# Patient Record
Sex: FEMALE | Race: BLACK OR AFRICAN AMERICAN | NOT HISPANIC OR LATINO | Employment: OTHER | URBAN - METROPOLITAN AREA
[De-identification: names, ages, dates, MRNs, and addresses within clinical notes are randomized per-mention and may not be internally consistent; named-entity substitution may affect disease eponyms.]

---

## 2017-02-01 ENCOUNTER — HISTORICAL (OUTPATIENT)
Dept: ENDOSCOPY | Facility: HOSPITAL | Age: 77
End: 2017-02-01

## 2017-03-06 ENCOUNTER — HISTORICAL (OUTPATIENT)
Dept: ADMINISTRATIVE | Facility: HOSPITAL | Age: 77
End: 2017-03-06

## 2017-09-07 ENCOUNTER — HISTORICAL (OUTPATIENT)
Dept: ADMINISTRATIVE | Facility: HOSPITAL | Age: 77
End: 2017-09-07

## 2017-09-07 LAB
APPEARANCE, UA: CLEAR
BACTERIA SPEC CULT: ABNORMAL /HPF
BILIRUB UR QL STRIP: NEGATIVE
COLOR UR: YELLOW
GLUCOSE (UA): ABNORMAL
HGB UR QL STRIP: ABNORMAL
KETONES UR QL STRIP: NEGATIVE
LEUKOCYTE ESTERASE UR QL STRIP: NEGATIVE
NITRITE UR QL STRIP: NEGATIVE
PH UR STRIP: 5 [PH] (ref 5–9)
PROT UR QL STRIP: ABNORMAL
RBC #/AREA URNS HPF: ABNORMAL /[HPF]
SP GR UR STRIP: 1.02 (ref 1–1.03)
SQUAMOUS EPITHELIAL, UA: ABNORMAL
UROBILINOGEN UR STRIP-ACNC: 0.2
WBC #/AREA URNS HPF: ABNORMAL /[HPF]

## 2018-04-10 ENCOUNTER — HISTORICAL (OUTPATIENT)
Dept: ADMINISTRATIVE | Facility: HOSPITAL | Age: 78
End: 2018-04-10

## 2018-07-10 ENCOUNTER — HISTORICAL (OUTPATIENT)
Dept: RADIOLOGY | Facility: HOSPITAL | Age: 78
End: 2018-07-10

## 2018-09-27 ENCOUNTER — HISTORICAL (OUTPATIENT)
Dept: ADMINISTRATIVE | Facility: HOSPITAL | Age: 78
End: 2018-09-27

## 2018-09-27 LAB
ABS NEUT (OLG): 5.56 X10(3)/MCL (ref 2.1–9.2)
APTT PPP: 29.1 SECOND(S) (ref 24.8–36.9)
BASOPHILS # BLD AUTO: 0 X10(3)/MCL (ref 0–0.2)
BASOPHILS NFR BLD AUTO: 0 %
BUN SERPL-MCNC: 21 MG/DL (ref 7–18)
CALCIUM SERPL-MCNC: 9.1 MG/DL (ref 8.5–10.1)
CHLORIDE SERPL-SCNC: 102 MMOL/L (ref 98–107)
CO2 SERPL-SCNC: 26 MMOL/L (ref 21–32)
CREAT SERPL-MCNC: 1.29 MG/DL (ref 0.55–1.02)
CREAT/UREA NIT SERPL: 16.3
EOSINOPHIL # BLD AUTO: 0.3 X10(3)/MCL (ref 0–0.9)
EOSINOPHIL NFR BLD AUTO: 3 %
ERYTHROCYTE [DISTWIDTH] IN BLOOD BY AUTOMATED COUNT: 12.8 % (ref 11.5–17)
GLUCOSE SERPL-MCNC: 189 MG/DL (ref 74–106)
HCT VFR BLD AUTO: 39.5 % (ref 37–47)
HGB BLD-MCNC: 13 GM/DL (ref 12–16)
INR PPP: 0.95 (ref 0–1.27)
LYMPHOCYTES # BLD AUTO: 2.9 X10(3)/MCL (ref 0.6–4.6)
LYMPHOCYTES NFR BLD AUTO: 30 %
MCH RBC QN AUTO: 29.6 PG (ref 27–31)
MCHC RBC AUTO-ENTMCNC: 32.9 GM/DL (ref 33–36)
MCV RBC AUTO: 90 FL (ref 80–94)
MONOCYTES # BLD AUTO: 0.7 X10(3)/MCL (ref 0.1–1.3)
MONOCYTES NFR BLD AUTO: 8 %
NEUTROPHILS # BLD AUTO: 5.56 X10(3)/MCL (ref 2.1–9.2)
NEUTROPHILS NFR BLD AUTO: 58 %
PLATELET # BLD AUTO: 217 X10(3)/MCL (ref 130–400)
PMV BLD AUTO: 9.8 FL (ref 9.4–12.4)
POTASSIUM SERPL-SCNC: 4.7 MMOL/L (ref 3.5–5.1)
PROTHROMBIN TIME: 13 SECOND(S) (ref 12.2–14.7)
RBC # BLD AUTO: 4.39 X10(6)/MCL (ref 4.2–5.4)
SODIUM SERPL-SCNC: 137 MMOL/L (ref 136–145)
WBC # SPEC AUTO: 9.6 X10(3)/MCL (ref 4.5–11.5)

## 2018-10-23 ENCOUNTER — HISTORICAL (OUTPATIENT)
Dept: ADMINISTRATIVE | Facility: HOSPITAL | Age: 78
End: 2018-10-23

## 2018-10-23 LAB
ABS NEUT (OLG): 3.19 X10(3)/MCL (ref 2.1–9.2)
ALBUMIN SERPL-MCNC: 2.9 GM/DL (ref 3.4–5)
ALBUMIN/GLOB SERPL: 1 {RATIO}
ALP SERPL-CCNC: 87 UNIT/L (ref 45–117)
ALT SERPL-CCNC: 40 UNIT/L (ref 13–56)
AST SERPL-CCNC: 55 UNIT/L (ref 15–37)
BASOPHILS # BLD AUTO: 0.02 X10(3)/MCL (ref 0–0.2)
BASOPHILS NFR BLD AUTO: 0.3 % (ref 0–1)
BILIRUB SERPL-MCNC: 0.2 MG/DL (ref 0.2–1)
BILIRUBIN DIRECT+TOT PNL SERPL-MCNC: <0.1 MG/DL (ref 0–0.2)
BILIRUBIN DIRECT+TOT PNL SERPL-MCNC: >0.1 MG/DL (ref 0–1)
BUN SERPL-MCNC: 26 MG/DL (ref 7–18)
CALCIUM SERPL-MCNC: 8.9 MG/DL (ref 8.5–10.1)
CHLORIDE SERPL-SCNC: 108 MMOL/L (ref 98–107)
CHOLEST SERPL-MCNC: 305 MG/DL (ref 0–199)
CHOLEST/HDLC SERPL: 8 MG/DL (ref 0–8)
CO2 SERPL-SCNC: 24 MMOL/L (ref 21–32)
CREAT SERPL-MCNC: 1.27 MG/DL (ref 0.55–1.02)
DEPRECATED CALCIDIOL+CALCIFEROL SERPL-MC: 9.95 NG/ML (ref 30–80)
EOSINOPHIL # BLD AUTO: 0.16 X10(3)/MCL (ref 0–0.9)
EOSINOPHIL NFR BLD AUTO: 2.4 % (ref 0–6.4)
ERYTHROCYTE [DISTWIDTH] IN BLOOD BY AUTOMATED COUNT: 13.4 % (ref 11.5–17)
GLOBULIN SER-MCNC: 4 GM/DL (ref 2–4)
GLUCOSE SERPL-MCNC: 128 MG/DL (ref 74–106)
HCT VFR BLD AUTO: 39.8 % (ref 37–47)
HDLC SERPL-MCNC: 37 MG/DL
HGB BLD-MCNC: 12.8 GM/DL (ref 12–16)
IMM GRANULOCYTES # BLD AUTO: 0.05 10*3/UL (ref 0–0.02)
IMM GRANULOCYTES NFR BLD AUTO: 0.7 % (ref 0–0.43)
LDLC SERPL CALC-MCNC: 185 MG/DL (ref 0–129)
LYMPHOCYTES # BLD AUTO: 2.68 X10(3)/MCL (ref 0.6–4.6)
LYMPHOCYTES NFR BLD AUTO: 39.9 % (ref 16–44)
MCH RBC QN AUTO: 29.7 PG (ref 27–31)
MCHC RBC AUTO-ENTMCNC: 32.2 GM/DL (ref 33–36)
MCV RBC AUTO: 92.3 FL (ref 80–94)
MONOCYTES # BLD AUTO: 0.62 X10(3)/MCL (ref 0.1–1.3)
MONOCYTES NFR BLD AUTO: 9.2 % (ref 4–12.1)
NEUTROPHILS # BLD AUTO: 3.19 X10(3)/MCL (ref 2.1–9.2)
NEUTROPHILS NFR BLD AUTO: 47.5 % (ref 43–73)
NRBC BLD AUTO-RTO: 0 % (ref 0–0.2)
PLATELET # BLD AUTO: 173 X10(3)/MCL (ref 130–400)
PMV BLD AUTO: 10.4 FL (ref 7.4–10.4)
POTASSIUM SERPL-SCNC: 4.5 MMOL/L (ref 3.5–5.1)
PREALB SERPL-MCNC: 28.8 MG/DL (ref 20–40)
PROT SERPL-MCNC: 7 GM/DL (ref 6.4–8.2)
RBC # BLD AUTO: 4.31 X10(6)/MCL (ref 4.2–5.4)
SODIUM SERPL-SCNC: 140 MMOL/L (ref 136–145)
TRIGL SERPL-MCNC: 414 MG/DL (ref 0–149)
TSH SERPL-ACNC: 2.13 MIU/ML (ref 0.36–3.74)
URATE SERPL-MCNC: 7.3 MG/DL (ref 2.6–6)
VLDLC SERPL CALC-MCNC: 83 MG/DL
WBC # SPEC AUTO: 6.7 X10(3)/MCL (ref 4.5–11.5)

## 2018-10-24 LAB
BUN SERPL-MCNC: 27 MG/DL (ref 7–18)
CALCIUM SERPL-MCNC: 8.4 MG/DL (ref 8.5–10.1)
CHLORIDE SERPL-SCNC: 104 MMOL/L (ref 98–107)
CO2 SERPL-SCNC: 25 MMOL/L (ref 21–32)
CREAT SERPL-MCNC: 1.78 MG/DL (ref 0.55–1.02)
CREAT/UREA NIT SERPL: 15
GLUCOSE SERPL-MCNC: 289 MG/DL (ref 74–106)
POTASSIUM SERPL-SCNC: 4.5 MMOL/L (ref 3.5–5.1)
SODIUM SERPL-SCNC: 137 MMOL/L (ref 136–145)

## 2018-10-25 LAB
BUN SERPL-MCNC: 25 MG/DL (ref 7–18)
CALCIUM SERPL-MCNC: 8.6 MG/DL (ref 8.5–10.1)
CHLORIDE SERPL-SCNC: 108 MMOL/L (ref 98–107)
CO2 SERPL-SCNC: 26 MMOL/L (ref 21–32)
CREAT SERPL-MCNC: 1.33 MG/DL (ref 0.55–1.02)
CREAT/UREA NIT SERPL: 19
GLUCOSE SERPL-MCNC: 127 MG/DL (ref 74–106)
POTASSIUM SERPL-SCNC: 4.6 MMOL/L (ref 3.5–5.1)
SODIUM SERPL-SCNC: 140 MMOL/L (ref 136–145)

## 2018-10-26 LAB
BUN SERPL-MCNC: 22 MG/DL (ref 7–18)
CALCIUM SERPL-MCNC: 8.5 MG/DL (ref 8.5–10.1)
CHLORIDE SERPL-SCNC: 106 MMOL/L (ref 98–107)
CO2 SERPL-SCNC: 25 MMOL/L (ref 21–32)
CREAT SERPL-MCNC: 1.21 MG/DL (ref 0.55–1.02)
CREAT/UREA NIT SERPL: 18
GLUCOSE SERPL-MCNC: 229 MG/DL (ref 74–106)
POTASSIUM SERPL-SCNC: 4.4 MMOL/L (ref 3.5–5.1)
SODIUM SERPL-SCNC: 138 MMOL/L (ref 136–145)

## 2018-10-29 LAB
ABS NEUT (OLG): 2.94 X10(3)/MCL (ref 2.1–9.2)
ALBUMIN SERPL-MCNC: 2.5 GM/DL (ref 3.4–5)
ALBUMIN/GLOB SERPL: 1 {RATIO}
ALP SERPL-CCNC: 67 UNIT/L (ref 45–117)
ALT SERPL-CCNC: 19 UNIT/L (ref 13–56)
AST SERPL-CCNC: 30 UNIT/L (ref 15–37)
BASOPHILS # BLD AUTO: 0.02 X10(3)/MCL (ref 0–0.2)
BASOPHILS NFR BLD AUTO: 0.4 % (ref 0–1)
BILIRUB SERPL-MCNC: 0.2 MG/DL (ref 0.2–1)
BILIRUBIN DIRECT+TOT PNL SERPL-MCNC: <0.1 MG/DL (ref 0–0.2)
BILIRUBIN DIRECT+TOT PNL SERPL-MCNC: >0.1 MG/DL (ref 0–1)
BUN SERPL-MCNC: 13 MG/DL (ref 7–18)
CALCIUM SERPL-MCNC: 8.8 MG/DL (ref 8.5–10.1)
CHLORIDE SERPL-SCNC: 107 MMOL/L (ref 98–107)
CO2 SERPL-SCNC: 27 MMOL/L (ref 21–32)
CREAT SERPL-MCNC: 1.27 MG/DL (ref 0.55–1.02)
EOSINOPHIL # BLD AUTO: 0.12 X10(3)/MCL (ref 0–0.9)
EOSINOPHIL NFR BLD AUTO: 2.1 % (ref 0–6.4)
ERYTHROCYTE [DISTWIDTH] IN BLOOD BY AUTOMATED COUNT: 13.2 % (ref 11.5–17)
GLOBULIN SER-MCNC: 4 GM/DL (ref 2–4)
GLUCOSE SERPL-MCNC: 101 MG/DL (ref 74–106)
HCT VFR BLD AUTO: 32.2 % (ref 37–47)
HGB BLD-MCNC: 10.8 GM/DL (ref 12–16)
IMM GRANULOCYTES # BLD AUTO: 0.04 10*3/UL (ref 0–0.02)
IMM GRANULOCYTES NFR BLD AUTO: 0.7 % (ref 0–0.43)
INR PPP: 1.03 (ref 0–1.27)
LYMPHOCYTES # BLD AUTO: 2.02 X10(3)/MCL (ref 0.6–4.6)
LYMPHOCYTES NFR BLD AUTO: 35.4 % (ref 16–44)
MCH RBC QN AUTO: 30.3 PG (ref 27–31)
MCHC RBC AUTO-ENTMCNC: 33.5 GM/DL (ref 33–36)
MCV RBC AUTO: 90.4 FL (ref 80–94)
MONOCYTES # BLD AUTO: 0.56 X10(3)/MCL (ref 0.1–1.3)
MONOCYTES NFR BLD AUTO: 9.8 % (ref 4–12.1)
NEUTROPHILS # BLD AUTO: 2.94 X10(3)/MCL (ref 2.1–9.2)
NEUTROPHILS NFR BLD AUTO: 51.6 % (ref 43–73)
NRBC BLD AUTO-RTO: 0 % (ref 0–0.2)
PLATELET # BLD AUTO: 196 X10(3)/MCL (ref 130–400)
PMV BLD AUTO: 9.5 FL (ref 7.4–10.4)
POTASSIUM SERPL-SCNC: 4.1 MMOL/L (ref 3.5–5.1)
PREALB SERPL-MCNC: 19.4 MG/DL (ref 20–40)
PROT SERPL-MCNC: 6.4 GM/DL (ref 6.4–8.2)
PROTHROMBIN TIME: 13.8 SECOND(S) (ref 12.2–14.7)
RBC # BLD AUTO: 3.56 X10(6)/MCL (ref 4.2–5.4)
SODIUM SERPL-SCNC: 142 MMOL/L (ref 136–145)
WBC # SPEC AUTO: 5.7 X10(3)/MCL (ref 4.5–11.5)

## 2018-10-31 LAB
HCT VFR BLD AUTO: 32.8 % (ref 37–47)
HGB BLD-MCNC: 10.8 GM/DL (ref 12–16)

## 2018-11-01 LAB
BUN SERPL-MCNC: 24 MG/DL (ref 7–18)
CALCIUM SERPL-MCNC: 8.8 MG/DL (ref 8.5–10.1)
CHLORIDE SERPL-SCNC: 102 MMOL/L (ref 98–107)
CO2 SERPL-SCNC: 28 MMOL/L (ref 21–32)
CREAT SERPL-MCNC: 1.89 MG/DL (ref 0.55–1.02)
CREAT/UREA NIT SERPL: 13
GLUCOSE SERPL-MCNC: 142 MG/DL (ref 74–106)
MAGNESIUM SERPL-MCNC: 1.8 MG/DL (ref 1.8–2.4)
POTASSIUM SERPL-SCNC: 4.4 MMOL/L (ref 3.5–5.1)
SODIUM SERPL-SCNC: 138 MMOL/L (ref 136–145)

## 2018-11-02 LAB
BUN SERPL-MCNC: 17 MG/DL (ref 7–18)
CALCIUM SERPL-MCNC: 8.2 MG/DL (ref 8.5–10.1)
CHLORIDE SERPL-SCNC: 105 MMOL/L (ref 98–107)
CO2 SERPL-SCNC: 28 MMOL/L (ref 21–32)
CREAT SERPL-MCNC: 1.54 MG/DL (ref 0.55–1.02)
CREAT/UREA NIT SERPL: 11
GLUCOSE SERPL-MCNC: 108 MG/DL (ref 74–106)
POTASSIUM SERPL-SCNC: 4.3 MMOL/L (ref 3.5–5.1)
SODIUM SERPL-SCNC: 139 MMOL/L (ref 136–145)

## 2018-12-14 ENCOUNTER — HISTORICAL (OUTPATIENT)
Dept: RADIOLOGY | Facility: HOSPITAL | Age: 78
End: 2018-12-14

## 2018-12-21 ENCOUNTER — HISTORICAL (OUTPATIENT)
Dept: LAB | Facility: HOSPITAL | Age: 78
End: 2018-12-21

## 2018-12-21 LAB
ABS NEUT (OLG): 5.87 X10(3)/MCL (ref 2.1–9.2)
ALBUMIN SERPL-MCNC: 3.5 GM/DL (ref 3.4–5)
ALBUMIN/GLOB SERPL: 0.8 {RATIO}
ALP SERPL-CCNC: 68 UNIT/L (ref 38–126)
ALT SERPL-CCNC: 11 UNIT/L (ref 12–78)
ANISOCYTOSIS BLD QL SMEAR: 1
AST SERPL-CCNC: 18 UNIT/L (ref 15–37)
BILIRUB SERPL-MCNC: 0.3 MG/DL (ref 0.2–1)
BILIRUBIN DIRECT+TOT PNL SERPL-MCNC: 0.1 MG/DL (ref 0–0.2)
BILIRUBIN DIRECT+TOT PNL SERPL-MCNC: 0.2 MG/DL (ref 0–0.8)
BUN SERPL-MCNC: 20 MG/DL (ref 7–18)
CALCIUM SERPL-MCNC: 8.9 MG/DL (ref 8.5–10.1)
CHLORIDE SERPL-SCNC: 109 MMOL/L (ref 98–107)
CHOLEST SERPL-MCNC: 182 MG/DL (ref 0–200)
CHOLEST/HDLC SERPL: 5.5 {RATIO} (ref 0–4)
CO2 SERPL-SCNC: 20 MMOL/L (ref 21–32)
CREAT SERPL-MCNC: 1.31 MG/DL (ref 0.55–1.02)
EOSINOPHIL NFR BLD MANUAL: 3 % (ref 0–8)
ERYTHROCYTE [DISTWIDTH] IN BLOOD BY AUTOMATED COUNT: 13.3 % (ref 11.5–17)
GLOBULIN SER-MCNC: 4.2 GM/DL (ref 2.4–3.5)
GLUCOSE SERPL-MCNC: 143 MG/DL (ref 74–106)
HAV IGM SERPL QL IA: NEGATIVE
HBV CORE IGM SERPL QL IA: NEGATIVE
HBV SURFACE AG SERPL QL IA: NEGATIVE
HCT VFR BLD AUTO: 40.1 % (ref 37–47)
HCV AB SERPL QL IA: NEGATIVE
HDLC SERPL-MCNC: 33 MG/DL (ref 35–60)
HEPATITIS PANEL INTERP: NORMAL
HGB BLD-MCNC: 12.6 GM/DL (ref 12–16)
HIV 1+2 AB+HIV1 P24 AG SERPL QL IA: NEGATIVE
HYPOCHROMIA BLD QL SMEAR: 0
LDLC SERPL CALC-MCNC: 103 MG/DL (ref 0–129)
LYMPHOCYTES NFR BLD MANUAL: 26 % (ref 13–40)
MACROCYTES BLD QL SMEAR: 0
MCH RBC QN AUTO: 28.3 PG (ref 27–31)
MCHC RBC AUTO-ENTMCNC: 31.4 GM/DL (ref 33–36)
MCV RBC AUTO: 90.1 FL (ref 80–94)
MICROCYTES BLD QL SMEAR: 1
MONOCYTES NFR BLD MANUAL: 2 % (ref 2–11)
NEUTROPHILS NFR BLD MANUAL: 67 % (ref 47–80)
PLATELET # BLD AUTO: 256 X10(3)/MCL (ref 130–400)
PLATELET # BLD EST: NORMAL 10*3/UL
PMV BLD AUTO: 10.6 FL (ref 7.4–10.4)
POIKILOCYTOSIS BLD QL SMEAR: 0
POLYCHROMASIA BLD QL SMEAR: 0
POTASSIUM SERPL-SCNC: 4.1 MMOL/L (ref 3.5–5.1)
PROT SERPL-MCNC: 7.7 GM/DL (ref 6.4–8.2)
RBC # BLD AUTO: 4.45 X10(6)/MCL (ref 4.2–5.4)
SODIUM SERPL-SCNC: 139 MMOL/L (ref 136–145)
TRIGL SERPL-MCNC: 228 MG/DL (ref 30–150)
TSH SERPL-ACNC: 2.74 MIU/L (ref 0.36–3.74)
VLDLC SERPL CALC-MCNC: 46 MG/DL
WBC # SPEC AUTO: 9.6 X10(3)/MCL (ref 4.5–11.5)

## 2019-02-25 ENCOUNTER — HOSPITAL ENCOUNTER (OUTPATIENT)
Dept: ADMINISTRATIVE | Facility: HOSPITAL | Age: 79
End: 2019-02-26
Attending: INTERNAL MEDICINE | Admitting: INTERNAL MEDICINE

## 2019-02-25 LAB
ABS NEUT (OLG): 5.46 X10(3)/MCL (ref 2.1–9.2)
ALBUMIN SERPL-MCNC: 3.6 GM/DL (ref 3.4–5)
ALBUMIN/GLOB SERPL: 0.9 RATIO (ref 1.1–2)
ALP SERPL-CCNC: 82 UNIT/L (ref 38–126)
ALT SERPL-CCNC: 13 UNIT/L (ref 12–78)
AST SERPL-CCNC: 18 UNIT/L (ref 15–37)
BASOPHILS # BLD AUTO: 0 X10(3)/MCL (ref 0–0.2)
BASOPHILS NFR BLD AUTO: 1 %
BILIRUB SERPL-MCNC: 0.2 MG/DL (ref 0.2–1)
BILIRUBIN DIRECT+TOT PNL SERPL-MCNC: 0.1 MG/DL (ref 0–0.5)
BILIRUBIN DIRECT+TOT PNL SERPL-MCNC: 0.1 MG/DL (ref 0–0.8)
BUN SERPL-MCNC: 22 MG/DL (ref 7–18)
CALCIUM SERPL-MCNC: 9 MG/DL (ref 8.5–10.1)
CHLORIDE SERPL-SCNC: 104 MMOL/L (ref 98–107)
CK MB SERPL-MCNC: 0.8 NG/ML (ref 0.5–3.6)
CK MB SERPL-MCNC: 1 NG/ML (ref 0.5–3.6)
CK SERPL-CCNC: 86 UNIT/L (ref 26–192)
CK SERPL-CCNC: 90 UNIT/L (ref 26–192)
CO2 SERPL-SCNC: 27 MMOL/L (ref 21–32)
CREAT SERPL-MCNC: 1.35 MG/DL (ref 0.55–1.02)
EOSINOPHIL # BLD AUTO: 0.2 X10(3)/MCL (ref 0–0.9)
EOSINOPHIL NFR BLD AUTO: 2 %
ERYTHROCYTE [DISTWIDTH] IN BLOOD BY AUTOMATED COUNT: 12.8 % (ref 11.5–17)
GLOBULIN SER-MCNC: 3.9 GM/DL (ref 2.4–3.5)
GLUCOSE SERPL-MCNC: 194 MG/DL (ref 74–106)
HCT VFR BLD AUTO: 44.6 % (ref 37–47)
HGB BLD-MCNC: 14 GM/DL (ref 12–16)
LYMPHOCYTES # BLD AUTO: 2.2 X10(3)/MCL (ref 0.6–4.6)
LYMPHOCYTES NFR BLD AUTO: 26 %
MCH RBC QN AUTO: 27.1 PG (ref 27–31)
MCHC RBC AUTO-ENTMCNC: 31.4 GM/DL (ref 33–36)
MCV RBC AUTO: 86.3 FL (ref 80–94)
MONOCYTES # BLD AUTO: 0.5 X10(3)/MCL (ref 0.1–1.3)
MONOCYTES NFR BLD AUTO: 6 %
NEUTROPHILS # BLD AUTO: 5.46 X10(3)/MCL (ref 2.1–9.2)
NEUTROPHILS NFR BLD AUTO: 64 %
PLATELET # BLD AUTO: 246 X10(3)/MCL (ref 130–400)
PMV BLD AUTO: 10 FL (ref 9.4–12.4)
POC TROPONIN: 0 NG/ML (ref 0–0.08)
POTASSIUM SERPL-SCNC: 4.3 MMOL/L (ref 3.5–5.1)
PROT SERPL-MCNC: 7.5 GM/DL (ref 6.4–8.2)
RBC # BLD AUTO: 5.17 X10(6)/MCL (ref 4.2–5.4)
SODIUM SERPL-SCNC: 139 MMOL/L (ref 136–145)
TROPONIN I SERPL-MCNC: <0.02 NG/ML (ref 0.02–0.49)
TROPONIN I SERPL-MCNC: <0.02 NG/ML (ref 0.02–0.49)
WBC # SPEC AUTO: 8.5 X10(3)/MCL (ref 4.5–11.5)

## 2019-02-26 LAB
CK MB SERPL-MCNC: <1 NG/ML (ref 0.5–3.6)
CK SERPL-CCNC: 98 UNIT/L (ref 26–192)
TROPONIN I SERPL-MCNC: <0.02 NG/ML (ref 0.02–0.49)

## 2019-04-09 ENCOUNTER — HISTORICAL (OUTPATIENT)
Dept: RADIOLOGY | Facility: HOSPITAL | Age: 79
End: 2019-04-09

## 2019-10-11 ENCOUNTER — HISTORICAL (OUTPATIENT)
Dept: ADMINISTRATIVE | Facility: HOSPITAL | Age: 79
End: 2019-10-11

## 2019-10-11 LAB — DEPRECATED CALCIDIOL+CALCIFEROL SERPL-MC: 73.1 NG/ML (ref 30–100)

## 2019-11-12 ENCOUNTER — HISTORICAL (OUTPATIENT)
Dept: LAB | Facility: HOSPITAL | Age: 79
End: 2019-11-12

## 2019-11-12 ENCOUNTER — HISTORICAL (OUTPATIENT)
Dept: SURGERY | Facility: HOSPITAL | Age: 79
End: 2019-11-12

## 2019-11-12 LAB
BUN SERPL-MCNC: 33 MG/DL (ref 7–18)
CALCIUM SERPL-MCNC: 9.7 MG/DL (ref 8.5–10.1)
CHLORIDE SERPL-SCNC: 107 MMOL/L (ref 98–107)
CO2 SERPL-SCNC: 27 MMOL/L (ref 21–32)
CREAT SERPL-MCNC: 1.61 MG/DL (ref 0.55–1.02)
CREAT/UREA NIT SERPL: 20.5
GLUCOSE SERPL-MCNC: 104 MG/DL (ref 74–106)
HCT VFR BLD AUTO: 41.2 % (ref 37–47)
HGB BLD-MCNC: 13.2 GM/DL (ref 12–16)
INR PPP: 1 (ref 0–1.3)
PLATELET # BLD AUTO: 221 X10(3)/MCL (ref 130–400)
POTASSIUM SERPL-SCNC: 4.5 MMOL/L (ref 3.5–5.1)
PROTHROMBIN TIME: 12.9 SECOND(S) (ref 12–14)
SODIUM SERPL-SCNC: 141 MMOL/L (ref 136–145)

## 2019-11-26 ENCOUNTER — HISTORICAL (OUTPATIENT)
Dept: SURGERY | Facility: HOSPITAL | Age: 79
End: 2019-11-26

## 2020-01-03 ENCOUNTER — HISTORICAL (OUTPATIENT)
Dept: PREADMISSION TESTING | Facility: HOSPITAL | Age: 80
End: 2020-01-03

## 2020-01-03 LAB
ABS NEUT (OLG): 5.97 X10(3)/MCL (ref 2.1–9.2)
BASOPHILS # BLD AUTO: 0.1 X10(3)/MCL (ref 0–0.2)
BASOPHILS NFR BLD AUTO: 1 %
BUN SERPL-MCNC: 23 MG/DL (ref 7–18)
CALCIUM SERPL-MCNC: 9.3 MG/DL (ref 8.5–10.1)
CHLORIDE SERPL-SCNC: 105 MMOL/L (ref 98–107)
CO2 SERPL-SCNC: 26 MMOL/L (ref 21–32)
CREAT SERPL-MCNC: 1.49 MG/DL (ref 0.55–1.02)
CREAT/UREA NIT SERPL: 15.4
EOSINOPHIL # BLD AUTO: 0.2 X10(3)/MCL (ref 0–0.9)
EOSINOPHIL NFR BLD AUTO: 2 %
ERYTHROCYTE [DISTWIDTH] IN BLOOD BY AUTOMATED COUNT: 13.2 % (ref 11.5–17)
GLUCOSE SERPL-MCNC: 179 MG/DL (ref 74–106)
HCT VFR BLD AUTO: 41.2 % (ref 37–47)
HGB BLD-MCNC: 12.9 GM/DL (ref 12–16)
LYMPHOCYTES # BLD AUTO: 2.9 X10(3)/MCL (ref 0.6–4.6)
LYMPHOCYTES NFR BLD AUTO: 30 %
MCH RBC QN AUTO: 28.2 PG (ref 27–31)
MCHC RBC AUTO-ENTMCNC: 31.3 GM/DL (ref 33–36)
MCV RBC AUTO: 90 FL (ref 80–94)
MONOCYTES # BLD AUTO: 0.7 X10(3)/MCL (ref 0.1–1.3)
MONOCYTES NFR BLD AUTO: 7 %
NEUTROPHILS # BLD AUTO: 5.97 X10(3)/MCL (ref 2.1–9.2)
NEUTROPHILS NFR BLD AUTO: 60 %
PLATELET # BLD AUTO: 207 X10(3)/MCL (ref 130–400)
PMV BLD AUTO: 10.3 FL (ref 9.4–12.4)
POTASSIUM SERPL-SCNC: 4.2 MMOL/L (ref 3.5–5.1)
RBC # BLD AUTO: 4.58 X10(6)/MCL (ref 4.2–5.4)
SODIUM SERPL-SCNC: 139 MMOL/L (ref 136–145)
WBC # SPEC AUTO: 9.9 X10(3)/MCL (ref 4.5–11.5)

## 2020-01-14 ENCOUNTER — HISTORICAL (OUTPATIENT)
Dept: ADMINISTRATIVE | Facility: HOSPITAL | Age: 80
End: 2020-01-14

## 2020-01-14 LAB
CHOLEST SERPL-MCNC: 122 MG/DL (ref 100–199)
CHOLEST/HDLC SERPL: 3.7 RATIO (ref 0–4.4)
HDLC SERPL-MCNC: 33 MG/DL
LDLC SERPL CALC-MCNC: 40 MG/DL (ref 0–99)
TRIGL SERPL-MCNC: 245 MG/DL (ref 0–149)
TSH SERPL-ACNC: 4.01 MIU/ML (ref 0.45–4.5)
VLDLC SERPL CALC-MCNC: 49 MG/DL (ref 5–40)

## 2020-01-16 ENCOUNTER — HISTORICAL (OUTPATIENT)
Dept: SURGERY | Facility: HOSPITAL | Age: 80
End: 2020-01-16

## 2020-01-25 ENCOUNTER — HISTORICAL (OUTPATIENT)
Dept: ADMINISTRATIVE | Facility: HOSPITAL | Age: 80
End: 2020-01-25

## 2020-01-25 LAB
ABS NEUT (OLG): 4.01 X10(3)/MCL (ref 2.1–9.2)
ALBUMIN SERPL-MCNC: 3.4 GM/DL (ref 3.4–5)
ALBUMIN/GLOB SERPL: 0.8 {RATIO}
ALP SERPL-CCNC: 63 UNIT/L (ref 45–117)
ALT SERPL-CCNC: 10 UNIT/L (ref 13–56)
AST SERPL-CCNC: 18 UNIT/L (ref 15–37)
BASOPHILS # BLD AUTO: 0.02 X10(3)/MCL (ref 0–0.2)
BASOPHILS NFR BLD AUTO: 0.3 % (ref 0–1)
BILIRUB SERPL-MCNC: 0.4 MG/DL (ref 0.2–1)
BILIRUBIN DIRECT+TOT PNL SERPL-MCNC: <0.1 MG/DL (ref 0–0.2)
BILIRUBIN DIRECT+TOT PNL SERPL-MCNC: >0.3 MG/DL (ref 0–1)
BUN SERPL-MCNC: 15 MG/DL (ref 7–18)
CALCIUM SERPL-MCNC: 9.8 MG/DL (ref 8.5–10.1)
CHLORIDE SERPL-SCNC: 108 MMOL/L (ref 98–107)
CO2 SERPL-SCNC: 28 MMOL/L (ref 21–32)
CREAT SERPL-MCNC: 1.38 MG/DL (ref 0.55–1.02)
EOSINOPHIL # BLD AUTO: 0.31 X10(3)/MCL (ref 0–0.9)
EOSINOPHIL NFR BLD AUTO: 4.3 % (ref 0–6.4)
ERYTHROCYTE [DISTWIDTH] IN BLOOD BY AUTOMATED COUNT: 13.6 % (ref 11.5–17)
GLOBULIN SER-MCNC: 4 GM/DL (ref 2–4)
GLUCOSE SERPL-MCNC: 156 MG/DL (ref 74–106)
HCT VFR BLD AUTO: 33.4 % (ref 37–47)
HGB BLD-MCNC: 10.9 GM/DL (ref 12–16)
IMM GRANULOCYTES # BLD AUTO: 0.03 10*3/UL (ref 0–0.02)
IMM GRANULOCYTES NFR BLD AUTO: 0.4 % (ref 0–0.43)
LYMPHOCYTES # BLD AUTO: 2.36 X10(3)/MCL (ref 0.6–4.6)
LYMPHOCYTES NFR BLD AUTO: 32.7 % (ref 16–44)
MCH RBC QN AUTO: 29.2 PG (ref 27–31)
MCHC RBC AUTO-ENTMCNC: 32.6 GM/DL (ref 33–36)
MCV RBC AUTO: 89.5 FL (ref 80–94)
MONOCYTES # BLD AUTO: 0.49 X10(3)/MCL (ref 0.1–1.3)
MONOCYTES NFR BLD AUTO: 6.8 % (ref 4–12.1)
NEUTROPHILS # BLD AUTO: 4.01 X10(3)/MCL (ref 2.1–9.2)
NEUTROPHILS NFR BLD AUTO: 55.5 % (ref 43–73)
NRBC BLD AUTO-RTO: 0 % (ref 0–0.2)
PLATELET # BLD AUTO: 208 X10(3)/MCL (ref 130–400)
PMV BLD AUTO: 9.2 FL (ref 7.4–10.4)
POTASSIUM SERPL-SCNC: 3.7 MMOL/L (ref 3.5–5.1)
PREALB SERPL-MCNC: 22.8 MG/DL (ref 20–40)
PROT SERPL-MCNC: 7.2 GM/DL (ref 6.4–8.2)
RBC # BLD AUTO: 3.73 X10(6)/MCL (ref 4.2–5.4)
SODIUM SERPL-SCNC: 142 MMOL/L (ref 136–145)
URATE SERPL-MCNC: 5.3 MG/DL (ref 2.6–6)
WBC # SPEC AUTO: 7.2 X10(3)/MCL (ref 4.5–11.5)

## 2020-01-27 LAB
ABS NEUT (OLG): 11.6 X10(3)/MCL (ref 2.1–9.2)
ALBUMIN SERPL-MCNC: 3.5 GM/DL (ref 3.4–5)
ALBUMIN/GLOB SERPL: 0.9 {RATIO}
ALP SERPL-CCNC: 64 UNIT/L (ref 45–117)
ALT SERPL-CCNC: 12 UNIT/L (ref 13–56)
AST SERPL-CCNC: 18 UNIT/L (ref 15–37)
BASOPHILS # BLD AUTO: 0.03 X10(3)/MCL (ref 0–0.2)
BASOPHILS NFR BLD AUTO: 0.2 % (ref 0–1)
BILIRUB SERPL-MCNC: 0.3 MG/DL (ref 0.2–1)
BILIRUBIN DIRECT+TOT PNL SERPL-MCNC: <0.1 MG/DL (ref 0–0.2)
BILIRUBIN DIRECT+TOT PNL SERPL-MCNC: >0.2 MG/DL (ref 0–1)
BUN SERPL-MCNC: 34 MG/DL (ref 7–18)
CALCIUM SERPL-MCNC: 9.6 MG/DL (ref 8.5–10.1)
CHLORIDE SERPL-SCNC: 109 MMOL/L (ref 98–107)
CO2 SERPL-SCNC: 28 MMOL/L (ref 21–32)
CREAT SERPL-MCNC: 1.56 MG/DL (ref 0.55–1.02)
EOSINOPHIL # BLD AUTO: 0.01 X10(3)/MCL (ref 0–0.9)
EOSINOPHIL NFR BLD AUTO: 0.1 % (ref 0–6.4)
ERYTHROCYTE [DISTWIDTH] IN BLOOD BY AUTOMATED COUNT: 14.1 % (ref 11.5–17)
GLOBULIN SER-MCNC: 4 GM/DL (ref 2–4)
GLUCOSE SERPL-MCNC: 142 MG/DL (ref 74–106)
HCT VFR BLD AUTO: 33.9 % (ref 37–47)
HGB BLD-MCNC: 10.9 GM/DL (ref 12–16)
IMM GRANULOCYTES # BLD AUTO: 0.11 10*3/UL (ref 0–0.02)
IMM GRANULOCYTES NFR BLD AUTO: 0.7 % (ref 0–0.43)
LYMPHOCYTES # BLD AUTO: 3.31 X10(3)/MCL (ref 0.6–4.6)
LYMPHOCYTES NFR BLD AUTO: 20.6 % (ref 16–44)
MCH RBC QN AUTO: 29.1 PG (ref 27–31)
MCHC RBC AUTO-ENTMCNC: 32.2 GM/DL (ref 33–36)
MCV RBC AUTO: 90.6 FL (ref 80–94)
MONOCYTES # BLD AUTO: 0.98 X10(3)/MCL (ref 0.1–1.3)
MONOCYTES NFR BLD AUTO: 6.1 % (ref 4–12.1)
NEUTROPHILS # BLD AUTO: 11.6 X10(3)/MCL (ref 2.1–9.2)
NEUTROPHILS NFR BLD AUTO: 72.3 % (ref 43–73)
NRBC BLD AUTO-RTO: 0 % (ref 0–0.2)
PLATELET # BLD AUTO: 278 X10(3)/MCL (ref 130–400)
PMV BLD AUTO: 9.5 FL (ref 7.4–10.4)
POTASSIUM SERPL-SCNC: 4.1 MMOL/L (ref 3.5–5.1)
PREALB SERPL-MCNC: 30.8 MG/DL (ref 20–40)
PROT SERPL-MCNC: 7.6 GM/DL (ref 6.4–8.2)
RBC # BLD AUTO: 3.74 X10(6)/MCL (ref 4.2–5.4)
SODIUM SERPL-SCNC: 143 MMOL/L (ref 136–145)
WBC # SPEC AUTO: 16 X10(3)/MCL (ref 4.5–11.5)

## 2020-01-30 LAB
ABS NEUT (OLG): 3.4 X10(3)/MCL (ref 2.1–9.2)
BASOPHILS # BLD AUTO: 0.04 X10(3)/MCL (ref 0–0.2)
BASOPHILS NFR BLD AUTO: 0.5 % (ref 0–1)
BUN SERPL-MCNC: 32 MG/DL (ref 7–18)
CALCIUM SERPL-MCNC: 9.1 MG/DL (ref 8.5–10.1)
CHLORIDE SERPL-SCNC: 107 MMOL/L (ref 98–107)
CO2 SERPL-SCNC: 28 MMOL/L (ref 21–32)
CREAT SERPL-MCNC: 1.54 MG/DL (ref 0.55–1.02)
CREAT/UREA NIT SERPL: 21
EOSINOPHIL # BLD AUTO: 0.38 X10(3)/MCL (ref 0–0.9)
EOSINOPHIL NFR BLD AUTO: 5 % (ref 0–6.4)
ERYTHROCYTE [DISTWIDTH] IN BLOOD BY AUTOMATED COUNT: 14.3 % (ref 11.5–17)
GLUCOSE SERPL-MCNC: 146 MG/DL (ref 74–106)
HCT VFR BLD AUTO: 34.6 % (ref 37–47)
HGB BLD-MCNC: 11.2 GM/DL (ref 12–16)
IMM GRANULOCYTES # BLD AUTO: 0.07 10*3/UL (ref 0–0.02)
IMM GRANULOCYTES NFR BLD AUTO: 0.9 % (ref 0–0.43)
LYMPHOCYTES # BLD AUTO: 3.02 X10(3)/MCL (ref 0.6–4.6)
LYMPHOCYTES NFR BLD AUTO: 40 % (ref 16–44)
MCH RBC QN AUTO: 29.5 PG (ref 27–31)
MCHC RBC AUTO-ENTMCNC: 32.4 GM/DL (ref 33–36)
MCV RBC AUTO: 91.1 FL (ref 80–94)
MONOCYTES # BLD AUTO: 0.64 X10(3)/MCL (ref 0.1–1.3)
MONOCYTES NFR BLD AUTO: 8.5 % (ref 4–12.1)
NEUTROPHILS # BLD AUTO: 3.4 X10(3)/MCL (ref 2.1–9.2)
NEUTROPHILS NFR BLD AUTO: 45.1 % (ref 43–73)
NRBC BLD AUTO-RTO: 0.3 % (ref 0–0.2)
PLATELET # BLD AUTO: 264 X10(3)/MCL (ref 130–400)
PMV BLD AUTO: 9.3 FL (ref 7.4–10.4)
POTASSIUM SERPL-SCNC: 4.1 MMOL/L (ref 3.5–5.1)
RBC # BLD AUTO: 3.8 X10(6)/MCL (ref 4.2–5.4)
SODIUM SERPL-SCNC: 142 MMOL/L (ref 136–145)
WBC # SPEC AUTO: 7.6 X10(3)/MCL (ref 4.5–11.5)

## 2020-02-04 ENCOUNTER — HISTORICAL (OUTPATIENT)
Dept: ADMINISTRATIVE | Facility: HOSPITAL | Age: 80
End: 2020-02-04

## 2020-02-04 LAB — MAGNESIUM SERPL-MCNC: 1.9 MG/DL (ref 1.6–2.3)

## 2020-02-12 ENCOUNTER — HISTORICAL (OUTPATIENT)
Dept: ADMINISTRATIVE | Facility: HOSPITAL | Age: 80
End: 2020-02-12

## 2020-03-18 ENCOUNTER — HISTORICAL (OUTPATIENT)
Dept: ADMINISTRATIVE | Facility: HOSPITAL | Age: 80
End: 2020-03-18

## 2020-03-18 LAB
ALBUMIN SERPL-MCNC: 4.2 G/DL (ref 3.7–4.7)
ALBUMIN/GLOB SERPL: 1.6 {RATIO} (ref 1.2–2.2)
ALP SERPL-CCNC: 71 IU/L (ref 39–117)
ALT SERPL-CCNC: 7 IU/L (ref 0–32)
AST SERPL-CCNC: 19 IU/L (ref 0–40)
BASOPHILS # BLD AUTO: 0.1 X10E3/UL (ref 0–0.2)
BASOPHILS NFR BLD AUTO: 1 %
BILIRUB SERPL-MCNC: 0.2 MG/DL (ref 0–1.2)
BUN SERPL-MCNC: 30 MG/DL (ref 8–27)
CALCIUM SERPL-MCNC: 10.1 MG/DL (ref 8.7–10.3)
CHLORIDE SERPL-SCNC: 104 MMOL/L (ref 96–106)
CHOLEST SERPL-MCNC: 122 MG/DL (ref 100–199)
CHOLEST/HDLC SERPL: 4.1 RATIO (ref 0–4.4)
CO2 SERPL-SCNC: 20 MMOL/L (ref 20–29)
CREAT SERPL-MCNC: 1.66 MG/DL (ref 0.57–1)
CREAT/UREA NIT SERPL: 18 (ref 12–28)
DEPRECATED CALCIDIOL+CALCIFEROL SERPL-MC: 47 NG/ML (ref 30–100)
EOSINOPHIL # BLD AUTO: 0.3 X10E3/UL (ref 0–0.4)
EOSINOPHIL NFR BLD AUTO: 4 %
ERYTHROCYTE [DISTWIDTH] IN BLOOD BY AUTOMATED COUNT: 13.7 % (ref 11.7–15.4)
GLOBULIN SER-MCNC: 2.7 G/DL (ref 1.5–4.5)
GLUCOSE SERPL-MCNC: 297 MG/DL (ref 65–99)
HCT VFR BLD AUTO: 39.5 % (ref 34–46.6)
HDLC SERPL-MCNC: 30 MG/DL
HGB BLD-MCNC: 12.3 G/DL (ref 11.1–15.9)
LDLC SERPL CALC-MCNC: 30 MG/DL (ref 0–99)
LYMPHOCYTES # BLD AUTO: 2.5 X10E3/UL (ref 0.7–3.1)
LYMPHOCYTES NFR BLD AUTO: 34 %
MAGNESIUM SERPL-MCNC: 1.8 MG/DL (ref 1.6–2.3)
MCH RBC QN AUTO: 28.7 PG (ref 26.6–33)
MCHC RBC AUTO-ENTMCNC: 31.1 G/DL (ref 31.5–35.7)
MCV RBC AUTO: 92 FL (ref 79–97)
MONOCYTES # BLD AUTO: 0.6 X10E3/UL (ref 0.1–0.9)
MONOCYTES NFR BLD AUTO: 8 %
NEUTROPHILS # BLD AUTO: 4 X10E3/UL (ref 1.4–7)
NEUTROPHILS NFR BLD AUTO: 53 %
PLATELET # BLD AUTO: 226 X10E3/UL (ref 150–450)
POTASSIUM SERPL-SCNC: 4.2 MMOL/L (ref 3.5–5.2)
PROT SERPL-MCNC: 6.9 G/DL (ref 6–8.5)
RBC # BLD AUTO: 4.29 X10(6)/MCL (ref 3.77–5.28)
SODIUM SERPL-SCNC: 139 MMOL/L (ref 134–144)
TRIGL SERPL-MCNC: 312 MG/DL (ref 0–149)
TSH SERPL-ACNC: 3.48 MIU/ML (ref 0.45–4.5)
VLDLC SERPL CALC-MCNC: 62 MG/DL (ref 5–40)
WBC # SPEC AUTO: 7.5 X10E3/UL (ref 3.4–10.8)

## 2020-04-22 ENCOUNTER — HISTORICAL (OUTPATIENT)
Dept: ADMINISTRATIVE | Facility: HOSPITAL | Age: 80
End: 2020-04-22

## 2020-04-22 LAB
ALBUMIN SERPL-MCNC: 4.2 G/DL (ref 3.7–4.7)
ALBUMIN/GLOB SERPL: 1.4 {RATIO} (ref 1.2–2.2)
ALP SERPL-CCNC: 69 IU/L (ref 39–117)
ALT SERPL-CCNC: 8 IU/L (ref 0–32)
AST SERPL-CCNC: 24 IU/L (ref 0–40)
BASOPHILS # BLD AUTO: 0.1 X10E3/UL (ref 0–0.2)
BASOPHILS NFR BLD AUTO: 1 %
BILIRUB SERPL-MCNC: 0.2 MG/DL (ref 0–1.2)
BILIRUB SERPL-MCNC: NEGATIVE MG/DL
BLOOD URINE, POC: NORMAL
BUN SERPL-MCNC: 33 MG/DL (ref 8–27)
CALCIUM SERPL-MCNC: 9.3 MG/DL (ref 8.7–10.3)
CHLORIDE SERPL-SCNC: 100 MMOL/L (ref 96–106)
CLARITY, POC UA: CLEAR
CO2 SERPL-SCNC: 22 MMOL/L (ref 20–29)
COLOR, POC UA: YELLOW
CREAT SERPL-MCNC: 1.74 MG/DL (ref 0.57–1)
CREAT/UREA NIT SERPL: 19 (ref 12–28)
EOSINOPHIL # BLD AUTO: 0.2 X10E3/UL (ref 0–0.4)
EOSINOPHIL NFR BLD AUTO: 3 %
ERYTHROCYTE [DISTWIDTH] IN BLOOD BY AUTOMATED COUNT: 13 % (ref 11.7–15.4)
GLOBULIN SER-MCNC: 3.1 G/DL (ref 1.5–4.5)
GLUCOSE SERPL-MCNC: 269 MG/DL (ref 65–99)
GLUCOSE UR QL STRIP: NORMAL
HCT VFR BLD AUTO: 38.8 % (ref 34–46.6)
HGB BLD-MCNC: 12.4 G/DL (ref 11.1–15.9)
KETONES UR QL STRIP: NEGATIVE
LEUKOCYTE EST, POC UA: NORMAL
LYMPHOCYTES # BLD AUTO: 2.3 X10E3/UL (ref 0.7–3.1)
LYMPHOCYTES NFR BLD AUTO: 32 %
MCH RBC QN AUTO: 28.5 PG (ref 26.6–33)
MCHC RBC AUTO-ENTMCNC: 32 G/DL (ref 31.5–35.7)
MCV RBC AUTO: 89 FL (ref 79–97)
MONOCYTES # BLD AUTO: 0.5 X10E3/UL (ref 0.1–0.9)
MONOCYTES NFR BLD AUTO: 7 %
NEUTROPHILS # BLD AUTO: 4.2 X10E3/UL (ref 1.4–7)
NEUTROPHILS NFR BLD AUTO: 57 %
NITRITE, POC UA: NEGATIVE
PH, POC UA: 6
PLATELET # BLD AUTO: 192 X10E3/UL (ref 150–450)
POTASSIUM SERPL-SCNC: 4.5 MMOL/L (ref 3.5–5.2)
PROT SERPL-MCNC: 7.3 G/DL (ref 6–8.5)
PROTEIN, POC: NORMAL
RBC # BLD AUTO: 4.35 X10(6)/MCL (ref 3.77–5.28)
SODIUM SERPL-SCNC: 139 MMOL/L (ref 134–144)
SPECIFIC GRAVITY, POC UA: 1.02
TSH SERPL-ACNC: 3.6 MIU/ML (ref 0.45–4.5)
UROBILINOGEN, POC UA: NORMAL
WBC # SPEC AUTO: 7.3 X10E3/UL (ref 3.4–10.8)

## 2020-07-30 ENCOUNTER — HISTORICAL (OUTPATIENT)
Dept: RADIOLOGY | Facility: HOSPITAL | Age: 80
End: 2020-07-30

## 2021-04-07 ENCOUNTER — HISTORICAL (OUTPATIENT)
Dept: ADMINISTRATIVE | Facility: HOSPITAL | Age: 81
End: 2021-04-07

## 2021-04-07 LAB
BASOPHILS # BLD AUTO: 0 X10E3/UL (ref 0–0.2)
BASOPHILS NFR BLD AUTO: 1 %
EOSINOPHIL # BLD AUTO: 0.4 X10E3/UL (ref 0–0.4)
EOSINOPHIL NFR BLD AUTO: 6 %
ERYTHROCYTE [DISTWIDTH] IN BLOOD BY AUTOMATED COUNT: 13.9 % (ref 11.7–15.4)
HCT VFR BLD AUTO: 41.1 % (ref 34–46.6)
HGB BLD-MCNC: 13.3 G/DL (ref 11.1–15.9)
LYMPHOCYTES # BLD AUTO: 2.3 X10E3/UL (ref 0.7–3.1)
LYMPHOCYTES NFR BLD AUTO: 32 %
MCH RBC QN AUTO: 29.3 PG (ref 26.6–33)
MCHC RBC AUTO-ENTMCNC: 32.4 G/DL (ref 31.5–35.7)
MCV RBC AUTO: 91 FL (ref 79–97)
MONOCYTES # BLD AUTO: 0.5 X10E3/UL (ref 0.1–0.9)
MONOCYTES NFR BLD AUTO: 7 %
NEUTROPHILS # BLD AUTO: 4 X10E3/UL (ref 1.4–7)
NEUTROPHILS NFR BLD AUTO: 54 %
PLATELET # BLD AUTO: 212 X10E3/UL (ref 150–450)
RBC # BLD AUTO: 4.54 X10(6)/MCL (ref 3.77–5.28)
WBC # SPEC AUTO: 7.4 X10E3/UL (ref 3.4–10.8)

## 2021-09-10 ENCOUNTER — HISTORICAL (OUTPATIENT)
Dept: ADMINISTRATIVE | Facility: HOSPITAL | Age: 81
End: 2021-09-10

## 2021-09-10 LAB
ALBUMIN SERPL-MCNC: 4.5 G/DL (ref 3.7–4.7)
ALBUMIN/GLOB SERPL: 1.6 {RATIO} (ref 1.2–2.2)
ALP SERPL-CCNC: 74 IU/L (ref 48–121)
ALT SERPL-CCNC: 6 IU/L (ref 0–32)
AST SERPL-CCNC: 19 IU/L (ref 0–40)
BASOPHILS # BLD AUTO: 0.1 X10E3/UL (ref 0–0.2)
BASOPHILS NFR BLD AUTO: 1 %
BILIRUB SERPL-MCNC: 0.2 MG/DL (ref 0–1.2)
BUN SERPL-MCNC: 25 MG/DL (ref 8–27)
CALCIUM SERPL-MCNC: 9.9 MG/DL (ref 8.7–10.3)
CHLORIDE SERPL-SCNC: 108 MMOL/L (ref 96–106)
CHOLEST SERPL-MCNC: 143 MG/DL (ref 100–199)
CHOLEST/HDLC SERPL: 4.2 RATIO (ref 0–4.4)
CO2 SERPL-SCNC: 20 MMOL/L (ref 20–29)
CREAT SERPL-MCNC: 1.45 MG/DL (ref 0.57–1)
CREAT/UREA NIT SERPL: 17 (ref 12–28)
DEPRECATED CALCIDIOL+CALCIFEROL SERPL-MC: 49.4 NG/ML (ref 30–100)
EOSINOPHIL # BLD AUTO: 0.3 X10E3/UL (ref 0–0.4)
EOSINOPHIL NFR BLD AUTO: 4 %
ERYTHROCYTE [DISTWIDTH] IN BLOOD BY AUTOMATED COUNT: 13.7 % (ref 11.7–15.4)
GLOBULIN SER-MCNC: 2.8 G/DL (ref 1.5–4.5)
GLUCOSE SERPL-MCNC: 171 MG/DL (ref 65–99)
HCT VFR BLD AUTO: 42.2 % (ref 34–46.6)
HDLC SERPL-MCNC: 34 MG/DL
HGB BLD-MCNC: 13.4 G/DL (ref 11.1–15.9)
LDLC SERPL CALC-MCNC: 73 MG/DL (ref 0–99)
LYMPHOCYTES # BLD AUTO: 2.4 X10E3/UL (ref 0.7–3.1)
LYMPHOCYTES NFR BLD AUTO: 32 %
MCH RBC QN AUTO: 29.3 PG (ref 26.6–33)
MCHC RBC AUTO-ENTMCNC: 31.8 G/DL (ref 31.5–35.7)
MCV RBC AUTO: 92 FL (ref 79–97)
MONOCYTES # BLD AUTO: 0.6 X10E3/UL (ref 0.1–0.9)
MONOCYTES NFR BLD AUTO: 8 %
NEUTROPHILS # BLD AUTO: 4.1 X10E3/UL (ref 1.4–7)
NEUTROPHILS NFR BLD AUTO: 55 %
PLATELET # BLD AUTO: 217 X10E3/UL (ref 150–450)
POTASSIUM SERPL-SCNC: 4.4 MMOL/L (ref 3.5–5.2)
PROT SERPL-MCNC: 7.3 G/DL (ref 6–8.5)
RBC # BLD AUTO: 4.58 X10(6)/MCL (ref 3.77–5.28)
SODIUM SERPL-SCNC: 144 MMOL/L (ref 134–144)
TRIGL SERPL-MCNC: 216 MG/DL (ref 0–149)
TSH SERPL-ACNC: 3.38 MIU/ML (ref 0.45–4.5)
VLDLC SERPL CALC-MCNC: 36 MG/DL (ref 5–40)
WBC # SPEC AUTO: 7.4 X10E3/UL (ref 3.4–10.8)

## 2021-10-04 ENCOUNTER — HISTORICAL (OUTPATIENT)
Dept: RADIOLOGY | Facility: HOSPITAL | Age: 81
End: 2021-10-04

## 2021-12-29 ENCOUNTER — HISTORICAL (OUTPATIENT)
Dept: ADMINISTRATIVE | Facility: HOSPITAL | Age: 81
End: 2021-12-29

## 2021-12-29 LAB
BASOPHILS # BLD AUTO: 0.1 X10E3/UL (ref 0–0.2)
BASOPHILS NFR BLD AUTO: 1 %
EOSINOPHIL # BLD AUTO: 0.3 X10E3/UL (ref 0–0.4)
EOSINOPHIL NFR BLD AUTO: 5 %
ERYTHROCYTE [DISTWIDTH] IN BLOOD BY AUTOMATED COUNT: 13.7 % (ref 11.7–15.4)
HBA1C MFR BLD: 9.4 % (ref 4.8–5.6)
HCT VFR BLD AUTO: 39.8 % (ref 34–46.6)
HGB BLD-MCNC: 12.8 G/DL (ref 11.1–15.9)
LYMPHOCYTES # BLD AUTO: 2.4 X10E3/UL (ref 0.7–3.1)
LYMPHOCYTES NFR BLD AUTO: 33 %
MCH RBC QN AUTO: 28.8 PG (ref 26.6–33)
MCHC RBC AUTO-ENTMCNC: 32.2 G/DL (ref 31.5–35.7)
MCV RBC AUTO: 90 FL (ref 79–97)
MONOCYTES # BLD AUTO: 0.6 X10E3/UL (ref 0.1–0.9)
MONOCYTES NFR BLD AUTO: 8 %
NEUTROPHILS # BLD AUTO: 3.8 X10E3/UL (ref 1.4–7)
NEUTROPHILS NFR BLD AUTO: 53 %
PLATELET # BLD AUTO: 202 X10E3/UL (ref 150–450)
RBC # BLD AUTO: 4.44 X10(6)/MCL (ref 3.77–5.28)
WBC # SPEC AUTO: 7.1 X10E3/UL (ref 3.4–10.8)

## 2022-02-22 ENCOUNTER — HISTORICAL (OUTPATIENT)
Dept: ADMINISTRATIVE | Facility: HOSPITAL | Age: 82
End: 2022-02-22

## 2022-03-15 ENCOUNTER — HISTORICAL (OUTPATIENT)
Dept: ADMINISTRATIVE | Facility: HOSPITAL | Age: 82
End: 2022-03-15

## 2022-04-11 ENCOUNTER — HISTORICAL (OUTPATIENT)
Dept: ADMINISTRATIVE | Facility: HOSPITAL | Age: 82
End: 2022-04-11
Payer: MEDICARE

## 2022-04-24 VITALS
SYSTOLIC BLOOD PRESSURE: 123 MMHG | WEIGHT: 161 LBS | OXYGEN SATURATION: 96 % | BODY MASS INDEX: 26.82 KG/M2 | DIASTOLIC BLOOD PRESSURE: 78 MMHG | HEIGHT: 65 IN

## 2022-04-30 NOTE — OP NOTE
Patient:   Rosalba Whitlock            MRN: [id number]            FIN: [id number]               Age:   79 years     Sex:  Female     :  1940   Associated Diagnoses:   None   Author:   Giovanny Trotter MD      DATE OF SURGERY:    2020    SURGEON:  Giovanny Trotter MD    PREOPERATIVE DIAGNOSIS:  Lumbar spondylosis.    POSTOPERATIVE DIAGNOSIS:  Lumbar spondylosis.    PROCEDURE:  Fluoroscopically guided radiofrequency ablation at right L3, L4, L5, S1.    PROCEDURE IN DETAIL:  Following informed consent, and with the patient under general anesthesia, she was prepped and draped in usual sterile fashion.  The skin and subcutaneous tissue were anesthetized.  Following this I used an 18 gauge angulated 100 mm exposed tip needle at each level, correctly placing them under fluoroscopic guidance at the junction of the SAP and transverse process.  After stimulating the medial branch nerve at each level without evidence of motor signs, I performed radiofrequency ablation of each level, heating the affected nerves to 80 degrees centigrade for 90 seconds.  A total of four sites were heated and treated.  I removed the needles and applied sterile dressings.  The patient tolerated the procedure well without apparent complications.    IMPRESSION:  Successful fluoroscopically guided radiofrequency ablation at right L3, L4, L5, S1.      ______________________________  Giovanny Trotter MD

## 2022-04-30 NOTE — ED PROVIDER NOTES
"   Patient:   Rosalba Whitlock            MRN: [id number]            FIN: [id number]               Age:   78 years     Sex:  Female     :  1940   Associated Diagnoses:   Chest pain   Author:   Geoffrey HUERTA, Hermelindo Fonseca      Basic Information   Time seen: Date & time 2019 11:27:00.   History source: Patient.   Arrival mode: Private vehicle, walking.   History limitation: None.   Additional information: Patient's physician(s): Richard HUERTA, Eleazar Kline, Chief Complaint from Nursing Triage Note : Chief Complaint   2019 11:27 CST      Chief Complaint           patient reports having L sided CP that began while at rest at approximately 0900. states L arm went numb. denies SOB. denies cardiac hx.  .      History of Present Illness   The patient presents with chest pain, 78-year-old black female complains left anterior chest pain radiating to left arm  on at 9 AM lasting approximately one hour.  No history of CAD.  States took baby aspirin this am.   Chastity James nurse practitioner, SORT NOTE and     I, Dr. Hale assumed care of the patient at 1449.  77 y/o AAF with a history of HTN, HLD, and DM presents to the ED c/o left substernal chest pain with LUE weakness onset 0900. Patient describes the pain as a "pressure." She states the LUE weakness lasted for about 45 mins to 1 hour. She is unsure if she had SOB or sweating. .  The onset was just prior to arrival.  The course/duration of symptoms is fluctuating in intensity.  Location: Left substernal. Radiating pain: left arm. The character of symptoms is pressure.  The degree at onset was moderate.  The degree at maximum was moderate.  The degree at present is minimal.  The exacerbating factor is none.  The relieving factor is none.  Risk factors consist of hypertension, diabetes mellitus and hyperlipidemia.  Prior episodes: none.  Therapy today None.        Review of Systems   Constitutional symptoms:  No fever, no chills, no sweats, no weakness, no " fatigue.    Skin symptoms:  No rash, no lesion.    Respiratory symptoms:  No shortness of breath, no cough, no hemoptysis.    Cardiovascular symptoms:  Chest pain, left chest, central, pressure, no palpitations, no syncope, no peripheral edema.    Gastrointestinal symptoms:  No abdominal pain, no nausea, no vomiting, no diarrhea.    Genitourinary symptoms:  No dysuria, no hematuria, no vaginal bleeding, no vaginal discharge.    Musculoskeletal symptoms:  L shoulder weakness, No back pain,    Neurologic symptoms:  No headache, no dizziness, no altered level of consciousness, no numbness, no tingling, no weakness.              Additional review of systems information: All other systems reviewed and otherwise negative.      Health Status   Allergies:    Allergic Reactions (Selected)  No Known Allergies.   Medications:  (Selected)   Prescriptions  Prescribed  Metoprolol Succinate ER 50 mg oral tablet, extended release: See Instructions, TAKE 1 AND 1/2 TABLETS BY MOUTH DAILY FOR BLOOD PRESSURE, # 135 tab(s), 4 Refill(s), Pharmacy: [id number]  Tradjenta 5 mg oral tablet: 5 mg = 1 tab(s), Oral, Daily, for diabetes, # 30 tab(s), 0 Refill(s), Pharmacy: [facility] [id number]  gabapentin 100 mg oral capsule: See Instructions, TAKE 1 CAPSULE EVERY MORNING, # 30 cap(s), 3 Refill(s), Pharmacy: [facility] [id number]  gabapentin 300 mg oral capsule: See Instructions, TAKE ONE CAPSULE BY MOUTH AT BEDTIME, # 30 cap(s), 1 Refill(s), Pharmacy: [facility] [id number]  glipiZIDE 5 mg oral tablet: See Instructions, TAKE 2 TABLETS BY MOUTH WITH BREAKFAST, # 60 tab(s), 3 Refill(s), eRx: [facility] [id number]  nortriptyline 25 mg oral capsule: 25 mg = 1 cap(s), Oral, Once a day (at bedtime), for sleep, # 30 cap(s), 5 Refill(s), Pharmacy: [facility] [id number]  omeprazole 40 mg oral DR capsule: See Instructions, TAKE 1 CAPSULE BY MOUTH DAILY FOR HEART BURN, # 30 cap(s), eRx: [facility]  [id number]  simvastatin 20 mg oral tablet: 20 mg = 1 tab(s), Oral, Once a day (at bedtime), # 30 tab(s), 11 Refill(s), other reason (Rx).      Past Medical/ Family/ Social History   Medical history:    Resolved  Mixed hyperlipidemia ([id number]):  Resolved.  Pain, foot ([id number]):  Resolved.,   HTN   DM.   Surgical history:    Injection Lumbar Epidural Steroid (., None) on 9/27/2018 at 78 Years.  Comments:  9/27/2018 17:09 - Agustina Steward RN  auto-populated from documented surgical case  feet ([id number]) on 1/10/2018 at 77 Years.  Comments:  5/2/2018 10:00 - Enid Irby  surgery on both feet  Polypectomy on 2/1/2017 at 76 Years.  Comments:  2/1/2017 16:Fabiola Farrar RN  auto-populated from documented surgical case  Colonoscopy on 2/1/2017 at 76 Years.  Comments:  2/1/2017 16:Fabiola Farrar RN  auto-populated from documented surgical case  Bougie Dilatation on 11/1/2013 at 73 Years.  Comments:  11/1/2013 13:Compa Vickers RN  auto-populated from documented surgical case  Esophagogastroduodenoscopy on 11/1/2013 at 73 Years.  Comments:  11/1/2013 13:Compa Vickers RN  auto-populated from documented surgical case  Biopsy Gastrointestional on 11/1/2013 at 73 Years.  Comments:  11/1/2013 13:Compa Vickers RN  auto-populated from documented surgical case  Colonoscopy ([id number]).  Hysterectomy ([id number]).  Comments:  1/26/2016 14:16 Denilson Ramos MD, Eleazar Kline  Subtotal.   Family history:    Primary malignant neoplasm of lung  Mother  .   Social history:    Social & Psychosocial Habits    Alcohol  11/19/2016  Use: Current    Type: Beer, Liquor, Wine    Frequency: 1-2 times per year    Employment/School  02/24/2016  Status: Retired    Exercise  04/01/2015 Risk Assessment: Does not exercise    02/24/2016  Self assessment: Fair condition    Home/Environment  04/01/2015 Risk Assessment: No Risk    03/07/2017  Lives with:  Alone    Nutrition/Health  04/01/2015 Risk Assessment: No Risk    02/24/2016  Type of diet: Regular    Sexual  02/24/2016  Sexually active: No    Substance Abuse  08/11/2013 Risk Assessment: Denies Substance Abuse      Comment: NEVER - 04/01/2015 10:38 - Comb Valeria QUINTERO    Tobacco  12/18/2018  Use: Former smoker    Patient Wants Consult For Cessation Counseling N/A    02/25/2019  Use: Never (less than 100 in l    Patient Wants Consult For Cessation Counseling N/A, Alcohol use: no history of alcohol abuse, Drug use: Denies.      Physical Examination               Vital Signs   Vital Signs   2/25/2019 15:15 CST      Peripheral Pulse Rate     72 bpm                             Heart Rate Monitored      71 bpm                             Respiratory Rate          18 br/min                             SpO2                      96 %                             Oxygen Therapy            Room air                             Systolic Blood Pressure   125 mmHg                             Diastolic Blood Pressure  95 mmHg  HI                             Mean Arterial Pressure, Cuff              105 mmHg    2/25/2019 13:54 CST      Peripheral Pulse Rate     69 bpm                             Heart Rate Monitored      115 bpm  HI                             Respiratory Rate          18 br/min                             SpO2                      100 %                             Oxygen Therapy            Room air                             Systolic Blood Pressure   127 mmHg                             Diastolic Blood Pressure  56 mmHg  LOW                             Mean Arterial Pressure, Cuff              80 mmHg    2/25/2019 11:27 CST      Temperature Oral          36.5 DegC                             Temperature Oral (calculated)             97.70 DegF                             Peripheral Pulse Rate     77 bpm                             Respiratory Rate          22 br/min                             SpO2                       98 %                             Oxygen Therapy            Room air                             Systolic Blood Pressure   147 mmHg  HI                             Diastolic Blood Pressure  87 mmHg  .      Vital Signs (last 24 hrs)_____  Last Charted___________  Temp Oral     36.5 DegC  (FEB 25 11:27)  Heart Rate Peripheral   72 bpm  (FEB 25 15:15)  Resp Rate         18 br/min  (FEB 25 15:15)  SBP      125 mmHg  (FEB 25 15:15)  DBP      H 95mmHg  (FEB 25 15:15)  SpO2      96 %  (FEB 25 15:15).               Per nurse's notes.   Basic Oxygen Information   2/25/2019 15:15 CST      SpO2                      96 %                             Oxygen Therapy            Room air    2/25/2019 13:54 CST      SpO2                      100 %                             Oxygen Therapy            Room air    2/25/2019 11:27 CST      SpO2                      98 %                             Oxygen Therapy            Room air  .   General:  No acute distress.   Neurological:  Alert and oriented to person, place, time, and situation, No focal neurological deficit observed, CN II-XII intact, normal sensory observed, normal motor observed, normal speech observed, normal coordination observed.    Skin:  Warm, dry, no rash.    Neck:  Supple.   , dry mucus membrane . Cardiovascular:  Regular rate and rhythm, No murmur, Normal peripheral perfusion, No edema.    Respiratory:  Breath sounds are equal, Symmetrical chest wall expansion, Respirations: Regular, Breath sounds: Clear.    Chest wall:  No tenderness, No deformity.    Back:  Nontender, Normal range of motion.    Musculoskeletal:  No swelling, no deformity.    Gastrointestinal:  Soft, Nontender, Non distended, Normal bowel sounds.    Psychiatric:  Cooperative, appropriate mood & affect.       Medical Decision Making   Differential Diagnosis:  Unstable angina, angina, anxiety, pulmonary embolism, atypical chest pain, pneumonia, gastroesophageal reflux disease,  costochondritis, pleurisy, chest wall pain, bronchitis.    Documents reviewed:  Emergency department nurses' notes.   Orders  Laboratory    POC iSTAT ER Troponin request, Chastity James NP, 02/25/19, 11:29, Completed    CBC w/ Auto Diff, Chastity James NP, 02/25/19, 11:29, Completed    CMP, Chastity James NP, 02/25/19, 11:29, Completed    Automated Diff, Chastity James NP, 02/25/19, 12:47, Completed    POC Istat ER Troponin, ER, Physician, 02/25/19, 12:51, Completed    Estimated Glomerular Filtration Rate, Chastity Jmaes NP, 02/25/19, 13:40, Completed  Xray    XR Chest 1 View, Chastity James NP, 02/25/19, 11:29, Completed  EKG / Respiratory / Ancillary    EKG Adult 12 Lead, Chastity James NP, 02/25/19, 11:29, Completed.   Electrocardiogram:  Time 2/25/2019 11:28:00, rate 71, normal sinus rhythm, no ectopy, normal SC & QRS intervals, EP Interp, T wave Inversion, V1, , V2.    Results review:  Lab results : Lab View   2/25/2019 12:46 CST      Sodium Lvl                139 mmol/L                             Potassium Lvl             4.3 mmol/L                             Chloride                  104 mmol/L                             CO2                       27.0 mmol/L                             Calcium Lvl               9.0 mg/dL                             Glucose Lvl               194 mg/dL  HI                             BUN                       22.0 mg/dL  HI                             Creatinine                1.35 mg/dL  HI                             eGFR-AA                   49 mL/min/1.73 m2  NA                             eGFR-BAM                  41 mL/min/1.73 m2  NA                             Bili Total                0.2 mg/dL                             Bili Direct               0.10 mg/dL                             Bili Indirect             0.10 mg/dL                             AST                       18 unit/L                             ALT                       13  unit/L                             Alk Phos                  82 unit/L                             Total Protein             7.5 gm/dL                             Albumin Lvl               3.60 gm/dL                             Globulin                  3.90 gm/dL  HI                             A/G Ratio                 0.9 ratio  LOW                             WBC                       8.5 x10(3)/mcL                             RBC                       5.17 x10(6)/mcL                             Hgb                       14.0 gm/dL                             Hct                       44.6 %                             Platelet                  246 x10(3)/mcL                             MCV                       86.3 fL                             MCH                       27.1 pg                             MCHC                      31.4 gm/dL  LOW                             RDW                       12.8 %                             MPV                       10.0 fL                             Abs Neut                  5.46 x10(3)/mcL                             Neutro Auto               64 %  NA                             Lymph Auto                26 %  NA                             Mono Auto                 6 %  NA                             Eos Auto                  2 %  NA                             Abs Eos                   0.2 x10(3)/mcL                             Basophil Auto             1 %  NA                             Abs Neutro                5.46 x10(3)/mcL                             Abs Lymph                 2.2 x10(3)/mcL                             Abs Mono                  0.5 x10(3)/mcL                             Abs Baso                  0.0 x10(3)/mcL    2/25/2019 12:40 CST      POC Troponin              0.00 ng/mL  .   Notes:  * Final Report *    Reason For Exam  Chest Pain    Radiology Report  Indication: Chest pain     Findings: Compared to 10/24/2018. Heart size is normal and  lungs are  clear bilaterally. Pulmonary vasculature is normal.     IMPRESSION: No evidence of acute disease.       Signature Line  Electronically Signed By: Carlos HUERTA, Vinicius DAVENPORT.  Date/Time Signed: 02/25/2019 12:26      This document has an image    .      Reexamination/ Reevaluation   Patient seen and examined. Treated with Aspirin PO and Zofran IV.  Labs and imaging reviewed as above. CXR no acute pathology  Patient remained stable during ED evaluation.    Discussed with [facility] for admission      Impression and Plan   Diagnosis   Chest pain (ONL37-NC R07.9)   Plan   Disposition: Place in Observation Unit, Magdiel HUERTA, Macie.    Counseled: Patient, Regarding diagnostic results, Regarding treatment plan.    Notes: I, Orquidea Fine, acted solely as a scribe for and in the presence of Dr. Hale who performed the service., I acknowledge that the documentation was provided by a scribe on the date of the service noted above and that the documentation in the chart reflects work and decisions made by me alone. .

## 2022-04-30 NOTE — OP NOTE
Patient:   Rosalba Whitlock            MRN: [id number]            FIN: [id number]               Age:   79 years     Sex:  Female     :  1940   Associated Diagnoses:   None   Author:   Giovanny Trotter MD      DATE OF SURGERY:    2019    SURGEON:  Giovanny Trotter MD    PREOPERATIVE DIAGNOSIS:  Lumbar spondylosis.    POSTOPERATIVE DIAGNOSIS:  Lumbar spondylosis.    PROCEDURE PERFORMED:  Fluoroscopically-guided medial branch blocks at right L4 and right L5.    EQUIPMENT USED:  Epidural tray.    DESCRIPTION OF PROCEDURE:  Following informed consent, the patient was prepped and draped in the usual sterile fashion.  I infiltrated the tissue overlying L4 and L5 with local anesthetic.  Under fluoroscopic guidance, I advanced two 22-gauge 3.5 inch BD spinal needles, performing medial branch blocks using 60mg 2% lidocaine  The patient tolerated the procedure well without apparent complication.    IMPRESSION:  Successful fluoroscopically-guided medial branch blocks at right L4 and right L5

## 2022-04-30 NOTE — OP NOTE
DATE OF SURGERY:    09/27/2018    SURGEON:  Giovanny Trotter MD    PREOPERATIVE DIAGNOSIS:  Lumbar radiculopathy.    POSTOPERATIVE DIAGNOSIS:  Lumbar radiculopathy.    PROCEDURE:  Fluoroscopically-guided transforaminal lumbar epidural injection at left L3-4.    EQUIPMENT USED:  Epidural tray.    DETAILED DESCRIPTION:  Following informed consent, the patient was prepped and draped in the usual sterile fashion.  I infiltrated the tissue overlying L3-4 with local anesthetic.  Under fluoroscopic guidance, I advanced a 22-gauge 3.5 inch BD spinal needle in the epidural space via left transforaminal approach.  Positioning was confirmed with administration of contrast.  I then instilled a combination of 160 mg Depo-Medrol and 1 mL of 5% dextrose in water.  I removed the needle and applied a sterile dressing.  The patient tolerated the procedure well, without apparent complication.    IMPRESSION:  Successful fluoroscopically-guided transforaminal lumbar epidural injection at left L3-4.        ______________________________  Giovanny Trotter MD    DP/GERI  DD:  09/27/2018  Time:  04:52PM  DT:  09/27/2018  Time:  05:10PM  Job #:  [id number]

## 2022-04-30 NOTE — DISCHARGE SUMMARY
Patient:   Rosalba Whitlock            MRN: [id number]            FIN: [id number]               Age:   78 years     Sex:  Female     :  1940   Associated Diagnoses:   None   Author:   Veronika Jo      Please see same day progress note as discharge summary

## 2022-04-30 NOTE — OP NOTE
Patient:   Rosalba Whitlock            MRN: [id number]            FIN: [id number]               Age:   79 years     Sex:  Female     :  1940   Associated Diagnoses:   None   Author:   Giovanny Trotter MD      DATE OF SURGERY:    2019    SURGEON:  Giovanny Trotter MD    PREOPERATIVE DIAGNOSIS:  Lumbar spondylosis.    POSTOPERATIVE DIAGNOSIS:  Lumbar spondylosis.    PROCEDURE PERFORMED:  Fluoroscopically-guided medial branch blocks at right L4 and right L5.    EQUIPMENT USED:  Epidural tray.    DESCRIPTION OF PROCEDURE:  Following informed consent, the patient was prepped and draped in the usual sterile fashion.  I infiltrated the tissue overlying L4 and L5 with local anesthetic.  Under fluoroscopic guidance, I advanced two 22-gauge 3.5 inch BD spinal needles, performing medial branch blocks using 80mg 2% lidocaine  The patient tolerated the procedure well without apparent complication.    IMPRESSION:  Successful fluoroscopically-guided medial branch blocks at right L4 and right L5

## 2022-06-08 ENCOUNTER — OFFICE VISIT (OUTPATIENT)
Dept: URGENT CARE | Facility: CLINIC | Age: 82
End: 2022-06-08
Payer: MEDICARE

## 2022-06-08 VITALS
SYSTOLIC BLOOD PRESSURE: 111 MMHG | BODY MASS INDEX: 24.99 KG/M2 | HEIGHT: 65 IN | RESPIRATION RATE: 20 BRPM | HEART RATE: 96 BPM | OXYGEN SATURATION: 96 % | TEMPERATURE: 98 F | WEIGHT: 150 LBS | DIASTOLIC BLOOD PRESSURE: 68 MMHG

## 2022-06-08 DIAGNOSIS — R05.9 COUGH: Primary | ICD-10-CM

## 2022-06-08 DIAGNOSIS — J40 BRONCHITIS: ICD-10-CM

## 2022-06-08 LAB
CTP QC/QA: YES
CTP QC/QA: YES
FLUAV AG NPH QL: NEGATIVE
FLUBV AG NPH QL: NEGATIVE
SARS-COV-2 RDRP RESP QL NAA+PROBE: NEGATIVE

## 2022-06-08 PROCEDURE — 99213 PR OFFICE/OUTPT VISIT, EST, LEVL III, 20-29 MIN: ICD-10-PCS | Mod: ,,, | Performed by: NURSE PRACTITIONER

## 2022-06-08 PROCEDURE — 99213 OFFICE O/P EST LOW 20 MIN: CPT | Mod: ,,, | Performed by: NURSE PRACTITIONER

## 2022-06-08 PROCEDURE — U0002 COVID-19 LAB TEST NON-CDC: HCPCS | Mod: QW,CR,, | Performed by: NURSE PRACTITIONER

## 2022-06-08 PROCEDURE — 87804 INFLUENZA ASSAY W/OPTIC: CPT | Mod: QW,,, | Performed by: NURSE PRACTITIONER

## 2022-06-08 PROCEDURE — U0002: ICD-10-PCS | Mod: QW,CR,, | Performed by: NURSE PRACTITIONER

## 2022-06-08 PROCEDURE — 87804 POCT INFLUENZA A/B: ICD-10-PCS | Mod: QW,,, | Performed by: NURSE PRACTITIONER

## 2022-06-08 RX ORDER — ASPIRIN 81 MG/1
81 TABLET ORAL DAILY
COMMUNITY
Start: 2022-03-11

## 2022-06-08 RX ORDER — ISOSORBIDE MONONITRATE 30 MG/1
30 TABLET, EXTENDED RELEASE ORAL DAILY
COMMUNITY
Start: 2022-05-08

## 2022-06-08 RX ORDER — DULOXETIN HYDROCHLORIDE 60 MG/1
CAPSULE, DELAYED RELEASE ORAL
COMMUNITY
Start: 2022-04-28 | End: 2022-06-16

## 2022-06-08 RX ORDER — GABAPENTIN 300 MG/1
300 CAPSULE ORAL NIGHTLY
COMMUNITY
Start: 2022-05-17 | End: 2022-06-16

## 2022-06-08 RX ORDER — ROSUVASTATIN CALCIUM 40 MG/1
40 TABLET, COATED ORAL NIGHTLY
COMMUNITY
Start: 2022-03-11

## 2022-06-08 RX ORDER — INSULIN GLARGINE 100 [IU]/ML
INJECTION, SOLUTION SUBCUTANEOUS
COMMUNITY
Start: 2022-01-12 | End: 2022-06-16

## 2022-06-08 RX ORDER — PEN NEEDLE, DIABETIC 32GX 5/32"
NEEDLE, DISPOSABLE MISCELLANEOUS
COMMUNITY
Start: 2022-03-17 | End: 2022-06-16

## 2022-06-08 RX ORDER — HYDROCODONE BITARTRATE AND ACETAMINOPHEN 5; 325 MG/1; MG/1
1 TABLET ORAL
COMMUNITY
Start: 2022-05-24 | End: 2022-06-16

## 2022-06-08 RX ORDER — MECLIZINE HCL 12.5 MG 12.5 MG/1
12.5 TABLET ORAL 3 TIMES DAILY PRN
COMMUNITY
Start: 2022-03-11 | End: 2022-06-16

## 2022-06-08 RX ORDER — OMEPRAZOLE 40 MG/1
CAPSULE, DELAYED RELEASE ORAL
COMMUNITY
Start: 2022-03-15 | End: 2023-03-15

## 2022-06-08 RX ORDER — LORATADINE 10 MG/1
10 TABLET ORAL DAILY
COMMUNITY
Start: 2022-03-11 | End: 2022-06-16

## 2022-06-08 RX ORDER — LINAGLIPTIN 5 MG/1
5 TABLET, FILM COATED ORAL DAILY
COMMUNITY
Start: 2022-05-14 | End: 2023-03-21 | Stop reason: SDUPTHER

## 2022-06-08 RX ORDER — BENZONATATE 100 MG/1
100 CAPSULE ORAL 3 TIMES DAILY PRN
Qty: 30 CAPSULE | Refills: 0 | Status: SHIPPED | OUTPATIENT
Start: 2022-06-08 | End: 2022-06-16

## 2022-06-08 RX ORDER — CLOPIDOGREL BISULFATE 75 MG/1
75 TABLET ORAL
COMMUNITY
Start: 2022-03-11 | End: 2022-06-16

## 2022-06-08 RX ORDER — METOPROLOL SUCCINATE 50 MG/1
50 TABLET, EXTENDED RELEASE ORAL DAILY
COMMUNITY
Start: 2022-05-05 | End: 2022-06-16

## 2022-06-08 RX ORDER — COLCHICINE 0.6 MG/1
TABLET ORAL
COMMUNITY
Start: 2022-04-26 | End: 2022-06-16

## 2022-06-08 NOTE — PROGRESS NOTES
"Subjective:       Patient ID: Rosalba Whitlock is a 81 y.o. female.    Vitals:  height is 5' 5" (1.651 m) and weight is 68 kg (150 lb). Her oral temperature is 98.3 °F (36.8 °C). Her blood pressure is 111/68 and her pulse is 96. Her respiration is 20 and oxygen saturation is 96%.     Chief Complaint: Cough (Started yesterday)    81-year-old female presents with intermittent periods of cough without shortness of breath or chest pain.  Onset 3 days ago.  No sore throat, or fever.    Cough  The current episode started yesterday. Pertinent negatives include no shortness of breath. Associated symptoms comments: Body aches, fatigue.       Respiratory: Positive for cough. Negative for shortness of breath.        Objective:      Physical Exam   Constitutional: She is oriented to person, place, and time. She appears well-developed. She is cooperative.  Non-toxic appearance. She does not appear ill. No distress.   HENT:   Head: Normocephalic and atraumatic.   Ears:   Right Ear: Hearing, tympanic membrane, external ear and ear canal normal.   Left Ear: Hearing, tympanic membrane, external ear and ear canal normal.   Nose: Nose normal. No mucosal edema, rhinorrhea or nasal deformity. No epistaxis. Right sinus exhibits no maxillary sinus tenderness and no frontal sinus tenderness. Left sinus exhibits no maxillary sinus tenderness and no frontal sinus tenderness.   Mouth/Throat: Uvula is midline, oropharynx is clear and moist and mucous membranes are normal. No trismus in the jaw. Normal dentition. No uvula swelling. No oropharyngeal exudate, posterior oropharyngeal edema or posterior oropharyngeal erythema.   Eyes: Conjunctivae and lids are normal. No scleral icterus.   Neck: Trachea normal and phonation normal. Neck supple. No edema present. No erythema present. No neck rigidity present.   Cardiovascular: Normal rate, regular rhythm, normal heart sounds and normal pulses.   Pulmonary/Chest: Effort normal and breath sounds " normal. No respiratory distress. She has no decreased breath sounds. She has no rhonchi.   Abdominal: Normal appearance.   Musculoskeletal: Normal range of motion.         General: No deformity. Normal range of motion.   Neurological: She is alert and oriented to person, place, and time. She exhibits normal muscle tone. Coordination normal.   Skin: Skin is warm, dry, intact, not diaphoretic and not pale.   Psychiatric: Her speech is normal and behavior is normal. Judgment and thought content normal.   Nursing note and vitals reviewed.        Assessment:       1. Cough    2. Bronchitis          Plan:     Increase oral fluids.  Tylenol or ibuprofen OTC for pain as directed.  Mucinex OTC for cough as directed or tessalon perles as directed  We will notify of the final radiology x-ray report result      Cough  -     POCT Influenza A/B  -     POCT COVID-19 Rapid Screening  -     XR CHEST PA AND LATERAL; Future; Expected date: 06/08/2022    Bronchitis  -     benzonatate (TESSALON) 100 MG capsule; Take 1 capsule (100 mg total) by mouth 3 (three) times daily as needed for Cough.  Dispense: 30 capsule; Refill: 0

## 2022-06-08 NOTE — PATIENT INSTRUCTIONS
Increase oral fluids.  Tylenol or ibuprofen OTC for pain as directed.  Mucinex OTC for cough as directed or tessalon perles as directed  We will notify of the final radiology x-ray report result

## 2022-06-13 PROBLEM — M81.0 OSTEOPOROSIS: Chronic | Status: ACTIVE | Noted: 2022-06-13

## 2022-06-13 PROBLEM — R53.81 PHYSICAL DECONDITIONING: Chronic | Status: ACTIVE | Noted: 2022-06-13

## 2022-06-13 PROBLEM — M54.50 LOW BACK PAIN: Chronic | Status: ACTIVE | Noted: 2022-06-13

## 2022-06-13 PROBLEM — E11.9 TYPE 2 DIABETES MELLITUS: Status: ACTIVE | Noted: 2022-06-13

## 2022-06-13 PROBLEM — M81.0 OSTEOPOROSIS: Status: ACTIVE | Noted: 2022-06-13

## 2022-06-13 PROBLEM — M1A.00X0 CHRONIC GOUTY ARTHRITIS: Status: ACTIVE | Noted: 2022-06-13

## 2022-06-13 PROBLEM — H81.13 BENIGN PAROXYSMAL POSITIONAL VERTIGO, BILATERAL: Chronic | Status: ACTIVE | Noted: 2022-06-13

## 2022-06-13 PROBLEM — M54.50 LOW BACK PAIN: Status: ACTIVE | Noted: 2022-06-13

## 2022-06-13 PROBLEM — N18.30 STAGE 3 CHRONIC KIDNEY DISEASE: Status: ACTIVE | Noted: 2022-06-13

## 2022-06-13 PROBLEM — H81.13 BENIGN PAROXYSMAL POSITIONAL VERTIGO, BILATERAL: Status: ACTIVE | Noted: 2022-06-13

## 2022-06-13 PROBLEM — E78.00 PURE HYPERCHOLESTEROLEMIA: Chronic | Status: ACTIVE | Noted: 2022-06-13

## 2022-06-13 PROBLEM — J40 BRONCHITIS: Chronic | Status: ACTIVE | Noted: 2022-06-08

## 2022-06-13 PROBLEM — R41.3 AMNESIA: Status: ACTIVE | Noted: 2022-06-13

## 2022-06-13 PROBLEM — M47.816 SPONDYLOSIS OF LUMBAR SPINE: Status: ACTIVE | Noted: 2022-06-13

## 2022-06-13 PROBLEM — R41.3 AMNESIA: Chronic | Status: ACTIVE | Noted: 2022-06-13

## 2022-06-13 PROBLEM — R60.0 PERIPHERAL EDEMA: Chronic | Status: ACTIVE | Noted: 2022-06-13

## 2022-06-13 PROBLEM — R79.89 LOW VITAMIN D LEVEL: Chronic | Status: ACTIVE | Noted: 2022-06-13

## 2022-06-13 PROBLEM — E78.00 PURE HYPERCHOLESTEROLEMIA: Status: ACTIVE | Noted: 2022-06-13

## 2022-06-13 PROBLEM — M47.816 SPONDYLOSIS OF LUMBAR SPINE: Chronic | Status: ACTIVE | Noted: 2022-06-13

## 2022-06-13 PROBLEM — R79.89 LOW VITAMIN D LEVEL: Status: ACTIVE | Noted: 2022-06-13

## 2022-06-13 PROBLEM — N18.30 STAGE 3 CHRONIC KIDNEY DISEASE: Chronic | Status: ACTIVE | Noted: 2022-06-13

## 2022-06-13 PROBLEM — R53.81 PHYSICAL DECONDITIONING: Status: ACTIVE | Noted: 2022-06-13

## 2022-06-13 PROBLEM — E11.9 TYPE 2 DIABETES MELLITUS: Chronic | Status: ACTIVE | Noted: 2022-06-13

## 2022-06-13 PROBLEM — M1A.00X0 CHRONIC GOUTY ARTHRITIS: Chronic | Status: ACTIVE | Noted: 2022-06-13

## 2022-06-13 PROBLEM — R60.0 PERIPHERAL EDEMA: Status: ACTIVE | Noted: 2022-06-13

## 2022-07-05 NOTE — PROGRESS NOTES
Subjective:      Patient ID: Rosalba Whitlock is a 81 y.o. female.    Chief Complaint: Back Pain (3 month f/u for lumbar spondylosis, pt reports pain level today is a 7 and describes pain as constant undescribable pain that radiates down both legs from hips down to mid calf. )    Referred by: No ref. provider found     HPI    Interventional Pain History  Last HPI on 04/06/2022 noted:   Pertinent PMH includes DMII (A1c uncontrolled w/8.8 and 9.4 in 2022+2021 respectively), CKD w/elevated crea, GERD, HA, memory loss, osteopenia, spondylosis of lumbar spine, chronic back pain and dyspnea.       Her current pain is located to bilateral low back with radiation to both legs and bilateral feet. She also reports instability with her left knee. She is not followed by Ortho. Ambulates with a rollator. She left at home today and presents with single prong cane. Patient denies falls, states near [facility]. States this is due to her balance being off. Her DMII is still uncontrolled and she has bilateral peripheral neuropathy.Patient states her Cardiologist told her  clear her for MBB LEFT L3,L4, L5, S1; however, she is going to an MD about her leg pain. Denies stents in legs and no dx of PAD. Former smoker.         is also now wearing compression hose due to orthostatic hypotension managed per Cardiology.  Currently, patient takes Gabapentin 400 mg TID. She also reports dehydration. She recently saw her Cardiologist yesterday and was concerned about her kidneys w/f/u appt on 4/19/2022.  Patient states she asked her Cardiologist a while back if she could receive a RFA on LEFT to L3, L4, L5 and S1. Cardiologist would not clear her in 2020. She will inquire again with Cardiologist if he would clear her for MBB LEFT L3,L4, L5, S1. She completed PT for the low back and leg pain 6 months and this did not help. Reports aggravated pain to her legs. She continues PT exercises at home with no relief.     Interventional Pain  Procedures:  01/2020: radiofrequency ablation at right L3, L4, L5, S1.  11/2019:medial branch blocks at right L4 and right L5.  11/2019: medial branch blocks at right L4 and right L5.  09/2018:transforaminal lumbar epidural injection at left L3-4.    Imaging Review:  MRI L Spine 2018:  IMPRESSION:  1. A left foraminal disc protrusion is present at the L3-4 level causing  moderate left-sided neural foraminal stenosis and possible impingement  of the exiting left L3 nerve root.  2. Posterior annular fissures are present at L4-5 and L5-S1  3. Simple appearing cysts are present in both kidneys and may be further  evaluated with renal ultrasound is clinically indicated.      Pertinent Labs 2021:  Creatinine >1.44, GFR for AA normal        ROS  As noted in HPI      Objective:          Physical Exam   Gen NA  CV : compression hose wearing. No peripheral edema. + claudication w/ambulation  Resp no distress. Even chest expansion  GI soft  Ext no C/C/E  Neuro  AAOx4  Speech fluent, appropriate  EOMI, PERRLA  Tongue and palate midline  Motor 4/5 bilateral upper and lower ext  SLR + LEFT leg  SLR - right leg  Sensation: numbness to bilateral  lower feet.  Coord intact: finger to nose  Gait slow, antalgic, with single prong cane.         Assessment:       Encounter Diagnosis   Name Primary?    Lumbar spondylosis Yes         Plan:       Rosalba was seen today for back pain.    Diagnoses and all orders for this visit:    Lumbar spondylosis       Pain Meds:   Gabapentin 800 mg at bedtime  Gabapentin 100 mg BID.     After Visit Summary Instructions to patient:   Ask pharmacist if you can cut Gabapentin 800 mg tablet in half and take this at bedtime.   If pharmacist recommends you reducing Gabapentin from 800 mg to 600 mg at bedtime, I will order this to replace your current dose of 800 mg at bedtime. Let me know recommendation of pharmacist since you have chronic kidney disease.     Also please ask your Cardiologist to send  confirmation to our clinic if will approve you proceeding with a medial branch block to LEFT L3, L4, L5, S1. If he approves, I will send request to Sarah Pires to request with insurance.     Also follow up with Ortho to evaluate your left knee instability.      Follow up for chronic pain first available with Dr Trotter.

## 2022-07-06 ENCOUNTER — OFFICE VISIT (OUTPATIENT)
Dept: NEUROLOGY | Facility: CLINIC | Age: 82
End: 2022-07-06
Payer: MEDICARE

## 2022-07-06 VITALS
HEIGHT: 65 IN | BODY MASS INDEX: 26.33 KG/M2 | DIASTOLIC BLOOD PRESSURE: 72 MMHG | WEIGHT: 158 LBS | SYSTOLIC BLOOD PRESSURE: 118 MMHG

## 2022-07-06 DIAGNOSIS — M47.816 LUMBAR SPONDYLOSIS: Primary | ICD-10-CM

## 2022-07-06 PROCEDURE — 99999 PR PBB SHADOW E&M-EST. PATIENT-LVL III: ICD-10-PCS | Mod: PBBFAC,,, | Performed by: NURSE PRACTITIONER

## 2022-07-06 PROCEDURE — 99215 OFFICE O/P EST HI 40 MIN: CPT | Mod: S$PBB,,, | Performed by: NURSE PRACTITIONER

## 2022-07-06 PROCEDURE — 99999 PR PBB SHADOW E&M-EST. PATIENT-LVL III: CPT | Mod: PBBFAC,,, | Performed by: NURSE PRACTITIONER

## 2022-07-06 PROCEDURE — 99213 OFFICE O/P EST LOW 20 MIN: CPT | Mod: PBBFAC | Performed by: NURSE PRACTITIONER

## 2022-07-06 PROCEDURE — 99215 PR OFFICE/OUTPT VISIT, EST, LEVL V, 40-54 MIN: ICD-10-PCS | Mod: S$PBB,,, | Performed by: NURSE PRACTITIONER

## 2022-07-06 RX ORDER — DULOXETIN HYDROCHLORIDE 60 MG/1
60 CAPSULE, DELAYED RELEASE ORAL DAILY
COMMUNITY
End: 2022-10-05

## 2022-07-06 RX ORDER — LORATADINE 10 MG/1
10 TABLET ORAL DAILY
COMMUNITY
Start: 2022-06-18 | End: 2023-03-21 | Stop reason: SDUPTHER

## 2022-07-06 NOTE — PATIENT INSTRUCTIONS
Ask pharmacist if you can cut Gabapentin 800 mg tablet in half and take this at bedtime.   If pharmacist recommends you reducing Gabapentin from 800 mg to 600 mg at bedtime, I will order this to replace your current dose of 800 mg at bedtime.     Also please ask your Cardiologist to send confirmation to our clinic if will approve you proceeding with a medial branch block to left

## 2022-08-16 ENCOUNTER — HOSPITAL ENCOUNTER (OUTPATIENT)
Dept: RADIOLOGY | Facility: HOSPITAL | Age: 82
Discharge: HOME | End: 2022-08-16
Attending: INTERNAL MEDICINE
Payer: MEDICARE

## 2022-08-16 DIAGNOSIS — R06.09 DOE (DYSPNEA ON EXERTION): ICD-10-CM

## 2022-08-16 PROCEDURE — 71046 X-RAY EXAM CHEST 2 VIEWS: CPT | Mod: TC

## 2022-11-02 PROBLEM — J40 BRONCHITIS: Chronic | Status: RESOLVED | Noted: 2022-06-08 | Resolved: 2022-11-02

## 2022-12-06 ENCOUNTER — HOSPITAL ENCOUNTER (OUTPATIENT)
Dept: RADIOLOGY | Facility: HOSPITAL | Age: 82
Discharge: HOME | End: 2022-12-06
Attending: INTERNAL MEDICINE
Payer: MEDICARE

## 2022-12-06 DIAGNOSIS — Z12.31 ENCOUNTER FOR SCREENING MAMMOGRAM FOR MALIGNANT NEOPLASM OF BREAST: ICD-10-CM

## 2022-12-06 DIAGNOSIS — Z00.00 ROUTINE HEALTH MAINTENANCE: ICD-10-CM

## 2022-12-06 PROCEDURE — 77067 MAMMO DIGITAL SCREENING BILAT WITH TOMO: ICD-10-PCS | Mod: 26,,, | Performed by: RADIOLOGY

## 2022-12-06 PROCEDURE — 77063 MAMMO DIGITAL SCREENING BILAT WITH TOMO: ICD-10-PCS | Mod: 26,,, | Performed by: RADIOLOGY

## 2022-12-06 PROCEDURE — 77063 BREAST TOMOSYNTHESIS BI: CPT | Mod: 26,,, | Performed by: RADIOLOGY

## 2022-12-06 PROCEDURE — 77063 BREAST TOMOSYNTHESIS BI: CPT | Mod: TC

## 2022-12-06 PROCEDURE — 77067 SCR MAMMO BI INCL CAD: CPT | Mod: 26,,, | Performed by: RADIOLOGY

## 2023-01-03 PROBLEM — R41.3 AMNESIA: Chronic | Status: RESOLVED | Noted: 2022-06-13 | Resolved: 2023-01-03

## 2023-01-12 ENCOUNTER — LAB REQUISITION (OUTPATIENT)
Dept: LAB | Facility: HOSPITAL | Age: 83
End: 2023-01-12
Payer: MEDICARE

## 2023-01-12 DIAGNOSIS — E11.22 TYPE 2 DIABETES MELLITUS WITH DIABETIC CHRONIC KIDNEY DISEASE: ICD-10-CM

## 2023-01-12 DIAGNOSIS — E11.65 TYPE 2 DIABETES MELLITUS WITH HYPERGLYCEMIA: ICD-10-CM

## 2023-01-12 PROBLEM — R80.9 MICROALBUMINURIA: Status: ACTIVE | Noted: 2023-01-12

## 2023-01-12 PROBLEM — E87.20 METABOLIC ACIDOSIS: Status: ACTIVE | Noted: 2023-01-12

## 2023-01-12 PROBLEM — E87.20 METABOLIC ACIDOSIS: Chronic | Status: ACTIVE | Noted: 2023-01-12

## 2023-01-12 PROBLEM — R80.9 MICROALBUMINURIA: Chronic | Status: ACTIVE | Noted: 2023-01-12

## 2023-01-12 LAB
ALBUMIN SERPL-MCNC: 3.8 G/DL (ref 3.4–4.8)
ALBUMIN/GLOB SERPL: 1.1 RATIO (ref 1.1–2)
ALP SERPL-CCNC: 64 UNIT/L (ref 40–150)
ALT SERPL-CCNC: 5 UNIT/L (ref 0–55)
AST SERPL-CCNC: 21 UNIT/L (ref 5–34)
BILIRUBIN DIRECT+TOT PNL SERPL-MCNC: 0.4 MG/DL
BUN SERPL-MCNC: 33.4 MG/DL (ref 9.8–20.1)
CALCIUM SERPL-MCNC: 10 MG/DL (ref 8.4–10.2)
CHLORIDE SERPL-SCNC: 110 MMOL/L (ref 98–107)
CO2 SERPL-SCNC: 16 MMOL/L (ref 23–31)
CREAT SERPL-MCNC: 1.95 MG/DL (ref 0.55–1.02)
CREAT UR-MCNC: 91.5 MG/DL (ref 47–110)
EST. AVERAGE GLUCOSE BLD GHB EST-MCNC: 205.9 MG/DL
GFR SERPLBLD CREATININE-BSD FMLA CKD-EPI: 26 MLS/MIN/1.73/M2
GLOBULIN SER-MCNC: 3.4 GM/DL (ref 2.4–3.5)
GLUCOSE SERPL-MCNC: 137 MG/DL (ref 82–115)
HBA1C MFR BLD: 8.8 %
MICROALBUMIN UR-MCNC: 49.2 UG/ML
MICROALBUMIN/CREAT RATIO PNL UR: 53.8 MG/GM CR (ref 0–30)
POTASSIUM SERPL-SCNC: 4.8 MMOL/L (ref 3.5–5.1)
PROT SERPL-MCNC: 7.2 GM/DL (ref 5.8–7.6)
SODIUM SERPL-SCNC: 140 MMOL/L (ref 136–145)

## 2023-01-12 PROCEDURE — 82043 UR ALBUMIN QUANTITATIVE: CPT | Performed by: INTERNAL MEDICINE

## 2023-01-12 PROCEDURE — 83036 HEMOGLOBIN GLYCOSYLATED A1C: CPT | Performed by: INTERNAL MEDICINE

## 2023-01-12 PROCEDURE — 80053 COMPREHEN METABOLIC PANEL: CPT | Performed by: INTERNAL MEDICINE

## 2023-02-08 ENCOUNTER — PATIENT MESSAGE (OUTPATIENT)
Dept: RESEARCH | Facility: HOSPITAL | Age: 83
End: 2023-02-08
Payer: MEDICARE

## 2023-02-14 ENCOUNTER — PATIENT MESSAGE (OUTPATIENT)
Dept: RESEARCH | Facility: HOSPITAL | Age: 83
End: 2023-02-14
Payer: MEDICARE

## 2023-03-09 ENCOUNTER — LAB REQUISITION (OUTPATIENT)
Dept: LAB | Facility: HOSPITAL | Age: 83
End: 2023-03-09
Payer: MEDICARE

## 2023-03-09 DIAGNOSIS — N18.32 CHRONIC KIDNEY DISEASE, STAGE 3B: ICD-10-CM

## 2023-03-09 DIAGNOSIS — E11.42 TYPE 2 DIABETES MELLITUS WITH DIABETIC POLYNEUROPATHY: ICD-10-CM

## 2023-03-09 DIAGNOSIS — D63.1 ANEMIA IN CHRONIC KIDNEY DISEASE (CODE): ICD-10-CM

## 2023-03-09 LAB
ALBUMIN SERPL-MCNC: 3.6 G/DL (ref 3.4–4.8)
ALBUMIN/GLOB SERPL: 1.2 RATIO (ref 1.1–2)
ALP SERPL-CCNC: 75 UNIT/L (ref 40–150)
ALT SERPL-CCNC: 8 UNIT/L (ref 0–55)
AST SERPL-CCNC: 19 UNIT/L (ref 5–34)
BASOPHILS # BLD AUTO: 0.05 X10(3)/MCL (ref 0–0.2)
BASOPHILS NFR BLD AUTO: 0.5 %
BILIRUBIN DIRECT+TOT PNL SERPL-MCNC: 0.4 MG/DL
BUN SERPL-MCNC: 28 MG/DL (ref 9.8–20.1)
CALCIUM SERPL-MCNC: 9.2 MG/DL (ref 8.4–10.2)
CHLORIDE SERPL-SCNC: 105 MMOL/L (ref 98–107)
CO2 SERPL-SCNC: 24 MMOL/L (ref 23–31)
CREAT SERPL-MCNC: 2.16 MG/DL (ref 0.55–1.02)
EOSINOPHIL # BLD AUTO: 0.43 X10(3)/MCL (ref 0–0.9)
EOSINOPHIL NFR BLD AUTO: 4.6 %
ERYTHROCYTE [DISTWIDTH] IN BLOOD BY AUTOMATED COUNT: 13.3 % (ref 11.5–17)
GFR SERPLBLD CREATININE-BSD FMLA CKD-EPI: 23 MLS/MIN/1.73/M2
GLOBULIN SER-MCNC: 2.9 GM/DL (ref 2.4–3.5)
GLUCOSE SERPL-MCNC: 329 MG/DL (ref 82–115)
HCT VFR BLD AUTO: 37 % (ref 37–47)
HGB BLD-MCNC: 11.9 G/DL (ref 12–16)
IMM GRANULOCYTES # BLD AUTO: 0.04 X10(3)/MCL (ref 0–0.04)
IMM GRANULOCYTES NFR BLD AUTO: 0.4 %
LYMPHOCYTES # BLD AUTO: 2.36 X10(3)/MCL (ref 0.6–4.6)
LYMPHOCYTES NFR BLD AUTO: 25.4 %
MCH RBC QN AUTO: 28.1 PG
MCHC RBC AUTO-ENTMCNC: 32.2 G/DL (ref 33–36)
MCV RBC AUTO: 87.5 FL (ref 80–94)
MONOCYTES # BLD AUTO: 0.54 X10(3)/MCL (ref 0.1–1.3)
MONOCYTES NFR BLD AUTO: 5.8 %
NEUTROPHILS # BLD AUTO: 5.88 X10(3)/MCL (ref 2.1–9.2)
NEUTROPHILS NFR BLD AUTO: 63.3 %
NRBC BLD AUTO-RTO: 0 %
PLATELET # BLD AUTO: 211 X10(3)/MCL (ref 130–400)
PMV BLD AUTO: 9.9 FL (ref 7.4–10.4)
POTASSIUM SERPL-SCNC: 4.2 MMOL/L (ref 3.5–5.1)
PROT SERPL-MCNC: 6.5 GM/DL (ref 5.8–7.6)
RBC # BLD AUTO: 4.23 X10(6)/MCL (ref 4.2–5.4)
SODIUM SERPL-SCNC: 140 MMOL/L (ref 136–145)
TSH SERPL-ACNC: 1.97 UIU/ML (ref 0.35–4.94)
WBC # SPEC AUTO: 9.3 X10(3)/MCL (ref 4.5–11.5)

## 2023-03-09 PROCEDURE — 85025 COMPLETE CBC W/AUTO DIFF WBC: CPT | Performed by: INTERNAL MEDICINE

## 2023-03-09 PROCEDURE — 80053 COMPREHEN METABOLIC PANEL: CPT | Performed by: INTERNAL MEDICINE

## 2023-03-09 PROCEDURE — 84443 ASSAY THYROID STIM HORMONE: CPT | Performed by: INTERNAL MEDICINE

## 2023-03-10 ENCOUNTER — LAB REQUISITION (OUTPATIENT)
Dept: LAB | Facility: HOSPITAL | Age: 83
End: 2023-03-10
Payer: MEDICARE

## 2023-03-10 DIAGNOSIS — N18.32 CHRONIC KIDNEY DISEASE, STAGE 3B: ICD-10-CM

## 2023-03-10 DIAGNOSIS — E11.22 TYPE 2 DIABETES MELLITUS WITH DIABETIC CHRONIC KIDNEY DISEASE: ICD-10-CM

## 2023-03-10 LAB
APPEARANCE UR: CLEAR
BACTERIA #/AREA URNS AUTO: NORMAL /HPF
BILIRUB UR QL STRIP.AUTO: NEGATIVE MG/DL
COLOR STL: NORMAL
COLOR UR AUTO: YELLOW
CONSISTENCY STL: NORMAL
GLUCOSE UR QL STRIP.AUTO: ABNORMAL MG/DL
HEMOCCULT SP1 STL QL: NEGATIVE
KETONES UR QL STRIP.AUTO: NEGATIVE MG/DL
LEUKOCYTE ESTERASE UR QL STRIP.AUTO: NEGATIVE UNIT/L
NITRITE UR QL STRIP.AUTO: NEGATIVE
PH UR STRIP.AUTO: 6 [PH]
PROT UR QL STRIP.AUTO: ABNORMAL MG/DL
RBC #/AREA URNS AUTO: <5 /HPF
RBC UR QL AUTO: NEGATIVE UNIT/L
SP GR UR STRIP.AUTO: 1.01 (ref 1–1.03)
SQUAMOUS #/AREA URNS AUTO: <5 /HPF
UROBILINOGEN UR STRIP-ACNC: 0.2 MG/DL
WBC #/AREA URNS AUTO: <5 /HPF

## 2023-03-10 PROCEDURE — 81001 URINALYSIS AUTO W/SCOPE: CPT | Performed by: FAMILY MEDICINE

## 2023-03-10 PROCEDURE — 82270 OCCULT BLOOD FECES: CPT | Performed by: FAMILY MEDICINE

## 2023-03-17 ENCOUNTER — LAB REQUISITION (OUTPATIENT)
Dept: LAB | Facility: HOSPITAL | Age: 83
End: 2023-03-17
Payer: MEDICARE

## 2023-03-17 DIAGNOSIS — N18.32 CHRONIC KIDNEY DISEASE, STAGE 3B: ICD-10-CM

## 2023-03-17 DIAGNOSIS — E11.65 TYPE 2 DIABETES MELLITUS WITH HYPERGLYCEMIA: ICD-10-CM

## 2023-03-17 LAB
ALBUMIN SERPL-MCNC: 3.9 G/DL (ref 3.4–4.8)
ALBUMIN/GLOB SERPL: 1.3 RATIO (ref 1.1–2)
ALP SERPL-CCNC: 60 UNIT/L (ref 40–150)
ALT SERPL-CCNC: 6 UNIT/L (ref 0–55)
AST SERPL-CCNC: 19 UNIT/L (ref 5–34)
BASOPHILS # BLD AUTO: 0.05 X10(3)/MCL (ref 0–0.2)
BASOPHILS NFR BLD AUTO: 0.5 %
BILIRUBIN DIRECT+TOT PNL SERPL-MCNC: 0.3 MG/DL
BUN SERPL-MCNC: 28.8 MG/DL (ref 9.8–20.1)
CALCIUM SERPL-MCNC: 9.8 MG/DL (ref 8.4–10.2)
CHLORIDE SERPL-SCNC: 105 MMOL/L (ref 98–107)
CO2 SERPL-SCNC: 23 MMOL/L (ref 23–31)
CREAT SERPL-MCNC: 1.59 MG/DL (ref 0.55–1.02)
EOSINOPHIL # BLD AUTO: 0.49 X10(3)/MCL (ref 0–0.9)
EOSINOPHIL NFR BLD AUTO: 5.2 %
ERYTHROCYTE [DISTWIDTH] IN BLOOD BY AUTOMATED COUNT: 13.6 % (ref 11.5–17)
FERRITIN SERPL-MCNC: 215.75 NG/ML (ref 4.63–204)
FOLATE SERPL-MCNC: 10.9 NG/ML (ref 7–31.4)
GFR SERPLBLD CREATININE-BSD FMLA CKD-EPI: 33 MLS/MIN/1.73/M2
GLOBULIN SER-MCNC: 3.1 GM/DL (ref 2.4–3.5)
GLUCOSE SERPL-MCNC: 106 MG/DL (ref 82–115)
HCT VFR BLD AUTO: 38.9 % (ref 37–47)
HGB BLD-MCNC: 12.8 G/DL (ref 12–16)
IMM GRANULOCYTES # BLD AUTO: 0.04 X10(3)/MCL (ref 0–0.04)
IMM GRANULOCYTES NFR BLD AUTO: 0.4 %
IRON SATN MFR SERPL: 24 % (ref 20–50)
IRON SERPL-MCNC: 54 UG/DL (ref 50–170)
LYMPHOCYTES # BLD AUTO: 3.38 X10(3)/MCL (ref 0.6–4.6)
LYMPHOCYTES NFR BLD AUTO: 36 %
MCH RBC QN AUTO: 28.3 PG
MCHC RBC AUTO-ENTMCNC: 32.9 G/DL (ref 33–36)
MCV RBC AUTO: 85.9 FL (ref 80–94)
MONOCYTES # BLD AUTO: 0.65 X10(3)/MCL (ref 0.1–1.3)
MONOCYTES NFR BLD AUTO: 6.9 %
NEUTROPHILS # BLD AUTO: 4.77 X10(3)/MCL (ref 2.1–9.2)
NEUTROPHILS NFR BLD AUTO: 51 %
NRBC BLD AUTO-RTO: 0 %
PLATELET # BLD AUTO: 209 X10(3)/MCL (ref 130–400)
PMV BLD AUTO: 10.5 FL (ref 7.4–10.4)
POTASSIUM SERPL-SCNC: 4.3 MMOL/L (ref 3.5–5.1)
PROT SERPL-MCNC: 7 GM/DL (ref 5.8–7.6)
RBC # BLD AUTO: 4.53 X10(6)/MCL (ref 4.2–5.4)
SODIUM SERPL-SCNC: 141 MMOL/L (ref 136–145)
TIBC SERPL-MCNC: 175 UG/DL (ref 70–310)
TIBC SERPL-MCNC: 229 UG/DL (ref 250–450)
TRANSFERRIN SERPL-MCNC: 210 MG/DL
VIT B12 SERPL-MCNC: 346 PG/ML (ref 213–816)
WBC # SPEC AUTO: 9.4 X10(3)/MCL (ref 4.5–11.5)

## 2023-03-17 PROCEDURE — 82728 ASSAY OF FERRITIN: CPT | Performed by: INTERNAL MEDICINE

## 2023-03-17 PROCEDURE — 82746 ASSAY OF FOLIC ACID SERUM: CPT | Performed by: INTERNAL MEDICINE

## 2023-03-17 PROCEDURE — 85025 COMPLETE CBC W/AUTO DIFF WBC: CPT | Performed by: INTERNAL MEDICINE

## 2023-03-17 PROCEDURE — 83550 IRON BINDING TEST: CPT | Performed by: INTERNAL MEDICINE

## 2023-03-17 PROCEDURE — 80053 COMPREHEN METABOLIC PANEL: CPT | Performed by: INTERNAL MEDICINE

## 2023-03-17 PROCEDURE — 82607 VITAMIN B-12: CPT | Performed by: INTERNAL MEDICINE

## 2023-03-21 ENCOUNTER — LAB REQUISITION (OUTPATIENT)
Dept: LAB | Facility: HOSPITAL | Age: 83
End: 2023-03-21
Payer: MEDICARE

## 2023-03-21 DIAGNOSIS — N39.0 URINARY TRACT INFECTION, SITE NOT SPECIFIED: ICD-10-CM

## 2023-03-21 LAB
APPEARANCE UR: CLEAR
BACTERIA #/AREA URNS AUTO: ABNORMAL /HPF
BILIRUB UR QL STRIP.AUTO: NEGATIVE MG/DL
COLOR UR AUTO: YELLOW
GLUCOSE UR QL STRIP.AUTO: ABNORMAL MG/DL
KETONES UR QL STRIP.AUTO: NEGATIVE MG/DL
LEUKOCYTE ESTERASE UR QL STRIP.AUTO: ABNORMAL UNIT/L
NITRITE UR QL STRIP.AUTO: NEGATIVE
PH UR STRIP.AUTO: 5.5 [PH]
PROT UR QL STRIP.AUTO: ABNORMAL MG/DL
RBC #/AREA URNS AUTO: ABNORMAL /HPF
RBC UR QL AUTO: NEGATIVE UNIT/L
SP GR UR STRIP.AUTO: 1.01 (ref 1–1.03)
SQUAMOUS #/AREA URNS AUTO: ABNORMAL /HPF
UROBILINOGEN UR STRIP-ACNC: 0.2 MG/DL
WBC #/AREA URNS AUTO: ABNORMAL /HPF

## 2023-03-21 PROCEDURE — 87088 URINE BACTERIA CULTURE: CPT | Performed by: INTERNAL MEDICINE

## 2023-03-21 PROCEDURE — 81001 URINALYSIS AUTO W/SCOPE: CPT | Performed by: INTERNAL MEDICINE

## 2023-03-22 ENCOUNTER — LAB REQUISITION (OUTPATIENT)
Dept: LAB | Facility: HOSPITAL | Age: 83
End: 2023-03-22
Payer: MEDICARE

## 2023-03-22 DIAGNOSIS — M47.816 SPONDYLOSIS WITHOUT MYELOPATHY OR RADICULOPATHY, LUMBAR REGION: ICD-10-CM

## 2023-03-22 DIAGNOSIS — E11.42 TYPE 2 DIABETES MELLITUS WITH DIABETIC POLYNEUROPATHY: ICD-10-CM

## 2023-03-22 DIAGNOSIS — N18.32 CHRONIC KIDNEY DISEASE, STAGE 3B: ICD-10-CM

## 2023-03-22 DIAGNOSIS — E11.65 TYPE 2 DIABETES MELLITUS WITH HYPERGLYCEMIA: ICD-10-CM

## 2023-03-22 LAB — HEMOCCULT SP1 STL QL: NEGATIVE

## 2023-03-22 PROCEDURE — 82270 OCCULT BLOOD FECES: CPT | Performed by: INTERNAL MEDICINE

## 2023-03-23 LAB — BACTERIA UR CULT: NO GROWTH

## 2023-04-14 ENCOUNTER — LAB REQUISITION (OUTPATIENT)
Dept: LAB | Facility: HOSPITAL | Age: 83
End: 2023-04-14
Payer: MEDICARE

## 2023-04-14 DIAGNOSIS — E11.65 TYPE 2 DIABETES MELLITUS WITH HYPERGLYCEMIA: ICD-10-CM

## 2023-04-14 LAB
ALBUMIN SERPL-MCNC: 3.3 G/DL (ref 3.4–4.8)
ALBUMIN/GLOB SERPL: 1 RATIO (ref 1.1–2)
ALP SERPL-CCNC: 75 UNIT/L (ref 40–150)
ALT SERPL-CCNC: 6 UNIT/L (ref 0–55)
AST SERPL-CCNC: 20 UNIT/L (ref 5–34)
BILIRUBIN DIRECT+TOT PNL SERPL-MCNC: 0.2 MG/DL
BUN SERPL-MCNC: 27 MG/DL (ref 9.8–20.1)
CALCIUM SERPL-MCNC: 9.4 MG/DL (ref 8.4–10.2)
CHLORIDE SERPL-SCNC: 107 MMOL/L (ref 98–107)
CO2 SERPL-SCNC: 21 MMOL/L (ref 23–31)
CREAT SERPL-MCNC: 1.9 MG/DL (ref 0.55–1.02)
EST. AVERAGE GLUCOSE BLD GHB EST-MCNC: 211.6 MG/DL
GFR SERPLBLD CREATININE-BSD FMLA CKD-EPI: 26 MLS/MIN/1.73/M2
GLOBULIN SER-MCNC: 3.4 GM/DL (ref 2.4–3.5)
GLUCOSE SERPL-MCNC: 241 MG/DL (ref 82–115)
HBA1C MFR BLD: 9 %
POTASSIUM SERPL-SCNC: 4.5 MMOL/L (ref 3.5–5.1)
PROT SERPL-MCNC: 6.7 GM/DL (ref 5.8–7.6)
SODIUM SERPL-SCNC: 139 MMOL/L (ref 136–145)

## 2023-04-14 PROCEDURE — 80053 COMPREHEN METABOLIC PANEL: CPT | Performed by: INTERNAL MEDICINE

## 2023-04-14 PROCEDURE — 83036 HEMOGLOBIN GLYCOSYLATED A1C: CPT | Performed by: INTERNAL MEDICINE

## 2023-04-15 ENCOUNTER — LAB REQUISITION (OUTPATIENT)
Dept: LAB | Facility: HOSPITAL | Age: 83
End: 2023-04-15
Payer: MEDICARE

## 2023-04-15 DIAGNOSIS — E11.65 TYPE 2 DIABETES MELLITUS WITH HYPERGLYCEMIA: ICD-10-CM

## 2023-04-15 LAB
APPEARANCE UR: CLEAR
BACTERIA #/AREA URNS AUTO: NORMAL /HPF
BILIRUB UR QL STRIP.AUTO: NEGATIVE MG/DL
COLOR UR AUTO: YELLOW
GLUCOSE UR QL STRIP.AUTO: ABNORMAL MG/DL
KETONES UR QL STRIP.AUTO: NEGATIVE MG/DL
LEUKOCYTE ESTERASE UR QL STRIP.AUTO: NEGATIVE UNIT/L
NITRITE UR QL STRIP.AUTO: NEGATIVE
PH UR STRIP.AUTO: 5.5 [PH]
PROT UR QL STRIP.AUTO: NEGATIVE MG/DL
RBC #/AREA URNS AUTO: <5 /HPF
RBC UR QL AUTO: NEGATIVE UNIT/L
SP GR UR STRIP.AUTO: 1.01 (ref 1–1.03)
SQUAMOUS #/AREA URNS AUTO: <5 /HPF
UROBILINOGEN UR STRIP-ACNC: 0.2 MG/DL
WBC #/AREA URNS AUTO: <5 /HPF

## 2023-04-15 PROCEDURE — 81001 URINALYSIS AUTO W/SCOPE: CPT | Performed by: INTERNAL MEDICINE

## 2023-04-18 ENCOUNTER — HOSPITAL ENCOUNTER (EMERGENCY)
Facility: HOSPITAL | Age: 83
Discharge: HOME | End: 2023-04-18
Attending: EMERGENCY MEDICINE
Payer: MEDICARE

## 2023-04-18 VITALS
TEMPERATURE: 99 F | OXYGEN SATURATION: 98 % | SYSTOLIC BLOOD PRESSURE: 133 MMHG | HEART RATE: 90 BPM | WEIGHT: 155 LBS | DIASTOLIC BLOOD PRESSURE: 87 MMHG | BODY MASS INDEX: 25.83 KG/M2 | HEIGHT: 65 IN | RESPIRATION RATE: 20 BRPM

## 2023-04-18 DIAGNOSIS — J98.01 BRONCHOSPASM: ICD-10-CM

## 2023-04-18 DIAGNOSIS — R06.02 SOB (SHORTNESS OF BREATH): ICD-10-CM

## 2023-04-18 DIAGNOSIS — J18.9 COMMUNITY ACQUIRED PNEUMONIA, UNSPECIFIED LATERALITY: Primary | ICD-10-CM

## 2023-04-18 LAB
ALBUMIN SERPL-MCNC: 3.4 G/DL (ref 3.4–4.8)
ALBUMIN/GLOB SERPL: 0.8 RATIO (ref 1.1–2)
ALP SERPL-CCNC: 61 UNIT/L (ref 40–150)
ALT SERPL-CCNC: 7 UNIT/L (ref 0–55)
AST SERPL-CCNC: 20 UNIT/L (ref 5–34)
BASOPHILS # BLD AUTO: 0.06 X10(3)/MCL (ref 0–0.2)
BASOPHILS NFR BLD AUTO: 0.5 %
BILIRUBIN DIRECT+TOT PNL SERPL-MCNC: 0.3 MG/DL
BNP BLD-MCNC: 144.4 PG/ML
BUN SERPL-MCNC: 25.2 MG/DL (ref 9.8–20.1)
CALCIUM SERPL-MCNC: 9.9 MG/DL (ref 8.4–10.2)
CHLORIDE SERPL-SCNC: 106 MMOL/L (ref 98–107)
CO2 SERPL-SCNC: 22 MMOL/L (ref 23–31)
CREAT SERPL-MCNC: 1.65 MG/DL (ref 0.55–1.02)
EOSINOPHIL # BLD AUTO: 0.45 X10(3)/MCL (ref 0–0.9)
EOSINOPHIL NFR BLD AUTO: 3.8 %
ERYTHROCYTE [DISTWIDTH] IN BLOOD BY AUTOMATED COUNT: 13.3 % (ref 11.5–17)
FLUAV AG UPPER RESP QL IA.RAPID: NOT DETECTED
FLUBV AG UPPER RESP QL IA.RAPID: NOT DETECTED
GFR SERPLBLD CREATININE-BSD FMLA CKD-EPI: 31 MLS/MIN/1.73/M2
GLOBULIN SER-MCNC: 4.1 GM/DL (ref 2.4–3.5)
GLUCOSE SERPL-MCNC: 176 MG/DL (ref 82–115)
HCT VFR BLD AUTO: 36.2 % (ref 37–47)
HGB BLD-MCNC: 11.7 G/DL (ref 12–16)
IMM GRANULOCYTES # BLD AUTO: 0.1 X10(3)/MCL (ref 0–0.04)
IMM GRANULOCYTES NFR BLD AUTO: 0.9 %
LYMPHOCYTES # BLD AUTO: 2.56 X10(3)/MCL (ref 0.6–4.6)
LYMPHOCYTES NFR BLD AUTO: 21.9 %
MCH RBC QN AUTO: 28.1 PG (ref 27–31)
MCHC RBC AUTO-ENTMCNC: 32.3 G/DL (ref 33–36)
MCV RBC AUTO: 87 FL (ref 80–94)
MONOCYTES # BLD AUTO: 0.72 X10(3)/MCL (ref 0.1–1.3)
MONOCYTES NFR BLD AUTO: 6.1 %
NEUTROPHILS # BLD AUTO: 7.82 X10(3)/MCL (ref 2.1–9.2)
NEUTROPHILS NFR BLD AUTO: 66.8 %
NRBC BLD AUTO-RTO: 0 %
PLATELET # BLD AUTO: 293 X10(3)/MCL (ref 130–400)
PMV BLD AUTO: 9.4 FL (ref 7.4–10.4)
POTASSIUM SERPL-SCNC: 4 MMOL/L (ref 3.5–5.1)
PROT SERPL-MCNC: 7.5 GM/DL (ref 5.8–7.6)
RBC # BLD AUTO: 4.16 X10(6)/MCL (ref 4.2–5.4)
SARS-COV-2 RNA RESP QL NAA+PROBE: NOT DETECTED
SODIUM SERPL-SCNC: 138 MMOL/L (ref 136–145)
TROPONIN I SERPL-MCNC: 0.01 NG/ML (ref 0–0.04)
WBC # SPEC AUTO: 11.7 X10(3)/MCL (ref 4.5–11.5)

## 2023-04-18 PROCEDURE — 93010 ELECTROCARDIOGRAM REPORT: CPT | Mod: ,,, | Performed by: INTERNAL MEDICINE

## 2023-04-18 PROCEDURE — 63600175 PHARM REV CODE 636 W HCPCS: Performed by: EMERGENCY MEDICINE

## 2023-04-18 PROCEDURE — 99285 EMERGENCY DEPT VISIT HI MDM: CPT | Mod: 25

## 2023-04-18 PROCEDURE — 84484 ASSAY OF TROPONIN QUANT: CPT | Performed by: EMERGENCY MEDICINE

## 2023-04-18 PROCEDURE — 94640 AIRWAY INHALATION TREATMENT: CPT

## 2023-04-18 PROCEDURE — 83880 ASSAY OF NATRIURETIC PEPTIDE: CPT | Performed by: EMERGENCY MEDICINE

## 2023-04-18 PROCEDURE — 94761 N-INVAS EAR/PLS OXIMETRY MLT: CPT

## 2023-04-18 PROCEDURE — 96374 THER/PROPH/DIAG INJ IV PUSH: CPT

## 2023-04-18 PROCEDURE — 0240U COVID/FLU A&B PCR: CPT | Performed by: EMERGENCY MEDICINE

## 2023-04-18 PROCEDURE — 93005 ELECTROCARDIOGRAM TRACING: CPT

## 2023-04-18 PROCEDURE — 99900031 HC PATIENT EDUCATION (STAT)

## 2023-04-18 PROCEDURE — 80053 COMPREHEN METABOLIC PANEL: CPT | Performed by: EMERGENCY MEDICINE

## 2023-04-18 PROCEDURE — 93010 EKG 12-LEAD: ICD-10-PCS | Mod: ,,, | Performed by: INTERNAL MEDICINE

## 2023-04-18 PROCEDURE — 85025 COMPLETE CBC W/AUTO DIFF WBC: CPT | Performed by: EMERGENCY MEDICINE

## 2023-04-18 PROCEDURE — 25000242 PHARM REV CODE 250 ALT 637 W/ HCPCS: Performed by: EMERGENCY MEDICINE

## 2023-04-18 RX ORDER — DEXAMETHASONE SODIUM PHOSPHATE 4 MG/ML
8 INJECTION, SOLUTION INTRA-ARTICULAR; INTRALESIONAL; INTRAMUSCULAR; INTRAVENOUS; SOFT TISSUE
Status: COMPLETED | OUTPATIENT
Start: 2023-04-18 | End: 2023-04-18

## 2023-04-18 RX ORDER — IPRATROPIUM BROMIDE AND ALBUTEROL SULFATE 2.5; .5 MG/3ML; MG/3ML
3 SOLUTION RESPIRATORY (INHALATION)
Status: COMPLETED | OUTPATIENT
Start: 2023-04-18 | End: 2023-04-18

## 2023-04-18 RX ORDER — AZITHROMYCIN 250 MG/1
TABLET, FILM COATED ORAL
Qty: 6 TABLET | Refills: 0 | Status: SHIPPED | OUTPATIENT
Start: 2023-04-18 | End: 2023-04-23

## 2023-04-18 RX ORDER — ALBUTEROL SULFATE 90 UG/1
1-2 AEROSOL, METERED RESPIRATORY (INHALATION) EVERY 4 HOURS PRN
Qty: 6.7 G | Refills: 0 | Status: SHIPPED | OUTPATIENT
Start: 2023-04-18 | End: 2024-02-01

## 2023-04-18 RX ORDER — PREDNISONE 20 MG/1
60 TABLET ORAL DAILY
Qty: 15 TABLET | Refills: 0 | Status: SHIPPED | OUTPATIENT
Start: 2023-04-18 | End: 2023-04-23

## 2023-04-18 RX ADMIN — DEXAMETHASONE SODIUM PHOSPHATE 8 MG: 4 INJECTION, SOLUTION INTRA-ARTICULAR; INTRALESIONAL; INTRAMUSCULAR; INTRAVENOUS; SOFT TISSUE at 05:04

## 2023-04-18 RX ADMIN — IPRATROPIUM BROMIDE AND ALBUTEROL SULFATE 3 ML: 2.5; .5 SOLUTION RESPIRATORY (INHALATION) at 05:04

## 2023-04-18 NOTE — ED TRIAGE NOTES
Non prod cough w/ sob worsening today , states dx w/ pneumonia x 2 weeks ago and completed, +fever at night.

## 2023-04-18 NOTE — ED PROVIDER NOTES
Encounter Date: 4/18/2023       History     Chief Complaint   Patient presents with    Cough     Non prod cough w/ sob worsening today , states dx w/ pneumonia x 2 weeks ago and completed, +fever at night.      The history is provided by the patient. No  was used.   Cough  This is a new problem. The current episode started several weeks ago. The problem has been unchanged. The cough is Non-productive. The maximum temperature recorded prior to her arrival was 100 - 100.9 F. The fever has been present for 5 days or more. Associated symptoms include sore throat, shortness of breath and wheezing. Pertinent negatives include no chest pain. Associated symptoms comments: hoarseness. She has tried nothing for the symptoms. She is not a smoker.   Completed course of antibiotics yesterday but not improving (cefdinir).  Sent from Dr. Ramos's office for evaluation.    Review of patient's allergies indicates:   Allergen Reactions    Amlodipine      Skin crawling      Past Medical History:   Diagnosis Date    Amnesia 6/13/2022    Benign paroxysmal positional vertigo, bilateral 6/13/2022    Bronchitis 6/8/2022    Chronic gouty arthritis 6/13/2022    Diabetes mellitus, type 2     Essential hypertension 4/21/2016    Gastroesophageal reflux disease without esophagitis 4/21/2016    GERD (gastroesophageal reflux disease)     Gout, unspecified     Heart attack     Low back pain 6/13/2022    lower back pain bilateral with movement; radiates down right leg to thigh    Low vitamin D level 6/13/2022    Metabolic acidosis 1/12/2023    Microalbuminuria 1/12/2023    Migraine headache 4/21/2016    Osteoporosis 6/13/2022    Peripheral edema 6/13/2022    Physical deconditioning 6/13/2022    Pure hypercholesterolemia 6/13/2022    Spondylosis of lumbar spine 6/13/2022    Stage 3 chronic kidney disease 6/13/2022    Type 2 diabetes mellitus 6/13/2022     Past Surgical History:   Procedure Laterality Date    FOOT SURGERY    "    History reviewed. No pertinent family history.  Social History     Tobacco Use    Smoking status: Former    Smokeless tobacco: Never   Substance Use Topics    Alcohol use: Never    Drug use: Never     Review of Systems   Constitutional:  Negative for fever.   HENT:  Positive for sore throat.    Respiratory:  Positive for cough, shortness of breath and wheezing.    Cardiovascular:  Negative for chest pain.   Gastrointestinal:  Negative for nausea.   Genitourinary:  Negative for dysuria.   Musculoskeletal:  Negative for back pain.   Skin:  Negative for rash.   Neurological:  Negative for weakness.   Hematological:  Does not bruise/bleed easily.     Physical Exam     Initial Vitals [04/18/23 1715]   BP Pulse Resp Temp SpO2   133/87 93 (!) 22 98.9 °F (37.2 °C) 98 %      MAP       --         Physical Exam    Nursing note and vitals reviewed.  Constitutional: She appears well-developed and well-nourished.   HENT:   Head: Normocephalic and atraumatic.   Right Ear: External ear normal.   Left Ear: External ear normal.   "Hot potato" voice   Eyes: Conjunctivae and EOM are normal. Pupils are equal, round, and reactive to light.   Neck: Neck supple.   Normal range of motion.  Cardiovascular:  Normal rate, regular rhythm, normal heart sounds and intact distal pulses.           Pulmonary/Chest: She has wheezes in the right lower field and the left lower field. She has rhonchi in the right middle field, the right lower field, the left middle field and the left lower field.   Abdominal: Abdomen is soft. Bowel sounds are normal.   Musculoskeletal:         General: Normal range of motion.      Cervical back: Normal range of motion and neck supple.     Neurological: She is alert and oriented to person, place, and time. GCS score is 15. GCS eye subscore is 4. GCS verbal subscore is 5. GCS motor subscore is 6.   Skin: Skin is warm and dry. Capillary refill takes less than 2 seconds.   Psychiatric: She has a normal mood and affect. " Her behavior is normal. Judgment and thought content normal.       ED Course   Procedures  Labs Reviewed   B-TYPE NATRIURETIC PEPTIDE - Abnormal; Notable for the following components:       Result Value    Natriuretic Peptide 144.4 (*)     All other components within normal limits   COMPREHENSIVE METABOLIC PANEL - Abnormal; Notable for the following components:    Carbon Dioxide 22 (*)     Glucose Level 176 (*)     Blood Urea Nitrogen 25.2 (*)     Creatinine 1.65 (*)     Globulin 4.1 (*)     Albumin/Globulin Ratio 0.8 (*)     All other components within normal limits   CBC WITH DIFFERENTIAL - Abnormal; Notable for the following components:    WBC 11.7 (*)     RBC 4.16 (*)     Hgb 11.7 (*)     Hct 36.2 (*)     MCHC 32.3 (*)     IG# 0.10 (*)     All other components within normal limits   COVID/FLU A&B PCR - Normal    Narrative:     The Xpert Xpress SARS-CoV-2/FLU/RSV plus is a rapid, multiplexed real-time PCR test intended for the simultaneous qualitative detection and differentiation of SARS-CoV-2, Influenza A, Influenza B, and respiratory syncytial virus (RSV) viral RNA in either nasopharyngeal swab or nasal swab specimens.         TROPONIN I - Normal   CBC W/ AUTO DIFFERENTIAL    Narrative:     The following orders were created for panel order CBC auto differential.  Procedure                               Abnormality         Status                     ---------                               -----------         ------                     CBC with Differential[[id number]]        Abnormal            Final result                 Please view results for these tests on the individual orders.     EKG Readings: (Independently Interpreted)   Initial Reading: No STEMI. Rhythm: Normal Sinus Rhythm. Heart Rate: 83. Ectopy: No Ectopy. Conduction: Normal. ST Segments: Normal ST Segments. T Waves: Normal. Axis: Normal. Clinical Impression: Normal Sinus Rhythm     Imaging Results              CT Chest Without Contrast (Final  result)  Result time 04/18/23 18:35:13      Final result by Seymour Bruce MD (04/18/23 18:35:13)                   Impression:      Mild bibasilar atelectasis versus developing infiltrate.  Follow-up is recommended    Punctate 1-2 mm areas of nodule seen in the lungs bilaterally.  Follow-up is recommended according to Fleischner criteria.  For multiple solid nodules all <6 mm, Fleischner Society 2017 guidelines recommend no routine follow up for a low risk patient, or follow up with non-contrast chest CT at 12 months after discovery in a high risk patient.      Electronically signed by: Seymour Bruce  Date:    04/18/2023  Time:    18:35               Narrative:    EXAMINATION:  CT CHEST WITHOUT CONTRAST    CLINICAL HISTORY:  Cough, persistent;    TECHNIQUE:  Low dose axial images, sagittal and coronal reformations were obtained from the thoracic inlet to the lung bases. Contrast was not administered.  Automatic exposure control is utilized to reduce patient radiation exposure.    COMPARISON:  None.    FINDINGS:  There is mild bibasilar atelectasis versus developing infiltrate.  Follow-up is recommended.  No consolidation is seen..  There are some punctate scattered 1-2 mm nodules seen in the lungs bilaterally..  No pleural effusion is seen.  No pleural thickening is seen.  The mediastinum appears grossly unremarkable.  No abnormal lymphadenopathy is seen.  Thoracic aorta appears grossly unremarkable.  Heart appears normal.    Visualized portions of the upper abdomen show no acute abnormality.                                       Medications   dexAMETHasone injection 8 mg (8 mg Intravenous Given 4/18/23 1746)   albuterol-ipratropium 2.5 mg-0.5 mg/3 mL nebulizer solution 3 mL (3 mLs Nebulization Given 4/18/23 1758)         Differential includes: viral URI, COVID, flu, allergies, COPD, CHF, laryngitis, vocal cord paralysis.  Will obtain BNP, CBC, CMP, troponin, CT chest (pt had CXR in Dr. Ramos's office  today), and give DuoNeb and steroids for wheezing.            1850:  pt feeling better; discussed further outpatient therapy to which she is agreeable; I think steroids may be of benefit, given her bronchospasm.  Cautioned that her CBG will run higher - daughter states they can manage with insulin sliding scale.  Will cover for atypicals with azithromycin and prescribe steroids, MDI.         Clinical Impression:   Final diagnoses:  [R06.02] SOB (shortness of breath)  [J18.9] Community acquired pneumonia, unspecified laterality (Primary)  [J98.01] Bronchospasm        ED Disposition Condition    Discharge Stable          ED Prescriptions       Medication Sig Dispense Start Date End Date Auth. Provider    azithromycin (Z-RON) 250 MG tablet Take 2 tablets by mouth on day 1; Take 1 tablet by mouth on days 2-5 6 tablet 4/18/2023 4/23/2023 Antoni Phillips MD    predniSONE (DELTASONE) 20 MG tablet Take 3 tablets (60 mg total) by mouth once daily. for 5 days 15 tablet 4/18/2023 4/23/2023 Antoni Phillips MD    albuterol (PROVENTIL/VENTOLIN HFA) 90 mcg/actuation inhaler Inhale 1-2 puffs into the lungs every 4 (four) hours as needed for Wheezing. Rescue 6.7 g 4/18/2023 -- Antoni Phillips MD          Follow-up Information       Follow up With Specialties Details Why Contact Info    Eleazar Ramos MD Internal Medicine In 1 week  [address] [place] [address]  [phone]               Antoni Phillips MD  04/18/23 9040

## 2023-04-24 ENCOUNTER — HOSPITAL ENCOUNTER (EMERGENCY)
Facility: HOSPITAL | Age: 83
Discharge: HOME | End: 2023-04-24
Attending: FAMILY MEDICINE
Payer: MEDICARE

## 2023-04-24 VITALS
RESPIRATION RATE: 16 BRPM | SYSTOLIC BLOOD PRESSURE: 140 MMHG | DIASTOLIC BLOOD PRESSURE: 79 MMHG | HEART RATE: 68 BPM | TEMPERATURE: 98 F | OXYGEN SATURATION: 100 %

## 2023-04-24 DIAGNOSIS — K85.90 ACUTE PANCREATITIS, UNSPECIFIED COMPLICATION STATUS, UNSPECIFIED PANCREATITIS TYPE: ICD-10-CM

## 2023-04-24 DIAGNOSIS — R07.9 CHEST PAIN: ICD-10-CM

## 2023-04-24 DIAGNOSIS — R07.89 ATYPICAL CHEST PAIN: Primary | ICD-10-CM

## 2023-04-24 LAB
ABS NEUT CALC (OHS): 11.4 X10(3)/MCL (ref 2.1–9.2)
ALBUMIN SERPL-MCNC: 3.6 G/DL (ref 3.4–4.8)
ALBUMIN/GLOB SERPL: 1.1 RATIO (ref 1.1–2)
ALP SERPL-CCNC: 62 UNIT/L (ref 40–150)
ALT SERPL-CCNC: 9 UNIT/L (ref 0–55)
AST SERPL-CCNC: 16 UNIT/L (ref 5–34)
BILIRUBIN DIRECT+TOT PNL SERPL-MCNC: 0.3 MG/DL
BNP BLD-MCNC: 110.7 PG/ML
BUN SERPL-MCNC: 45.9 MG/DL (ref 9.8–20.1)
CALCIUM SERPL-MCNC: 10 MG/DL (ref 8.4–10.2)
CHLORIDE SERPL-SCNC: 105 MMOL/L (ref 98–107)
CK MB SERPL-MCNC: 1.4 NG/ML
CK SERPL-CCNC: 45 U/L (ref 29–168)
CO2 SERPL-SCNC: 22 MMOL/L (ref 23–31)
CREAT SERPL-MCNC: 1.85 MG/DL (ref 0.55–1.02)
EOSINOPHIL NFR BLD MANUAL: 0.18 X10(3)/MCL (ref 0–0.9)
EOSINOPHIL NFR BLD MANUAL: 1 % (ref 0–8)
ERYTHROCYTE [DISTWIDTH] IN BLOOD BY AUTOMATED COUNT: 13.7 % (ref 11.5–17)
GFR SERPLBLD CREATININE-BSD FMLA CKD-EPI: 27 MLS/MIN/1.73/M2
GLOBULIN SER-MCNC: 3.4 GM/DL (ref 2.4–3.5)
GLUCOSE SERPL-MCNC: 145 MG/DL (ref 82–115)
HCT VFR BLD AUTO: 38.8 % (ref 37–47)
HGB BLD-MCNC: 12.6 G/DL (ref 12–16)
LIPASE SERPL-CCNC: 171 U/L
LYMPHOCYTES NFR BLD MANUAL: 34 % (ref 13–40)
LYMPHOCYTES NFR BLD MANUAL: 6.15 X10(3)/MCL
MCH RBC QN AUTO: 28.6 PG (ref 27–31)
MCHC RBC AUTO-ENTMCNC: 32.5 G/DL (ref 33–36)
MCV RBC AUTO: 88.2 FL (ref 80–94)
MONOCYTES NFR BLD MANUAL: 0.36 X10(3)/MCL (ref 0.1–1.3)
MONOCYTES NFR BLD MANUAL: 2 % (ref 2–11)
NEUTROPHILS NFR BLD MANUAL: 63 % (ref 47–80)
PLATELET # BLD AUTO: 310 X10(3)/MCL (ref 130–400)
PMV BLD AUTO: 9.4 FL (ref 7.4–10.4)
POTASSIUM SERPL-SCNC: 4.7 MMOL/L (ref 3.5–5.1)
PROT SERPL-MCNC: 7 GM/DL (ref 5.8–7.6)
RBC # BLD AUTO: 4.4 X10(6)/MCL (ref 4.2–5.4)
SODIUM SERPL-SCNC: 139 MMOL/L (ref 136–145)
TROPONIN I SERPL-MCNC: 0.02 NG/ML (ref 0–0.04)
WBC # SPEC AUTO: 18.1 X10(3)/MCL (ref 4.5–11.5)

## 2023-04-24 PROCEDURE — 82550 ASSAY OF CK (CPK): CPT | Performed by: FAMILY MEDICINE

## 2023-04-24 PROCEDURE — 93010 EKG 12-LEAD: ICD-10-PCS | Mod: ,,, | Performed by: INTERNAL MEDICINE

## 2023-04-24 PROCEDURE — 99285 EMERGENCY DEPT VISIT HI MDM: CPT | Mod: 25

## 2023-04-24 PROCEDURE — 25000003 PHARM REV CODE 250: Performed by: FAMILY MEDICINE

## 2023-04-24 PROCEDURE — 85025 COMPLETE CBC W/AUTO DIFF WBC: CPT | Performed by: FAMILY MEDICINE

## 2023-04-24 PROCEDURE — 93010 ELECTROCARDIOGRAM REPORT: CPT | Mod: ,,, | Performed by: INTERNAL MEDICINE

## 2023-04-24 PROCEDURE — 82553 CREATINE MB FRACTION: CPT | Performed by: FAMILY MEDICINE

## 2023-04-24 PROCEDURE — 85027 COMPLETE CBC AUTOMATED: CPT | Performed by: FAMILY MEDICINE

## 2023-04-24 PROCEDURE — 84484 ASSAY OF TROPONIN QUANT: CPT | Performed by: FAMILY MEDICINE

## 2023-04-24 PROCEDURE — 80053 COMPREHEN METABOLIC PANEL: CPT | Performed by: FAMILY MEDICINE

## 2023-04-24 PROCEDURE — 83880 ASSAY OF NATRIURETIC PEPTIDE: CPT | Performed by: FAMILY MEDICINE

## 2023-04-24 PROCEDURE — 83690 ASSAY OF LIPASE: CPT | Performed by: FAMILY MEDICINE

## 2023-04-24 PROCEDURE — 93005 ELECTROCARDIOGRAM TRACING: CPT

## 2023-04-24 RX ORDER — FAMOTIDINE 20 MG/1
20 TABLET, FILM COATED ORAL 2 TIMES DAILY
Qty: 20 TABLET | Refills: 0 | Status: SHIPPED | OUTPATIENT
Start: 2023-04-24 | End: 2023-05-02 | Stop reason: SDUPTHER

## 2023-04-24 RX ORDER — MAG HYDROX/ALUMINUM HYD/SIMETH 200-200-20
30 SUSPENSION, ORAL (FINAL DOSE FORM) ORAL
Status: COMPLETED | OUTPATIENT
Start: 2023-04-24 | End: 2023-04-24

## 2023-04-24 RX ORDER — LIDOCAINE HYDROCHLORIDE 20 MG/ML
10 SOLUTION OROPHARYNGEAL
Status: COMPLETED | OUTPATIENT
Start: 2023-04-24 | End: 2023-04-24

## 2023-04-24 RX ORDER — ONDANSETRON 4 MG/1
4 TABLET, ORALLY DISINTEGRATING ORAL EVERY 6 HOURS PRN
Qty: 10 TABLET | Refills: 0 | Status: SHIPPED | OUTPATIENT
Start: 2023-04-24 | End: 2023-04-29

## 2023-04-24 RX ADMIN — LIDOCAINE HYDROCHLORIDE 10 ML: 20 SOLUTION ORAL; TOPICAL at 09:04

## 2023-04-24 RX ADMIN — ALUMINUM HYDROXIDE, MAGNESIUM HYDROXIDE, AND DIMETHICONE 30 ML: 200; 20; 200 SUSPENSION ORAL at 09:04

## 2023-04-25 NOTE — ED PROVIDER NOTES
Encounter Date: 4/24/2023       History     Chief Complaint   Patient presents with    Chest Pain     Patient 82-year-old female presents emergency room complaints of chest pain.  Symptoms began around 7:30 p.m. tonight.  Patient reports calling her daughter due to the chest pain.  Patient took some nitroglycerin at home, and reports slight improvement of the pain.  Currently reports the pain as being minimal.  Patient recently treated for upper respiratory infection and pneumonia, finished taking a steroid pack as well as azithromycin yesterday.  Patient denies nausea or vomiting.  During symptom episode, patient did break out in a sweat, and had nausea.  Denies any associated shortness of breath.    The history is provided by the patient and a relative.   Review of patient's allergies indicates:   Allergen Reactions    Amlodipine      Skin crawling      Past Medical History:   Diagnosis Date    Amnesia 6/13/2022    Benign paroxysmal positional vertigo, bilateral 6/13/2022    Bronchitis 6/8/2022    Chronic gouty arthritis 6/13/2022    Diabetes mellitus, type 2     Essential hypertension 4/21/2016    Gastroesophageal reflux disease without esophagitis 4/21/2016    GERD (gastroesophageal reflux disease)     Gout, unspecified     Heart attack     Low back pain 6/13/2022    lower back pain bilateral with movement; radiates down right leg to thigh    Low vitamin D level 6/13/2022    Metabolic acidosis 1/12/2023    Microalbuminuria 1/12/2023    Migraine headache 4/21/2016    Osteoporosis 6/13/2022    Peripheral edema 6/13/2022    Physical deconditioning 6/13/2022    Pure hypercholesterolemia 6/13/2022    Spondylosis of lumbar spine 6/13/2022    Stage 3 chronic kidney disease 6/13/2022    Type 2 diabetes mellitus 6/13/2022     Past Surgical History:   Procedure Laterality Date    FOOT SURGERY       History reviewed. No pertinent family history.  Social History     Tobacco Use    Smoking status: Former    Smokeless  tobacco: Never   Substance Use Topics    Alcohol use: Never    Drug use: Never     Review of Systems   Constitutional:  Negative for chills, fatigue and fever.   HENT:  Negative for ear pain, rhinorrhea and sore throat.    Eyes:  Negative for photophobia and pain.   Respiratory:  Negative for cough, shortness of breath and wheezing.    Cardiovascular:  Positive for chest pain.   Gastrointestinal:  Positive for nausea. Negative for abdominal pain, diarrhea and vomiting.   Genitourinary:  Negative for dysuria.   Neurological:  Negative for dizziness, weakness and headaches.   All other systems reviewed and are negative.    Physical Exam     Initial Vitals [04/24/23 2053]   BP Pulse Resp Temp SpO2   (!) 140/79 68 16 98 °F (36.7 °C) 100 %      MAP       --         Physical Exam    Nursing note and vitals reviewed.  Constitutional: She appears well-developed and well-nourished. No distress.   HENT:   Head: Normocephalic and atraumatic.   Eyes: Conjunctivae and EOM are normal. Pupils are equal, round, and reactive to light.   Neck: Neck supple.   Normal range of motion.  Cardiovascular:  Normal rate, regular rhythm and normal heart sounds.           Pulmonary/Chest: Breath sounds normal. No respiratory distress. She has no wheezes. She has no rhonchi. She has no rales.   Abdominal: Abdomen is soft. Bowel sounds are normal. She exhibits no distension. There is abdominal tenderness.   Mild epigastric abdominal tenderness without rebound or guarding. There is no rebound and no guarding.   Musculoskeletal:         General: Normal range of motion.      Cervical back: Normal range of motion and neck supple.     Neurological: She is alert and oriented to person, place, and time.   Skin: Skin is warm and dry. Capillary refill takes less than 2 seconds. No erythema.   Psychiatric: She has a normal mood and affect. Her behavior is normal. Judgment and thought content normal.     ED Course   Procedures  Labs Reviewed   COMPREHENSIVE  METABOLIC PANEL - Abnormal; Notable for the following components:       Result Value    Carbon Dioxide 22 (*)     Glucose Level 145 (*)     Blood Urea Nitrogen 45.9 (*)     Creatinine 1.85 (*)     All other components within normal limits   B-TYPE NATRIURETIC PEPTIDE - Abnormal; Notable for the following components:    Natriuretic Peptide 110.7 (*)     All other components within normal limits   CBC WITH DIFFERENTIAL - Abnormal; Notable for the following components:    WBC 18.1 (*)     MCHC 32.5 (*)     All other components within normal limits   MANUAL DIFFERENTIAL - Abnormal; Notable for the following components:    Abs Neut calc 11.403 (*)     Abs Lymp 6.154 (*)     All other components within normal limits   LIPASE - Abnormal; Notable for the following components:    Lipase Level 171 (*)     All other components within normal limits   CK - Normal   CK-MB - Normal   TROPONIN I - Normal   CBC W/ AUTO DIFFERENTIAL    Narrative:     The following orders were created for panel order CBC Auto Differential.  Procedure                               Abnormality         Status                     ---------                               -----------         ------                     CBC with Differential[[id number]]        Abnormal            Final result               Manual Differential[[id number]]          Abnormal            Final result                 Please view results for these tests on the individual orders.     EKG Readings: (Independently Interpreted)   My Independent EKG Interpretation  04/24/2023 9:53 PM  Rate: 70 bpm  Rhythm: Sinus  Axis: Normal  Intervals: Normal  ST Changes: Nonspecific  Impression: Normal sinus rhythm with nonspecific ST changes.         Imaging Results              X-Ray Chest 1 View (Final result)  Result time 04/24/23 21:55:05      Final result by Luiz Herring MD (04/24/23 21:55:05)                   Impression:      No acute findings.      Electronically signed by: Luiz  Herring  Date:    04/24/2023  Time:    21:55               Narrative:    EXAMINATION:  XR CHEST 1 VIEW    CLINICAL HISTORY:  Chest Pain;    COMPARISON:  18 April 2023    FINDINGS:  Portable frontal view of the chest was obtained. The heart is not significantly enlarged.  There is aortic atherosclerosis.  Lungs are grossly clear.  There is no pneumothorax.                        Wet Read by Ashish Harding MD (04/24/23 21:49:34, [facility] - Emergency Dept, Emergency Medicine)    No acute abnormality                                     Medications   aluminum-magnesium hydroxide-simethicone 200-200-20 mg/5 mL suspension 30 mL (30 mLs Oral Given 4/24/23 2114)   LIDOcaine HCl 2% oral solution 10 mL (10 mLs Oral Given 4/24/23 2114)     Medical Decision Making:   Initial Assessment:   Patient 82-year-old female presents emergency room with complaints of chest pain.  Patient has mild epigastric abdominal tenderness on physical examination.  Currently patient reports minimal discomfort.  She denies any associated shortness of breath at present.  Will obtain laboratory evaluation including cardiac klaus.  On review of patient's medical record, she recently completed a course of azithromycin and prednisone, taking last dose yesterday.  Differential diagnosis does include a gastritis with gastroesophageal reflux-will give a GI cocktail while awaiting on results to see for symptomatic relief.  Will continue to monitor.  Differential Diagnosis:   Atypical chest pain, NSTEMI, gastritis, gastroesophageal reflux, nonspecific abdominal pain, pancreatitis           ED Course as of 04/24/23 2308 Mon Apr 24, 2023 2146 Patient reports feeling significantly improved since receiving GI cocktail.  Still awaiting on laboratory evaluation.  Will continue to monitor. [MW]   2306 Patient feeling much improved.  Slightly elevated lipase.  Discussed with patient family about repeating cardiac enzymes, the patient  "says she is "ready to go".  Patient appears much better.  Patient was recently on prednisone, which is likely the reason for the slightly elevated white count of 18.1.  Patient will follow-up with primary care physician.  ER precautions given for any acute worsening.  Patient family report understanding. [MW]      ED Course User Index  [MW] Ashish Harding MD                 Clinical Impression:   Final diagnoses:  [R07.9] Chest pain  [R07.89] Atypical chest pain (Primary)  [K85.90] Acute pancreatitis, unspecified complication status, unspecified pancreatitis type        ED Disposition Condition    Discharge Stable          ED Prescriptions       Medication Sig Dispense Start Date End Date Auth. Provider    ondansetron (ZOFRAN-ODT) 4 MG TbDL Take 1 tablet (4 mg total) by mouth every 6 (six) hours as needed (nausea vomiting). 10 tablet 4/24/2023 4/29/2023 Ashish Harding MD    famotidine (PEPCID) 20 MG tablet Take 1 tablet (20 mg total) by mouth 2 (two) times daily. for 10 days 20 tablet 4/24/2023 5/4/2023 Ashish Harding MD          Follow-up Information       Follow up With Specialties Details Why Contact Info    Eleazar Ramos MD Internal Medicine   [address]  [facility] [address]  [phone]      [facility] - Emergency Dept Emergency Medicine  As needed, If symptoms worsen [id number]  [facility] [id number] [phone]             Ashish Harding MD  04/24/23 2308    "

## 2023-06-02 ENCOUNTER — LAB REQUISITION (OUTPATIENT)
Dept: LAB | Facility: HOSPITAL | Age: 83
End: 2023-06-02
Payer: MEDICARE

## 2023-06-02 DIAGNOSIS — E11.65 TYPE 2 DIABETES MELLITUS WITH HYPERGLYCEMIA: ICD-10-CM

## 2023-06-02 DIAGNOSIS — N18.32 CHRONIC KIDNEY DISEASE, STAGE 3B: ICD-10-CM

## 2023-06-02 LAB
ALBUMIN SERPL-MCNC: 3.6 G/DL (ref 3.4–4.8)
ALBUMIN/GLOB SERPL: 1.2 RATIO (ref 1.1–2)
ALP SERPL-CCNC: 76 UNIT/L (ref 40–150)
ALT SERPL-CCNC: 7 UNIT/L (ref 0–55)
AST SERPL-CCNC: 20 UNIT/L (ref 5–34)
BASOPHILS # BLD AUTO: 0.04 X10(3)/MCL
BASOPHILS NFR BLD AUTO: 0.5 %
BILIRUBIN DIRECT+TOT PNL SERPL-MCNC: 0.3 MG/DL
BUN SERPL-MCNC: 39.3 MG/DL (ref 9.8–20.1)
CALCIUM SERPL-MCNC: 9.2 MG/DL (ref 8.4–10.2)
CHLORIDE SERPL-SCNC: 109 MMOL/L (ref 98–107)
CO2 SERPL-SCNC: 20 MMOL/L (ref 23–31)
CREAT SERPL-MCNC: 2.06 MG/DL (ref 0.55–1.02)
EOSINOPHIL # BLD AUTO: 0.31 X10(3)/MCL (ref 0–0.9)
EOSINOPHIL NFR BLD AUTO: 4.2 %
ERYTHROCYTE [DISTWIDTH] IN BLOOD BY AUTOMATED COUNT: 14.1 % (ref 11.5–17)
GFR SERPLBLD CREATININE-BSD FMLA CKD-EPI: 24 MLS/MIN/1.73/M2
GLOBULIN SER-MCNC: 3.1 GM/DL (ref 2.4–3.5)
GLUCOSE SERPL-MCNC: 308 MG/DL (ref 82–115)
HCT VFR BLD AUTO: 35.8 % (ref 37–47)
HGB BLD-MCNC: 12.2 G/DL (ref 12–16)
IMM GRANULOCYTES # BLD AUTO: 0.01 X10(3)/MCL (ref 0–0.04)
IMM GRANULOCYTES NFR BLD AUTO: 0.1 %
LYMPHOCYTES # BLD AUTO: 2.68 X10(3)/MCL (ref 0.6–4.6)
LYMPHOCYTES NFR BLD AUTO: 36.6 %
MCH RBC QN AUTO: 29.3 PG (ref 27–31)
MCHC RBC AUTO-ENTMCNC: 34.1 G/DL (ref 33–36)
MCV RBC AUTO: 86.1 FL (ref 80–94)
MONOCYTES # BLD AUTO: 0.48 X10(3)/MCL (ref 0.1–1.3)
MONOCYTES NFR BLD AUTO: 6.5 %
NEUTROPHILS # BLD AUTO: 3.81 X10(3)/MCL (ref 2.1–9.2)
NEUTROPHILS NFR BLD AUTO: 52.1 %
NRBC BLD AUTO-RTO: 0 %
PLATELET # BLD AUTO: 191 X10(3)/MCL (ref 130–400)
PMV BLD AUTO: 10.2 FL (ref 7.4–10.4)
POTASSIUM SERPL-SCNC: 4.3 MMOL/L (ref 3.5–5.1)
PROT SERPL-MCNC: 6.7 GM/DL (ref 5.8–7.6)
RBC # BLD AUTO: 4.16 X10(6)/MCL (ref 4.2–5.4)
SODIUM SERPL-SCNC: 139 MMOL/L (ref 136–145)
WBC # SPEC AUTO: 7.33 X10(3)/MCL (ref 4.5–11.5)

## 2023-06-02 PROCEDURE — 80053 COMPREHEN METABOLIC PANEL: CPT | Performed by: INTERNAL MEDICINE

## 2023-06-02 PROCEDURE — 85025 COMPLETE CBC W/AUTO DIFF WBC: CPT | Performed by: INTERNAL MEDICINE

## 2023-06-09 ENCOUNTER — HOSPITAL ENCOUNTER (OUTPATIENT)
Dept: RADIOLOGY | Facility: HOSPITAL | Age: 83
Discharge: HOME | End: 2023-06-09
Attending: INTERNAL MEDICINE
Payer: MEDICARE

## 2023-06-09 DIAGNOSIS — N18.32 STAGE 3B CHRONIC KIDNEY DISEASE: ICD-10-CM

## 2023-06-09 PROCEDURE — 84166 PROTEIN E-PHORESIS/URINE/CSF: CPT

## 2023-06-09 PROCEDURE — 76770 US EXAM ABDO BACK WALL COMP: CPT | Mod: TC

## 2023-06-14 DIAGNOSIS — D47.2 MONOCLONAL GAMMOPATHY: Primary | ICD-10-CM

## 2023-06-14 PROCEDURE — 84156 ASSAY OF PROTEIN URINE: CPT | Performed by: INTERNAL MEDICINE

## 2023-06-14 PROCEDURE — 82570 ASSAY OF URINE CREATININE: CPT | Performed by: INTERNAL MEDICINE

## 2023-07-18 ENCOUNTER — OFFICE VISIT (OUTPATIENT)
Dept: HEMATOLOGY/ONCOLOGY | Facility: CLINIC | Age: 83
End: 2023-07-18
Payer: MEDICARE

## 2023-07-18 VITALS
HEART RATE: 87 BPM | WEIGHT: 156.5 LBS | SYSTOLIC BLOOD PRESSURE: 123 MMHG | BODY MASS INDEX: 26.08 KG/M2 | HEIGHT: 65 IN | RESPIRATION RATE: 16 BRPM | TEMPERATURE: 98 F | DIASTOLIC BLOOD PRESSURE: 74 MMHG | OXYGEN SATURATION: 96 %

## 2023-07-18 DIAGNOSIS — D47.2 MONOCLONAL GAMMOPATHY: ICD-10-CM

## 2023-07-18 LAB
ALBUMIN SERPL-MCNC: 4.1 G/DL (ref 3.4–4.8)
ALBUMIN/GLOB SERPL: 1.4 RATIO (ref 1.1–2)
ALP SERPL-CCNC: 64 UNIT/L (ref 40–150)
ALT SERPL-CCNC: 8 UNIT/L (ref 0–55)
AST SERPL-CCNC: 24 UNIT/L (ref 5–34)
B2 MICROGLOB SERPL-MCNC: 4.33 MG/L (ref 0.97–2.64)
BASOPHILS # BLD AUTO: 0.05 X10(3)/MCL
BASOPHILS NFR BLD AUTO: 0.5 %
BILIRUBIN DIRECT+TOT PNL SERPL-MCNC: 0.3 MG/DL
BUN SERPL-MCNC: 25.3 MG/DL (ref 9.8–20.1)
CALCIUM SERPL-MCNC: 10.1 MG/DL (ref 8.4–10.2)
CHLORIDE SERPL-SCNC: 107 MMOL/L (ref 98–107)
CO2 SERPL-SCNC: 24 MMOL/L (ref 23–31)
CREAT SERPL-MCNC: 1.62 MG/DL (ref 0.55–1.02)
EOSINOPHIL # BLD AUTO: 0.34 X10(3)/MCL (ref 0–0.9)
EOSINOPHIL NFR BLD AUTO: 3.7 %
ERYTHROCYTE [DISTWIDTH] IN BLOOD BY AUTOMATED COUNT: 13.3 % (ref 11.5–17)
GFR SERPLBLD CREATININE-BSD FMLA CKD-EPI: 32 MLS/MIN/1.73/M2
GLOBULIN SER-MCNC: 3 GM/DL (ref 2.4–3.5)
GLUCOSE SERPL-MCNC: 123 MG/DL (ref 82–115)
HCT VFR BLD AUTO: 38.9 % (ref 37–47)
HGB BLD-MCNC: 12.4 G/DL (ref 12–16)
IGA SERPL-MCNC: 360 MG/DL (ref 69–517)
IGG SERPL-MCNC: 966 MG/DL (ref 522–1631)
IGM SERPL-MCNC: 272 MG/DL (ref 33–293)
IMM GRANULOCYTES # BLD AUTO: 0.02 X10(3)/MCL (ref 0–0.04)
IMM GRANULOCYTES NFR BLD AUTO: 0.2 %
LYMPHOCYTES # BLD AUTO: 2.89 X10(3)/MCL (ref 0.6–4.6)
LYMPHOCYTES NFR BLD AUTO: 31.5 %
MCH RBC QN AUTO: 28.4 PG (ref 27–31)
MCHC RBC AUTO-ENTMCNC: 31.9 G/DL (ref 33–36)
MCV RBC AUTO: 89.2 FL (ref 80–94)
MONOCYTES # BLD AUTO: 0.65 X10(3)/MCL (ref 0.1–1.3)
MONOCYTES NFR BLD AUTO: 7.1 %
NEUTROPHILS # BLD AUTO: 5.22 X10(3)/MCL (ref 2.1–9.2)
NEUTROPHILS NFR BLD AUTO: 57 %
PLATELET # BLD AUTO: 222 X10(3)/MCL (ref 130–400)
PMV BLD AUTO: 9.3 FL (ref 7.4–10.4)
POTASSIUM SERPL-SCNC: 4.3 MMOL/L (ref 3.5–5.1)
PROT SERPL-MCNC: 7.1 GM/DL (ref 5.8–7.6)
RBC # BLD AUTO: 4.36 X10(6)/MCL (ref 4.2–5.4)
SODIUM SERPL-SCNC: 142 MMOL/L (ref 136–145)
WBC # SPEC AUTO: 9.17 X10(3)/MCL (ref 4.5–11.5)

## 2023-07-18 PROCEDURE — 82784 ASSAY IGA/IGD/IGG/IGM EACH: CPT | Performed by: INTERNAL MEDICINE

## 2023-07-18 PROCEDURE — 3074F PR MOST RECENT SYSTOLIC BLOOD PRESSURE < 130 MM HG: ICD-10-PCS | Mod: CPTII,S$GLB,, | Performed by: INTERNAL MEDICINE

## 2023-07-18 PROCEDURE — 85025 COMPLETE CBC W/AUTO DIFF WBC: CPT | Performed by: INTERNAL MEDICINE

## 2023-07-18 PROCEDURE — 84165 PROTEIN E-PHORESIS SERUM: CPT | Performed by: INTERNAL MEDICINE

## 2023-07-18 PROCEDURE — 1160F PR REVIEW ALL MEDS BY PRESCRIBER/CLIN PHARMACIST DOCUMENTED: ICD-10-PCS | Mod: CPTII,S$GLB,, | Performed by: INTERNAL MEDICINE

## 2023-07-18 PROCEDURE — 83521 IG LIGHT CHAINS FREE EACH: CPT | Performed by: INTERNAL MEDICINE

## 2023-07-18 PROCEDURE — 86334 IMMUNOFIX E-PHORESIS SERUM: CPT | Performed by: INTERNAL MEDICINE

## 2023-07-18 PROCEDURE — 99999 PR PBB SHADOW E&M-EST. PATIENT-LVL V: CPT | Mod: PBBFAC,,, | Performed by: INTERNAL MEDICINE

## 2023-07-18 PROCEDURE — 82232 ASSAY OF BETA-2 PROTEIN: CPT | Performed by: INTERNAL MEDICINE

## 2023-07-18 PROCEDURE — 36415 COLL VENOUS BLD VENIPUNCTURE: CPT | Performed by: INTERNAL MEDICINE

## 2023-07-18 PROCEDURE — 3078F DIAST BP <80 MM HG: CPT | Mod: CPTII,S$GLB,, | Performed by: INTERNAL MEDICINE

## 2023-07-18 PROCEDURE — 1160F RVW MEDS BY RX/DR IN RCRD: CPT | Mod: CPTII,S$GLB,, | Performed by: INTERNAL MEDICINE

## 2023-07-18 PROCEDURE — 80053 COMPREHEN METABOLIC PANEL: CPT | Performed by: INTERNAL MEDICINE

## 2023-07-18 PROCEDURE — 3074F SYST BP LT 130 MM HG: CPT | Mod: CPTII,S$GLB,, | Performed by: INTERNAL MEDICINE

## 2023-07-18 PROCEDURE — 99999 PR PBB SHADOW E&M-EST. PATIENT-LVL V: ICD-10-PCS | Mod: PBBFAC,,, | Performed by: INTERNAL MEDICINE

## 2023-07-18 PROCEDURE — 1101F PT FALLS ASSESS-DOCD LE1/YR: CPT | Mod: CPTII,S$GLB,, | Performed by: INTERNAL MEDICINE

## 2023-07-18 PROCEDURE — 3078F PR MOST RECENT DIASTOLIC BLOOD PRESSURE < 80 MM HG: ICD-10-PCS | Mod: CPTII,S$GLB,, | Performed by: INTERNAL MEDICINE

## 2023-07-18 PROCEDURE — 99204 OFFICE O/P NEW MOD 45 MIN: CPT | Mod: S$GLB,,, | Performed by: INTERNAL MEDICINE

## 2023-07-18 PROCEDURE — 3288F PR FALLS RISK ASSESSMENT DOCUMENTED: ICD-10-PCS | Mod: CPTII,S$GLB,, | Performed by: INTERNAL MEDICINE

## 2023-07-18 PROCEDURE — 1101F PR PT FALLS ASSESS DOC 0-1 FALLS W/OUT INJ PAST YR: ICD-10-PCS | Mod: CPTII,S$GLB,, | Performed by: INTERNAL MEDICINE

## 2023-07-18 PROCEDURE — 86146 BETA-2 GLYCOPROTEIN ANTIBODY: CPT | Performed by: INTERNAL MEDICINE

## 2023-07-18 PROCEDURE — 99204 PR OFFICE/OUTPT VISIT, NEW, LEVL IV, 45-59 MIN: ICD-10-PCS | Mod: S$GLB,,, | Performed by: INTERNAL MEDICINE

## 2023-07-18 PROCEDURE — 3288F FALL RISK ASSESSMENT DOCD: CPT | Mod: CPTII,S$GLB,, | Performed by: INTERNAL MEDICINE

## 2023-07-18 PROCEDURE — 1159F PR MEDICATION LIST DOCUMENTED IN MEDICAL RECORD: ICD-10-PCS | Mod: CPTII,S$GLB,, | Performed by: INTERNAL MEDICINE

## 2023-07-18 PROCEDURE — 1159F MED LIST DOCD IN RCRD: CPT | Mod: CPTII,S$GLB,, | Performed by: INTERNAL MEDICINE

## 2023-07-18 NOTE — PROGRESS NOTES
Subjective:       Patient ID: Rosalba Whitlock is a 82 y.o. female.    Chief Complaint: new patient (Patient reports back pain)      Diagnosis:  Monoclonal gammopathy, IgM lambda    Current Treatment:   Observation    Treatment History:  N/A    HPI:  82-year-old female with multiple comorbidities including heart disease and diabetes who suffers with neuropathy.  She also has chronic kidney disease.  She was seen by her PCP, blood work done on 06/09/2023 due to neuropathy and kidney disease revealed normal quantitative immunoglobulins with a protein electrophoresis that showed a small M spike in the gamma region of 0.06 grams/deciliter.  Immunofixation showed IgM lambda specificity.  Kappa and lambda light chains were both slightly elevated, however the kappa/lambda ratio was normal at 1.25.  Urine studies on 06/09/2023 showed no apparent M spike.  She was referred to hematology.  I originally saw the patient on 07/18/2023.  Overall, she stated that she had some chronic back pain, otherwise had no major issues.    Interval History:   Initial consult note.        Past Medical History:   Diagnosis Date    Amnesia 6/13/2022    Benign paroxysmal positional vertigo, bilateral 6/13/2022    Bronchitis 6/8/2022    Chronic gouty arthritis 6/13/2022    Diabetes mellitus, type 2     Essential hypertension 4/21/2016    Gastroesophageal reflux disease without esophagitis 4/21/2016    GERD (gastroesophageal reflux disease)     Gout, unspecified     Heart attack     Low back pain 6/13/2022    lower back pain bilateral with movement; radiates down right leg to thigh    Low vitamin D level 6/13/2022    Metabolic acidosis 1/12/2023    Microalbuminuria 1/12/2023    Migraine headache 4/21/2016    Osteoporosis 6/13/2022    Peripheral edema 6/13/2022    Physical deconditioning 6/13/2022    Pure hypercholesterolemia 6/13/2022    Spondylosis of lumbar spine 6/13/2022    Stage 3 chronic kidney disease 6/13/2022    Type 2 diabetes mellitus  6/13/2022      Past Surgical History:   Procedure Laterality Date    FOOT SURGERY       Social History     Socioeconomic History    Marital status:    Tobacco Use    Smoking status: Former    Smokeless tobacco: Never   Substance and Sexual Activity    Alcohol use: Never    Drug use: Never      Family History   Problem Relation Age of Onset    Lung cancer Mother     Kidney disease Father       Review of patient's allergies indicates:   Allergen Reactions    Amlodipine      Skin crawling       Review of Systems   Constitutional:  Negative for chills, diaphoresis, fatigue, fever and unexpected weight change.   HENT:  Negative for nasal congestion, mouth sores, sinus pressure/congestion and sore throat.    Eyes:  Negative for pain and visual disturbance.   Respiratory:  Negative for cough, chest tightness and shortness of breath.    Cardiovascular:  Negative for chest pain, palpitations and leg swelling.   Gastrointestinal:  Negative for abdominal distention, abdominal pain, blood in stool, constipation and diarrhea.   Genitourinary:  Negative for dysuria, frequency and hematuria.   Musculoskeletal:  Positive for back pain. Negative for arthralgias.   Integumentary:  Negative for rash.   Neurological:  Negative for dizziness, weakness, numbness and headaches.   Hematological:  Negative for adenopathy.   Psychiatric/Behavioral:  Negative for confusion.        Objective:      Physical Exam  Vitals reviewed. Exam conducted with a chaperone present.   Constitutional:       General: She is not in acute distress.     Appearance: Normal appearance.   HENT:      Head: Normocephalic and atraumatic.      Nose: Nose normal.      Mouth/Throat:      Mouth: Mucous membranes are moist.   Eyes:      Extraocular Movements: Extraocular movements intact.      Conjunctiva/sclera: Conjunctivae normal.   Cardiovascular:      Rate and Rhythm: Normal rate and regular rhythm.      Pulses: Normal pulses.      Heart sounds: Normal heart  sounds.   Pulmonary:      Effort: Pulmonary effort is normal.      Breath sounds: Normal breath sounds.   Abdominal:      General: Bowel sounds are normal.      Palpations: Abdomen is soft.   Musculoskeletal:         General: No swelling. Normal range of motion.      Cervical back: Normal range of motion and neck supple.      Right lower leg: No edema.      Left lower leg: No edema.   Lymphadenopathy:      Cervical: No cervical adenopathy.   Skin:     General: Skin is warm and dry.   Neurological:      General: No focal deficit present.      Mental Status: She is alert and oriented to person, place, and time. Mental status is at baseline.   Psychiatric:         Mood and Affect: Mood normal.         Behavior: Behavior normal.       LABS AND IMAGING REVIEWED IN EPIC          Assessment:   Monoclonal gammopathy, IgM lambda        Plan:       CBC today shows normal findings.    I explained to the patient that I would like to repeat MM labs today. We did discuss the role for a bone marrow biopsy.  I do not think this is necessary at this time, but I wanted to bring it up as a possibility. The patient has minimal M-spike.     She will come back in 3 months with repeat labs 1 week prior to appointment    All questions were answered to the best of rmy ability and the patient understands the plan moving forward.       Taran Salvador II, MD I, Mercy Lakhani LPN, acted solely as a scribe for and in the presence of, Dr. Taran Salvador who performed the service.

## 2023-07-19 LAB
ALBUMIN % SPEP (OHS): 51.56
ALBUMIN SERPL-MCNC: 3.7 G/DL (ref 3.4–4.8)
ALBUMIN/GLOB SERPL: 1.1 RATIO (ref 1.1–2)
ALPHA 1 GLOB (OHS): 0.27 GM/DL
ALPHA 1 GLOB% (OHS): 3.8
ALPHA 2 GLOB % (OHS): 11.87
ALPHA 2 GLOB (OHS): 0.84 GM/DL
BETA GLOB (OHS): 1.15 GM/DL
BETA GLOB% (OHS): 16.23
GAMMA GLOBULIN % (OHS): 16.54
GAMMA GLOBULIN (OHS): 1.17 GM/DL
GLOBULIN SER-MCNC: 3.4 GM/DL (ref 2.4–3.5)
KAPPA LC FREE SER-MCNC: 3.69 MG/DL (ref 0.33–1.94)
KAPPA LC FREE/LAMBDA FREE SER: 1.18 {RATIO} (ref 0.26–1.65)
LAMBDA LC FREE SERPL-MCNC: 3.13 MG/DL (ref 0.57–2.63)
M SPIKE % (OHS): 0.9
M SPIKE (OHS): 0.06 GM/DL
PATH REV: NORMAL
PROT SERPL-MCNC: 7.1 GM/DL (ref 5.8–7.6)

## 2023-08-14 DIAGNOSIS — Z79.4 TYPE 2 DIABETES MELLITUS WITH HYPERGLYCEMIA, WITH LONG-TERM CURRENT USE OF INSULIN: Primary | ICD-10-CM

## 2023-08-14 DIAGNOSIS — E11.65 TYPE 2 DIABETES MELLITUS WITH HYPERGLYCEMIA, WITH LONG-TERM CURRENT USE OF INSULIN: Primary | ICD-10-CM

## 2023-08-23 ENCOUNTER — CLINICAL SUPPORT (OUTPATIENT)
Dept: DIABETES | Facility: CLINIC | Age: 83
End: 2023-08-23
Payer: MEDICARE

## 2023-08-23 VITALS — WEIGHT: 160 LBS | BODY MASS INDEX: 26.66 KG/M2 | HEIGHT: 65 IN

## 2023-08-23 DIAGNOSIS — Z79.4 TYPE 2 DIABETES MELLITUS WITH HYPERGLYCEMIA, WITH LONG-TERM CURRENT USE OF INSULIN: ICD-10-CM

## 2023-08-23 DIAGNOSIS — E11.65 TYPE 2 DIABETES MELLITUS WITH HYPERGLYCEMIA, WITH LONG-TERM CURRENT USE OF INSULIN: ICD-10-CM

## 2023-08-23 PROCEDURE — G0108 DIAB MANAGE TRN  PER INDIV: HCPCS | Mod: ,,, | Performed by: INTERNAL MEDICINE

## 2023-08-23 PROCEDURE — G0108 PR DIAB MANAGE TRN  PER INDIV: ICD-10-PCS | Mod: ,,, | Performed by: INTERNAL MEDICINE

## 2023-08-23 NOTE — PROGRESS NOTES
Diabetes Care Specialist Progress Note  Author: Destiney Bonds RN  Date: 8/23/2023    Program Intake  Reason for Diabetes Program Visit:: Initial Diabetes Assessment  Current diabetes risk level:: high  In the last 12 months, have you:: used emergency room services  Was the ER or hospital admission related to diabetes?: No  Permission to speak with others about care:: yes    Lab Results   Component Value Date    HGBA1C 8.8 (H) 07/27/2023       Clinical    Patient Health Rating  Compared to other people your age, how would you rate your health?: Fair    Problem Review  Reviewed Problem List with Patient: yes  Active comorbidities affecting diabetes self-care.: yes  Comorbidities: Heart Attack, Neuropathy    Clinical Assessment  Current Diabetes Treatment: Insulin, Oral Medication  Have you ever experienced hypoglycemia (low blood sugar)?: no (did have low reading this week of 79 and felt a little dizzy ate and resolved.)  Have you ever experienced hyperglycemia (high blood sugar)?: yes  In the last month, how often have you experienced high blood sugar?: more than once a week  Are you able to tell when your blood sugar is high?: Yes  What are your symptoms?: thirst, fatigue, nausea  Have you ever been hospitalized because your blood sugar was high?: no    Medication Information  How do you obtain your medications?: Patient drives  How many days a week do you miss your medications?: Never  Do you use a pill box or medication chart to help you manage your medications?: Pill box  Do you sometimes have difficulty refilling your medications?: No  Medication adherence impacting ability to self-manage diabetes?: No    Labs  Do you have regular lab work to monitor your medications?: Yes  Type of Regular Lab Work: A1c  Lab Compliance Barriers: No    Nutritional Status  Diet: Regular  Meal Plan 24 Hour Recall: Breakfast, Lunch, Dinner, Snack  Meal Plan 24 Hour Recall - Breakfast: Oatmeal and a cup of coffee  Meal Plan 24  Hour Recall - Lunch: sandwich with sugar free whole wheat bread sometimes chips  Meal Plan 24 Hour Recall - Dinner: ramen  Meal Plan 24 Hour Recall - Snack: watermelon  Change in appetite?: No  Dentation:: Intact  Recent Changes in Weight: No Recent Weight Change  Current nutritional status an area of need that is impacting patient's ability to self-manage diabetes?: No    Additional Social History    Support  Does anyone support you with your diabetes care?: yes  Who supports you?: son/daughter, self  Who takes you to your medical appointments?: son/daughter  Does the current support meet the patient's needs?: Yes  Is Support an area impacting ability to self-manage diabetes?: No    Access to Mass Media & Technology  Media or technology needs impacting ability to self-manage diabetes?: No    Cognitive/Behavioral Health  Alert and Oriented: Yes  Difficulty Thinking: No  Requires Prompting: No  Requires assistance for routine expression?: No  Cognitive or behavioral barriers impacting ability to self-manage diabetes?: No    Culture/Scientology  Culture or Zoroastrianism beliefs that may impact ability to access healthcare: No    Communication  Language preference: English  Hearing Problems: No  Vision Problems: No  Communication needs impacting ability to self-manage diabetes?: No    Health Literacy  Preferred Learning Method: Face to Face, Reading Materials  How often do you need to have someone help you read instructions, pamphlets, or written material from your doctor or pharmacy?: Sometimes  Health literacy needs impacting ability to self-manage diabetes?: No      Diabetes Self-Management Skills Assessment    Diabetes Disease Process/Treatment Options  Diabetes Disease Process/Treatment Options: Skills Assessment Completed: No  Deferred due to:: Time    Nutrition/Healthy Eating  Challenges to healthy eating:: portion control, other (see comments) (knowing what to eat)  Method of carbohydrate measurement:: no  method  Patient can identify foods that impact blood sugar.: no (see comments)  Nutrition/Healthy Eating Skills Assessment Completed:: Yes  Assessment indicates:: Knowledge deficit, Instruction Needed  Area of need?: Yes    Physical Activity/Exercise  Physical Activity/Exercise Skills Assessment Completed: : No  Deffered due to:: Time    Medications  Patient is able to describe current diabetes management routine.: yes  Diabetes management routine:: insulin, oral medications (Lantus 35units am and 20units pm, trajenta 5mg qd)  Patient is able to identify current diabetes medications, dosages, and appropriate timing of medications.: yes  Patient understands the purpose of the medications taken for diabetes.: yes  Patient reports problems or concerns with current medication regimen.: no  Medication Skills Assessment Completed:: Yes  Assessment indicates:: Adequate understanding  Area of need?: No    Home Blood Glucose Monitoring  Patient states that blood sugar is checked at home daily.: yes  Monitoring Method:: personal continuous glucose monitor  Personal CGM type:: Freestyle maria isabel 2  Patient is able to use personal CGM appropriately.: no  CGM Report reviewed?: no  Home Blood Glucose Monitoring Skills Assessment Completed: : Yes  Assessment indicates:: Knowledge deficit, Instruction Needed  Area of need?: Yes    Acute Complications  Acute Complications Skills Assessment Completed: : No  Deffered due to:: Time    Chronic Complications  Chronic Complications Skills Assessment Completed: : No  Deferred due to:: Time    Psychosocial/Coping  Patient can identify ways of coping with chronic disease.: yes  Patient-stated ways of coping with chronic disease:: spiritual/Oriental orthodox practices, support from loved ones  Psychosocial/Coping Skills Assessment Completed: : Yes  Assessment indicates:: Adequate understanding  Area of need?: No      Assessment Summary and Plan    Based on today's diabetes care assessment, the following  areas of need were identified:          8/23/2023    12:01 AM   Social   Support No   Access to Mass Media/Tech No   Cognitive/Behavioral Health No   Culture/Episcopalian No   Communication No   Health Literacy No            8/23/2023    12:01 AM   Clinical   Medication Adherence No   Lab Compliance No   Nutritional Status No            8/23/2023    12:01 AM   Diabetes Self-Management Skills   Nutrition/Healthy Eating Yes see care plan    Medication No Reviewed alternating sites, site selection, and storage of insulin pens. Daughter injects in arms at times pt. Uses abd but does not alternate enough. Know understands. Daughter sets up her pill case for the week and pt. Injects own insulin bid    Home Blood Glucose Monitoring Yes see care plan    Psychosocial/Coping No Daughter at side for support           Today's interventions were provided through individual discussion, instruction, and written materials were provided.      Patient verbalized understanding of instruction and written materials.  Pt was able to return back demonstration of instructions today. Patient understood key points, needs reinforcement and further instruction.     Diabetes Self-Management Care Plan:    Today's Diabetes Self-Management Care Plan was developed with Rosalba's input. Rosalba has agreed to work toward the following goal(s) to improve his/her overall diabetes control.      Care Plan: Diabetes Management   Updates made since 7/24/2023 12:00 AM        Problem: Healthy Eating         Goal: Patient agrees to decrease portions of carbohydrates and increase portions of non-starchy vegetables.    Start Date: 8/23/2023   Expected End Date: 9/13/2023   Priority: High   Barriers: Knowledge deficit   Note:    She cooks occasionally, eats a piccadilly at times. She does not eat red meat. Loves most non-starchy vegetables and sweets.         Task: Reviewed the sources and role of Carbohydrate, Protein, and Fat and how each nutrient impacts blood sugar.          Task: Provided visual examples using dry measuring cups, food models, and other familiar objects such as computer mouse, deck or cards, tennis ball etc. to help with visualization of portions. Completed 8/23/2023        Task: Explained how to count carbohydrates using the food label and the use of dry measuring cups for accurate carb counting.         Task: Discussed strategies for choosing healthier menu options when dining out. Completed 8/23/2023        Task: Recommended replacing beverages containing high sugar content with noncaloric/sugar free options and/or water. Completed 8/23/2023        Task: Review the importance of balancing carbohydrates with each meal using portion control techniques to count servings of carbohydrate and label reading to identify serving size and amount of total carbs per serving.         Task: Provided Sample plate method and reviewed the use of the plate to estimate amounts of carbohydrate per meal. Completed 8/23/2023        Problem: Blood Glucose Self-Monitoring         Long-Range Goal: Patient agrees to scan Freestyle maria isabel 2 every hour or 2 while awake.    Start Date: 8/23/2023   Expected End Date: 9/13/2023   Priority: Medium   Barriers: Knowledge deficit   Note:    8/23/23 She has been scanning only twice a day but will scan more often now to get a better agp to review trends and patterns. Discussed trend arrows and lag time between blood glucose and interstitial fluid glucose.          Follow Up Plan     Follow up in about 4 weeks (around 9/20/2023) for dm f/u.Appt made with pt and daughter today for 9/13/23 at 1100.  Plan to review agp, she wants to learn how to read food labels, use food pics to help with meal planning, quality of food, snack ideas.     Today's care plan and follow up schedule was discussed with patient.  Rosalba verbalized understanding of the care plan, goals, and agrees to follow up plan.        The patient was encouraged to communicate with  his/her health care provider/physician and care team regarding his/her condition(s) and treatment.  I provided the patient with my contact information today and encouraged to contact me via phone or [facility] Patient Portal as needed.     Length of Visit   Total Time: 60 Minutes

## 2023-09-13 ENCOUNTER — CLINICAL SUPPORT (OUTPATIENT)
Dept: DIABETES | Facility: CLINIC | Age: 83
End: 2023-09-13
Payer: MEDICARE

## 2023-09-13 VITALS — HEIGHT: 65 IN | WEIGHT: 160.81 LBS | BODY MASS INDEX: 26.79 KG/M2

## 2023-09-13 DIAGNOSIS — Z79.4 TYPE 2 DIABETES MELLITUS WITH HYPERGLYCEMIA, WITH LONG-TERM CURRENT USE OF INSULIN: Primary | ICD-10-CM

## 2023-09-13 DIAGNOSIS — E11.65 TYPE 2 DIABETES MELLITUS WITH HYPERGLYCEMIA, WITH LONG-TERM CURRENT USE OF INSULIN: Primary | ICD-10-CM

## 2023-09-13 PROCEDURE — G0108 DIAB MANAGE TRN  PER INDIV: HCPCS | Mod: ,,, | Performed by: INTERNAL MEDICINE

## 2023-09-13 PROCEDURE — G0108 PR DIAB MANAGE TRN  PER INDIV: ICD-10-PCS | Mod: ,,, | Performed by: INTERNAL MEDICINE

## 2023-09-13 NOTE — PROGRESS NOTES
"Diabetes Care Specialist Progress Note  Author: Destiney Bonds RN  Date: 9/13/2023    Program Intake  Reason for Diabetes Program Visit:: Intervention  Type of Intervention:: Individual  Individual: Education  Education: Nutrition and Meal Planning, Pattern Management  Current diabetes risk level:: high  Permission to speak with others about care:: yes    Lab Results   Component Value Date    HGBA1C 8.8 (H) 07/27/2023       Clinical    Weight: 72.9 kg (160 lb 12.8 oz)   Height: 5' 5" (165.1 cm)   Body mass index is 26.76 kg/m².       Access to Mass Media & Technology  Does the patient have access to any of the following devices or technologies?: Other (see comment) (she has flip phone and daughter has smart phone)    Cognitive/Behavioral Health  Cognitive or behavioral barriers impacting ability to self-manage diabetes?: Yes (states has trouble with memory at times Daughter good support.)       Diabetes Self-Management Skills Assessment    Diabetes Disease Process/Treatment Options  Patient/caregiver able to state what happens when someone has diabetes.: yes  Patient/caregiver knows what type of diabetes they have.: yes  Diabetes Type : Type II  Patient able to identify at least three risk factors for diabetes.: yes  Identified risk factors:: family history, being overweight, reduced activity, age over 40  Diabetes Disease Process/Treatment Options: Skills Assessment Completed: Yes  Assessment indicates:: Adequate understanding  Area of need?: No       Medications  Diabetes management routine:: insulin, oral medications (Lantus 37 units Qam and 20 units pm Trajenta 5mg daily)    Assessment Summary and Plan    Based on today's diabetes care assessment, the following areas of need were identified:          9/13/2023    12:01 AM   Social   Cognitive/Behavioral Health Yes            8/23/2023    12:01 AM   Clinical   Medication Adherence No   Lab Compliance No   Nutritional Status No            9/13/2023    12:01 AM "   Diabetes Self-Management Skills   Diabetes Disease Process/Treatment Options No          Today's interventions were provided through individual discussion, instruction, and written materials were provided.      Patient verbalized understanding of instruction and written materials.  Pt was able to return back demonstration of instructions today. Patient understood key points, needs reinforcement and further instruction.     Diabetes Self-Management Care Plan:    Today's Diabetes Self-Management Care Plan was developed with Rosalba's input. Rosalba has agreed to work toward the following goal(s) to improve his/her overall diabetes control.      Care Plan: Diabetes Management   Updates made since 8/14/2023 12:00 AM        Problem: Healthy Eating         Goal: Patient agrees to decrease portions of carbohydrates and increase portions of non-starchy vegetables.    Start Date: 8/23/2023   Expected End Date: 9/13/2023   Priority: High   Barriers: Knowledge deficit   Note:    She cooks occasionally, eats a piccadilly at times. She does not eat red meat. Loves most non-starchy vegetables and sweets.    9/13/23 She eats pb and jelly and chips for lunch often so discussed trying turkey sandwich with chips or if eating pb and Jelly to have veggies with it.  Discussed meal planning and snack ideas and used food photos to help as well.  Gave handouts for her and to bring to daughter to help remind her.          Task: Provided visual examples using dry measuring cups, food models, and other familiar objects such as computer mouse, deck or cards, tennis ball etc. to help with visualization of portions. Completed 8/23/2023     Task: Discussed strategies for choosing healthier menu options when dining out. Completed 8/23/2023        Task: Recommended replacing beverages containing high sugar content with noncaloric/sugar free options and/or water. Completed 8/23/2023        Task: Provided Sample plate method and reviewed the use of the  plate to estimate amounts of carbohydrate per meal. Completed 8/23/2023        Problem: Blood Glucose Self-Monitoring         Long-Range Goal: Patient agrees to scan Freestyle maria isabel 2 every hour or 2 while awake.    Start Date: 8/23/2023   Expected End Date: 9/13/2023   Priority: Medium   Barriers: Knowledge deficit   Note:    8/23/23 She has been scanning only twice a day but will scan more often now to get a better agp to review trends and patterns. Discussed trend arrows and lag time between blood glucose and interstitial fluid glucose.   9/13/23 See agp attached.  She does forget to scan at times. Discussed scanning on commercials when watching tv, before and after meals.    Freestyle maria isabel 2 CGM reviewed  Average glucose is 185 mg/dL  Hypoglycemia frequency 0 % with BG <70  Hyperglycemia frequency 56 % with BG >180  Time in range 44 %  Recommendations: Discussed stopping chips with sandwich at lunch time and trying hummus or guacamole with raw veggies.  She love bellpeppers and cucumbers.  Discussed doing some exercise after meals.    Pcp did increase lantus to 37 units qam from 35 units. Gave copy of agp to pt for daughter to review, with recommendations.                        Follow Up Plan     Follow up in about 3 months (around 12/13/2023) for dm f/u. Appt made with pt. Today for Dec 13 at 1pm. Plan on reviewing agp, nutrition, and discussing exercise. Spoke with Daughter on phone, reviewed recommendations, states she calls nurse every Monday to give glucose results and adjust medication as needed.     Today's care plan and follow up schedule was discussed with patient.  Rosalba verbalized understanding of the care plan, goals, and agrees to follow up plan.        The patient was encouraged to communicate with his/her health care provider/physician and care team regarding his/her condition(s) and treatment.  I provided the patient with my contact information today and encouraged to contact me via phone or  [facility] Patient Portal as needed.     Length of Visit   Total Time: 60 Minutes

## 2023-10-24 PROBLEM — D47.2 MGUS (MONOCLONAL GAMMOPATHY OF UNKNOWN SIGNIFICANCE): Status: ACTIVE | Noted: 2023-10-24

## 2023-11-06 PROBLEM — D47.2 MGUS (MONOCLONAL GAMMOPATHY OF UNKNOWN SIGNIFICANCE): Chronic | Status: ACTIVE | Noted: 2023-10-24

## 2023-11-14 ENCOUNTER — HOSPITAL ENCOUNTER (OUTPATIENT)
Dept: RADIOLOGY | Facility: HOSPITAL | Age: 83
Discharge: HOME | End: 2023-11-14
Attending: INTERNAL MEDICINE
Payer: MEDICARE

## 2023-11-14 DIAGNOSIS — Z78.0 POST-MENOPAUSAL: ICD-10-CM

## 2023-11-14 PROCEDURE — 77080 DXA BONE DENSITY AXIAL SKELETON 1 OR MORE SITES: ICD-10-PCS | Mod: 26,,, | Performed by: RADIOLOGY

## 2023-11-14 PROCEDURE — 77080 DXA BONE DENSITY AXIAL: CPT | Mod: 26,,, | Performed by: RADIOLOGY

## 2023-11-14 PROCEDURE — 77080 DXA BONE DENSITY AXIAL: CPT | Mod: TC

## 2023-11-27 ENCOUNTER — LAB VISIT (OUTPATIENT)
Dept: LAB | Facility: HOSPITAL | Age: 83
End: 2023-11-27
Attending: INTERNAL MEDICINE
Payer: MEDICARE

## 2023-11-27 DIAGNOSIS — I10 HYPERTENSION, UNSPECIFIED TYPE: Primary | ICD-10-CM

## 2023-11-27 LAB
ANION GAP SERPL CALC-SCNC: 8 MEQ/L
BUN SERPL-MCNC: 33.8 MG/DL (ref 9.8–20.1)
CALCIUM SERPL-MCNC: 9.7 MG/DL (ref 8.4–10.2)
CHLORIDE SERPL-SCNC: 109 MMOL/L (ref 98–107)
CO2 SERPL-SCNC: 25 MMOL/L (ref 23–31)
CREAT SERPL-MCNC: 2.06 MG/DL (ref 0.55–1.02)
CREAT/UREA NIT SERPL: 16
ERYTHROCYTE [DISTWIDTH] IN BLOOD BY AUTOMATED COUNT: 13.6 % (ref 11.5–17)
GFR SERPLBLD CREATININE-BSD FMLA CKD-EPI: 24 MLS/MIN/1.73/M2
GLUCOSE SERPL-MCNC: 105 MG/DL (ref 82–115)
HCT VFR BLD AUTO: 40.6 % (ref 37–47)
HGB BLD-MCNC: 13 G/DL (ref 12–16)
MCH RBC QN AUTO: 27.8 PG (ref 27–31)
MCHC RBC AUTO-ENTMCNC: 32 G/DL (ref 33–36)
MCV RBC AUTO: 86.8 FL (ref 80–94)
NRBC BLD AUTO-RTO: 0 %
PLATELET # BLD AUTO: 212 X10(3)/MCL (ref 130–400)
PMV BLD AUTO: 10.2 FL (ref 7.4–10.4)
POTASSIUM SERPL-SCNC: 4.4 MMOL/L (ref 3.5–5.1)
RBC # BLD AUTO: 4.68 X10(6)/MCL (ref 4.2–5.4)
SODIUM SERPL-SCNC: 142 MMOL/L (ref 136–145)
WBC # SPEC AUTO: 8.93 X10(3)/MCL (ref 4.5–11.5)

## 2023-11-27 PROCEDURE — 80048 BASIC METABOLIC PNL TOTAL CA: CPT

## 2023-11-27 PROCEDURE — 36415 COLL VENOUS BLD VENIPUNCTURE: CPT

## 2023-11-27 PROCEDURE — 85027 COMPLETE CBC AUTOMATED: CPT

## 2023-12-04 ENCOUNTER — HOSPITAL ENCOUNTER (OUTPATIENT)
Facility: HOSPITAL | Age: 83
Discharge: HOME | End: 2023-12-04
Attending: INTERNAL MEDICINE | Admitting: INTERNAL MEDICINE
Payer: MEDICARE

## 2023-12-04 DIAGNOSIS — I25.10 CAD (CORONARY ARTERY DISEASE): ICD-10-CM

## 2023-12-04 DIAGNOSIS — I25.10 ATHEROSCLEROSIS OF NATIVE CORONARY ARTERY OF NATIVE HEART, UNSPECIFIED WHETHER ANGINA PRESENT: ICD-10-CM

## 2023-12-04 LAB — POCT GLUCOSE: 90 MG/DL (ref 70–110)

## 2023-12-04 PROCEDURE — 93010 ELECTROCARDIOGRAM REPORT: CPT | Mod: ,,, | Performed by: STUDENT IN AN ORGANIZED HEALTH CARE EDUCATION/TRAINING PROGRAM

## 2023-12-04 PROCEDURE — C1760 CLOSURE DEV, VASC: HCPCS | Performed by: INTERNAL MEDICINE

## 2023-12-04 PROCEDURE — C1887 CATHETER, GUIDING: HCPCS | Performed by: INTERNAL MEDICINE

## 2023-12-04 PROCEDURE — C9600 PERC DRUG-EL COR STENT SING: HCPCS | Mod: LD | Performed by: INTERNAL MEDICINE

## 2023-12-04 PROCEDURE — 93005 ELECTROCARDIOGRAM TRACING: CPT | Mod: 59

## 2023-12-04 PROCEDURE — 27201423 OPTIME MED/SURG SUP & DEVICES STERILE SUPPLY: Performed by: INTERNAL MEDICINE

## 2023-12-04 PROCEDURE — 93010 EKG 12-LEAD: ICD-10-PCS | Mod: ,,, | Performed by: STUDENT IN AN ORGANIZED HEALTH CARE EDUCATION/TRAINING PROGRAM

## 2023-12-04 PROCEDURE — C1769 GUIDE WIRE: HCPCS | Performed by: INTERNAL MEDICINE

## 2023-12-04 PROCEDURE — 63600175 PHARM REV CODE 636 W HCPCS: Performed by: INTERNAL MEDICINE

## 2023-12-04 PROCEDURE — C1725 CATH, TRANSLUMIN NON-LASER: HCPCS | Performed by: INTERNAL MEDICINE

## 2023-12-04 PROCEDURE — 99153 MOD SED SAME PHYS/QHP EA: CPT | Performed by: INTERNAL MEDICINE

## 2023-12-04 PROCEDURE — 25000003 PHARM REV CODE 250: Performed by: INTERNAL MEDICINE

## 2023-12-04 PROCEDURE — C1874 STENT, COATED/COV W/DEL SYS: HCPCS | Performed by: INTERNAL MEDICINE

## 2023-12-04 PROCEDURE — 93458 L HRT ARTERY/VENTRICLE ANGIO: CPT | Mod: 59 | Performed by: INTERNAL MEDICINE

## 2023-12-04 PROCEDURE — C1894 INTRO/SHEATH, NON-LASER: HCPCS | Performed by: INTERNAL MEDICINE

## 2023-12-04 PROCEDURE — 25500020 PHARM REV CODE 255: Performed by: INTERNAL MEDICINE

## 2023-12-04 PROCEDURE — 99152 MOD SED SAME PHYS/QHP 5/>YRS: CPT | Performed by: INTERNAL MEDICINE

## 2023-12-04 DEVICE — ANGIO-SEAL VIP VASCULAR CLOSURE DEVICE
Type: IMPLANTABLE DEVICE | Site: CORONARY | Status: FUNCTIONAL
Brand: ANGIO-SEAL

## 2023-12-04 DEVICE — EVEROLIMUS-ELUTING PLATINUM CHROMIUM CORONARY STENT SYSTEM
Type: IMPLANTABLE DEVICE | Site: CORONARY | Status: FUNCTIONAL
Brand: SYNERGY™ XD

## 2023-12-04 RX ORDER — MAG HYDROX/ALUMINUM HYD/SIMETH 200-200-20
SUSPENSION, ORAL (FINAL DOSE FORM) ORAL
Status: DISCONTINUED | OUTPATIENT
Start: 2023-12-04 | End: 2023-12-04 | Stop reason: HOSPADM

## 2023-12-04 RX ORDER — HYDROCODONE BITARTRATE AND ACETAMINOPHEN 5; 325 MG/1; MG/1
1 TABLET ORAL EVERY 4 HOURS PRN
Status: DISCONTINUED | OUTPATIENT
Start: 2023-12-04 | End: 2023-12-04 | Stop reason: HOSPADM

## 2023-12-04 RX ORDER — NITROGLYCERIN 20 MG/100ML
INJECTION INTRAVENOUS
Status: DISCONTINUED | OUTPATIENT
Start: 2023-12-04 | End: 2023-12-04 | Stop reason: HOSPADM

## 2023-12-04 RX ORDER — ACETAMINOPHEN 325 MG/1
650 TABLET ORAL EVERY 4 HOURS PRN
Status: DISCONTINUED | OUTPATIENT
Start: 2023-12-04 | End: 2023-12-04 | Stop reason: HOSPADM

## 2023-12-04 RX ORDER — HEPARIN SODIUM 1000 [USP'U]/ML
INJECTION, SOLUTION INTRAVENOUS; SUBCUTANEOUS
Status: DISCONTINUED | OUTPATIENT
Start: 2023-12-04 | End: 2023-12-04 | Stop reason: HOSPADM

## 2023-12-04 RX ORDER — DIPHENHYDRAMINE HCL 25 MG
50 CAPSULE ORAL
Status: DISPENSED | OUTPATIENT
Start: 2023-12-04

## 2023-12-04 RX ORDER — DIAZEPAM 5 MG/1
10 TABLET ORAL ONCE
Status: COMPLETED | OUTPATIENT
Start: 2023-12-04 | End: 2023-12-04

## 2023-12-04 RX ORDER — FENTANYL CITRATE 50 UG/ML
INJECTION, SOLUTION INTRAMUSCULAR; INTRAVENOUS
Status: DISCONTINUED | OUTPATIENT
Start: 2023-12-04 | End: 2023-12-04 | Stop reason: HOSPADM

## 2023-12-04 RX ORDER — SODIUM CHLORIDE 9 MG/ML
INJECTION, SOLUTION INTRAVENOUS CONTINUOUS
Status: ACTIVE | OUTPATIENT
Start: 2023-12-04

## 2023-12-04 RX ORDER — ONDANSETRON 4 MG/1
8 TABLET, ORALLY DISINTEGRATING ORAL EVERY 8 HOURS PRN
Status: DISCONTINUED | OUTPATIENT
Start: 2023-12-04 | End: 2023-12-04 | Stop reason: HOSPADM

## 2023-12-04 RX ORDER — SODIUM CHLORIDE 9 MG/ML
INJECTION, SOLUTION INTRAVENOUS CONTINUOUS
Status: ACTIVE | OUTPATIENT
Start: 2023-12-04 | End: 2023-12-04

## 2023-12-04 RX ORDER — MORPHINE SULFATE 4 MG/ML
2 INJECTION, SOLUTION INTRAMUSCULAR; INTRAVENOUS EVERY 4 HOURS PRN
Status: DISCONTINUED | OUTPATIENT
Start: 2023-12-04 | End: 2023-12-04 | Stop reason: HOSPADM

## 2023-12-04 RX ORDER — MIDAZOLAM HYDROCHLORIDE 1 MG/ML
INJECTION INTRAMUSCULAR; INTRAVENOUS
Status: DISCONTINUED | OUTPATIENT
Start: 2023-12-04 | End: 2023-12-04 | Stop reason: HOSPADM

## 2023-12-04 RX ORDER — SODIUM CHLORIDE 0.9 % (FLUSH) 0.9 %
10 SYRINGE (ML) INJECTION
Status: ACTIVE | OUTPATIENT
Start: 2023-12-04

## 2023-12-04 RX ORDER — LIDOCAINE HYDROCHLORIDE 10 MG/ML
INJECTION INFILTRATION; PERINEURAL
Status: DISCONTINUED | OUTPATIENT
Start: 2023-12-04 | End: 2023-12-04 | Stop reason: HOSPADM

## 2023-12-04 RX ORDER — HYDRALAZINE HYDROCHLORIDE 20 MG/ML
10 INJECTION INTRAMUSCULAR; INTRAVENOUS EVERY 4 HOURS PRN
Status: DISCONTINUED | OUTPATIENT
Start: 2023-12-04 | End: 2023-12-04 | Stop reason: HOSPADM

## 2023-12-04 RX ADMIN — DIPHENHYDRAMINE HYDROCHLORIDE 50 MG: 25 CAPSULE ORAL at 07:12

## 2023-12-04 RX ADMIN — DIAZEPAM 10 MG: 5 TABLET ORAL at 07:12

## 2023-12-04 RX ADMIN — SODIUM CHLORIDE: 9 INJECTION, SOLUTION INTRAVENOUS at 06:12

## 2023-12-04 RX ADMIN — MORPHINE SULFATE 2 MG: 4 INJECTION INTRAVENOUS at 08:12

## 2023-12-04 RX ADMIN — SODIUM CHLORIDE: 9 INJECTION, SOLUTION INTRAVENOUS at 08:12

## 2023-12-04 RX ADMIN — ONDANSETRON 8 MG: 4 TABLET, ORALLY DISINTEGRATING ORAL at 12:12

## 2023-12-04 NOTE — Clinical Note
The catheter was inserted into the, was removed from the and was inserted over the wire into the ostium   right coronary artery. An angiography was performed of the right coronary arteries. Multiple views were taken. The angiography was performed via power injection.  JR catheter being used

## 2023-12-04 NOTE — Clinical Note
The catheter was inserted into the, was removed from the and was inserted over the wire into the ostium   left main. An angiography was performed of the left coronary arteries. Multiple views were taken. The angiography was performed via power injection.  JL Catheter being used.

## 2023-12-04 NOTE — Clinical Note
The catheter was inserted into the, was removed from the and was inserted over the wire into the left ventricle. Hemodynamics were performed.  and Pullback was recorded.  PIG catheter being used.

## 2023-12-04 NOTE — Clinical Note
The catheter was inserted into the, was removed from the and was inserted over the wire into the distal   left anterior descending. An angiography was performed of the left coronary arteries. The angiography was performed via power injection.

## 2023-12-04 NOTE — DISCHARGE INSTRUCTIONS
-Remove dressing in 24hrs  -No driving for two Days  -Do not lift anything heavier than a gallon of milk for 5 days  -No tub bathing for 5 days (if you have a groin site).   -No lotions, powders, creams around site for 5 days  - Return to the nearest emergency room if you start running a fever; have any kind of discharge coming from the site, the site looks red or swollen.  - If site starts to bleed, lay flat on the ground, apply pressure to the site and call 911.   Stop taking Plavix

## 2023-12-05 VITALS
BODY MASS INDEX: 25.79 KG/M2 | WEIGHT: 160.5 LBS | HEIGHT: 66 IN | DIASTOLIC BLOOD PRESSURE: 70 MMHG | RESPIRATION RATE: 15 BRPM | TEMPERATURE: 98 F | OXYGEN SATURATION: 99 % | SYSTOLIC BLOOD PRESSURE: 96 MMHG | HEART RATE: 68 BPM

## 2023-12-13 ENCOUNTER — CLINICAL SUPPORT (OUTPATIENT)
Dept: DIABETES | Facility: CLINIC | Age: 83
End: 2023-12-13
Payer: MEDICARE

## 2023-12-13 VITALS — HEIGHT: 65 IN | BODY MASS INDEX: 27.02 KG/M2 | WEIGHT: 162.19 LBS

## 2023-12-13 DIAGNOSIS — E11.65 TYPE 2 DIABETES MELLITUS WITH HYPERGLYCEMIA, WITH LONG-TERM CURRENT USE OF INSULIN: Primary | ICD-10-CM

## 2023-12-13 DIAGNOSIS — Z79.4 TYPE 2 DIABETES MELLITUS WITH HYPERGLYCEMIA, WITH LONG-TERM CURRENT USE OF INSULIN: Primary | ICD-10-CM

## 2023-12-13 PROCEDURE — G0108 DIAB MANAGE TRN  PER INDIV: HCPCS | Mod: ,,, | Performed by: INTERNAL MEDICINE

## 2023-12-13 PROCEDURE — G0108 PR DIAB MANAGE TRN  PER INDIV: ICD-10-PCS | Mod: ,,, | Performed by: INTERNAL MEDICINE

## 2023-12-13 NOTE — PROGRESS NOTES
"Diabetes Care Specialist Follow-up Note  Author: Destiney Bonds RN  Date: 12/13/2023    Program Intake  Reason for Diabetes Program Visit:: Intervention  Type of Intervention:: Individual  Individual: Education  Education: Nutrition and Meal Planning, Pattern Management  Current diabetes risk level:: high  In the last 12 months, have you:: been admitted to a hospital ([facility] with stent to Lad 12/4/23)  Was the ER or hospital admission related to diabetes?: No    Lab Results   Component Value Date    HGBA1C 8.9 (H) 10/22/2023     A1c Pre Diabetes Care Specialist Intervention:  8.8%    Clinical    Weight: 73.6 kg (162 lb 3.2 oz)   Height: 5' 5" (165.1 cm)   Body mass index is 26.99 kg/m².  Wt gain of 2 lbs since last visit on 9/13/23    Physical activity/Exercise:   Will start cardiac rehab twice a week 9/14/2023    Diabetes Self-Management Skills Assessment      Physical Activity/Exercise  Patient's daily activity level:: lightly active  Patient formally exercises outside of work.: no  Reasons for not exercising:: physically unable to exercise currently  Patient can identify forms of physical activity.: no  Patient can identify reasons why exercise/physical activity is important in diabetes management.: no  Physical Activity/Exercise Skills Assessment Completed: : Yes  Assessment indicates:: Knowledge deficit, Instruction Needed  Area of need?: Yes    Medications  Diabetes management routine:: insulin, oral medications (Lantus 46 units Qam, 20 units Qpm, Novolog 5units at lunch time hold if <150.  Trajenta 5mg daily)      Chronic Complications  Patient can identify major chronic complications of diabetes.: yes  Stated chronic complications:: stroke, kidney disease, heart disease/heart attack  Patient can identify ways to prevent or delay diabetes complications.: yes  Stated ways to prevent complications:: controlling blood sugar  Patient is aware that having diabetes increases risk of heart disease?: No  Patient is " aware that heart disease is the leading cause of death and disability in people with diabetes?: No  Patient able to state risk factors for heart disease?: Yes  Patient stated risk factors for heart disease:: Having diabetes  Patient is taking statin?: Yes  Chronic Complications Skills Assessment Completed: : Yes  Assessment indicates:: Knowledge deficit, Instruction Needed  Area of need?: Yes       During today's follow-up visit,  the following areas required further assessment and content was provided/reviewed.    Based on today's diabetes care assessment, the following areas of need were identified:          9/13/2023    12:01 AM   Social   Cognitive/Behavioral Health Yes            8/23/2023    12:01 AM   Clinical   Medication Adherence No   Lab Compliance No   Nutritional Status No            12/13/2023    12:01 AM   Diabetes Self-Management Skills   Physical Activity/Exercise Yes Discussed physical activity as it relates to insulin resistance and weight loss.    Chronic Complications Yes Discussed importance of A1c less than 7 to reduce risk of micro and macro complications, including nephropathy, neuropathy, retinopathy, heart attack and stroke. Reviewed the importance of controlled Blood Pressure and Cholesterol Lab Values in preventing disease.   Health maintenance reviewed        Today's interventions were provided through individual discussion, instruction, and written materials were provided.    Patient verbalized understanding of instruction and written materials.  Pt was able to return back demonstration of instructions today. Patient understood key points, needs reinforcement and further instruction.     Diabetes Self-Management Care Plan Review and Evaluation of Progress:    During today's follow-up [facility] Diabetes Self-Management Care Plan progress was reviewed and progress was evaluated including his/her input. Rosalba has agreed to continue his/her journey to improve/maintain overall diabetes  control by continuing to set health goals. See care plan progress below.      Care Plan: Diabetes Management   Updates made since 11/13/2023 12:00 AM        Problem: Healthy Eating         Long-Range Goal: Patient agrees to decrease portions of carbohydrates and increase portions of non-starchy vegetables.    Start Date: 8/23/2023   Expected End Date: 9/13/2023   This Visit's Progress: Met   Priority: High   Barriers: Knowledge deficit   Note:    She cooks occasionally, eats a [place] at times. She does not eat red meat. Loves most non-starchy vegetables and sweets.    9/13/23 She eats pb and jelly and chips for lunch often so discussed trying turkey sandwich with chips or if eating pb and Jelly to have veggies with it.    12/13/23 She is now eating sugar free bread and stopped ramen noodles for supper, now eating salads, and grilled shrimp with broccoli sometimes fish sandwich from [employer] but instructed to remove top bun or eat half at a time. Discussed food labels and practiced on a few items.        Task: Explained how to count carbohydrates using the food label and the use of dry measuring cups for accurate carb counting. Completed 12/13/2023        Task: Review the importance of balancing carbohydrates with each meal using portion control techniques to count servings of carbohydrate and label reading to identify serving size and amount of total carbs per serving. Completed 12/13/2023        Problem: Blood Glucose Self-Monitoring         Long-Range Goal: Patient agrees to scan Freestyle maria isabel 2 every hour or 2 while awake.    Start Date: 8/23/2023   Expected End Date: 9/13/2023   This Visit's Progress: Met   Priority: Medium   Barriers: Knowledge deficit   Note:    8/23/23 She has been scanning only twice a day but will scan more often now to get a better agp to review trends and patterns. Discussed trend arrows and lag time between blood glucose and interstitial fluid glucose.   9/13/23 See agp attached.   She does forget to scan at times. Discussed scanning on commercials when watching tv, before and after meals.    Freestyle maria isabel 2 CGM reviewed  Average glucose is 185 mg/dL  Hypoglycemia frequency 0 % with BG <70  Hyperglycemia frequency 56 % with BG >180  Time in range 44 %  Recommendations: Discussed stopping chips with sandwich at lunch time and trying hummus or guacamole with raw veggies.  She love bellpeppers and cucumbers.  Discussed doing some exercise after meals.    Pcp did increase lantus to 37 units qam from 35 units.     12/13/23  Freestyle maria isabel 2 CGM reviewed  Average glucose is 161 mg/dL  Hypoglycemia frequency 0 % with BG <70  Hyperglycemia frequency 30 % with BG >180  Time in range 70 %  Recommendations: Continue medication as prescribed, starting cardiac rehab twice a week, continue to balance portion size and carbs with protein.                 Follow Up Plan     Follow up in about 3 months (around 3/13/2024) for pattern management. Appt made with pt. Today for march 13 2024 at 1:30.  Plan on reviewing agp, discuss acute complications.     Today's care plan and follow up schedule was discussed with patient.  Rosalba verbalized understanding of the care plan, goals, and agrees to follow up plan.        The patient was encouraged to communicate with his/her health care provider/physician and care team regarding his/her condition(s) and treatment.  I provided the patient with my contact information today and encouraged to contact me via phone or [facility] Patient Portal as needed.     Length of Visit   Total Time: 60 Minutes

## 2023-12-20 NOTE — DISCHARGE SUMMARY
Procedure(s) (LRB):  Left heart cath (Left)  Stent, Drug Eluting, Single Vessel, Coronary    OUTCOME: Patient tolerated treatment/procedure well without complication and is now ready for discharge.    DIAGNOSIS: cad    DISPOSITION: Home or Self Care    FOLLOWUP: In clinic    DISCHARGE INSTRUCTIONS: No discharge procedures on file.    TIME SPENT ON DISCHARGE:   31 minutes

## 2024-01-09 ENCOUNTER — HOSPITAL ENCOUNTER (OUTPATIENT)
Dept: RADIOLOGY | Facility: HOSPITAL | Age: 84
Discharge: HOME | End: 2024-01-09
Attending: INTERNAL MEDICINE
Payer: MEDICARE

## 2024-01-09 DIAGNOSIS — Z12.31 BREAST CANCER SCREENING BY MAMMOGRAM: ICD-10-CM

## 2024-01-09 PROCEDURE — 77067 SCR MAMMO BI INCL CAD: CPT | Mod: 26,,, | Performed by: RADIOLOGY

## 2024-01-09 PROCEDURE — 77063 BREAST TOMOSYNTHESIS BI: CPT | Mod: 26,,, | Performed by: RADIOLOGY

## 2024-01-09 PROCEDURE — 77067 SCR MAMMO BI INCL CAD: CPT | Mod: TC

## 2024-02-01 ENCOUNTER — HOSPITAL ENCOUNTER (EMERGENCY)
Facility: HOSPITAL | Age: 84
Discharge: HOME | End: 2024-02-01
Attending: EMERGENCY MEDICINE
Payer: MEDICARE

## 2024-02-01 VITALS
TEMPERATURE: 98 F | SYSTOLIC BLOOD PRESSURE: 125 MMHG | WEIGHT: 160 LBS | RESPIRATION RATE: 21 BRPM | DIASTOLIC BLOOD PRESSURE: 76 MMHG | HEART RATE: 80 BPM | OXYGEN SATURATION: 99 % | HEIGHT: 65 IN | BODY MASS INDEX: 26.66 KG/M2

## 2024-02-01 DIAGNOSIS — J40 BRONCHITIS: ICD-10-CM

## 2024-02-01 DIAGNOSIS — R06.2 WHEEZING: ICD-10-CM

## 2024-02-01 DIAGNOSIS — R06.02 SHORTNESS OF BREATH: Primary | ICD-10-CM

## 2024-02-01 LAB
ALBUMIN SERPL-MCNC: 3.9 G/DL (ref 3.4–4.8)
ALBUMIN/GLOB SERPL: 1 RATIO (ref 1.1–2)
ALP SERPL-CCNC: 58 UNIT/L (ref 40–150)
ALT SERPL-CCNC: 6 UNIT/L (ref 0–55)
AST SERPL-CCNC: 18 UNIT/L (ref 5–34)
BASOPHILS # BLD AUTO: 0.04 X10(3)/MCL
BASOPHILS NFR BLD AUTO: 0.4 %
BILIRUB SERPL-MCNC: 0.3 MG/DL
BNP BLD-MCNC: 97.2 PG/ML
BUN SERPL-MCNC: 32 MG/DL (ref 9.8–20.1)
CALCIUM SERPL-MCNC: 9.8 MG/DL (ref 8.4–10.2)
CHLORIDE SERPL-SCNC: 110 MMOL/L (ref 98–107)
CO2 SERPL-SCNC: 24 MMOL/L (ref 23–31)
CREAT SERPL-MCNC: 1.85 MG/DL (ref 0.55–1.02)
EOSINOPHIL # BLD AUTO: 0.39 X10(3)/MCL (ref 0–0.9)
EOSINOPHIL NFR BLD AUTO: 4.2 %
ERYTHROCYTE [DISTWIDTH] IN BLOOD BY AUTOMATED COUNT: 13.9 % (ref 11.5–17)
FLUAV AG UPPER RESP QL IA.RAPID: NOT DETECTED
FLUBV AG UPPER RESP QL IA.RAPID: NOT DETECTED
GFR SERPLBLD CREATININE-BSD FMLA CKD-EPI: 27 MLS/MIN/1.73/M2
GLOBULIN SER-MCNC: 3.8 GM/DL (ref 2.4–3.5)
GLUCOSE SERPL-MCNC: 171 MG/DL (ref 82–115)
HCT VFR BLD AUTO: 37.1 % (ref 37–47)
HGB BLD-MCNC: 12.4 G/DL (ref 12–16)
IMM GRANULOCYTES # BLD AUTO: 0.03 X10(3)/MCL (ref 0–0.04)
IMM GRANULOCYTES NFR BLD AUTO: 0.3 %
INR PPP: 1 (ref 2–3)
LYMPHOCYTES # BLD AUTO: 2.22 X10(3)/MCL (ref 0.6–4.6)
LYMPHOCYTES NFR BLD AUTO: 24 %
MCH RBC QN AUTO: 28.5 PG (ref 27–31)
MCHC RBC AUTO-ENTMCNC: 33.4 G/DL (ref 33–36)
MCV RBC AUTO: 85.3 FL (ref 80–94)
MONOCYTES # BLD AUTO: 0.6 X10(3)/MCL (ref 0.1–1.3)
MONOCYTES NFR BLD AUTO: 6.5 %
NEUTROPHILS # BLD AUTO: 5.98 X10(3)/MCL (ref 2.1–9.2)
NEUTROPHILS NFR BLD AUTO: 64.6 %
NRBC BLD AUTO-RTO: 0 %
PLATELET # BLD AUTO: 221 X10(3)/MCL (ref 130–400)
PMV BLD AUTO: 10.2 FL (ref 7.4–10.4)
POTASSIUM SERPL-SCNC: 3.6 MMOL/L (ref 3.5–5.1)
PROT SERPL-MCNC: 7.7 GM/DL (ref 5.8–7.6)
PROTHROMBIN TIME: 13.5 SECONDS (ref 11.7–14.5)
RBC # BLD AUTO: 4.35 X10(6)/MCL (ref 4.2–5.4)
SARS-COV-2 RNA RESP QL NAA+PROBE: NOT DETECTED
SODIUM SERPL-SCNC: 145 MMOL/L (ref 136–145)
TROPONIN I SERPL-MCNC: <0.01 NG/ML (ref 0–0.04)
WBC # SPEC AUTO: 9.26 X10(3)/MCL (ref 4.5–11.5)

## 2024-02-01 PROCEDURE — 94640 AIRWAY INHALATION TREATMENT: CPT

## 2024-02-01 PROCEDURE — 0240U COVID/FLU A&B PCR: CPT | Performed by: NURSE PRACTITIONER

## 2024-02-01 PROCEDURE — 83880 ASSAY OF NATRIURETIC PEPTIDE: CPT | Performed by: NURSE PRACTITIONER

## 2024-02-01 PROCEDURE — 99285 EMERGENCY DEPT VISIT HI MDM: CPT | Mod: 25

## 2024-02-01 PROCEDURE — 93010 ELECTROCARDIOGRAM REPORT: CPT | Mod: ,,, | Performed by: INTERNAL MEDICINE

## 2024-02-01 PROCEDURE — 25000242 PHARM REV CODE 250 ALT 637 W/ HCPCS: Performed by: NURSE PRACTITIONER

## 2024-02-01 PROCEDURE — 84484 ASSAY OF TROPONIN QUANT: CPT | Performed by: NURSE PRACTITIONER

## 2024-02-01 PROCEDURE — 99900031 HC PATIENT EDUCATION (STAT)

## 2024-02-01 PROCEDURE — 85025 COMPLETE CBC W/AUTO DIFF WBC: CPT | Performed by: NURSE PRACTITIONER

## 2024-02-01 PROCEDURE — 80053 COMPREHEN METABOLIC PANEL: CPT | Performed by: NURSE PRACTITIONER

## 2024-02-01 PROCEDURE — 93005 ELECTROCARDIOGRAM TRACING: CPT

## 2024-02-01 PROCEDURE — 85610 PROTHROMBIN TIME: CPT | Performed by: NURSE PRACTITIONER

## 2024-02-01 RX ORDER — NEBULIZER AND COMPRESSOR
EACH MISCELLANEOUS
Qty: 1 EACH | Refills: 0 | Status: SHIPPED | OUTPATIENT
Start: 2024-02-01 | End: 2024-02-06 | Stop reason: SDUPTHER

## 2024-02-01 RX ORDER — ALBUTEROL SULFATE 2.5 MG/.5ML
2.5 SOLUTION RESPIRATORY (INHALATION) EVERY 4 HOURS PRN
Qty: 25 EACH | Refills: 0 | Status: ON HOLD | OUTPATIENT
Start: 2024-02-01 | End: 2024-02-21 | Stop reason: HOSPADM

## 2024-02-01 RX ORDER — IPRATROPIUM BROMIDE AND ALBUTEROL SULFATE 2.5; .5 MG/3ML; MG/3ML
3 SOLUTION RESPIRATORY (INHALATION)
Status: COMPLETED | OUTPATIENT
Start: 2024-02-01 | End: 2024-02-01

## 2024-02-01 RX ORDER — AZITHROMYCIN 250 MG/1
TABLET, FILM COATED ORAL
Qty: 6 TABLET | Refills: 0 | Status: SHIPPED | OUTPATIENT
Start: 2024-02-01 | End: 2024-02-06

## 2024-02-01 RX ORDER — ALBUTEROL SULFATE 90 UG/1
1-2 AEROSOL, METERED RESPIRATORY (INHALATION) EVERY 6 HOURS PRN
Qty: 18 G | Refills: 0 | Status: SHIPPED | OUTPATIENT
Start: 2024-02-01 | End: 2024-02-06

## 2024-02-01 RX ADMIN — IPRATROPIUM BROMIDE AND ALBUTEROL SULFATE 3 ML: 2.5; .5 SOLUTION RESPIRATORY (INHALATION) at 06:02

## 2024-02-01 NOTE — ED PROVIDER NOTES
Encounter Date: 2/1/2024       History     Chief Complaint   Patient presents with    Shortness of Breath     Pt c.o of increase sob while washing dishes     Patient states that she began with increased SOB while cleaning her house and washing her dishes this afternoon. States that she has a history of SOB with exertion.  States that she has had some intermittent wheezing and congestion. Denies any chest pain, coughing, congestion, fever, nausea, vomiting, extremity swelling, or abdominal pain. Hx. Of Bronchitis, HTN, GERD, CKD, CAD    The history is provided by the patient and a relative.     Review of patient's allergies indicates:   Allergen Reactions    Amlodipine      Skin crawling      Past Medical History:   Diagnosis Date    Amnesia 06/13/2022    Benign paroxysmal positional vertigo, bilateral 06/13/2022    Bronchitis 06/08/2022    Chronic gouty arthritis 06/13/2022    Coronary artery disease involving native coronary artery of native heart without angina pectoris 12/04/2023    Diabetes mellitus, type 2     Essential hypertension 04/21/2016    Gastroesophageal reflux disease without esophagitis 04/21/2016    GERD (gastroesophageal reflux disease)     Gout, unspecified     Heart attack     Low back pain 06/13/2022    lower back pain bilateral with movement; radiates down right leg to thigh    Low vitamin D level 06/13/2022    Metabolic acidosis 01/12/2023    Microalbuminuria 01/12/2023    Migraine headache 04/21/2016    Osteoporosis 06/13/2022    Peripheral edema 06/13/2022    Physical deconditioning 06/13/2022    Pure hypercholesterolemia 06/13/2022    Spondylosis of lumbar spine 06/13/2022    Stage 3 chronic kidney disease 06/13/2022    Type 2 diabetes mellitus 06/13/2022     Past Surgical History:   Procedure Laterality Date    FOOT SURGERY      LEFT HEART CATHETERIZATION Left 12/4/2023    Procedure: Left heart cath;  Surgeon: Rubia Seymour MD;  Location: [facility] CATH LAB;  Service: Cardiology;   Laterality: Left;  [facility] +/- PCI    STENT, DRUG ELUTING, SINGLE VESSEL, CORONARY  12/4/2023    Procedure: Stent, Drug Eluting, Single Vessel, Coronary;  Surgeon: Rubia Seymour MD;  Location: [facility] CATH LAB;  Service: Cardiology;;     Family History   Problem Relation Age of Onset    Lung cancer Mother     Kidney disease Father     Diabetes Brother      Social History     Tobacco Use    Smoking status: Former    Smokeless tobacco: Never   Substance Use Topics    Alcohol use: Never    Drug use: Never     Review of Systems   Constitutional: Negative.  Negative for fever.   HENT:  Positive for congestion.    Eyes: Negative.    Respiratory:  Positive for shortness of breath and wheezing. Negative for cough and chest tightness.    Cardiovascular: Negative.  Negative for chest pain.   Gastrointestinal: Negative.    Endocrine: Negative.    Genitourinary: Negative.    Musculoskeletal: Negative.    Skin: Negative.    Allergic/Immunologic: Negative.    Neurological: Negative.  Negative for weakness.   Hematological: Negative.    Psychiatric/Behavioral: Negative.     All other systems reviewed and are negative.      Physical Exam     Initial Vitals   BP Pulse Resp Temp SpO2   02/01/24 1701 02/01/24 1701 02/01/24 1701 02/01/24 1705 02/01/24 1701   (!) 164/83 88 (!) 28 97.5 °F (36.4 °C) 98 %      MAP       --                Physical Exam    Nursing note and vitals reviewed.  Constitutional: She appears well-developed and well-nourished. No distress.   HENT:   Head: Normocephalic and atraumatic.   Mouth/Throat: Oropharynx is clear and moist.   Eyes: Conjunctivae and EOM are normal. Pupils are equal, round, and reactive to light.   Neck: Neck supple.   Normal range of motion.  Cardiovascular:  Normal rate, regular rhythm, normal heart sounds and intact distal pulses.           Pulmonary/Chest: No respiratory distress. She has wheezes (Mild right sided wheezing.).   Abdominal: Abdomen is soft. Bowel sounds are normal. She exhibits  no distension. There is no abdominal tenderness.   Musculoskeletal:         General: No tenderness or edema. Normal range of motion.      Cervical back: Normal range of motion and neck supple.     Neurological: She is alert and oriented to person, place, and time. She has normal strength. GCS score is 15. GCS eye subscore is 4. GCS verbal subscore is 5. GCS motor subscore is 6.   Skin: Skin is warm and dry. No rash noted.   Psychiatric: She has a normal mood and affect. Thought content normal.         ED Course   Procedures  Labs Reviewed   COMPREHENSIVE METABOLIC PANEL - Abnormal; Notable for the following components:       Result Value    Chloride 110 (*)     Glucose Level 171 (*)     Blood Urea Nitrogen 32.0 (*)     Creatinine 1.85 (*)     Protein Total 7.7 (*)     Globulin 3.8 (*)     Albumin/Globulin Ratio 1.0 (*)     All other components within normal limits   PROTIME-INR - Abnormal; Notable for the following components:    INR 1.0 (*)     All other components within normal limits   B-TYPE NATRIURETIC PEPTIDE - Normal   TROPONIN I - Normal   COVID/FLU A&B PCR - Normal    Narrative:     The Xpert Xpress SARS-CoV-2/FLU/RSV plus is a rapid, multiplexed real-time PCR test intended for the simultaneous qualitative detection and differentiation of SARS-CoV-2, Influenza A, Influenza B, and respiratory syncytial virus (RSV) viral RNA in either nasopharyngeal swab or nasal swab specimens.         CBC W/ AUTO DIFFERENTIAL    Narrative:     The following orders were created for panel order CBC Auto Differential.  Procedure                               Abnormality         Status                     ---------                               -----------         ------                     CBC with Differential[[id number]]                           Final result                 Please view results for these tests on the individual orders.   CBC WITH DIFFERENTIAL     EKG Readings: (Independently Interpreted)   Initial Reading:  No STEMI. Rhythm: Normal Sinus Rhythm. Heart Rate: 92. Ectopy: No Ectopy. Conduction: Normal. ST Segments: Normal ST Segments. T Waves: Normal. Axis: Normal. Clinical Impression: Normal Sinus Rhythm       Imaging Results              X-Ray Chest PA And Lateral (Final result)  Result time 02/01/24 17:42:05      Final result by Enid Gordon MD (02/01/24 17:42:05)                   Impression:      No acute cardiopulmonary abnormality.      Electronically signed by: Enid Gordon  Date:    02/01/2024  Time:    17:42               Narrative:    EXAMINATION:  XR CHEST PA AND LATERAL    CLINICAL HISTORY:  Shortness of breath;    TECHNIQUE:  Two views of the chest    COMPARISON:  04/24/2023    FINDINGS:  LINES AND TUBES: None    MEDIASTINUM AND SHUBHAM: The cardiac silhouette is normal. Aortic atherosclerosis.    LUNGS: No lobar consolidation. No edema.    PLEURA:No pleural effusion. No pneumothorax.    BONES: No acute osseous abnormality.                                       Medications   albuterol-ipratropium 2.5 mg-0.5 mg/3 mL nebulizer solution 3 mL (3 mLs Nebulization Given 2/1/24 1841)     Medical Decision Making  Patient states that she began with increased SOB while cleaning her house and washing her dishes this afternoon. States that she has a history of SOB with exertion.  States that she has had some intermittent wheezing and congestion. Denies any chest pain, coughing, congestion, fever, nausea, vomiting, extremity swelling, or abdominal pain. Hx. Of Bronchitis, HTN, GERD, CKD, CAD    The history is provided by the patient and a relative.   Patient is awake, alert, afebrile, mild wheezing to right side, o2 sat is 98-99% on RA, and patient is nontoxic appearing in the ED.     Amount and/or Complexity of Data Reviewed  External Data Reviewed: notes.  Labs: ordered. Decision-making details documented in ED Course.  Radiology: ordered. Decision-making details documented in ED Course.  ECG/medicine tests:   Decision-making details documented in ED Course.  Discussion of management or test interpretation with external provider(s): Differential diagnosis (including but not limited to):   Judging by the patient's chief complaint and pertinent history, the patient has the following possible differential diagnoses, including but not limited to the following.  Some of these are deemed to be lower likelihood and some more likely based on my physical exam and history combined with possible lab work and/or imaging studies.   Please see the pertinent studies, and refer to the HPI.  Some of these diagnoses will take further evaluation to fully rule out, perhaps as an outpatient and the patient was encouraged to follow up when discharged for more comprehensive evaluation.  SOB, CHF, Bronchitis, Pneumonia   Patient was given a Duoneb in the ED and wheezing is now resolved. Patient states SOB is relieved. Labs are at patient's baseline and appear unremarkable and Chest x-ray does not show any acute changes. Patient states that she feels improved and would like to be discharged home. Instructed to follow-up with MD. ED return precautions were given to patient and her daughter. Both state understanding and agreement with plan.                 ED Course as of 02/01/24 2024 Thu Feb 01, 2024   1754 X-Ray Chest PA And Lateral [AB]   1807 CBC Auto Differential [AB]   1817 Protime-INR(!) [AB]   1830 Comprehensive Metabolic Panel(!) [AB]   1830 Creatinine(!): 1.85 [AB]   1830 BUN(!): 32.0  BUN and Creat are at patient's baseline. [AB]   1831 BNP [AB]   1832 Troponin I [AB]   1842 COVID/FLU A&B PCR [AB]      ED Course User Index  [AB] Renate Basurto, LIAM                           Clinical Impression:  Final diagnoses:  [R06.02] Shortness of breath (Primary)  [R06.2] Wheezing  [J40] Bronchitis          ED Disposition Condition    Discharge Stable          ED Prescriptions       Medication Sig Dispense Start Date End Date Auth. Provider     nebulizer and compressor Veronica Please dispense one nebulizer machine with appropriate supplies. 1 each 2/1/2024 -- Renate Basurto FNP    nebulizer accessories Kit Please dispense one nebulizer accessories kit for appropriate nebulizer machine. 1 kit 2/1/2024 -- Renate Basurto FNP    albuterol (PROVENTIL/VENTOLIN HFA) 90 mcg/actuation inhaler Inhale 1-2 puffs into the lungs every 6 (six) hours as needed for Wheezing. Rescue 18 g 2/1/2024 2/6/2024 Renate Basurto FNP    albuterol sulfate 2.5 mg/0.5 mL Nebu Take 2.5 mg by nebulization every 4 (four) hours as needed (Wheezing). Rescue 25 each 2/1/2024 2/6/2024 Renate Basurto FNP    azithromycin (Z-RON) 250 MG tablet Take 2 tablets by mouth on day 1; Take 1 tablet by mouth on days 2-5 6 tablet 2/1/2024 2/6/2024 Renate Basurto FNP          Follow-up Information       Follow up With Specialties Details Why Contact Info    Eleazar Ramos MD Internal Medicine In 3 days  [address] [place] [address]  [phone]               Renate Basurto FNP  02/01/24 2024

## 2024-02-14 DIAGNOSIS — N18.30 CHRONIC KIDNEY DISEASE, STAGE III (MODERATE): Primary | ICD-10-CM

## 2024-02-21 ENCOUNTER — HOSPITAL ENCOUNTER (OUTPATIENT)
Facility: HOSPITAL | Age: 84
Discharge: HOME | End: 2024-02-21
Attending: INTERNAL MEDICINE | Admitting: INTERNAL MEDICINE
Payer: MEDICARE

## 2024-02-21 DIAGNOSIS — I25.10 CORONARY ATHEROSCLEROSIS OF NATIVE CORONARY ARTERY: ICD-10-CM

## 2024-02-21 DIAGNOSIS — I10 HTN (HYPERTENSION): ICD-10-CM

## 2024-02-21 DIAGNOSIS — R07.9 CHEST PAIN: ICD-10-CM

## 2024-02-21 LAB — POCT GLUCOSE: 188 MG/DL (ref 70–110)

## 2024-02-21 PROCEDURE — 93005 ELECTROCARDIOGRAM TRACING: CPT

## 2024-02-21 PROCEDURE — 93010 ELECTROCARDIOGRAM REPORT: CPT | Mod: ,,, | Performed by: INTERNAL MEDICINE

## 2024-02-21 PROCEDURE — 99152 MOD SED SAME PHYS/QHP 5/>YRS: CPT | Performed by: INTERNAL MEDICINE

## 2024-02-21 PROCEDURE — 25000003 PHARM REV CODE 250: Performed by: INTERNAL MEDICINE

## 2024-02-21 PROCEDURE — C1887 CATHETER, GUIDING: HCPCS | Performed by: INTERNAL MEDICINE

## 2024-02-21 PROCEDURE — 63600175 PHARM REV CODE 636 W HCPCS: Performed by: INTERNAL MEDICINE

## 2024-02-21 PROCEDURE — C1894 INTRO/SHEATH, NON-LASER: HCPCS | Performed by: INTERNAL MEDICINE

## 2024-02-21 PROCEDURE — 85347 COAGULATION TIME ACTIVATED: CPT | Performed by: INTERNAL MEDICINE

## 2024-02-21 PROCEDURE — C1769 GUIDE WIRE: HCPCS | Performed by: INTERNAL MEDICINE

## 2024-02-21 PROCEDURE — 93458 L HRT ARTERY/VENTRICLE ANGIO: CPT | Performed by: INTERNAL MEDICINE

## 2024-02-21 PROCEDURE — 93799 UNLISTED CV SVC/PROCEDURE: CPT | Performed by: INTERNAL MEDICINE

## 2024-02-21 PROCEDURE — 27201423 OPTIME MED/SURG SUP & DEVICES STERILE SUPPLY: Performed by: INTERNAL MEDICINE

## 2024-02-21 RX ORDER — FENTANYL CITRATE 50 UG/ML
INJECTION, SOLUTION INTRAMUSCULAR; INTRAVENOUS
Status: DISCONTINUED | OUTPATIENT
Start: 2024-02-21 | End: 2024-02-22 | Stop reason: HOSPADM

## 2024-02-21 RX ORDER — HYDROCODONE BITARTRATE AND ACETAMINOPHEN 5; 325 MG/1; MG/1
1 TABLET ORAL EVERY 4 HOURS PRN
Status: DISCONTINUED | OUTPATIENT
Start: 2024-02-21 | End: 2024-02-22 | Stop reason: HOSPADM

## 2024-02-21 RX ORDER — ONDANSETRON HYDROCHLORIDE 2 MG/ML
4 INJECTION, SOLUTION INTRAVENOUS EVERY 8 HOURS PRN
Status: DISCONTINUED | OUTPATIENT
Start: 2024-02-21 | End: 2024-02-22 | Stop reason: HOSPADM

## 2024-02-21 RX ORDER — SODIUM CHLORIDE 9 MG/ML
INJECTION, SOLUTION INTRAVENOUS CONTINUOUS
Status: DISCONTINUED | OUTPATIENT
Start: 2024-02-21 | End: 2024-02-21 | Stop reason: HOSPADM

## 2024-02-21 RX ORDER — LIDOCAINE HYDROCHLORIDE 10 MG/ML
INJECTION INFILTRATION; PERINEURAL
Status: DISCONTINUED | OUTPATIENT
Start: 2024-02-21 | End: 2024-02-22 | Stop reason: HOSPADM

## 2024-02-21 RX ORDER — DIPHENHYDRAMINE HCL 25 MG
50 CAPSULE ORAL
Status: DISCONTINUED | OUTPATIENT
Start: 2024-02-21 | End: 2024-05-30

## 2024-02-21 RX ORDER — HYDRALAZINE HYDROCHLORIDE 20 MG/ML
10 INJECTION INTRAMUSCULAR; INTRAVENOUS EVERY 4 HOURS PRN
Status: DISCONTINUED | OUTPATIENT
Start: 2024-02-21 | End: 2024-02-22 | Stop reason: HOSPADM

## 2024-02-21 RX ORDER — CLOPIDOGREL BISULFATE 75 MG/1
75 TABLET ORAL DAILY
Qty: 30 TABLET | Refills: 11 | Status: ON HOLD | OUTPATIENT
Start: 2024-02-21 | End: 2024-05-13 | Stop reason: HOSPADM

## 2024-02-21 RX ORDER — DIAZEPAM 5 MG/1
10 TABLET ORAL
Status: DISCONTINUED | OUTPATIENT
Start: 2024-02-21 | End: 2024-05-30

## 2024-02-21 RX ORDER — DIPHENHYDRAMINE HYDROCHLORIDE 50 MG/ML
INJECTION INTRAMUSCULAR; INTRAVENOUS
Status: DISCONTINUED | OUTPATIENT
Start: 2024-02-21 | End: 2024-02-22 | Stop reason: HOSPADM

## 2024-02-21 RX ORDER — NITROGLYCERIN 20 MG/100ML
INJECTION INTRAVENOUS
Status: DISCONTINUED | OUTPATIENT
Start: 2024-02-21 | End: 2024-02-22 | Stop reason: HOSPADM

## 2024-02-21 RX ORDER — HEPARIN SODIUM 1000 [USP'U]/ML
INJECTION, SOLUTION INTRAVENOUS; SUBCUTANEOUS
Status: DISCONTINUED | OUTPATIENT
Start: 2024-02-21 | End: 2024-02-22 | Stop reason: HOSPADM

## 2024-02-21 RX ORDER — MORPHINE SULFATE 4 MG/ML
2 INJECTION, SOLUTION INTRAMUSCULAR; INTRAVENOUS EVERY 4 HOURS PRN
Status: DISCONTINUED | OUTPATIENT
Start: 2024-02-21 | End: 2024-02-22 | Stop reason: HOSPADM

## 2024-02-21 RX ORDER — MIDAZOLAM HYDROCHLORIDE 1 MG/ML
INJECTION INTRAMUSCULAR; INTRAVENOUS
Status: DISCONTINUED | OUTPATIENT
Start: 2024-02-21 | End: 2024-02-22 | Stop reason: HOSPADM

## 2024-02-21 RX ORDER — ACETAMINOPHEN 325 MG/1
650 TABLET ORAL EVERY 4 HOURS PRN
Status: DISCONTINUED | OUTPATIENT
Start: 2024-02-21 | End: 2024-02-22 | Stop reason: HOSPADM

## 2024-02-21 RX ORDER — VERAPAMIL HYDROCHLORIDE 2.5 MG/ML
INJECTION, SOLUTION INTRAVENOUS
Status: DISCONTINUED | OUTPATIENT
Start: 2024-02-21 | End: 2024-02-22 | Stop reason: HOSPADM

## 2024-02-21 RX ORDER — SODIUM CHLORIDE 9 MG/ML
INJECTION, SOLUTION INTRAVENOUS ONCE
Status: COMPLETED | OUTPATIENT
Start: 2024-02-21 | End: 2024-02-21

## 2024-02-21 RX ADMIN — DIAZEPAM 10 MG: 5 TABLET ORAL at 10:02

## 2024-02-21 RX ADMIN — DIPHENHYDRAMINE HYDROCHLORIDE 50 MG: 25 CAPSULE ORAL at 10:02

## 2024-02-21 RX ADMIN — SODIUM CHLORIDE: 9 INJECTION, SOLUTION INTRAVENOUS at 09:02

## 2024-02-21 NOTE — DISCHARGE SUMMARY
[facility] - Cath Lab Services  Discharge Note  Short Stay    Procedure(s) (LRB):  Repair, Chronic Total Occlusion, Coronary (N/A)      OUTCOME: Patient tolerated treatment/procedure well without complication and is now ready for discharge.    DISPOSITION: Home or Self Care    FINAL DIAGNOSIS:  <principal problem not specified>    FOLLOWUP: In clinic    DISCHARGE INSTRUCTIONS:  No discharge procedures on file.     TIME SPENT ON DISCHARGE:

## 2024-02-21 NOTE — INTERVAL H&P NOTE
Patient name: Rosalba Whitlock  MRN: [id number]  : 1940  Cath Lab Procedure H&P Update    Pre-Procedure Assessment:    I saw and examined the patient face to face. The patient has been re-evaluated and her condition is unchanged. The reason for admission, procedure and care is still present.  Based on the patients H&P, pre-procedure physical exam, relevant diagnostic studies, NPO status and information obtained from the patient, I determined the patient is an appropriate candidate for the proposed procedure and anesthesia planned. I further certify the anesthesia risks, benefits and options have been explained to the patient to which she agrees as documented on the procedural consent.

## 2024-02-22 LAB
OHS QRS DURATION: 76 MS
OHS QTC CALCULATION: 403 MS

## 2024-02-22 NOTE — PROGRESS NOTES
TR band gradually deflated, no bleeding noted from the site during deflation. Site cleaned of old dried blood. Slight oozing from the puncture site. Pressure held approx 5 minutes to obtain total homeostasis. Dressed with Quick Clot and Tegaderm.   Gauze dressing applied to venipuncture site after dc PIV, catheter intact.

## 2024-02-23 ENCOUNTER — HOSPITAL ENCOUNTER (OUTPATIENT)
Dept: RADIOLOGY | Facility: HOSPITAL | Age: 84
Discharge: HOME | End: 2024-02-23
Attending: INTERNAL MEDICINE
Payer: MEDICARE

## 2024-02-23 VITALS
OXYGEN SATURATION: 96 % | TEMPERATURE: 98 F | WEIGHT: 161.38 LBS | SYSTOLIC BLOOD PRESSURE: 105 MMHG | RESPIRATION RATE: 15 BRPM | HEART RATE: 74 BPM | HEIGHT: 65 IN | BODY MASS INDEX: 26.89 KG/M2 | DIASTOLIC BLOOD PRESSURE: 65 MMHG

## 2024-02-23 DIAGNOSIS — N18.30 CHRONIC KIDNEY DISEASE, STAGE III (MODERATE): ICD-10-CM

## 2024-02-23 PROCEDURE — 76770 US EXAM ABDO BACK WALL COMP: CPT | Mod: TC

## 2024-03-05 ENCOUNTER — LAB VISIT (OUTPATIENT)
Dept: LAB | Facility: HOSPITAL | Age: 84
End: 2024-03-05
Attending: INTERNAL MEDICINE
Payer: MEDICARE

## 2024-03-05 DIAGNOSIS — N17.9 ACUTE KIDNEY FAILURE, UNSPECIFIED: ICD-10-CM

## 2024-03-05 DIAGNOSIS — E27.40 ALDOSTERONE DEFICIENCY DUE TO 18-HYDROXYLASE DEFECT: Primary | ICD-10-CM

## 2024-03-05 LAB
ALBUMIN SERPL-MCNC: 3.7 G/DL (ref 3.4–4.8)
BUN SERPL-MCNC: 29.1 MG/DL (ref 9.8–20.1)
CALCIUM SERPL-MCNC: 9.1 MG/DL (ref 8.4–10.2)
CHLORIDE SERPL-SCNC: 112 MMOL/L (ref 98–107)
CO2 SERPL-SCNC: 27 MMOL/L (ref 23–31)
CREAT SERPL-MCNC: 1.82 MG/DL (ref 0.55–1.02)
GFR SERPLBLD CREATININE-BSD FMLA CKD-EPI: 28 MLS/MIN/1.73/M2
GLUCOSE SERPL-MCNC: 106 MG/DL (ref 82–115)
PHOSPHATE SERPL-MCNC: 2.6 MG/DL (ref 2.3–4.7)
POTASSIUM SERPL-SCNC: 4.5 MMOL/L (ref 3.5–5.1)
SODIUM SERPL-SCNC: 144 MMOL/L (ref 136–145)

## 2024-03-05 PROCEDURE — 36415 COLL VENOUS BLD VENIPUNCTURE: CPT

## 2024-03-05 PROCEDURE — 80069 RENAL FUNCTION PANEL: CPT

## 2024-03-13 ENCOUNTER — CLINICAL SUPPORT (OUTPATIENT)
Dept: DIABETES | Facility: CLINIC | Age: 84
End: 2024-03-13
Payer: MEDICARE

## 2024-03-13 VITALS — HEIGHT: 65 IN | BODY MASS INDEX: 27.02 KG/M2 | WEIGHT: 162.19 LBS

## 2024-03-13 DIAGNOSIS — Z79.4 TYPE 2 DIABETES MELLITUS WITH HYPERGLYCEMIA, WITH LONG-TERM CURRENT USE OF INSULIN: Primary | ICD-10-CM

## 2024-03-13 DIAGNOSIS — E11.65 TYPE 2 DIABETES MELLITUS WITH HYPERGLYCEMIA, WITH LONG-TERM CURRENT USE OF INSULIN: Primary | ICD-10-CM

## 2024-03-13 PROCEDURE — G0108 DIAB MANAGE TRN  PER INDIV: HCPCS | Mod: ,,, | Performed by: INTERNAL MEDICINE

## 2024-03-13 NOTE — PROGRESS NOTES
"Diabetes Care Specialist Follow-up Note  Author: Destiney Bonds RN  Date: 3/13/2024    Program Intake  Reason for Diabetes Program Visit:: Intervention  Type of Intervention:: Individual  Individual: Education  Education: Self-Management Skill Review, Nutrition and Meal Planning  Current diabetes risk level:: high  In the last 12 months, have you:: been admitted to a hospital  Was the ER or hospital admission related to diabetes?: No  Permission to speak with others about care:: yes  Continuous Glucose Monitoring  Patient has CGM: No  Personal CGM type:: Doing finger sticks at this time    Lab Results   Component Value Date    HGBA1C 7.7 (H) 01/29/2024     A1c Pre Diabetes Care Specialist Intervention:  8.8%    Clinical    Weight: 73.6 kg (162 lb 3.2 oz)   Height: 5' 5" (165.1 cm)   Body mass index is 26.99 kg/m².  No Wt change since last visit on 12/13/23     Diabetes Self-Management Skills Assessment     Medications  Diabetes management routine:: oral medications, insulin (Tradjenta 5mg daily, Lantus 46 units in am and 20 units in pm, Humalog 5units >150 ac meals)    Home Blood Glucose Monitoring  Personal CGM type:: Doing finger sticks at this time    Acute Complications  Patient is able to identify types of acute complications: Yes  Patient Identified:: Hypoglycemia  Patient is able to state the basic meaning of hypoglycemia?: Yes  Able to state the blood sugar range for hypoglycemia?: no (see comments)  Patient can identify general symptoms of hypoglycemia: yes  Patient identified:: shakiness, dizziness, hunger  Able to state proper treatment of hypoglycemia?: yes  Patient identified:: 5-6 pieces of hard candy  Acute Complications Skills Assessment Completed: : Yes  Assessment indicates:: Adequate understanding  Area of need?: No       During today's follow-up visit,  the following areas required further assessment and content was provided/reviewed.    Based on today's diabetes care assessment, the " following areas of need were identified:          9/13/2023    12:01 AM   Social   Cognitive/Behavioral Health Yes            8/23/2023    12:01 AM   Clinical   Medication Adherence No   Lab Compliance No   Nutritional Status No         3/13/2024    12:01 AM   Diabetes Self-Management Skills   Acute Complications No Discussed prevention, identification signs/symptoms and treatment of hyperglycemia and hypoglycemia and when to contact clinic.         Today's interventions were provided through individual discussion, instruction, and written materials were provided.    Patient verbalized understanding of instruction and written materials.  Pt was able to return back demonstration of instructions today. Patient understood key points, needs reinforcement and further instruction.     Diabetes Self-Management Care Plan Review and Evaluation of Progress:    During today's follow-up Rosalba's Diabetes Self-Management Care Plan progress was reviewed and progress was evaluated including his/her input. Rosalba has agreed to continue his/her journey to improve/maintain overall diabetes control by continuing to set health goals. See care plan progress below.      Care Plan: Diabetes Management   Updates made since 2/12/2024 12:00 AM        Problem: Healthy Eating         Long-Range Goal: Patient agrees to decrease portions of carbohydrates and increase portions of non-starchy vegetables.    Start Date: 8/23/2023   Expected End Date: 9/13/2023   This Visit's Progress: Met   Recent Progress: Met   Priority: High   Barriers: Knowledge deficit   Note:    She cooks occasionally, eats a [place] at times. She does not eat red meat. Loves most non-starchy vegetables and sweets.    9/13/23 She eats pb and jelly and chips for lunch often so discussed trying turkey sandwich with chips or if eating pb and Jelly to have veggies with it.    12/13/23 She is now eating sugar free bread and stopped ramen noodles for supper, now eating salads, and  grilled shrimp with broccoli sometimes fish sandwich from [employer] but instructed to remove top bun or eat half at a time. Discussed food labels and practiced on a few items.   3/13/24 States she has changed her diet and is doing much better with cutting carbs and adding veggies.  On weekends she gets some meals from her sister that have to much rice and glucose goes up.         Problem: Blood Glucose Self-Monitoring         Long-Range Goal: Patient agrees to scan [employer] maria isabel 2 every hour or 2 while awake. Completed 3/13/2024   Start Date: 8/23/2023   Expected End Date: 9/13/2023   This Visit's Progress: Met   Recent Progress: Met   Priority: Medium   Barriers: Knowledge deficit   Note:    8/23/23 She has been scanning only twice a day but will scan more often now to get a better agp to review trends and patterns. Discussed trend arrows and lag time between blood glucose and interstitial fluid glucose.   9/13/23 See agp attached.  She does forget to scan at times. Discussed scanning on commercials when watching tv, before and after meals.    [employer] maria isabel 2 CGM reviewed  Average glucose is 185 mg/dL  Hypoglycemia frequency 0 % with BG <70  Hyperglycemia frequency 56 % with BG >180  Time in range 44 %  Recommendations: Discussed stopping chips with sandwich at lunch time and trying hummus or guacamole with raw veggies.  She love bellpeppers and cucumbers.  Discussed doing some exercise after meals.    Pcp did increase lantus to 37 units qam from 35 units.     12/13/23  [employer] maria isabel 2 CGM reviewed  Average glucose is 161 mg/dL  Hypoglycemia frequency 0 % with BG <70  Hyperglycemia frequency 30 % with BG >180  Time in range 70 %  Recommendations: Continue medication as prescribed, starting cardiac rehab twice a week, continue to balance portion size and carbs with protein.    3/13/24 She was using CGM but the last few weeks started finger tip testing again due to meter not working properly, daughter did call  DME company and [employer] customer support but they were not able to send new unit.  States she is eligible to get new cgm at the end of the year.  Spoke with daughter and confirmed this information. Discussed lag time and difference between blood and interstitial fluid glucose. Daughter states she understands and meter was still not working properly.  Reviewed glucose log, see attached. Low of 90 and states asymptomatic.  High above 200, took Humulog 5 units and knows it is sometimes because of what she eats. Reviewed tips for finger tips testing and keeping logs, which she is doing a good job of.              Problem: Physical Activity and Exercise         Long-Range Goal: Patient agrees to go back to cardiac rehab 2 times per week, starting next week.    Start Date: 3/13/2024   Priority: Low   Barriers: Physical Limitations   Note:    3/13/24 States loves cardiac rehab and is willing to start again.  Discussed activity when at home since only goes to exercise class twice a week. Discussed sitting arm and leg exercises. She does walk with cane for stability. She will walk to mail box at home but can't walk much farther than that.        Task: Discussed role of physical activity on reducing insulin resistance and improvement in overall glycemic control. Completed 3/13/2024        Task: Discussed role of physical activity as it relates to weight loss Completed 3/13/2024        Task: Offered suggestions on how patient could increase their regular physical activity Completed 3/13/2024        Task: Reviewed blood glucose monitoring before, during and after exercise/activity Completed 3/13/2024          Follow Up Plan     Follow up in about 6 months (around 9/13/2024) for New Assessment Episode #2. Appt made with pt. Today for 9/4/24 at 1:30pm.  Plan review new A1c, skills, glucose and insulin log, do new assessment.     Today's care plan and follow up schedule was discussed with patient.  Rosalba verbalized understanding  of the care plan, goals, and agrees to follow up plan.        The patient was encouraged to communicate with his/her health care provider/physician and care team regarding his/her condition(s) and treatment.  I provided the patient with my contact information today and encouraged to contact me via phone or [facility] Patient Portal as needed.     Length of Visit   Total Time: 60 Minutes

## 2024-04-15 DIAGNOSIS — I25.10 CAD (CORONARY ARTERY DISEASE): Primary | ICD-10-CM

## 2024-04-19 ENCOUNTER — HOSPITAL ENCOUNTER (OUTPATIENT)
Dept: CARDIOLOGY | Facility: HOSPITAL | Age: 84
Discharge: HOME | End: 2024-04-19
Attending: THORACIC SURGERY (CARDIOTHORACIC VASCULAR SURGERY)
Payer: MEDICARE

## 2024-04-19 DIAGNOSIS — Z00.00 ROUTINE CHECK-UP: ICD-10-CM

## 2024-04-25 ENCOUNTER — OFFICE VISIT (OUTPATIENT)
Dept: CARDIAC SURGERY | Facility: CLINIC | Age: 84
End: 2024-04-25
Payer: MEDICARE

## 2024-04-25 VITALS
HEART RATE: 74 BPM | RESPIRATION RATE: 22 BRPM | BODY MASS INDEX: 26.66 KG/M2 | WEIGHT: 160 LBS | OXYGEN SATURATION: 97 % | DIASTOLIC BLOOD PRESSURE: 68 MMHG | HEIGHT: 65 IN | SYSTOLIC BLOOD PRESSURE: 112 MMHG

## 2024-04-25 DIAGNOSIS — I25.10 CORONARY ARTERY DISEASE, UNSPECIFIED VESSEL OR LESION TYPE, UNSPECIFIED WHETHER ANGINA PRESENT, UNSPECIFIED WHETHER NATIVE OR TRANSPLANTED HEART: Primary | ICD-10-CM

## 2024-04-25 DIAGNOSIS — I25.10 CORONARY ARTERY DISEASE INVOLVING NATIVE CORONARY ARTERY OF NATIVE HEART WITHOUT ANGINA PECTORIS: Primary | Chronic | ICD-10-CM

## 2024-04-25 DIAGNOSIS — Z79.01 ADMISSION FOR LONG-TERM (CURRENT) USE OF ANTICOAGULANTS: ICD-10-CM

## 2024-04-25 DIAGNOSIS — I25.10 CORONARY ARTERY DISEASE INVOLVING NATIVE CORONARY ARTERY OF NATIVE HEART WITHOUT ANGINA PECTORIS: Primary | ICD-10-CM

## 2024-04-25 DIAGNOSIS — Z51.81 ADMISSION FOR LONG-TERM (CURRENT) USE OF ANTICOAGULANTS: ICD-10-CM

## 2024-04-25 PROCEDURE — 99204 OFFICE O/P NEW MOD 45 MIN: CPT | Mod: ,,, | Performed by: THORACIC SURGERY (CARDIOTHORACIC VASCULAR SURGERY)

## 2024-04-25 PROCEDURE — 3288F FALL RISK ASSESSMENT DOCD: CPT | Mod: CPTII,,, | Performed by: THORACIC SURGERY (CARDIOTHORACIC VASCULAR SURGERY)

## 2024-04-25 PROCEDURE — 3078F DIAST BP <80 MM HG: CPT | Mod: CPTII,,, | Performed by: THORACIC SURGERY (CARDIOTHORACIC VASCULAR SURGERY)

## 2024-04-25 PROCEDURE — 1126F AMNT PAIN NOTED NONE PRSNT: CPT | Mod: CPTII,,, | Performed by: THORACIC SURGERY (CARDIOTHORACIC VASCULAR SURGERY)

## 2024-04-25 PROCEDURE — 1160F RVW MEDS BY RX/DR IN RCRD: CPT | Mod: CPTII,,, | Performed by: THORACIC SURGERY (CARDIOTHORACIC VASCULAR SURGERY)

## 2024-04-25 PROCEDURE — 1159F MED LIST DOCD IN RCRD: CPT | Mod: CPTII,,, | Performed by: THORACIC SURGERY (CARDIOTHORACIC VASCULAR SURGERY)

## 2024-04-25 PROCEDURE — 3074F SYST BP LT 130 MM HG: CPT | Mod: CPTII,,, | Performed by: THORACIC SURGERY (CARDIOTHORACIC VASCULAR SURGERY)

## 2024-04-25 PROCEDURE — 1101F PT FALLS ASSESS-DOCD LE1/YR: CPT | Mod: CPTII,,, | Performed by: THORACIC SURGERY (CARDIOTHORACIC VASCULAR SURGERY)

## 2024-04-25 RX ORDER — MUPIROCIN 20 MG/G
OINTMENT TOPICAL DAILY
Qty: 1 EACH | Refills: 0 | Status: ON HOLD | OUTPATIENT
Start: 2024-04-25 | End: 2024-05-13 | Stop reason: HOSPADM

## 2024-04-25 RX ORDER — HYDROCODONE BITARTRATE AND ACETAMINOPHEN 500; 5 MG/1; MG/1
TABLET ORAL
Status: CANCELLED | OUTPATIENT
Start: 2024-04-25

## 2024-04-25 RX ORDER — MUPIROCIN 20 MG/G
OINTMENT TOPICAL
Status: CANCELLED | OUTPATIENT
Start: 2024-04-25 | End: 2024-04-25

## 2024-04-25 NOTE — PROGRESS NOTES
"[facility]  Cardiothoracic Surgery  Consult Note    Patient Name: Rosalba Whitlock  MRN: [id number]  Date of Service: 4/25/2024  Referring Provider: Emmanuel Cantu MD    Patient information was obtained from patient, past medical records, and primary team    Subjective:     Chief Complaint   Patient presents with    Pre-op Exam     REF'D DR. CANTU. DISCUSS CABG.  [facility] 2/21/24) *IN EPIC  ECHO (11/21/23): *IN EZ VIZ  CAROTID US (11/21/23): ADEBAYO ICA 1-39%  PMH: CAD/STENTS, HTN, CKD, DLD.          History of Present Illness:  Patient is an 83-year-old woman who walks with a cane who has recently been found to have severe proximal LAD InStent restenosis with an IFR of 0.86.  She reports increasing shortness of breath and dyspnea on exertion but denies chest pain.      She also denies fever, chills, nausea, vomiting, headache, syncope, back pain, chest pain, or palpitations.  She does admit to some unsteadiness on her feet which is why she walks with a cane.        Current Outpatient Medications   Medication Sig Dispense Refill    ALCOHOL PREP PADS PadM       aspirin (ECOTRIN) 81 MG EC tablet Take 81 mg by mouth once daily.      clopidogreL (PLAVIX) 75 mg tablet Take 1 tablet (75 mg total) by mouth once daily. 30 tablet 11    COMFORT EZ INSULIN SYRINGE 0.5 mL 31 gauge x 5/16" Syrg       denosumab (PROLIA) 60 mg/mL Syrg Inject 1 mL (60 mg total) into the skin every 6 (six) months. 1 mL 1    fluticasone propionate (FLONASE) 50 mcg/actuation nasal spray 2 sprays (100 mcg total) by Each Nostril route once daily. 16 g 3    gabapentin (NEURONTIN) 100 MG capsule TAKE 2 CAPSULES BY MOUTH TWICE DAILY, EVERY MORNING AND AT NOON 60 capsule 6    gabapentin (NEURONTIN) 300 MG capsule TAKE 1 CAPSULE(300 MG) BY MOUTH EVERY EVENING 30 capsule 2    icosapent ethyL (VASCEPA) 1 gram Cap BID      insulin glargine (LANTUS U-100 INSULIN) 100 unit/mL injection Inject 46 units in the am, and 20 units at night 20 " "mL 6    insulin lispro (HUMALOG KWIKPEN INSULIN) 100 unit/mL pen Humalog 5 units w/ largest meal of the day  (hold if glucose < 150) 3 mL 11    isosorbide mononitrate (IMDUR) 30 MG 24 hr tablet Take 30 mg by mouth once daily.      loratadine (CLARITIN) 10 mg tablet TAKE 1 TABLET BY MOUTH EVERY DAY 90 tablet 3    metoprolol succinate (TOPROL-XL) 50 MG 24 hr tablet TAKE 1 TABLET(50 MG) BY MOUTH EVERY DAY 30 tablet 11    nebulizer accessories Kit Please dispense one nebulizer accessories kit for appropriate nebulizer machine. 1 kit 0    nebulizer and compressor Veronica Please dispense one nebulizer machine with appropriate supplies. 1 each 0    nortriptyline (PAMELOR) 25 MG capsule TAKE 1 CAPSULE BY MOUTH EVERY DAY AT BEDTIME FOR SLEEP 30 capsule 3    omeprazole (PRILOSEC) 40 MG capsule TAKE 1 CAPSULE BY MOUTH DAILY AS NEEDED FOR REFLUX (Patient taking differently: Take 40 mg by mouth every morning.) 90 capsule 3    pen needle, diabetic 32 gauge x 5/32" Ndle 1 each by Misc.(Non-Drug; Combo Route) route 2 (two) times a day. 100 each 12    rosuvastatin (CRESTOR) 40 MG Tab Take 40 mg by mouth every evening.      TRADJENTA 5 mg Tab tablet TAKE 1 TABLET(5 MG) BY MOUTH EVERY DAY 90 tablet 3     No current facility-administered medications for this visit.     Facility-Administered Medications Ordered in Other Visits   Medication Dose Route Frequency Provider Last Rate Last Admin    0.9%  NaCl infusion   Intravenous Continuous Rubia Seymour MD   Stopped at 12/04/23 0713    diazePAM tablet 10 mg  10 mg Oral On Call Procedure Emmanuel Riley MD   10 mg at 02/21/24 1031    diphenhydrAMINE capsule 50 mg  50 mg Oral On Call Procedure Rubia Seymour MD   50 mg at 12/04/23 0700    diphenhydrAMINE capsule 50 mg  50 mg Oral On Call Procedure Emmanuel Riley MD   50 mg at 02/21/24 1031    sodium chloride 0.9% flush 10 mL  10 mL Intravenous PRN Rubia Seymour MD           Review of patient's allergies indicates:   Allergen " Reactions    Amlodipine      Skin crawling        Past Medical History:   Diagnosis Date    Amnesia 06/13/2022    Benign paroxysmal positional vertigo, bilateral 06/13/2022    Bronchitis 06/08/2022    Chronic gouty arthritis 06/13/2022    Coronary artery disease involving native coronary artery of native heart without angina pectoris 12/04/2023    Diabetes mellitus, type 2     Essential hypertension 04/21/2016    Gastroesophageal reflux disease without esophagitis 04/21/2016    GERD (gastroesophageal reflux disease)     Gout, unspecified     Heart attack     Low back pain 06/13/2022    lower back pain bilateral with movement; radiates down right leg to thigh    Low vitamin D level 06/13/2022    Metabolic acidosis 01/12/2023    Microalbuminuria 01/12/2023    Migraine headache 04/21/2016    Osteoporosis 06/13/2022    Peripheral edema 06/13/2022    Physical deconditioning 06/13/2022    Pure hypercholesterolemia 06/13/2022    Spondylosis of lumbar spine 06/13/2022    Stage 3 chronic kidney disease 06/13/2022    Type 2 diabetes mellitus 06/13/2022     Past Surgical History:   Procedure Laterality Date    FOOT SURGERY      LEFT HEART CATHETERIZATION Left 12/4/2023    Procedure: Left heart cath;  Surgeon: Rubia Seymour MD;  Location: [facility] CATH LAB;  Service: Cardiology;  Laterality: Left;  LHC +/- PCI    REPAIR, CHRONIC TOTAL OCCLUSION, CORONARY N/A 2/21/2024    Procedure: Repair, Chronic Total Occlusion, Coronary;  Surgeon: Emmanuel Riley MD;  Location: [facility] CATH LAB;  Service: Cardiology;  Laterality: N/A;   PCI RCA *START WITH PICTURES OF THE LAD AND CONSIDER IFR IF LAD LOOKS WORSE*  8F EBU 3.5 GUIDE 90 CM LM VIA RRA, 9F AL-1 GUIDE SH RCA VIA RT GROIN    STENT, DRUG ELUTING, SINGLE VESSEL, CORONARY  12/4/2023    Procedure: Stent, Drug Eluting, Single Vessel, Coronary;  Surgeon: Rubia Seymour MD;  Location: [facility] CATH LAB;  Service: Cardiology;;     Family History       Problem Relation (Age of Onset)     Diabetes Brother    Kidney disease Father    Lung cancer Mother          Tobacco Use    Smoking status: Former     Types: Cigarettes    Smokeless tobacco: Never    Tobacco comments:     Quit 20 years ago   Substance and Sexual Activity    Alcohol use: Never    Drug use: Never    Sexual activity: Not on file     Review of Systems   Cardiovascular:  Positive for dyspnea on exertion.   Respiratory:  Positive for shortness of breath.      Objective:     Vitals:    04/25/24 0946   BP: 112/68   Pulse: 74   Resp: (!) 22        Weight: 72.6 kg (160 lb)  Body mass index is 26.63 kg/m².     STS Risk Score:  97 %    Physical Exam  Vitals and nursing note reviewed.   Constitutional:       Appearance: Normal appearance.   HENT:      Head: Normocephalic and atraumatic.      Mouth/Throat:      Mouth: Mucous membranes are moist.   Eyes:      Extraocular Movements: Extraocular movements intact.      Conjunctiva/sclera: Conjunctivae normal.      Pupils: Pupils are equal, round, and reactive to light.   Neck:      Vascular: No carotid bruit.   Cardiovascular:      Rate and Rhythm: Normal rate and regular rhythm.      Pulses: Normal pulses.      Heart sounds: Normal heart sounds. No murmur heard.     No friction rub. No gallop.   Pulmonary:      Effort: Pulmonary effort is normal. No respiratory distress.      Breath sounds: Normal breath sounds. No wheezing, rhonchi or rales.   Abdominal:      General: Abdomen is flat. Bowel sounds are normal. There is no distension.      Palpations: Abdomen is soft. There is no mass.      Tenderness: There is no abdominal tenderness.   Musculoskeletal:      Cervical back: Normal range of motion and neck supple.   Lymphadenopathy:      Cervical: No cervical adenopathy.   Skin:     General: Skin is warm and dry.      Capillary Refill: Capillary refill takes less than 2 seconds.      Findings: No erythema or rash.   Neurological:      General: No focal deficit present.      Mental Status: She is alert  and oriented to person, place, and time. Mental status is at baseline.          Significant Labs:  Reviewed    Significant Diagnostics:  Reviewed    Assessment/Plan:     Problem List Items Addressed This Visit          Cardiac/Vascular    Coronary artery disease involving native coronary artery of native heart without angina pectoris - Primary (Chronic)     Would recommend coronary artery bypass grafting due to the critical nature of the flow-limiting disease at the end of the stent in the LAD.  Risks, benefits, and alternatives have been discussed.  Questions have been answered.  The patient voices understanding and agrees to proceed.             Thank you for your consult. I will follow-up with patient. Please contact us if you have any additional questions.    KACI Estrada MD, FACC, FACS  Cardiothoracic Surgery  [facility]

## 2024-04-25 NOTE — H&P (VIEW-ONLY)
"[facility]  Cardiothoracic Surgery  Consult Note    Patient Name: Rosalba Whitlock  MRN: [id number]  Date of Service: 4/25/2024  Referring Provider: Emmanuel Cantu MD    Patient information was obtained from patient, past medical records, and primary team    Subjective:     Chief Complaint   Patient presents with    Pre-op Exam     REF'D DR. CANTU. DISCUSS CABG.  [facility] 2/21/24) *IN EPIC  ECHO (11/21/23): *IN EZ VIZ  CAROTID US (11/21/23): ADEBAYO ICA 1-39%  PMH: CAD/STENTS, HTN, CKD, DLD.          History of Present Illness:  Patient is an 83-year-old woman who walks with a cane who has recently been found to have severe proximal LAD InStent restenosis with an IFR of 0.86.  She reports increasing shortness of breath and dyspnea on exertion but denies chest pain.      She also denies fever, chills, nausea, vomiting, headache, syncope, back pain, chest pain, or palpitations.  She does admit to some unsteadiness on her feet which is why she walks with a cane.        Current Outpatient Medications   Medication Sig Dispense Refill    ALCOHOL PREP PADS PadM       aspirin (ECOTRIN) 81 MG EC tablet Take 81 mg by mouth once daily.      clopidogreL (PLAVIX) 75 mg tablet Take 1 tablet (75 mg total) by mouth once daily. 30 tablet 11    COMFORT EZ INSULIN SYRINGE 0.5 mL 31 gauge x 5/16" Syrg       denosumab (PROLIA) 60 mg/mL Syrg Inject 1 mL (60 mg total) into the skin every 6 (six) months. 1 mL 1    fluticasone propionate (FLONASE) 50 mcg/actuation nasal spray 2 sprays (100 mcg total) by Each Nostril route once daily. 16 g 3    gabapentin (NEURONTIN) 100 MG capsule TAKE 2 CAPSULES BY MOUTH TWICE DAILY, EVERY MORNING AND AT NOON 60 capsule 6    gabapentin (NEURONTIN) 300 MG capsule TAKE 1 CAPSULE(300 MG) BY MOUTH EVERY EVENING 30 capsule 2    icosapent ethyL (VASCEPA) 1 gram Cap BID      insulin glargine (LANTUS U-100 INSULIN) 100 unit/mL injection Inject 46 units in the am, and 20 units at night 20 " "mL 6    insulin lispro (HUMALOG KWIKPEN INSULIN) 100 unit/mL pen Humalog 5 units w/ largest meal of the day  (hold if glucose < 150) 3 mL 11    isosorbide mononitrate (IMDUR) 30 MG 24 hr tablet Take 30 mg by mouth once daily.      loratadine (CLARITIN) 10 mg tablet TAKE 1 TABLET BY MOUTH EVERY DAY 90 tablet 3    metoprolol succinate (TOPROL-XL) 50 MG 24 hr tablet TAKE 1 TABLET(50 MG) BY MOUTH EVERY DAY 30 tablet 11    nebulizer accessories Kit Please dispense one nebulizer accessories kit for appropriate nebulizer machine. 1 kit 0    nebulizer and compressor Veronica Please dispense one nebulizer machine with appropriate supplies. 1 each 0    nortriptyline (PAMELOR) 25 MG capsule TAKE 1 CAPSULE BY MOUTH EVERY DAY AT BEDTIME FOR SLEEP 30 capsule 3    omeprazole (PRILOSEC) 40 MG capsule TAKE 1 CAPSULE BY MOUTH DAILY AS NEEDED FOR REFLUX (Patient taking differently: Take 40 mg by mouth every morning.) 90 capsule 3    pen needle, diabetic 32 gauge x 5/32" Ndle 1 each by Misc.(Non-Drug; Combo Route) route 2 (two) times a day. 100 each 12    rosuvastatin (CRESTOR) 40 MG Tab Take 40 mg by mouth every evening.      TRADJENTA 5 mg Tab tablet TAKE 1 TABLET(5 MG) BY MOUTH EVERY DAY 90 tablet 3     No current facility-administered medications for this visit.     Facility-Administered Medications Ordered in Other Visits   Medication Dose Route Frequency Provider Last Rate Last Admin    0.9%  NaCl infusion   Intravenous Continuous Rubia Seymour MD   Stopped at 12/04/23 0713    diazePAM tablet 10 mg  10 mg Oral On Call Procedure Emmanuel Riley MD   10 mg at 02/21/24 1031    diphenhydrAMINE capsule 50 mg  50 mg Oral On Call Procedure Rubia Seymour MD   50 mg at 12/04/23 0700    diphenhydrAMINE capsule 50 mg  50 mg Oral On Call Procedure Emmanuel Riley MD   50 mg at 02/21/24 1031    sodium chloride 0.9% flush 10 mL  10 mL Intravenous PRN Rubia Seymour MD           Review of patient's allergies indicates:   Allergen " Reactions    Amlodipine      Skin crawling        Past Medical History:   Diagnosis Date    Amnesia 06/13/2022    Benign paroxysmal positional vertigo, bilateral 06/13/2022    Bronchitis 06/08/2022    Chronic gouty arthritis 06/13/2022    Coronary artery disease involving native coronary artery of native heart without angina pectoris 12/04/2023    Diabetes mellitus, type 2     Essential hypertension 04/21/2016    Gastroesophageal reflux disease without esophagitis 04/21/2016    GERD (gastroesophageal reflux disease)     Gout, unspecified     Heart attack     Low back pain 06/13/2022    lower back pain bilateral with movement; radiates down right leg to thigh    Low vitamin D level 06/13/2022    Metabolic acidosis 01/12/2023    Microalbuminuria 01/12/2023    Migraine headache 04/21/2016    Osteoporosis 06/13/2022    Peripheral edema 06/13/2022    Physical deconditioning 06/13/2022    Pure hypercholesterolemia 06/13/2022    Spondylosis of lumbar spine 06/13/2022    Stage 3 chronic kidney disease 06/13/2022    Type 2 diabetes mellitus 06/13/2022     Past Surgical History:   Procedure Laterality Date    FOOT SURGERY      LEFT HEART CATHETERIZATION Left 12/4/2023    Procedure: Left heart cath;  Surgeon: Rubia Seymour MD;  Location: [facility] CATH LAB;  Service: Cardiology;  Laterality: Left;  LHC +/- PCI    REPAIR, CHRONIC TOTAL OCCLUSION, CORONARY N/A 2/21/2024    Procedure: Repair, Chronic Total Occlusion, Coronary;  Surgeon: Emmanuel Rliey MD;  Location: [facility] CATH LAB;  Service: Cardiology;  Laterality: N/A;   PCI RCA *START WITH PICTURES OF THE LAD AND CONSIDER IFR IF LAD LOOKS WORSE*  6F EBU 3.5 GUIDE 90 CM LM VIA RRA, 7F AL-1 GUIDE SH RCA VIA RT GROIN    STENT, DRUG ELUTING, SINGLE VESSEL, CORONARY  12/4/2023    Procedure: Stent, Drug Eluting, Single Vessel, Coronary;  Surgeon: Rubia Seymour MD;  Location: [facility] CATH LAB;  Service: Cardiology;;     Family History       Problem Relation (Age of Onset)     Diabetes Brother    Kidney disease Father    Lung cancer Mother          Tobacco Use    Smoking status: Former     Types: Cigarettes    Smokeless tobacco: Never    Tobacco comments:     Quit 20 years ago   Substance and Sexual Activity    Alcohol use: Never    Drug use: Never    Sexual activity: Not on file     Review of Systems   Cardiovascular:  Positive for dyspnea on exertion.   Respiratory:  Positive for shortness of breath.      Objective:     Vitals:    04/25/24 0946   BP: 112/68   Pulse: 74   Resp: (!) 22        Weight: 72.6 kg (160 lb)  Body mass index is 26.63 kg/m².     STS Risk Score:  97 %    Physical Exam  Vitals and nursing note reviewed.   Constitutional:       Appearance: Normal appearance.   HENT:      Head: Normocephalic and atraumatic.      Mouth/Throat:      Mouth: Mucous membranes are moist.   Eyes:      Extraocular Movements: Extraocular movements intact.      Conjunctiva/sclera: Conjunctivae normal.      Pupils: Pupils are equal, round, and reactive to light.   Neck:      Vascular: No carotid bruit.   Cardiovascular:      Rate and Rhythm: Normal rate and regular rhythm.      Pulses: Normal pulses.      Heart sounds: Normal heart sounds. No murmur heard.     No friction rub. No gallop.   Pulmonary:      Effort: Pulmonary effort is normal. No respiratory distress.      Breath sounds: Normal breath sounds. No wheezing, rhonchi or rales.   Abdominal:      General: Abdomen is flat. Bowel sounds are normal. There is no distension.      Palpations: Abdomen is soft. There is no mass.      Tenderness: There is no abdominal tenderness.   Musculoskeletal:      Cervical back: Normal range of motion and neck supple.   Lymphadenopathy:      Cervical: No cervical adenopathy.   Skin:     General: Skin is warm and dry.      Capillary Refill: Capillary refill takes less than 2 seconds.      Findings: No erythema or rash.   Neurological:      General: No focal deficit present.      Mental Status: She is alert  and oriented to person, place, and time. Mental status is at baseline.          Significant Labs:  Reviewed    Significant Diagnostics:  Reviewed    Assessment/Plan:     Problem List Items Addressed This Visit          Cardiac/Vascular    Coronary artery disease involving native coronary artery of native heart without angina pectoris - Primary (Chronic)     Would recommend coronary artery bypass grafting due to the critical nature of the flow-limiting disease at the end of the stent in the LAD.  Risks, benefits, and alternatives have been discussed.  Questions have been answered.  The patient voices understanding and agrees to proceed.             Thank you for your consult. I will follow-up with patient. Please contact us if you have any additional questions.    KACI Estrada MD, FACC, FACS  Cardiothoracic Surgery  [facility]

## 2024-04-25 NOTE — ASSESSMENT & PLAN NOTE
Would recommend coronary artery bypass grafting due to the critical nature of the flow-limiting disease at the end of the stent in the LAD.  Risks, benefits, and alternatives have been discussed.  Questions have been answered.  The patient voices understanding and agrees to proceed.  Will schedule coronary artery bypass grafting on May 1, 2024.

## 2024-04-26 ENCOUNTER — HOSPITAL ENCOUNTER (OUTPATIENT)
Dept: RADIOLOGY | Facility: HOSPITAL | Age: 84
Discharge: HOME | End: 2024-04-26
Attending: THORACIC SURGERY (CARDIOTHORACIC VASCULAR SURGERY)
Payer: MEDICARE

## 2024-04-26 ENCOUNTER — TELEPHONE (OUTPATIENT)
Dept: CARDIAC SURGERY | Facility: CLINIC | Age: 84
End: 2024-04-26
Payer: MEDICARE

## 2024-04-26 DIAGNOSIS — I25.10 CORONARY ARTERY DISEASE, UNSPECIFIED VESSEL OR LESION TYPE, UNSPECIFIED WHETHER ANGINA PRESENT, UNSPECIFIED WHETHER NATIVE OR TRANSPLANTED HEART: ICD-10-CM

## 2024-04-26 PROCEDURE — 71046 X-RAY EXAM CHEST 2 VIEWS: CPT | Mod: TC

## 2024-04-26 NOTE — TELEPHONE ENCOUNTER
Magda, dhtr of pt called to report that her Mom did not bring her current list of medications with her to her appt. on yesterday. Magda clarified that Ms. Navarrete is now on Brilinta and NOT on Plavix. Informed our PA's and Dr. Estrada. Pt is to hold Brilinta beginning today (4/26/24). Pt and daughter instructed and verbalized understanding. Daughter also clarified that Ms. Navarrete is NOT taking Metformin.

## 2024-04-28 ENCOUNTER — ANESTHESIA EVENT (OUTPATIENT)
Dept: SURGERY | Facility: HOSPITAL | Age: 84
DRG: 263 | End: 2024-04-28
Payer: MEDICARE

## 2024-04-29 ENCOUNTER — TELEPHONE (OUTPATIENT)
Dept: CARDIAC SURGERY | Facility: CLINIC | Age: 84
End: 2024-04-29
Payer: MEDICARE

## 2024-04-29 NOTE — TELEPHONE ENCOUNTER
Called and spoke with pt and her dhtr Magda. As per Dr. Estrada's orders; surgery- CABG has been moved to Friday, May 3rd and for her to continue holding Brilinta. Both pt. And dhtr verbalize understanding wish to proceed with surgery.

## 2024-05-01 ENCOUNTER — ANESTHESIA (OUTPATIENT)
Dept: SURGERY | Facility: HOSPITAL | Age: 84
DRG: 263 | End: 2024-05-01
Payer: MEDICARE

## 2024-05-02 NOTE — PRE-PROCEDURE INSTRUCTIONS
"[facility]: Outpatient Surgery  Preprocedure Check-In Instructions     Your physician's office will be calling you with your arrival time.   We ask patients to arrive about 2 hours before surgery to allow for enough time to review your health history & medications, start your IV, complete any outstanding labwork or tests, and meet your Anesthesiologist.    Expectations: "Because of inconsistent procedure completion times, an unexpected wait may occur. The Physicians would like you to be here to prepare in the event they run ahead of time. We will make you as comfortable as possible and keep you informed. We apologize in advance if this happens."    You will arrive at [facility], [address], [place].  Enter through the [address] entrance next to the Emergency Room, and come to the 6th floor to the Outpatient Surgery Department.     Visitory Policy:  You are allowed 2 adult visitors to be with you in the hospital. All hospital visitors should be in good current health.  No small children.     What to Bring:  Please have your ID, insurance cards, and all home medication bottles with you at check in.  Bring your CPAP machine if one is used at home.     Fasting:  Nothing to eat or drink after midnight the night before your procedure. This includes no ice, gum, hard candies, and/or tobacco products.  Follow your doctor's instructions for taking any medications on the morning of your procedure.  If no instructions for taking medications were given, do not take any medications but bring your medications in their bottles to your procedure check in.     Follow your doctor's preoperative instructions regarding skin prep, bowel prep, bathing, or showering prior to your procedure.  If any special soaps were provided to you, please use according to your doctor's instructions. If no instructions were given from your doctor, take a good bath or shower with antibacterial soap the " night before and the morning of your procedure.  On the morning of procedure, wear loose, comfortable clothing.  No lotions, makeup, perfumes, colognes, deodorant, or jewelry to your procedure.  Removable items (glasses, contact lenses, dentures, retainers, hearing aids) need to be removed for your procedure.  Bring your storage containers for these items if you wear them.     Artificial nails, body jewelry, eyelash extensions, and/or hair extensions with metal clips are not allowed during your surgery.  If you currently wear any of these items, please arrange for them to be removed prior to your arrival to the hospital.     Outpatient or Same Day Surgeries:  Any patients receiving sedation/anesthesia are advised not to drive for 24 hours after their procedure.  We do not allow patients to drive themselves home after discharge.  If you are going home after your procedure, please have someone available to drive you home from the hospital.        You may call the Outpatient Surgery Department at [phone] with any questions or concerns.  We are looking forward to meeting you and taking great care of you for your procedure.  Thank you for choosing [facility] for your surgical needs.

## 2024-05-02 NOTE — ANESTHESIA PREPROCEDURE EVALUATION
05/02/2024  Rosalba Whitlock is a 83 y.o., female.   PMH DM II (diagnosed 10/2018), GERD, CKD, HTN, HLD, neuropathy, gout, hypercalcemia. Last seen in IM clinic on 2/5/24 for routine f/u. A1c 7.7 at that time. Insulin titrated in the interim.  A1c 7.9 on 4/26. LHC on 2/21 notable for normal left main. Lad-patent small mid stent, 60-70% stenosis distal to stent edge. LCX- codominant, Mid 50%. RCA-codominant Mid occlusion in fractured stent, left to right collaterals. Recommended CTS consult to evaluate for two-vessel bypass to distal LAD, as patient does not have good stenting options for apical LAD and RCA. Scheduled for CABG   Past Medical History: Colon polyps s/p polypectomy, GERD and esophageal stricture s/p dilatation, DM II (diagnosed 10/2018), CKD IIIa, Hypercalcemia, HTN, HLD, Neuropathy s/p BL LE nerve clipping procedure in 01/2018, Chronic back pain (epidural injection in 09/2018), Gout, Right shoulder tendonitis and rotator cuff tear, Diverticulosis, Chronic hematuria, Shingles, NSTEMI, CAD    Pre-op Assessment    I have reviewed the Patient Summary Reports.     I have reviewed the Nursing Notes. I have reviewed the NPO Status.   I have reviewed the Medications.     Review of Systems  Anesthesia Hx:  No problems with previous Anesthesia                Social:  Non-Smoker       Cardiovascular:     Hypertension  Past MI CAD                     Shortness of Breath    Coronary Artery Disease:          Hx of Myocardial Infarction                  Hypertension         Pulmonary:      Shortness of breath                  Renal/:  Chronic Renal Disease        Kidney Function/Disease             Hepatic/GI:     GERD      Gerd          Musculoskeletal:  Arthritis        Arthritis          Neurological:      Headaches      Dx of Headaches   Arthritis                           Endocrine:  Diabetes     Diabetes                          Physical Exam  General: Well nourished, Cooperative, Alert and Oriented    Airway:  Mallampati: II   Mouth Opening: Normal  TM Distance: Normal  Tongue: Normal  Neck ROM: Normal ROM    Dental:  Intact        Anesthesia Plan  Type of Anesthesia, risks & benefits discussed:    Anesthesia Type: Gen ETT  Intra-op Monitoring Plan: Standard ASA Monitors, Art Line, Central Line, DARION and CO  Post Op Pain Control Plan: multimodal analgesia and IV/PO Opioids PRN  Induction:  IV  Airway Plan: Direct, Post-Induction  Informed Consent: Informed consent signed with the Patient and all parties understand the risks and agree with anesthesia plan.  All questions answered. Patient consented to blood products? Yes  ASA Score: 3    Ready For Surgery From Anesthesia Perspective.     .

## 2024-05-03 ENCOUNTER — HOSPITAL ENCOUNTER (INPATIENT)
Facility: HOSPITAL | Age: 84
LOS: 10 days | Discharge: NURSING, REHAB OR LONG-TERM CARE | DRG: 263 | End: 2024-05-13
Attending: THORACIC SURGERY (CARDIOTHORACIC VASCULAR SURGERY) | Admitting: THORACIC SURGERY (CARDIOTHORACIC VASCULAR SURGERY)
Payer: MEDICARE

## 2024-05-03 DIAGNOSIS — I25.10 CAD (CORONARY ARTERY DISEASE): ICD-10-CM

## 2024-05-03 DIAGNOSIS — I25.10 CORONARY ARTERY DISEASE, UNSPECIFIED VESSEL OR LESION TYPE, UNSPECIFIED WHETHER ANGINA PRESENT, UNSPECIFIED WHETHER NATIVE OR TRANSPLANTED HEART: ICD-10-CM

## 2024-05-03 DIAGNOSIS — Z95.1 HX OF CABG: ICD-10-CM

## 2024-05-03 DIAGNOSIS — I25.10 CORONARY ARTERY DISEASE INVOLVING NATIVE CORONARY ARTERY OF NATIVE HEART WITHOUT ANGINA PECTORIS: ICD-10-CM

## 2024-05-03 LAB
ABO + RH BLD: NORMAL
ABO + RH BLD: NORMAL
ALBUMIN SERPL-MCNC: 3 G/DL (ref 3.4–4.8)
ALBUMIN/GLOB SERPL: 1.8 RATIO (ref 1.1–2)
ALLENS TEST BLOOD GAS (OHS): ABNORMAL
ALP SERPL-CCNC: 32 UNIT/L (ref 40–150)
ALT SERPL-CCNC: 8 UNIT/L (ref 0–55)
ANION GAP SERPL CALC-SCNC: 12 MEQ/L
ANION GAP SERPL CALC-SCNC: 7 MEQ/L
APTT PPP: 30.2 SECONDS (ref 23.2–33.7)
AST SERPL-CCNC: 24 UNIT/L (ref 5–34)
BASE EXCESS BLD CALC-SCNC: -2.9 MMOL/L (ref -2–2)
BASE EXCESS BLD CALC-SCNC: -3.3 MMOL/L (ref -2–2)
BASE EXCESS BLD CALC-SCNC: -3.6 MMOL/L (ref -2–2)
BASOPHILS # BLD AUTO: 0.02 X10(3)/MCL
BASOPHILS NFR BLD AUTO: 0.2 %
BILIRUB SERPL-MCNC: 0.6 MG/DL
BLD PROD TYP BPU: NORMAL
BLD PROD TYP BPU: NORMAL
BLOOD GAS SAMPLE TYPE (OHS): ABNORMAL
BLOOD UNIT EXPIRATION DATE: NORMAL
BLOOD UNIT EXPIRATION DATE: NORMAL
BLOOD UNIT TYPE CODE: 5100
BLOOD UNIT TYPE CODE: 5100
BUN SERPL-MCNC: 24.3 MG/DL (ref 9.8–20.1)
BUN SERPL-MCNC: 25.8 MG/DL (ref 9.8–20.1)
BUN SERPL-MCNC: 26.3 MG/DL (ref 9.8–20.1)
CA-I BLD-SCNC: 1.01 MMOL/L (ref 1.12–1.23)
CA-I BLD-SCNC: 1.08 MMOL/L (ref 1.12–1.23)
CA-I BLD-SCNC: 1.22 MMOL/L (ref 1.12–1.23)
CALCIUM SERPL-MCNC: 8 MG/DL (ref 8.4–10.2)
CALCIUM SERPL-MCNC: 9 MG/DL (ref 8.4–10.2)
CALCIUM SERPL-MCNC: 9.5 MG/DL (ref 8.4–10.2)
CHLORIDE SERPL-SCNC: 115 MMOL/L (ref 98–107)
CHLORIDE SERPL-SCNC: 117 MMOL/L (ref 98–107)
CHLORIDE SERPL-SCNC: 120 MMOL/L (ref 98–107)
CO2 BLDA-SCNC: 21.3 MMOL/L
CO2 BLDA-SCNC: 25.1 MMOL/L
CO2 BLDA-SCNC: 25.5 MMOL/L
CO2 SERPL-SCNC: 20 MMOL/L (ref 23–31)
CO2 SERPL-SCNC: 20 MMOL/L (ref 23–31)
CO2 SERPL-SCNC: 21 MMOL/L (ref 23–31)
COHGB MFR BLDA: 1 % (ref 0.5–1.5)
COHGB MFR BLDA: 1.2 % (ref 0.5–1.5)
COHGB MFR BLDA: 2.4 % (ref 0.5–1.5)
CREAT SERPL-MCNC: 1.65 MG/DL (ref 0.55–1.02)
CREAT SERPL-MCNC: 1.71 MG/DL (ref 0.55–1.02)
CREAT SERPL-MCNC: 1.77 MG/DL (ref 0.55–1.02)
CREAT/UREA NIT SERPL: 14
CREAT/UREA NIT SERPL: 16
CROSSMATCH INTERPRETATION: NORMAL
CROSSMATCH INTERPRETATION: NORMAL
DISPENSE STATUS: NORMAL
DISPENSE STATUS: NORMAL
DRAWN BY BLOOD GAS (OHS): ABNORMAL
EOSINOPHIL # BLD AUTO: 0.19 X10(3)/MCL (ref 0–0.9)
EOSINOPHIL NFR BLD AUTO: 2.3 %
ERYTHROCYTE [DISTWIDTH] IN BLOOD BY AUTOMATED COUNT: 14 % (ref 11.5–17)
ERYTHROCYTE [DISTWIDTH] IN BLOOD BY AUTOMATED COUNT: 14.1 % (ref 11.5–17)
FIO2: 100
FIO2: 100
FIO2: 70
FIO2: 70
GFR SERPLBLD CREATININE-BSD FMLA CKD-EPI: 29 MLS/MIN/1.73/M2
GFR SERPLBLD CREATININE-BSD FMLA CKD-EPI: 30 MLS/MIN/1.73/M2
GFR SERPLBLD CREATININE-BSD FMLA CKD-EPI: 31 MLS/MIN/1.73/M2
GLOBULIN SER-MCNC: 1.7 GM/DL (ref 2.4–3.5)
GLUCOSE SERPL-MCNC: 149 MG/DL (ref 70–110)
GLUCOSE SERPL-MCNC: 151 MG/DL (ref 82–115)
GLUCOSE SERPL-MCNC: 168 MG/DL (ref 82–115)
GLUCOSE SERPL-MCNC: 180 MG/DL (ref 70–110)
GLUCOSE SERPL-MCNC: 182 MG/DL (ref 82–115)
GLUCOSE SERPL-MCNC: 195 MG/DL (ref 70–110)
GLUCOSE SERPL-MCNC: 216 MG/DL (ref 70–110)
GLUCOSE SERPL-MCNC: 227 MG/DL (ref 70–110)
GLUCOSE SERPL-MCNC: 259 MG/DL (ref 70–110)
HCO3 BLDA-SCNC: 20.3 MMOL/L (ref 22–26)
HCO3 BLDA-SCNC: 23.6 MMOL/L (ref 22–26)
HCO3 BLDA-SCNC: 24 MMOL/L (ref 22–26)
HCO3 UR-SCNC: 19.5 MMOL/L (ref 24–28)
HCO3 UR-SCNC: 19.8 MMOL/L (ref 24–28)
HCO3 UR-SCNC: 21.5 MMOL/L (ref 24–28)
HCO3 UR-SCNC: 22.1 MMOL/L (ref 24–28)
HCO3 UR-SCNC: 24 MMOL/L (ref 24–28)
HCO3 UR-SCNC: 24.9 MMOL/L (ref 24–28)
HCT VFR BLD AUTO: 29.9 % (ref 37–47)
HCT VFR BLD AUTO: 30.7 % (ref 37–47)
HCT VFR BLD AUTO: 34.5 % (ref 37–47)
HCT VFR BLD CALC: 19 %PCV (ref 36–54)
HCT VFR BLD CALC: 23 %PCV (ref 36–54)
HCT VFR BLD CALC: 23 %PCV (ref 36–54)
HCT VFR BLD CALC: 25 %PCV (ref 36–54)
HCT VFR BLD CALC: 25 %PCV (ref 36–54)
HCT VFR BLD CALC: 30 %PCV (ref 36–54)
HGB BLD-MCNC: 10 G/DL
HGB BLD-MCNC: 10 G/DL (ref 12–16)
HGB BLD-MCNC: 10.3 G/DL (ref 12–16)
HGB BLD-MCNC: 11.4 G/DL (ref 12–16)
HGB BLD-MCNC: 7 G/DL
HGB BLD-MCNC: 8 G/DL
HGB BLD-MCNC: 8 G/DL
HGB BLD-MCNC: 9 G/DL
HGB BLD-MCNC: 9 G/DL
IMM GRANULOCYTES # BLD AUTO: 0.04 X10(3)/MCL (ref 0–0.04)
IMM GRANULOCYTES NFR BLD AUTO: 0.5 %
INHALED O2 CONCENTRATION: 30 %
INHALED O2 CONCENTRATION: 30 %
INHALED O2 CONCENTRATION: 50 %
INR PPP: 1.3
INR PPP: 1.3
LYMPHOCYTES # BLD AUTO: 1.82 X10(3)/MCL (ref 0.6–4.6)
LYMPHOCYTES NFR BLD AUTO: 22.4 %
MAGNESIUM SERPL-MCNC: 2.7 MG/DL (ref 1.6–2.6)
MCH RBC QN AUTO: 29 PG (ref 27–31)
MCH RBC QN AUTO: 29.2 PG (ref 27–31)
MCHC RBC AUTO-ENTMCNC: 33.4 G/DL (ref 33–36)
MCHC RBC AUTO-ENTMCNC: 33.6 G/DL (ref 33–36)
MCV RBC AUTO: 86.5 FL (ref 80–94)
MCV RBC AUTO: 87.4 FL (ref 80–94)
MECH RR (OHS): 20 B/MIN
METHGB MFR BLDA: 0.2 % (ref 0.4–1.5)
METHGB MFR BLDA: 1 % (ref 0.4–1.5)
METHGB MFR BLDA: 1.1 % (ref 0.4–1.5)
MODE (OHS): ABNORMAL
MONOCYTES # BLD AUTO: 0.12 X10(3)/MCL (ref 0.1–1.3)
MONOCYTES NFR BLD AUTO: 1.5 %
NEUTROPHILS # BLD AUTO: 5.93 X10(3)/MCL (ref 2.1–9.2)
NEUTROPHILS NFR BLD AUTO: 73.1 %
NRBC BLD AUTO-RTO: 0 %
NRBC BLD AUTO-RTO: 0 %
O2 HB BLOOD GAS (OHS): 94.4 % (ref 94–97)
O2 HB BLOOD GAS (OHS): 96.3 % (ref 94–97)
O2 HB BLOOD GAS (OHS): 96.9 % (ref 94–97)
OXYGEN DEVICE BLOOD GAS (OHS): ABNORMAL
OXYGEN DEVICE BLOOD GAS (OHS): ABNORMAL
OXYHGB MFR BLDA: 11 G/DL (ref 12–16)
OXYHGB MFR BLDA: 12.1 G/DL (ref 12–16)
OXYHGB MFR BLDA: 12.9 G/DL (ref 12–16)
PCO2 BLDA: 32 MMHG (ref 35–45)
PCO2 BLDA: 33.8 MMHG (ref 35–45)
PCO2 BLDA: 35.5 MMHG (ref 35–45)
PCO2 BLDA: 37.8 MMHG (ref 35–45)
PCO2 BLDA: 38.1 MMHG (ref 35–45)
PCO2 BLDA: 45.4 MMHG (ref 35–45)
PCO2 BLDA: 46.1 MMHG (ref 35–45)
PCO2 BLDA: 49 MMHG (ref 35–45)
PCO2 BLDA: 50 MMHG (ref 35–45)
PEEP RESPIRATORY: 5 CMH2O
PH BLDA: 7.29 [PH] (ref 7.35–7.45)
PH BLDA: 7.29 [PH] (ref 7.35–7.45)
PH BLDA: 7.41 [PH] (ref 7.35–7.45)
PH SMN: 7.29 [PH] (ref 7.35–7.45)
PH SMN: 7.32 [PH] (ref 7.35–7.45)
PH SMN: 7.34 [PH] (ref 7.35–7.45)
PH SMN: 7.36 [PH] (ref 7.35–7.45)
PH SMN: 7.37 [PH] (ref 7.35–7.45)
PH SMN: 7.44 [PH] (ref 7.35–7.45)
PHOSPHATE SERPL-MCNC: 2 MG/DL (ref 2.3–4.7)
PLATELET # BLD AUTO: 120 X10(3)/MCL (ref 130–400)
PLATELET # BLD AUTO: 131 X10(3)/MCL (ref 130–400)
PMV BLD AUTO: 9.6 FL (ref 7.4–10.4)
PMV BLD AUTO: 9.8 FL (ref 7.4–10.4)
PO2 BLDA: 160 MMHG (ref 80–100)
PO2 BLDA: 258 MMHG (ref 80–100)
PO2 BLDA: 292 MMHG (ref 80–100)
PO2 BLDA: 329 MMHG (ref 80–100)
PO2 BLDA: 353 MMHG (ref 80–100)
PO2 BLDA: 45 MMHG (ref 80–100)
PO2 BLDA: 47 MMHG (ref 40–60)
PO2 BLDA: 86 MMHG (ref 80–100)
PO2 BLDA: 89 MMHG (ref 80–100)
POC BE: -1 MMOL/L
POC BE: -4 MMOL/L
POC BE: -4 MMOL/L
POC BE: -6 MMOL/L
POC BE: -6 MMOL/L
POC BE: 0 MMOL/L
POC IONIZED CALCIUM: 0.92 MMOL/L (ref 1.06–1.42)
POC IONIZED CALCIUM: 0.96 MMOL/L (ref 1.06–1.42)
POC IONIZED CALCIUM: 1.11 MMOL/L (ref 1.06–1.42)
POC IONIZED CALCIUM: 1.15 MMOL/L (ref 1.06–1.42)
POC IONIZED CALCIUM: 1.34 MMOL/L (ref 1.06–1.42)
POC IONIZED CALCIUM: 1.5 MMOL/L (ref 1.06–1.42)
POC SATURATED O2: 100 % (ref 95–100)
POC SATURATED O2: 78 % (ref 95–100)
POC SATURATED O2: 78 % (ref 95–100)
POC TCO2: 21 MMOL/L (ref 23–27)
POC TCO2: 21 MMOL/L (ref 23–27)
POC TCO2: 23 MMOL/L (ref 23–27)
POC TCO2: 23 MMOL/L (ref 24–29)
POC TCO2: 25 MMOL/L (ref 23–27)
POC TCO2: 26 MMOL/L (ref 23–27)
POCT GLUCOSE: 131 MG/DL (ref 70–110)
POCT GLUCOSE: 147 MG/DL (ref 70–110)
POCT GLUCOSE: 157 MG/DL (ref 70–110)
POCT GLUCOSE: 160 MG/DL (ref 70–110)
POCT GLUCOSE: 189 MG/DL (ref 70–110)
POCT GLUCOSE: 197 MG/DL (ref 70–110)
POCT GLUCOSE: 203 MG/DL (ref 70–110)
POCT GLUCOSE: 205 MG/DL (ref 70–110)
POCT GLUCOSE: 211 MG/DL (ref 70–110)
POCT GLUCOSE: 219 MG/DL (ref 70–110)
POCT GLUCOSE: 223 MG/DL (ref 70–110)
POTASSIUM BLD-SCNC: 3.7 MMOL/L (ref 3.5–5.1)
POTASSIUM BLD-SCNC: 4 MMOL/L (ref 3.5–5.1)
POTASSIUM BLD-SCNC: 4.1 MMOL/L (ref 3.5–5.1)
POTASSIUM BLD-SCNC: 4.2 MMOL/L (ref 3.5–5.1)
POTASSIUM BLD-SCNC: 4.6 MMOL/L (ref 3.5–5.1)
POTASSIUM BLD-SCNC: 5.2 MMOL/L (ref 3.5–5.1)
POTASSIUM BLOOD GAS (OHS): 3.2 MMOL/L (ref 3.5–5)
POTASSIUM BLOOD GAS (OHS): 3.9 MMOL/L (ref 3.5–5)
POTASSIUM BLOOD GAS (OHS): 4.2 MMOL/L (ref 3.5–5)
POTASSIUM SERPL-SCNC: 3.4 MMOL/L (ref 3.5–5.1)
POTASSIUM SERPL-SCNC: 3.8 MMOL/L (ref 3.5–5.1)
POTASSIUM SERPL-SCNC: 4 MMOL/L (ref 3.5–5.1)
PROT SERPL-MCNC: 4.7 GM/DL (ref 5.8–7.6)
PROTHROMBIN TIME: 15.8 SECONDS (ref 12.5–14.5)
PROTHROMBIN TIME: 16.4 SECONDS (ref 12.5–14.5)
PS (OHS): 10 CMH2O
RBC # BLD AUTO: 3.42 X10(6)/MCL (ref 4.2–5.4)
RBC # BLD AUTO: 3.55 X10(6)/MCL (ref 4.2–5.4)
SAMPLE SITE BLOOD GAS (OHS): ABNORMAL
SAMPLE: ABNORMAL
SAO2 % BLDA: 95.3 %
SAO2 % BLDA: 95.7 %
SAO2 % BLDA: 99.4 %
SODIUM BLD-SCNC: 142 MMOL/L (ref 136–145)
SODIUM BLD-SCNC: 143 MMOL/L (ref 136–145)
SODIUM BLD-SCNC: 144 MMOL/L (ref 136–145)
SODIUM BLD-SCNC: 144 MMOL/L (ref 136–145)
SODIUM BLD-SCNC: 145 MMOL/L (ref 136–145)
SODIUM BLD-SCNC: 145 MMOL/L (ref 136–145)
SODIUM BLOOD GAS (OHS): 138 MMOL/L (ref 137–145)
SODIUM BLOOD GAS (OHS): 139 MMOL/L (ref 137–145)
SODIUM BLOOD GAS (OHS): 140 MMOL/L (ref 137–145)
SODIUM SERPL-SCNC: 145 MMOL/L (ref 136–145)
SODIUM SERPL-SCNC: 147 MMOL/L (ref 136–145)
SODIUM SERPL-SCNC: 147 MMOL/L (ref 136–145)
SPONT+MECH VT ON VENT: 500 ML
UNIT NUMBER: NORMAL
UNIT NUMBER: NORMAL
WBC # SPEC AUTO: 10.19 X10(3)/MCL (ref 4.5–11.5)
WBC # SPEC AUTO: 8.12 X10(3)/MCL (ref 4.5–11.5)

## 2024-05-03 PROCEDURE — 82803 BLOOD GASES ANY COMBINATION: CPT

## 2024-05-03 PROCEDURE — 86923 COMPATIBILITY TEST ELECTRIC: CPT | Performed by: THORACIC SURGERY (CARDIOTHORACIC VASCULAR SURGERY)

## 2024-05-03 PROCEDURE — 63600175 PHARM REV CODE 636 W HCPCS: Performed by: STUDENT IN AN ORGANIZED HEALTH CARE EDUCATION/TRAINING PROGRAM

## 2024-05-03 PROCEDURE — 5A1221Z PERFORMANCE OF CARDIAC OUTPUT, CONTINUOUS: ICD-10-PCS | Performed by: THORACIC SURGERY (CARDIOTHORACIC VASCULAR SURGERY)

## 2024-05-03 PROCEDURE — 36620 INSERTION CATHETER ARTERY: CPT | Mod: 59,,, | Performed by: ANESTHESIOLOGY

## 2024-05-03 PROCEDURE — 25000003 PHARM REV CODE 250: Performed by: PHYSICIAN ASSISTANT

## 2024-05-03 PROCEDURE — 27100171 HC OXYGEN HIGH FLOW UP TO 24 HOURS

## 2024-05-03 PROCEDURE — 36000713 HC OR TIME LEV V EA ADD 15 MIN: Performed by: THORACIC SURGERY (CARDIOTHORACIC VASCULAR SURGERY)

## 2024-05-03 PROCEDURE — P9012 CRYOPRECIPITATE EACH UNIT: HCPCS | Performed by: THORACIC SURGERY (CARDIOTHORACIC VASCULAR SURGERY)

## 2024-05-03 PROCEDURE — C1713 ANCHOR/SCREW BN/BN,TIS/BN: HCPCS | Performed by: THORACIC SURGERY (CARDIOTHORACIC VASCULAR SURGERY)

## 2024-05-03 PROCEDURE — 25000003 PHARM REV CODE 250: Performed by: THORACIC SURGERY (CARDIOTHORACIC VASCULAR SURGERY)

## 2024-05-03 PROCEDURE — C1768 GRAFT, VASCULAR: HCPCS | Performed by: THORACIC SURGERY (CARDIOTHORACIC VASCULAR SURGERY)

## 2024-05-03 PROCEDURE — 36556 INSERT NON-TUNNEL CV CATH: CPT | Mod: 59,,, | Performed by: ANESTHESIOLOGY

## 2024-05-03 PROCEDURE — 0PQ00ZZ REPAIR STERNUM, OPEN APPROACH: ICD-10-PCS | Performed by: THORACIC SURGERY (CARDIOTHORACIC VASCULAR SURGERY)

## 2024-05-03 PROCEDURE — 83735 ASSAY OF MAGNESIUM: CPT | Performed by: PHYSICIAN ASSISTANT

## 2024-05-03 PROCEDURE — 93010 ELECTROCARDIOGRAM REPORT: CPT | Mod: ,,, | Performed by: INTERNAL MEDICINE

## 2024-05-03 PROCEDURE — P9035 PLATELET PHERES LEUKOREDUCED: HCPCS | Performed by: THORACIC SURGERY (CARDIOTHORACIC VASCULAR SURGERY)

## 2024-05-03 PROCEDURE — 25000003 PHARM REV CODE 250

## 2024-05-03 PROCEDURE — P9047 ALBUMIN (HUMAN), 25%, 50ML: HCPCS | Mod: JG

## 2024-05-03 PROCEDURE — 82962 GLUCOSE BLOOD TEST: CPT | Performed by: THORACIC SURGERY (CARDIOTHORACIC VASCULAR SURGERY)

## 2024-05-03 PROCEDURE — 25000003 PHARM REV CODE 250: Performed by: STUDENT IN AN ORGANIZED HEALTH CARE EDUCATION/TRAINING PROGRAM

## 2024-05-03 PROCEDURE — S5010 5% DEXTROSE AND 0.45% SALINE: HCPCS | Performed by: PHYSICIAN ASSISTANT

## 2024-05-03 PROCEDURE — 63600175 PHARM REV CODE 636 W HCPCS

## 2024-05-03 PROCEDURE — C9248 INJ, CLEVIDIPINE BUTYRATE: HCPCS | Performed by: PHYSICIAN ASSISTANT

## 2024-05-03 PROCEDURE — 94002 VENT MGMT INPAT INIT DAY: CPT

## 2024-05-03 PROCEDURE — 30233N1 TRANSFUSION OF NONAUTOLOGOUS RED BLOOD CELLS INTO PERIPHERAL VEIN, PERCUTANEOUS APPROACH: ICD-10-PCS | Performed by: THORACIC SURGERY (CARDIOTHORACIC VASCULAR SURGERY)

## 2024-05-03 PROCEDURE — 27201423 OPTIME MED/SURG SUP & DEVICES STERILE SUPPLY: Performed by: THORACIC SURGERY (CARDIOTHORACIC VASCULAR SURGERY)

## 2024-05-03 PROCEDURE — 37799 UNLISTED PX VASCULAR SURGERY: CPT

## 2024-05-03 PROCEDURE — 32100 EXPLORATION OF CHEST: CPT | Mod: ,,, | Performed by: THORACIC SURGERY (CARDIOTHORACIC VASCULAR SURGERY)

## 2024-05-03 PROCEDURE — 27200966 HC CLOSED SUCTION SYSTEM

## 2024-05-03 PROCEDURE — D9220A PRA ANESTHESIA: Mod: CRNA,,, | Performed by: STUDENT IN AN ORGANIZED HEALTH CARE EDUCATION/TRAINING PROGRAM

## 2024-05-03 PROCEDURE — 85610 PROTHROMBIN TIME: CPT | Performed by: THORACIC SURGERY (CARDIOTHORACIC VASCULAR SURGERY)

## 2024-05-03 PROCEDURE — 02JY0ZZ INSPECTION OF GREAT VESSEL, OPEN APPROACH: ICD-10-PCS | Performed by: THORACIC SURGERY (CARDIOTHORACIC VASCULAR SURGERY)

## 2024-05-03 PROCEDURE — C1894 INTRO/SHEATH, NON-LASER: HCPCS | Performed by: THORACIC SURGERY (CARDIOTHORACIC VASCULAR SURGERY)

## 2024-05-03 PROCEDURE — 85027 COMPLETE CBC AUTOMATED: CPT | Performed by: PHYSICIAN ASSISTANT

## 2024-05-03 PROCEDURE — 37000008 HC ANESTHESIA 1ST 15 MINUTES: Performed by: THORACIC SURGERY (CARDIOTHORACIC VASCULAR SURGERY)

## 2024-05-03 PROCEDURE — 63600175 PHARM REV CODE 636 W HCPCS: Mod: JZ,JG | Performed by: PHYSICIAN ASSISTANT

## 2024-05-03 PROCEDURE — 85610 PROTHROMBIN TIME: CPT | Performed by: PHYSICIAN ASSISTANT

## 2024-05-03 PROCEDURE — 85025 COMPLETE CBC W/AUTO DIFF WBC: CPT | Performed by: THORACIC SURGERY (CARDIOTHORACIC VASCULAR SURGERY)

## 2024-05-03 PROCEDURE — 30233R1 TRANSFUSION OF NONAUTOLOGOUS PLATELETS INTO PERIPHERAL VEIN, PERCUTANEOUS APPROACH: ICD-10-PCS | Performed by: THORACIC SURGERY (CARDIOTHORACIC VASCULAR SURGERY)

## 2024-05-03 PROCEDURE — 63600175 PHARM REV CODE 636 W HCPCS: Performed by: INTERNAL MEDICINE

## 2024-05-03 PROCEDURE — 32100 EXPLORATION OF CHEST: CPT | Mod: AS,,, | Performed by: PHYSICIAN ASSISTANT

## 2024-05-03 PROCEDURE — P9045 ALBUMIN (HUMAN), 5%, 250 ML: HCPCS | Mod: JZ,JG | Performed by: PHYSICIAN ASSISTANT

## 2024-05-03 PROCEDURE — 93005 ELECTROCARDIOGRAM TRACING: CPT

## 2024-05-03 PROCEDURE — 20000000 HC ICU ROOM

## 2024-05-03 PROCEDURE — D9220A PRA ANESTHESIA: Mod: ANES,,, | Performed by: ANESTHESIOLOGY

## 2024-05-03 PROCEDURE — 36000712 HC OR TIME LEV V 1ST 15 MIN: Performed by: THORACIC SURGERY (CARDIOTHORACIC VASCULAR SURGERY)

## 2024-05-03 PROCEDURE — 0WJC0ZZ INSPECTION OF MEDIASTINUM, OPEN APPROACH: ICD-10-PCS | Performed by: THORACIC SURGERY (CARDIOTHORACIC VASCULAR SURGERY)

## 2024-05-03 PROCEDURE — 99900035 HC TECH TIME PER 15 MIN (STAT)

## 2024-05-03 PROCEDURE — 94760 N-INVAS EAR/PLS OXIMETRY 1: CPT | Mod: XB

## 2024-05-03 PROCEDURE — 84100 ASSAY OF PHOSPHORUS: CPT | Performed by: PHYSICIAN ASSISTANT

## 2024-05-03 PROCEDURE — 99900031 HC PATIENT EDUCATION (STAT)

## 2024-05-03 PROCEDURE — C1729 CATH, DRAINAGE: HCPCS | Performed by: THORACIC SURGERY (CARDIOTHORACIC VASCULAR SURGERY)

## 2024-05-03 PROCEDURE — 85018 HEMOGLOBIN: CPT | Performed by: INTERNAL MEDICINE

## 2024-05-03 PROCEDURE — 85730 THROMBOPLASTIN TIME PARTIAL: CPT | Performed by: THORACIC SURGERY (CARDIOTHORACIC VASCULAR SURGERY)

## 2024-05-03 PROCEDURE — 80048 BASIC METABOLIC PNL TOTAL CA: CPT | Performed by: THORACIC SURGERY (CARDIOTHORACIC VASCULAR SURGERY)

## 2024-05-03 PROCEDURE — 63600175 PHARM REV CODE 636 W HCPCS: Performed by: THORACIC SURGERY (CARDIOTHORACIC VASCULAR SURGERY)

## 2024-05-03 PROCEDURE — 80053 COMPREHEN METABOLIC PANEL: CPT | Performed by: PHYSICIAN ASSISTANT

## 2024-05-03 PROCEDURE — 36415 COLL VENOUS BLD VENIPUNCTURE: CPT | Performed by: PHYSICIAN ASSISTANT

## 2024-05-03 PROCEDURE — 37000009 HC ANESTHESIA EA ADD 15 MINS: Performed by: THORACIC SURGERY (CARDIOTHORACIC VASCULAR SURGERY)

## 2024-05-03 PROCEDURE — 27000513 HC SENSOR FLOTRAC

## 2024-05-03 PROCEDURE — 27200667 HC PACEMAKER, TEMPORARY MONITORING, PER SHIFT

## 2024-05-03 PROCEDURE — A4216 STERILE WATER/SALINE, 10 ML: HCPCS | Performed by: STUDENT IN AN ORGANIZED HEALTH CARE EDUCATION/TRAINING PROGRAM

## 2024-05-03 PROCEDURE — 99024 POSTOP FOLLOW-UP VISIT: CPT | Mod: ,,, | Performed by: PHYSICIAN ASSISTANT

## 2024-05-03 PROCEDURE — P9016 RBC LEUKOCYTES REDUCED: HCPCS | Performed by: THORACIC SURGERY (CARDIOTHORACIC VASCULAR SURGERY)

## 2024-05-03 PROCEDURE — 06BQ4ZZ EXCISION OF LEFT SAPHENOUS VEIN, PERCUTANEOUS ENDOSCOPIC APPROACH: ICD-10-PCS | Performed by: THORACIC SURGERY (CARDIOTHORACIC VASCULAR SURGERY)

## 2024-05-03 DEVICE — PLATE MANUBRIUM LOW PROFILE: Type: IMPLANTABLE DEVICE | Site: STERNUM | Status: FUNCTIONAL

## 2024-05-03 DEVICE — SCREW SD LOCKING 2.3X10MM: Type: IMPLANTABLE DEVICE | Site: STERNUM | Status: FUNCTIONAL

## 2024-05-03 DEVICE — PLATE LOW PROFILE X-PLATE: Type: IMPLANTABLE DEVICE | Site: STERNUM | Status: FUNCTIONAL

## 2024-05-03 DEVICE — SCREW SD LOCKING 2.3X16MM: Type: IMPLANTABLE DEVICE | Site: STERNUM | Status: FUNCTIONAL

## 2024-05-03 DEVICE — SCREW SD LOCKING 2.3X14MM: Type: IMPLANTABLE DEVICE | Site: STERNUM | Status: FUNCTIONAL

## 2024-05-03 DEVICE — SCREW SD LOCKING 2.3X12MM: Type: IMPLANTABLE DEVICE | Site: STERNUM | Status: FUNCTIONAL

## 2024-05-03 RX ORDER — PAPAVERINE HYDROCHLORIDE 30 MG/ML
INJECTION INTRAMUSCULAR; INTRAVENOUS
Status: DISCONTINUED | OUTPATIENT
Start: 2024-05-03 | End: 2024-05-03 | Stop reason: HOSPADM

## 2024-05-03 RX ORDER — MUPIROCIN 20 MG/G
OINTMENT TOPICAL 2 TIMES DAILY
Status: DISPENSED | OUTPATIENT
Start: 2024-05-03 | End: 2024-05-05

## 2024-05-03 RX ORDER — HEPARIN 100 UNIT/ML
5 SYRINGE INTRAVENOUS
Status: DISCONTINUED | OUTPATIENT
Start: 2024-05-03 | End: 2024-05-03 | Stop reason: HOSPADM

## 2024-05-03 RX ORDER — LOPERAMIDE HYDROCHLORIDE 2 MG/1
2 CAPSULE ORAL CONTINUOUS PRN
Status: CANCELLED | OUTPATIENT
Start: 2024-05-03

## 2024-05-03 RX ORDER — HEPARIN SODIUM 1000 [USP'U]/ML
INJECTION, SOLUTION INTRAVENOUS; SUBCUTANEOUS
Status: DISCONTINUED | OUTPATIENT
Start: 2024-05-03 | End: 2024-05-03 | Stop reason: HOSPADM

## 2024-05-03 RX ORDER — CALCIUM GLUCONATE 20 MG/ML
3 INJECTION, SOLUTION INTRAVENOUS
Status: DISCONTINUED | OUTPATIENT
Start: 2024-05-03 | End: 2024-05-13 | Stop reason: HOSPADM

## 2024-05-03 RX ORDER — DOCUSATE SODIUM 100 MG/1
100 CAPSULE, LIQUID FILLED ORAL 2 TIMES DAILY
Status: DISCONTINUED | OUTPATIENT
Start: 2024-05-04 | End: 2024-05-13 | Stop reason: HOSPADM

## 2024-05-03 RX ORDER — DEXMEDETOMIDINE HYDROCHLORIDE 4 UG/ML
0-1.4 INJECTION, SOLUTION INTRAVENOUS CONTINUOUS
Status: DISCONTINUED | OUTPATIENT
Start: 2024-05-03 | End: 2024-05-04

## 2024-05-03 RX ORDER — TRANEXAMIC ACID 100 MG/ML
INJECTION, SOLUTION INTRAVENOUS
Status: DISCONTINUED | OUTPATIENT
Start: 2024-05-03 | End: 2024-05-03

## 2024-05-03 RX ORDER — POTASSIUM CHLORIDE 14.9 MG/ML
40 INJECTION INTRAVENOUS
Status: DISCONTINUED | OUTPATIENT
Start: 2024-05-03 | End: 2024-05-13 | Stop reason: HOSPADM

## 2024-05-03 RX ORDER — ONDANSETRON HYDROCHLORIDE 2 MG/ML
4 INJECTION, SOLUTION INTRAVENOUS EVERY 4 HOURS PRN
Status: DISCONTINUED | OUTPATIENT
Start: 2024-05-03 | End: 2024-05-13 | Stop reason: HOSPADM

## 2024-05-03 RX ORDER — ETOMIDATE 2 MG/ML
INJECTION INTRAVENOUS
Status: DISCONTINUED | OUTPATIENT
Start: 2024-05-03 | End: 2024-05-03

## 2024-05-03 RX ORDER — CALCIUM CHLORIDE INJECTION 100 MG/ML
INJECTION, SOLUTION INTRAVENOUS
Status: DISCONTINUED | OUTPATIENT
Start: 2024-05-03 | End: 2024-05-03

## 2024-05-03 RX ORDER — HYDROCODONE BITARTRATE AND ACETAMINOPHEN 500; 5 MG/1; MG/1
TABLET ORAL
Status: DISCONTINUED | OUTPATIENT
Start: 2024-05-03 | End: 2024-05-03 | Stop reason: HOSPADM

## 2024-05-03 RX ORDER — ACETAMINOPHEN 10 MG/ML
1000 INJECTION, SOLUTION INTRAVENOUS EVERY 6 HOURS
Status: COMPLETED | OUTPATIENT
Start: 2024-05-03 | End: 2024-05-04

## 2024-05-03 RX ORDER — MIDAZOLAM HYDROCHLORIDE 1 MG/ML
INJECTION INTRAMUSCULAR; INTRAVENOUS
Status: DISCONTINUED | OUTPATIENT
Start: 2024-05-03 | End: 2024-05-03

## 2024-05-03 RX ORDER — DEXTROSE MONOHYDRATE AND SODIUM CHLORIDE 5; .45 G/100ML; G/100ML
INJECTION, SOLUTION INTRAVENOUS CONTINUOUS
Status: DISCONTINUED | OUTPATIENT
Start: 2024-05-03 | End: 2024-05-04

## 2024-05-03 RX ORDER — HYDROCODONE BITARTRATE AND ACETAMINOPHEN 5; 325 MG/1; MG/1
1 TABLET ORAL EVERY 4 HOURS PRN
Status: DISCONTINUED | OUTPATIENT
Start: 2024-05-03 | End: 2024-05-13 | Stop reason: HOSPADM

## 2024-05-03 RX ORDER — CALCIUM GLUCONATE 20 MG/ML
1 INJECTION, SOLUTION INTRAVENOUS
Status: DISCONTINUED | OUTPATIENT
Start: 2024-05-03 | End: 2024-05-13 | Stop reason: HOSPADM

## 2024-05-03 RX ORDER — GABAPENTIN 100 MG/1
100 CAPSULE ORAL 3 TIMES DAILY
Status: DISCONTINUED | OUTPATIENT
Start: 2024-05-03 | End: 2024-05-13 | Stop reason: HOSPADM

## 2024-05-03 RX ORDER — MAGNESIUM SULFATE HEPTAHYDRATE 40 MG/ML
2 INJECTION, SOLUTION INTRAVENOUS
Status: DISCONTINUED | OUTPATIENT
Start: 2024-05-03 | End: 2024-05-13 | Stop reason: HOSPADM

## 2024-05-03 RX ORDER — CALCIUM CHLORIDE INJECTION 100 MG/ML
INJECTION, SOLUTION INTRAVENOUS
Status: DISCONTINUED | OUTPATIENT
Start: 2024-05-03 | End: 2024-05-03 | Stop reason: HOSPADM

## 2024-05-03 RX ORDER — ASPIRIN 81 MG/1
81 TABLET ORAL DAILY
Status: DISCONTINUED | OUTPATIENT
Start: 2024-05-04 | End: 2024-05-13 | Stop reason: HOSPADM

## 2024-05-03 RX ORDER — ENOXAPARIN SODIUM 100 MG/ML
40 INJECTION SUBCUTANEOUS EVERY 24 HOURS
Status: DISCONTINUED | OUTPATIENT
Start: 2024-05-04 | End: 2024-05-03

## 2024-05-03 RX ORDER — FAMOTIDINE 10 MG/ML
20 INJECTION INTRAVENOUS DAILY
Status: DISCONTINUED | OUTPATIENT
Start: 2024-05-03 | End: 2024-05-09

## 2024-05-03 RX ORDER — FENTANYL CITRATE 50 UG/ML
INJECTION, SOLUTION INTRAMUSCULAR; INTRAVENOUS
Status: DISCONTINUED | OUTPATIENT
Start: 2024-05-03 | End: 2024-05-03

## 2024-05-03 RX ORDER — SUCRALFATE 1 G/1
1 TABLET ORAL
Status: DISCONTINUED | OUTPATIENT
Start: 2024-05-04 | End: 2024-05-03

## 2024-05-03 RX ORDER — PHENYLEPHRINE HCL IN 0.9% NACL 1 MG/10 ML
SYRINGE (ML) INTRAVENOUS
Status: DISCONTINUED | OUTPATIENT
Start: 2024-05-03 | End: 2024-05-03

## 2024-05-03 RX ORDER — METOPROLOL TARTRATE 25 MG/1
12.5 TABLET ORAL 2 TIMES DAILY
Status: DISCONTINUED | OUTPATIENT
Start: 2024-05-04 | End: 2024-05-07

## 2024-05-03 RX ORDER — MUPIROCIN 20 MG/G
OINTMENT TOPICAL
Status: DISCONTINUED | OUTPATIENT
Start: 2024-05-03 | End: 2024-05-03 | Stop reason: HOSPADM

## 2024-05-03 RX ORDER — POTASSIUM CHLORIDE 14.9 MG/ML
20 INJECTION INTRAVENOUS
Status: DISCONTINUED | OUTPATIENT
Start: 2024-05-03 | End: 2024-05-13 | Stop reason: HOSPADM

## 2024-05-03 RX ORDER — MAGNESIUM SULFATE HEPTAHYDRATE 40 MG/ML
4 INJECTION, SOLUTION INTRAVENOUS
Status: DISCONTINUED | OUTPATIENT
Start: 2024-05-03 | End: 2024-05-13 | Stop reason: HOSPADM

## 2024-05-03 RX ORDER — ALBUMIN HUMAN 50 G/1000ML
12.5 SOLUTION INTRAVENOUS
Status: DISCONTINUED | OUTPATIENT
Start: 2024-05-03 | End: 2024-05-13 | Stop reason: HOSPADM

## 2024-05-03 RX ORDER — FOLIC ACID 1 MG/1
1 TABLET ORAL DAILY
Status: DISCONTINUED | OUTPATIENT
Start: 2024-05-04 | End: 2024-05-13 | Stop reason: HOSPADM

## 2024-05-03 RX ORDER — HYDROCODONE BITARTRATE AND ACETAMINOPHEN 5; 325 MG/1; MG/1
1 TABLET ORAL EVERY 4 HOURS PRN
Status: DISCONTINUED | OUTPATIENT
Start: 2024-05-03 | End: 2024-05-03

## 2024-05-03 RX ORDER — VASOPRESSIN 20 [USP'U]/ML
INJECTION, SOLUTION INTRAMUSCULAR; SUBCUTANEOUS
Status: DISCONTINUED | OUTPATIENT
Start: 2024-05-03 | End: 2024-05-03

## 2024-05-03 RX ORDER — CALCIUM GLUCONATE 20 MG/ML
2 INJECTION, SOLUTION INTRAVENOUS
Status: DISCONTINUED | OUTPATIENT
Start: 2024-05-03 | End: 2024-05-13 | Stop reason: HOSPADM

## 2024-05-03 RX ORDER — HEPARIN SODIUM 1000 [USP'U]/ML
INJECTION, SOLUTION INTRAVENOUS; SUBCUTANEOUS
Status: DISCONTINUED | OUTPATIENT
Start: 2024-05-03 | End: 2024-05-03

## 2024-05-03 RX ORDER — ACETAMINOPHEN 650 MG/20.3ML
650 LIQUID ORAL EVERY 6 HOURS PRN
Status: CANCELLED | OUTPATIENT
Start: 2024-05-03

## 2024-05-03 RX ORDER — OXYCODONE HYDROCHLORIDE 5 MG/1
10 TABLET ORAL EVERY 4 HOURS PRN
Status: DISCONTINUED | OUTPATIENT
Start: 2024-05-03 | End: 2024-05-13 | Stop reason: HOSPADM

## 2024-05-03 RX ORDER — METOCLOPRAMIDE HYDROCHLORIDE 5 MG/ML
5 INJECTION INTRAMUSCULAR; INTRAVENOUS EVERY 6 HOURS PRN
Status: CANCELLED | OUTPATIENT
Start: 2024-05-03

## 2024-05-03 RX ORDER — LACTULOSE 10 G/15ML
20 SOLUTION ORAL EVERY 6 HOURS PRN
Status: CANCELLED | OUTPATIENT
Start: 2024-05-03

## 2024-05-03 RX ORDER — ENOXAPARIN SODIUM 100 MG/ML
30 INJECTION SUBCUTANEOUS EVERY 24 HOURS
Status: DISCONTINUED | OUTPATIENT
Start: 2024-05-04 | End: 2024-05-13 | Stop reason: HOSPADM

## 2024-05-03 RX ORDER — LIDOCAINE HYDROCHLORIDE 20 MG/ML
INJECTION INTRAVENOUS
Status: DISCONTINUED | OUTPATIENT
Start: 2024-05-03 | End: 2024-05-03

## 2024-05-03 RX ORDER — OXYCODONE HYDROCHLORIDE 5 MG/1
5 TABLET ORAL EVERY 4 HOURS PRN
Status: DISCONTINUED | OUTPATIENT
Start: 2024-05-03 | End: 2024-05-13 | Stop reason: HOSPADM

## 2024-05-03 RX ORDER — ROCURONIUM BROMIDE 10 MG/ML
INJECTION, SOLUTION INTRAVENOUS
Status: DISCONTINUED | OUTPATIENT
Start: 2024-05-03 | End: 2024-05-03

## 2024-05-03 RX ORDER — CEFAZOLIN SODIUM 2 G/50ML
2 SOLUTION INTRAVENOUS
Qty: 100 ML | Refills: 0 | Status: COMPLETED | OUTPATIENT
Start: 2024-05-03 | End: 2024-05-04

## 2024-05-03 RX ORDER — POTASSIUM CHLORIDE 14.9 MG/ML
60 INJECTION INTRAVENOUS
Status: DISCONTINUED | OUTPATIENT
Start: 2024-05-03 | End: 2024-05-13 | Stop reason: HOSPADM

## 2024-05-03 RX ORDER — MORPHINE SULFATE 4 MG/ML
4 INJECTION, SOLUTION INTRAMUSCULAR; INTRAVENOUS EVERY 4 HOURS PRN
Status: DISCONTINUED | OUTPATIENT
Start: 2024-05-03 | End: 2024-05-13 | Stop reason: HOSPADM

## 2024-05-03 RX ORDER — PROTAMINE SULFATE 10 MG/ML
INJECTION, SOLUTION INTRAVENOUS
Status: DISCONTINUED | OUTPATIENT
Start: 2024-05-03 | End: 2024-05-03

## 2024-05-03 RX ADMIN — DEXTROSE AND SODIUM CHLORIDE: 5; 450 INJECTION, SOLUTION INTRAVENOUS at 10:05

## 2024-05-03 RX ADMIN — GABAPENTIN 100 MG: 100 CAPSULE ORAL at 08:05

## 2024-05-03 RX ADMIN — ALBUMIN (HUMAN) 12.5 G: 12.5 INJECTION, SOLUTION INTRAVENOUS at 06:05

## 2024-05-03 RX ADMIN — MUPIROCIN: 20 OINTMENT TOPICAL at 08:05

## 2024-05-03 RX ADMIN — ACETAMINOPHEN 1000 MG: 10 INJECTION, SOLUTION INTRAVENOUS at 05:05

## 2024-05-03 RX ADMIN — VASOPRESSIN 1 UNITS: 20 INJECTION INTRAVENOUS at 07:05

## 2024-05-03 RX ADMIN — HEPARIN SODIUM 25000 UNITS: 1000 INJECTION, SOLUTION INTRAVENOUS; SUBCUTANEOUS at 07:05

## 2024-05-03 RX ADMIN — LIDOCAINE HYDROCHLORIDE 80 MG: 20 INJECTION INTRAVENOUS at 06:05

## 2024-05-03 RX ADMIN — DEXMEDETOMIDINE HYDROCHLORIDE 0.7 MCG/KG/HR: 400 INJECTION INTRAVENOUS at 11:05

## 2024-05-03 RX ADMIN — SODIUM CHLORIDE: 9 INJECTION, SOLUTION INTRAVENOUS at 06:05

## 2024-05-03 RX ADMIN — FENTANYL CITRATE 100 MCG: 50 INJECTION, SOLUTION INTRAMUSCULAR; INTRAVENOUS at 09:05

## 2024-05-03 RX ADMIN — CEFAZOLIN SODIUM 2 G: 2 SOLUTION INTRAVENOUS at 06:05

## 2024-05-03 RX ADMIN — INSULIN HUMAN 2 UNITS: 100 INJECTION, SOLUTION PARENTERAL at 08:05

## 2024-05-03 RX ADMIN — CALCIUM CHLORIDE INJECTION 0.5 G: 100 INJECTION, SOLUTION INTRAVENOUS at 09:05

## 2024-05-03 RX ADMIN — ROCURONIUM BROMIDE 100 MG: 10 SOLUTION INTRAVENOUS at 06:05

## 2024-05-03 RX ADMIN — CLEVIPIDINE 1 MG/HR: 0.5 EMULSION INTRAVENOUS at 12:05

## 2024-05-03 RX ADMIN — FENTANYL CITRATE 200 MCG: 50 INJECTION, SOLUTION INTRAMUSCULAR; INTRAVENOUS at 06:05

## 2024-05-03 RX ADMIN — ROCURONIUM BROMIDE 25 MG: 10 SOLUTION INTRAVENOUS at 07:05

## 2024-05-03 RX ADMIN — ROCURONIUM BROMIDE 50 MG: 10 SOLUTION INTRAVENOUS at 08:05

## 2024-05-03 RX ADMIN — FENTANYL CITRATE 150 MCG: 50 INJECTION, SOLUTION INTRAMUSCULAR; INTRAVENOUS at 07:05

## 2024-05-03 RX ADMIN — SODIUM CHLORIDE 2 UNITS/HR: 9 INJECTION, SOLUTION INTRAVENOUS at 08:05

## 2024-05-03 RX ADMIN — ETOMIDATE 16 MG: 2 INJECTION INTRAVENOUS at 06:05

## 2024-05-03 RX ADMIN — DEXTROSE MONOHYDRATE 1.5 G: 50 INJECTION, SOLUTION INTRAVENOUS at 07:05

## 2024-05-03 RX ADMIN — MIDAZOLAM HYDROCHLORIDE 2 MG: 1 INJECTION, SOLUTION INTRAMUSCULAR; INTRAVENOUS at 06:05

## 2024-05-03 RX ADMIN — SODIUM CHLORIDE: 9 INJECTION, SOLUTION INTRAVENOUS at 09:05

## 2024-05-03 RX ADMIN — DEXMEDETOMIDINE 0.6 MCG/KG/HR: 200 INJECTION, SOLUTION INTRAVENOUS at 08:05

## 2024-05-03 RX ADMIN — Medication 50 MCG: at 07:05

## 2024-05-03 RX ADMIN — Medication 50 MCG: at 06:05

## 2024-05-03 RX ADMIN — ROCURONIUM BROMIDE 25 MG: 10 SOLUTION INTRAVENOUS at 09:05

## 2024-05-03 RX ADMIN — MORPHINE SULFATE 4 MG: 4 INJECTION INTRAVENOUS at 10:05

## 2024-05-03 RX ADMIN — DEXMEDETOMIDINE HYDROCHLORIDE 0.2 MCG/KG/HR: 400 INJECTION INTRAVENOUS at 05:05

## 2024-05-03 RX ADMIN — ALBUMIN (HUMAN) 12.5 G: 12.5 INJECTION, SOLUTION INTRAVENOUS at 12:05

## 2024-05-03 RX ADMIN — PROTAMINE SULFATE 300 MG: 10 INJECTION, SOLUTION INTRAVENOUS at 09:05

## 2024-05-03 RX ADMIN — TRANEXAMIC ACID 719 MG: 100 INJECTION, SOLUTION INTRAVENOUS at 09:05

## 2024-05-03 RX ADMIN — INSULIN HUMAN 13 UNITS/HR: 1 INJECTION, SOLUTION INTRAVENOUS at 11:05

## 2024-05-03 RX ADMIN — MIDAZOLAM HYDROCHLORIDE 3 MG: 1 INJECTION, SOLUTION INTRAMUSCULAR; INTRAVENOUS at 08:05

## 2024-05-03 RX ADMIN — TRANEXAMIC ACID 719 MG: 100 INJECTION, SOLUTION INTRAVENOUS at 07:05

## 2024-05-03 RX ADMIN — MORPHINE SULFATE 4 MG: 4 INJECTION INTRAVENOUS at 02:05

## 2024-05-03 RX ADMIN — ACETAMINOPHEN 1000 MG: 10 INJECTION, SOLUTION INTRAVENOUS at 11:05

## 2024-05-03 RX ADMIN — POTASSIUM CHLORIDE 20 MEQ: 14.9 INJECTION, SOLUTION INTRAVENOUS at 11:05

## 2024-05-03 RX ADMIN — INSULIN HUMAN 2 UNITS/HR: 1 INJECTION, SOLUTION INTRAVENOUS at 11:05

## 2024-05-03 RX ADMIN — FENTANYL CITRATE 50 MCG: 50 INJECTION, SOLUTION INTRAMUSCULAR; INTRAVENOUS at 08:05

## 2024-05-03 NOTE — PROGRESS NOTES
PROCEDURE: Endoscopic Saphenous Vein Harvesting    PRE-OP  Diagnosis: CAD    Pre-op EF: 60 %,  Pre-op BP: 120/80 mmHg    Vital Signs: HR: 70 bpm, BP: 120/60 mmHg    Heart rhythm: NSR    Chest tube output: 60/50mL / 2 hours    CXR: clear    Urine output adequate: YES     Cardiac Index: 2.0 L/min/m2    Drips:  cleviprex    Hgb: 10   g/dL  Potassium: 3.4   mmol/L  Creatinine: 1.71   mg/dL

## 2024-05-03 NOTE — ANESTHESIA PROCEDURE NOTES
Intubation    Date/Time: 5/3/2024 6:54 AM    Performed by: Anna Dewey CRNA  Authorized by: Fannie Holland MD    Intubation:     Induction:  Intravenous    Intubated:  Postinduction    Mask Ventilation:  Easy with oral airway    Attempts:  1    Attempted By:  CRNA    Method of Intubation:  Direct    Blade:  Lua 2    Laryngeal View Grade: Grade IIA - cords partially seen      Difficult Airway Encountered?: No      Complications:  None    Airway Device:  Oral endotracheal tube    Airway Device Size:  8.0    Style/Cuff Inflation:  Cuffed (inflated to minimal occlusive pressure)    Inflation Amount (mL):  6    Tube secured:  22    Secured at:  The lips    Placement Verified By:  Capnometry    Complicating Factors:  None    Findings Post-Intubation:  BS equal bilateral and atraumatic/condition of teeth unchanged

## 2024-05-03 NOTE — H&P
Critical Care Medicine History and Physical Note   [facility] - [facility] ICU      Patient Name: Rosalba Whitlock  MRN: [id number]  Admission Date: 5/3/2024  Hospital Length of Stay: 0 days  Code Status: Full Code  Attending Provider: Jeovanny Wan MD  Primary Care Provider: Eleazar Ramos MD   Principal Problem: <principal problem not specified>        HPI:  The patient is an 83-year-old female with a medical history of gout, coronary artery disease, diabetes mellitus type 2, hypertension, GERD, osteoporosis, CKD stage 3, who was seen in the immediate postoperative.  Status post median sternotomy with attempted CABG with inability to bypass the LAD which was found to be a graftable intraoperatively.  She was found to have severe in stent restenosis of the RCA/PDA stent with distal PDA long segment occlusion to the apex.  Due to anatomical difficulty bypass was not performed.  Patient was closed backup with tubes placed for drainage and moved to the ICU.  She was seen intubated still under intraoperative sedation unresponsive.        Past Medical History:   Diagnosis Date    Amnesia 06/13/2022    Benign paroxysmal positional vertigo, bilateral 06/13/2022    Bronchitis 06/08/2022    Chronic gouty arthritis 06/13/2022    Coronary artery disease involving native coronary artery of native heart without angina pectoris 12/04/2023    Diabetes mellitus, type 2     Essential hypertension 04/21/2016    Gastroesophageal reflux disease without esophagitis 04/21/2016    GERD (gastroesophageal reflux disease)     Gout, unspecified     Heart attack     Low back pain 06/13/2022    lower back pain bilateral with movement; radiates down right leg to thigh    Low vitamin D level 06/13/2022    Metabolic acidosis 01/12/2023    Microalbuminuria 01/12/2023    Migraine headache 04/21/2016    Osteoporosis 06/13/2022    Peripheral edema 06/13/2022    Physical deconditioning 06/13/2022    Pure hypercholesterolemia 06/13/2022     SOBOE (shortness of breath on exertion)     Spondylosis of lumbar spine 06/13/2022    Stage 3 chronic kidney disease 06/13/2022    Type 2 diabetes mellitus 06/13/2022    Weakness          Past Surgical History:   Procedure Laterality Date    COLONOSCOPY      EPIDURAL STEROID INJECTION INTO CERVICAL SPINE      EYE SURGERY Bilateral     cataract extraction    FOOT SURGERY Bilateral     HYSTERECTOMY      LEFT HEART CATHETERIZATION Left 12/04/2023    Procedure: Left heart cath;  Surgeon: Rubia Seymour MD;  Location: [facility] CATH LAB;  Service: Cardiology;  Laterality: Left;  LHC +/- PCI    REPAIR, CHRONIC TOTAL OCCLUSION, CORONARY N/A 02/21/2024    Procedure: Repair, Chronic Total Occlusion, Coronary;  Surgeon: Emmanuel Riley MD;  Location: [facility] CATH LAB;  Service: Cardiology;  Laterality: N/A;   PCI RCA *START WITH PICTURES OF THE LAD AND CONSIDER IFR IF LAD LOOKS WORSE*  6F EBU 3.5 GUIDE 90 CM LM VIA RRA, 7F AL-1 GUIDE SH RCA VIA RT GROIN    STENT, DRUG ELUTING, SINGLE VESSEL, CORONARY  12/04/2023    Procedure: Stent, Drug Eluting, Single Vessel, Coronary;  Surgeon: Rubia Seymour MD;  Location: [facility] CATH LAB;  Service: Cardiology;;    TONSILLECTOMY      UPPER GASTROINTESTINAL ENDOSCOPY           Social History     Socioeconomic History    Marital status:    Tobacco Use    Smoking status: Former     Types: Cigarettes    Smokeless tobacco: Never    Tobacco comments:     Quit 20 years ago   Substance and Sexual Activity    Alcohol use: Never    Drug use: Never         Family History   Problem Relation Name Age of Onset    Lung cancer Mother      Kidney disease Father      Diabetes Brother           Drug Allergies:   Review of patient's allergies indicates:   Allergen Reactions    Amlodipine      Skin crawling          Current Infusions:  Current Facility-Administered Medications   Medication Dose Route Frequency Last Rate Last Admin    clevidipine  0-16 mg/hr Intravenous Continuous 2 mL/hr at  05/03/24 1222 1 mg/hr at 05/03/24 1222    dexmedeTOMIDine (Precedex) infusion (titrating)  0-1.4 mcg/kg/hr Intravenous Continuous   Stopped at 05/03/24 1045    dextrose 5 % and 0.45 % NaCl   Intravenous Continuous   Stopped at 05/03/24 1116    EPINEPHrine  0-2 mcg/kg/min Intravenous Continuous   Stopped at 05/03/24 0945    insulin regular 1 units/mL infusion orderable (CTS POST-OP)  0-52 Units/hr Intravenous Continuous   Stopped at 05/03/24 1153         Scheduled Medications:    Current Facility-Administered Medications   Medication Dose Route Frequency    acetaminophen  1,000 mg Intravenous Q6H    [START ON 5/4/2024] aspirin  81 mg Oral Daily    ceFAZolin (Ancef) IV (PEDS and ADULTS)  2 g Intravenous Q12H    [START ON 5/4/2024] docusate sodium  100 mg Oral BID    [START ON 5/4/2024] enoxparin  30 mg Subcutaneous Daily    famotidine (PF)  20 mg Intravenous Daily    [START ON 5/4/2024] folic acid  1 mg Oral Daily    [START ON 5/4/2024] metoprolol tartrate  12.5 mg Oral BID    mupirocin   Nasal BID         PRN Medications:     Current Facility-Administered Medications:     albumin human 5%, 12.5 g, Intravenous, PRN    calcium gluconate IVPB, 1 g, Intravenous, PRN    calcium gluconate IVPB, 2 g, Intravenous, PRN    calcium gluconate IVPB, 3 g, Intravenous, PRN    dextrose 10%, 12.5 g, Intravenous, PRN    dextrose 10%, 25 g, Intravenous, PRN    HYDROcodone-acetaminophen, 1 tablet, Oral, Q4H PRN    magnesium sulfate IVPB, 2 g, Intravenous, PRN    magnesium sulfate IVPB, 4 g, Intravenous, PRN    morphine, 4 mg, Intravenous, Q4H PRN    ondansetron, 4 mg, Intravenous, Q4H PRN    oxyCODONE, 10 mg, Oral, Q4H PRN    potassium chloride in water, 20 mEq, Intravenous, PRN    potassium chloride in water, 40 mEq, Intravenous, PRN    potassium chloride in water, 60 mEq, Intravenous, PRN    sodium phosphate 15 mmol in dextrose 5 % (D5W) 250 mL IVPB, 15 mmol, Intravenous, PRN    sodium phosphate 20.01 mmol in dextrose 5 % (D5W) 250 mL  IVPB, 20.01 mmol, Intravenous, PRN    sodium phosphate 30 mmol in dextrose 5 % (D5W) 250 mL IVPB, 30 mmol, Intravenous, PRN      Review of Systems   Reason unable to perform ROS: Unable to perform full 14 point review of systems as patient was intubated sedated unresponsive on mechanical ventilation.         Vital Signs:    Vitals:    05/03/24 1330   BP: 114/77   Pulse: 73   Resp: (!) 23   Temp:          Fluid Balance:     Intake/Output Summary (Last 24 hours) at 5/3/2024 1344  Last data filed at 5/3/2024 1300  Gross per 24 hour   Intake 5013.23 ml   Output 4313 ml   Net 700.23 ml         Physical Exam  Vitals and nursing note reviewed.   Constitutional:       General: She is not in acute distress.     Appearance: Normal appearance. She is ill-appearing. She is not toxic-appearing.   HENT:      Head: Normocephalic and atraumatic.      Right Ear: External ear normal.      Left Ear: External ear normal.      Nose: Nose normal.      Mouth/Throat:      Pharynx: No posterior oropharyngeal erythema.      Comments: Unable to visualize posterior oropharynx secondary to endotracheal tube  Eyes:      General: No scleral icterus.     Conjunctiva/sclera: Conjunctivae normal.      Pupils: Pupils are equal, round, and reactive to light.   Neck:      Vascular: No carotid bruit.   Cardiovascular:      Rate and Rhythm: Normal rate and regular rhythm.      Pulses: Normal pulses.      Heart sounds: Normal heart sounds. No murmur heard.     No friction rub. No gallop.      Comments: Median sternotomy wound site clean and dry with mediastinal chest tubes in place with bloody output.  Pulmonary:      Effort: Pulmonary effort is normal. No respiratory distress.      Breath sounds: Normal breath sounds. No wheezing, rhonchi or rales.      Comments: Intubated, on mechanical ventilation  Abdominal:      General: Abdomen is flat. Bowel sounds are normal. There is no distension.      Palpations: Abdomen is soft.      Tenderness: There is no  abdominal tenderness. There is no guarding or rebound.   Musculoskeletal:         General: No swelling or deformity.      Cervical back: Neck supple. No rigidity or tenderness.   Skin:     General: Skin is warm and dry.      Capillary Refill: Capillary refill takes less than 2 seconds.      Findings: No erythema or rash.   Neurological:      General: No focal deficit present.      Comments: Unable to fully assess neurologic status and orientation secondary to patient being intubated, sedated and nonverbal   Psychiatric:      Comments: Unable to fully assess as patient is intubated and nonverbal           Ventilator  Vent Mode: SIMV (05/03/24 1220)  Ventilator Initiated: Yes (05/03/24 1000)  Set Rate: 20 BPM (05/03/24 1220)  Vt Set: 500 mL (05/03/24 1220)  Pressure Support: 10 cmH20 (05/03/24 1220)  PEEP/CPAP: 5 cmH20 (05/03/24 1220)  Oxygen Concentration (%): 30 (05/03/24 1300)  Peak Airway Pressure: 21 cmH20 (05/03/24 1220)  Total Ve: 8.6 L/m (05/03/24 1220)  F/VT Ratio<105 (RSBI): (!) 47.17 (05/03/24 1000)      Laboratory Studies:     Recent Labs   Lab 05/03/24  0935 05/03/24  1035   PH 7.370 7.410   PCO2 33.8* 32.0*   PO2 258* 160.0*   HCO3 19.5* 20.3*   POCSATURATED 100  --    BE -6*  --        Recent Labs   Lab 05/03/24  1030   WBC 10.19   RBC 3.55*   HGB 10.3*   HCT 30.7*      MCV 86.5   MCH 29.0   MCHC 33.6       Recent Labs   Lab 05/03/24  1030   GLUCOSE 151*   *   K 3.4*   CO2 20*   BUN 26.3*   CREATININE 1.71*   CALCIUM 9.0   MG 2.70*         Microbiology Data:   Microbiology Results (last 7 days)       ** No results found for the last 168 hours. **            Imaging reviewed:  X-Ray Chest AP Portable  Narrative: EXAMINATION:  XR CHEST AP PORTABLE    CLINICAL HISTORY:  Post-op;    TECHNIQUE:  Single view of the chest    COMPARISON:  04/26/2024    FINDINGS:  Postsurgical changes of mediastinal drain.  Left-sided central line projects over the mid SVC.  ET tube terminates at the level of the  "clavicular heads.  Impression: Expected postsurgical changes of median sternotomy.    Electronically signed by: David Gomez  Date:    05/03/2024  Time:    11:13        2D ECHO Results    No results found in the last 24 hours.       Pulmonary Functions Testing Results:    No results found for: "FEV1", "FVC", "GZM2APC", "TLC", "DLCO"      Assessment and Plan:    Assessment:  Postop day 0 status post median sternotomy without coronary artery bypass grafting   Hypertension  Hyperlipidemia  Coronary artery disease   Gout      Plan:  -discontinue lines tubes medications mechanical ventilation per CV surgery protocol  -resume home medications when appropriate        Jeovanny Wan MD  5/3/2024  Pulmonology/Critical Care    "

## 2024-05-03 NOTE — OP NOTE
[facility]  Cardiothoracic Surgery  Operative Note    SUMMARY     Date of Procedure: 5/3/2024     Procedure:  1.  Median sternotomy with attempted coronary artery bypass grafting  2.  Plating of sternal defect with the [place] sternal plating system and stainless steel wire     Surgeon: KACI Estrada     Assistant: Aaron Ontiveros    Pre-Operative Diagnosis:  1.  Severe coronary artery disease with InStent restenosis  2.  Occluded RCA/PDA stent after heavily stented proximal right coronary artery  3. Distal LAD stent with in stent restenosis in a very small LAD  4.  Advanced frailty     Post-Operative Diagnosis:  Same.  The patient had intra myocardial LAD with mid/distal LAD stenting to the apex.  LAD was ungraftable as was the PDA    Anesthesia: General    Operative Findings (including complications, if any):  Severe InStent restenosis of RCA/PDA stent with distal PDA long segment occlusion to the apex.  Lad was deeply intra myocardial with with mid to distal LAD stent terminating in an extremely small ongoing LAD less than 1 mm to the apex.  LAD was intra myocardial necessitating cardioplegic arrest to palpate previously placed LAD stent and identified distal LAD segment and this vessel was not graftable beyond the stent    Description of Technical Procedures: Patient was delivered to the operating room, support lines were placed, general endotracheal anesthesia was induced.  Patient was prepped and draped in usual sterile fashion.  Saphenous vein was harvested from the lower extremity with the endoscopic saphenous vein harvesting technique.  Wounds were closed in layers with Vicryl.  Median sternotomy was made in the left internal mammary artery was harvested as a pedicle graft.  Heparin was administered, once the ACT was satisfactory the patient was cannulated via the ascending aorta and right atrial appendage and placed on cardiopulmonary bypass.  Aortic cross-clamp was  applied and cardioplegia was given down the aortic root to promote diastolic arrest.  Topical cold saline was also used to promote hypothermia.  Distal target coronary arteries were explored.  Patient was found to have an intra myocardial LAD which was deeply intra myocardial so as to not even be able to feel the mid to distal LAD stent.  Exploration for the LAD was performed with diastolic arrest and the stent was still not able to be palpated.  A 15 blade was used to explore the suspected course of the LAD and the previously placed LAD stent was eventually located and followed distally to the apex.  The LAD beyond the stent was diminutive measuring less than 1 mm it is outside diameter and was ungraftable..  Exploration of diagonal vessels was then performed to possibly anastomosed the left internal mammary artery to a diagonal branch, however, there was no acceptable diagonal target either.  The left internal mammary artery was was then clipped.  The right coronary artery/PDA was then explored and also found to be intra myocardial.  The confluence of the PDA and proximal right coronary stents were investigated and the PDA stent was found to be occluded with long segment occlusion to the apex of a very long stent and ongoing PDA artery.  The PLB branch was opened and the stent of the proximal right coronary artery was allowing free flow into the PLB.  This ongoing PLB was diminutive and ungraftable.  The patient was weaned and discontinued from cardiopulmonary bypass.  Right atrial pacing wires were placed as were 2-24 [place] Valentin drains, 1 in the left chest and 1 in the mediastinum.  Protamine was administered the patient was decannulated.  Sternum was closed with stainless steel wire in the [employer] sternal plating system.  Subcutaneous tissues were closed in layers with Vicryl.  Patient tolerated the procedure well will be delivered to the recovery room in stable condition.     Estimated Blood Loss (EBL): N/A           Specimens:   Specimen (24h ago, onward)      None                    Condition: Stable    Disposition: ICU - intubated and hemodynamically stable.

## 2024-05-03 NOTE — ANESTHESIA PROCEDURE NOTES
Arterial    Diagnosis: Coronary artery disease, unspecified vessel or lesion type, unspecified whether angina present, unspecified whether native or transplanted heart [I25.10]  Doctor requesting consult: Sean    Patient location during procedure: done in OR  Timeout: 5/3/2024 6:55 AM  Procedure end time: 5/3/2024 6:58 AM    Staffing  Authorizing Provider: Fannie Holland MD  Performing Provider: Fannie Holland MD    Staffing  Performed by: Fannie Holland MD  Authorized by: Fannie Holland MD    Anesthesiologist was present at the time of the procedure.    Preanesthetic Checklist  Completed: patient identified, IV checked, site marked, risks and benefits discussed, surgical consent, monitors and equipment checked, pre-op evaluation, timeout performed and anesthesia consent givenArterial  Skin Prep: chlorhexidine gluconate  Local Infiltration: lidocaine  Orientation: left  Location: radial    Catheter Size: 20 G  Catheter placement by Anatomical landmarks. Heme positive aspiration all ports. Insertion Attempts: 1  Assessment  Dressing: secured with tape and tegaderm  Patient: Tolerated well

## 2024-05-03 NOTE — ANESTHESIA PROCEDURE NOTES
Intraoperative DARION:  CABG    Diagnosis: Coronary artery disease  Patient location during procedure: OR    Staffing  Authorizing Provider: Fannie Holland MD  Performing Provider: Fannie Holland MD    Staffing  Anesthesiologist Present  Yes  Setup & Induction  Probe Insertion: easy            After induction of endotracheal anesthesia in the operating room, transesophageal echo probe was placed atraumatically the esophagus for evaluation of cardiovascular structures.      Preoperative Assessment:    1. Ascending aorta has a small calcification at the posterior sinotubular junction.  The proximal transverse arch are extensively calcified.  The descending thoracic aorta has grade 2 and 3 atheroma is noted.  The caliber is normal throughout its visualized portion to the level of diaphragm.    2. Right atrium, left atrium, left atrial appendage are well-visualized.  There was no thrombus, tumor evidence an interatrial septal defect atria normal in size.    3. The  tricuspid valve is trivial regurgitation is otherwise anatomically normal.    4. Pulmonic valve is normal with no significant regurgitation.    5. Mitral valve annulus is nondilated, the leaflets appear fairly freely mobile.  There is a redundant segment of the P2 which is associated with mild mitral regurgitation.  Regurgitant orifice area is calculated at 0.12 cm2 and the regurgitant volume is 13 mL only.  No calcification or stenosis of the valve is seen.      6. The aortic valve is a trileaflet valve without stenosis or insufficiency.      7. Left ventricular hypertrophy is seen, concentric.  There is grade 1 diastolic dysfunction only, ejection fraction is 55% with perhaps a only a mild inferior wall hypokinesis noted.    8. No pericardial or pleural effusions      Surgeon ascertained there were no suitable targets for bypass, therefore procedure was aborted.      There were no changes on the echo.  EF remains 55% and mild mitral regurgitation seen.

## 2024-05-03 NOTE — PLAN OF CARE
Problem: Adult Inpatient Plan of Care  Goal: Plan of Care Review  Outcome: Progressing  Goal: Patient-Specific Goal (Individualized)  Outcome: Progressing  Goal: Absence of Hospital-Acquired Illness or Injury  Outcome: Progressing  Goal: Optimal Comfort and Wellbeing  Outcome: Progressing  Goal: Readiness for Transition of Care  Outcome: Progressing     Problem: Diabetes Comorbidity  Goal: Blood Glucose Level Within Targeted Range  Outcome: Progressing     Problem: Infection  Goal: Absence of Infection Signs and Symptoms  Outcome: Progressing     Problem: Wound  Goal: Optimal Coping  Outcome: Progressing  Goal: Optimal Functional Ability  Outcome: Progressing  Goal: Absence of Infection Signs and Symptoms  Outcome: Progressing  Goal: Improved Oral Intake  Outcome: Progressing  Goal: Optimal Pain Control and Function  Outcome: Progressing  Goal: Skin Health and Integrity  Outcome: Progressing  Goal: Optimal Wound Healing  Outcome: Progressing     Problem: Fall Injury Risk  Goal: Absence of Fall and Fall-Related Injury  Outcome: Progressing     Problem: Skin Injury Risk Increased  Goal: Skin Health and Integrity  Outcome: Progressing

## 2024-05-03 NOTE — ANESTHESIA PROCEDURE NOTES
Central Line    Diagnosis: Coronary Artery Bypass  Patient location during procedure: done in OR  Procedure start time: 5/3/2024 6:54 AM  Timeout: 5/3/2024 6:54 AM  Procedure end time: 5/3/2024 6:58 AM    Staffing  Authorizing Provider: Fannie Holland MD  Performing Provider: Fannie Holland MD    Staffing  Performed: anesthesiologist   Anesthesiologist: Fannie Holland MD  Performed by: Fannie Holland MD  Authorized by: Fannie Holland MD    Anesthesiologist was present at the time of the procedure.  Preanesthetic Checklist  Completed: patient identified, risks and benefits discussed, monitors and equipment checked, timeout performed and anesthesia consent given  Indication   Indication: hemodynamic monitoring, vascular access, med administration     Anesthesia   general anesthesia    Central Line   Skin Prep: skin prepped with ChloraPrep, skin prep agent completely dried prior to procedure  Sterile Barriers Followed: Yes    All five maximal barriers used- gloves, gown, cap, mask, and large sterile sheet    hand hygiene performed prior to central venous catheter insertion  Location: right internal jugular.   Catheter type: triple lumen  Catheter Size: 9 Fr  Inserted Catheter Length: 16 cm  Ultrasound: none     Manometry: none  Insertion Attempts: 1   Securement:line sutured, chlorhexidine patch, sterile dressing applied and blood return through all ports    Post-Procedure   X-Ray: successful placement   Adverse Events:none      Guidewire Guidewire removed intact.

## 2024-05-03 NOTE — NURSING
Nurses Note -- 4 Eyes      5/3/2024   12:12 PM      Skin assessed during: Daily Assessment      [] No Altered Skin Integrity Present    []Prevention Measures Documented      [x] Yes- Altered Skin Integrity Present or Discovered   [] LDA Added if Not in Epic (Describe Wound)   [] New Altered Skin Integrity was Present on Admit and Documented in LDA   [] Wound Image Taken    Wound Care Consulted? No    Attending Nurse:   Negin Dennis RN/Staff Member:  Sakina WHITESIDE RN

## 2024-05-03 NOTE — TRANSFER OF CARE
"Anesthesia Transfer of Care Note    Patient: Rosalba Whitlock    Procedure(s) Performed: Procedure(s) (LRB):  CORONARY ARTERY BYPASS GRAFT (CABG) (N/A)  SURGICAL PROCUREMENT, VEIN, ENDOSCOPIC (Left)  PLATING, STERNAL (N/A)    Patient location: ICU    Anesthesia Type: general    Transport from OR: Transported from OR intubated on 100% O2 by AMBU with adequate controlled ventilation. Upon arrival to PACU/ICU, patient attached to ventilator and auscultated to confirm bilateral breath sounds and adequate TV. Continuous ECG monitoring in transport. Continuous SpO2 monitoring in transport. Continuos invasive BP monitoring in transport    Post pain: adequate analgesia    Post assessment: no apparent anesthetic complications    Post vital signs: stable    Level of consciousness: sedated    Nausea/Vomiting: no nausea/vomiting    Complications: none    Transfer of care protocol was followed      Last vitals: Visit Vitals  BP (!) 123/56   Pulse 73   Temp 36.6 °C (97.9 °F) (Oral)   Resp 15   Ht 5' 5" (1.651 m)   Wt 71.9 kg (158 lb 8.2 oz)   SpO2 97%   Breastfeeding No   BMI 26.38 kg/m²     "

## 2024-05-04 LAB
ALBUMIN SERPL-MCNC: 3.6 G/DL (ref 3.4–4.8)
ALBUMIN/GLOB SERPL: 1.9 RATIO (ref 1.1–2)
ALLENS TEST BLOOD GAS (OHS): ABNORMAL
ALP SERPL-CCNC: 33 UNIT/L (ref 40–150)
ALT SERPL-CCNC: 11 UNIT/L (ref 0–55)
AST SERPL-CCNC: 36 UNIT/L (ref 5–34)
BASE EXCESS BLD CALC-SCNC: -1.7 MMOL/L
BILIRUB SERPL-MCNC: 0.4 MG/DL
BLOOD GAS SAMPLE TYPE (OHS): ABNORMAL
BUN SERPL-MCNC: 20.4 MG/DL (ref 9.8–20.1)
CA-I BLD-SCNC: 1.05 MMOL/L (ref 1.12–1.23)
CALCIUM SERPL-MCNC: 8 MG/DL (ref 8.4–10.2)
CHLORIDE SERPL-SCNC: 112 MMOL/L (ref 98–107)
CO2 BLDA-SCNC: 24.2 MMOL/L
CO2 SERPL-SCNC: 19 MMOL/L (ref 23–31)
CREAT SERPL-MCNC: 1.95 MG/DL (ref 0.55–1.02)
DRAWN BY BLOOD GAS (OHS): ABNORMAL
ERYTHROCYTE [DISTWIDTH] IN BLOOD BY AUTOMATED COUNT: 14.6 % (ref 11.5–17)
GFR SERPLBLD CREATININE-BSD FMLA CKD-EPI: 25 MLS/MIN/1.73/M2
GLOBULIN SER-MCNC: 1.9 GM/DL (ref 2.4–3.5)
GLUCOSE SERPL-MCNC: 212 MG/DL (ref 82–115)
HCO3 BLDA-SCNC: 23 MMOL/L (ref 22–26)
HCT VFR BLD AUTO: 38.2 % (ref 37–47)
HGB BLD-MCNC: 12.7 G/DL (ref 12–16)
INHALED O2 CONCENTRATION: 30 %
MAGNESIUM SERPL-MCNC: 2.5 MG/DL (ref 1.6–2.6)
MCH RBC QN AUTO: 29.1 PG (ref 27–31)
MCHC RBC AUTO-ENTMCNC: 33.2 G/DL (ref 33–36)
MCV RBC AUTO: 87.6 FL (ref 80–94)
MODE (OHS): ABNORMAL
NRBC BLD AUTO-RTO: 0 %
OXYGEN DEVICE BLOOD GAS (OHS): ABNORMAL
PCO2 BLDA: 38 MMHG (ref 35–45)
PEEP RESPIRATORY: 5 CMH2O
PH BLDA: 7.39 [PH] (ref 7.35–7.45)
PHOSPHATE SERPL-MCNC: 1.5 MG/DL (ref 2.3–4.7)
PLATELET # BLD AUTO: 203 X10(3)/MCL (ref 130–400)
PMV BLD AUTO: 10 FL (ref 7.4–10.4)
PO2 BLDA: 88 MMHG (ref 80–100)
POCT GLUCOSE: 100 MG/DL (ref 70–110)
POCT GLUCOSE: 119 MG/DL (ref 70–110)
POCT GLUCOSE: 137 MG/DL (ref 70–110)
POCT GLUCOSE: 141 MG/DL (ref 70–110)
POCT GLUCOSE: 146 MG/DL (ref 70–110)
POCT GLUCOSE: 156 MG/DL (ref 70–110)
POCT GLUCOSE: 172 MG/DL (ref 70–110)
POCT GLUCOSE: 174 MG/DL (ref 70–110)
POCT GLUCOSE: 193 MG/DL (ref 70–110)
POCT GLUCOSE: 197 MG/DL (ref 70–110)
POCT GLUCOSE: 209 MG/DL (ref 70–110)
POCT GLUCOSE: 211 MG/DL (ref 70–110)
POCT GLUCOSE: 212 MG/DL (ref 70–110)
POCT GLUCOSE: 216 MG/DL (ref 70–110)
POCT GLUCOSE: 217 MG/DL (ref 70–110)
POCT GLUCOSE: 70 MG/DL (ref 70–110)
POCT GLUCOSE: 76 MG/DL (ref 70–110)
POCT GLUCOSE: 97 MG/DL (ref 70–110)
POCT GLUCOSE: 97 MG/DL (ref 70–110)
POTASSIUM BLOOD GAS (OHS): 3.2 MMOL/L (ref 3.5–5)
POTASSIUM SERPL-SCNC: 3.6 MMOL/L (ref 3.5–5.1)
PROT SERPL-MCNC: 5.5 GM/DL (ref 5.8–7.6)
PS (OHS): 10 CMH2O
RBC # BLD AUTO: 4.36 X10(6)/MCL (ref 4.2–5.4)
SAMPLE SITE BLOOD GAS (OHS): ABNORMAL
SAO2 % BLDA: 97 %
SODIUM BLOOD GAS (OHS): 139 MMOL/L (ref 137–145)
SODIUM SERPL-SCNC: 145 MMOL/L (ref 136–145)
WBC # SPEC AUTO: 15.42 X10(3)/MCL (ref 4.5–11.5)

## 2024-05-04 PROCEDURE — 99024 POSTOP FOLLOW-UP VISIT: CPT | Mod: ,,, | Performed by: PHYSICIAN ASSISTANT

## 2024-05-04 PROCEDURE — 36600 WITHDRAWAL OF ARTERIAL BLOOD: CPT

## 2024-05-04 PROCEDURE — 37799 UNLISTED PX VASCULAR SURGERY: CPT

## 2024-05-04 PROCEDURE — 94003 VENT MGMT INPAT SUBQ DAY: CPT

## 2024-05-04 PROCEDURE — S5010 5% DEXTROSE AND 0.45% SALINE: HCPCS | Performed by: PHYSICIAN ASSISTANT

## 2024-05-04 PROCEDURE — 94760 N-INVAS EAR/PLS OXIMETRY 1: CPT

## 2024-05-04 PROCEDURE — 63600175 PHARM REV CODE 636 W HCPCS: Performed by: PHYSICIAN ASSISTANT

## 2024-05-04 PROCEDURE — 36415 COLL VENOUS BLD VENIPUNCTURE: CPT | Performed by: INTERNAL MEDICINE

## 2024-05-04 PROCEDURE — 25000003 PHARM REV CODE 250: Performed by: PHYSICIAN ASSISTANT

## 2024-05-04 PROCEDURE — 84100 ASSAY OF PHOSPHORUS: CPT | Performed by: INTERNAL MEDICINE

## 2024-05-04 PROCEDURE — 27100171 HC OXYGEN HIGH FLOW UP TO 24 HOURS

## 2024-05-04 PROCEDURE — 63600175 PHARM REV CODE 636 W HCPCS: Performed by: INTERNAL MEDICINE

## 2024-05-04 PROCEDURE — 85027 COMPLETE CBC AUTOMATED: CPT | Performed by: INTERNAL MEDICINE

## 2024-05-04 PROCEDURE — 80053 COMPREHEN METABOLIC PANEL: CPT | Performed by: INTERNAL MEDICINE

## 2024-05-04 PROCEDURE — 83735 ASSAY OF MAGNESIUM: CPT | Performed by: INTERNAL MEDICINE

## 2024-05-04 PROCEDURE — 93005 ELECTROCARDIOGRAM TRACING: CPT

## 2024-05-04 PROCEDURE — 25000003 PHARM REV CODE 250: Performed by: INTERNAL MEDICINE

## 2024-05-04 PROCEDURE — 99900031 HC PATIENT EDUCATION (STAT)

## 2024-05-04 PROCEDURE — 27200667 HC PACEMAKER, TEMPORARY MONITORING, PER SHIFT

## 2024-05-04 PROCEDURE — 25000003 PHARM REV CODE 250

## 2024-05-04 PROCEDURE — 94799 UNLISTED PULMONARY SVC/PX: CPT

## 2024-05-04 PROCEDURE — 93010 ELECTROCARDIOGRAM REPORT: CPT | Mod: ,,, | Performed by: INTERNAL MEDICINE

## 2024-05-04 PROCEDURE — 20000000 HC ICU ROOM

## 2024-05-04 PROCEDURE — 99900035 HC TECH TIME PER 15 MIN (STAT)

## 2024-05-04 RX ORDER — IBUPROFEN 200 MG
16 TABLET ORAL
Status: DISCONTINUED | OUTPATIENT
Start: 2024-05-04 | End: 2024-05-13 | Stop reason: HOSPADM

## 2024-05-04 RX ORDER — GLUCAGON 1 MG
1 KIT INJECTION
Status: DISCONTINUED | OUTPATIENT
Start: 2024-05-04 | End: 2024-05-13 | Stop reason: HOSPADM

## 2024-05-04 RX ORDER — SODIUM CHLORIDE 450 MG/100ML
INJECTION, SOLUTION INTRAVENOUS CONTINUOUS
Status: DISCONTINUED | OUTPATIENT
Start: 2024-05-04 | End: 2024-05-08

## 2024-05-04 RX ORDER — IBUPROFEN 200 MG
24 TABLET ORAL
Status: DISCONTINUED | OUTPATIENT
Start: 2024-05-04 | End: 2024-05-13 | Stop reason: HOSPADM

## 2024-05-04 RX ADMIN — HYDROCODONE BITARTRATE AND ACETAMINOPHEN 1 TABLET: 5; 325 TABLET ORAL at 05:05

## 2024-05-04 RX ADMIN — FAMOTIDINE 20 MG: 10 INJECTION, SOLUTION INTRAVENOUS at 08:05

## 2024-05-04 RX ADMIN — INSULIN HUMAN 30 UNITS/HR: 1 INJECTION, SOLUTION INTRAVENOUS at 08:05

## 2024-05-04 RX ADMIN — ASPIRIN 81 MG: 81 TABLET, COATED ORAL at 12:05

## 2024-05-04 RX ADMIN — FOLIC ACID 1 MG: 1 TABLET ORAL at 12:05

## 2024-05-04 RX ADMIN — DEXTROSE AND SODIUM CHLORIDE: 5; 450 INJECTION, SOLUTION INTRAVENOUS at 09:05

## 2024-05-04 RX ADMIN — SODIUM CHLORIDE: 4.5 INJECTION, SOLUTION INTRAVENOUS at 10:05

## 2024-05-04 RX ADMIN — GABAPENTIN 100 MG: 100 CAPSULE ORAL at 08:05

## 2024-05-04 RX ADMIN — INSULIN HUMAN 25 UNITS/HR: 1 INJECTION, SOLUTION INTRAVENOUS at 04:05

## 2024-05-04 RX ADMIN — OXYCODONE HYDROCHLORIDE 5 MG: 5 TABLET ORAL at 02:05

## 2024-05-04 RX ADMIN — POTASSIUM PHOSPHATE, MONOBASIC AND POTASSIUM PHOSPHATE, DIBASIC 30 MMOL: 224; 236 INJECTION, SOLUTION, CONCENTRATE INTRAVENOUS at 03:05

## 2024-05-04 RX ADMIN — MUPIROCIN: 20 OINTMENT TOPICAL at 08:05

## 2024-05-04 RX ADMIN — DOCUSATE SODIUM 100 MG: 100 CAPSULE, LIQUID FILLED ORAL at 12:05

## 2024-05-04 RX ADMIN — DOCUSATE SODIUM 100 MG: 100 CAPSULE, LIQUID FILLED ORAL at 08:05

## 2024-05-04 RX ADMIN — CEFAZOLIN SODIUM 2 G: 2 SOLUTION INTRAVENOUS at 06:05

## 2024-05-04 RX ADMIN — ENOXAPARIN SODIUM 30 MG: 30 INJECTION SUBCUTANEOUS at 05:05

## 2024-05-04 RX ADMIN — ACETAMINOPHEN 1000 MG: 10 INJECTION, SOLUTION INTRAVENOUS at 06:05

## 2024-05-04 RX ADMIN — OXYCODONE HYDROCHLORIDE 5 MG: 5 TABLET ORAL at 10:05

## 2024-05-04 RX ADMIN — GABAPENTIN 100 MG: 100 CAPSULE ORAL at 12:05

## 2024-05-04 RX ADMIN — ONDANSETRON 4 MG: 2 INJECTION INTRAMUSCULAR; INTRAVENOUS at 01:05

## 2024-05-04 NOTE — CONSULTS
Inpatient consult to Cardiology  Consult performed by: Prakash Peres ANP  Consult ordered by: Frederick Hicks PA  Reason for consult: s/p Sternotomy with Ungraftable Anatomy x 2 Vessels        [facility]    Cardiology  Consult Note    Patient Name: Rosalba Whitlock  MRN: [id number]  Admission Date: 5/3/2024  Hospital Length of Stay: 1 days  Code Status: Full Code   Attending Provider: Jeovanny Wan MD   Consulting Provider: JIMBO Mcdonald  Primary Care Physician: Eleazar Ramos MD  Principal Problem:<principal problem not specified>    Patient information was obtained from past medical records and ER records.     Subjective:     Chief Complaint/Reason for Consult: s/p Sternotomy with Ungraftable Anatomy x 2 Vessels    HPI: Ms. Whitlock is an 82 y/o female who is known to [facility], Dr. Fry. The patient was recently worked up by Dr. 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 She was referred to CV Surgery for a CABG Evaluation. She was deemed a candidate for CABG and on 5.3.24 she underwent a Sternotomy, unfortunately, she had ungraftable anatomy to both the RCA and LAD. She was transferred to ICU for further management. [facility] has been consulted for Recommendations.     PMH: Vertigo, Amnesia, OA, MVCAD/Ungraftable post Sternotomy, DM II, HTN, GERD, MI, Chronic Lower Back pain, Migraines, HLD, CKD Stage III, Osteoporosis, Obesity, VI  PSH: Sternotomy/No Graftable Vessels (5.3.24), Angiogram, Colonoscopy, Eye Surgery, Foot Surgery, KIA, Tonsillectomy, Epidural Steroid Injections, EGD  Family History: Father, D, ESRD; Mother, D, DVT  Social History:     Previous Cardiac Diagnostics:   [facility] 2.21.24:  Findings:  1. Left main-normal  2. Lad-patent small mid stent, 60-70% stenosis distal to stent edge, IFR 0.86  3. LCX- codominant, Mid 50%, IFR 0.92  4. RCA-codominant Mid occlusion in fractured stent, left to right collaterals  Plan:  1. Maximize medical management  2. Discharge home  today  3. Consult CTS as outpatient to evaluate for two-vessel bypass to distal LAD and poor and small.  Patient does not have good stenting options for apical LAD and RCA as well.  Will continue medical MNGT for now.    ECHO 11.21.23:  The study quality is average.   The left ventricle is normal in size. Global left ventricular systolic function is normal. The left ventricular ejection fraction is 60%. Left ventricular diastolic function is normal. Normal wall thickness.  No significant valvular dysfunction noted.     Carotid US 11.21.23:  The study quality is average.   1-39% stenosis in the mid right internal carotid artery based on Bluth Criteria.   1-39% stenosis in the proximal left internal carotid artery based on Bluth Criteria.   Antegrade right vertebral artery flow.   Antegrade left vertebral artery flow.    Review of patient's allergies indicates:   Allergen Reactions    Amlodipine      Skin crawling      Current Facility-Administered Medications   Medication Dose Route Frequency Provider Last Rate Last Admin    albumin human 5% bottle 12.5 g  12.5 g Intravenous PRN Aaron Ontiveros PA-C   Stopped at 05/03/24 1906    aspirin EC tablet 81 mg  81 mg Oral Daily Aaron Ontiveros PA-C        calcium gluconate 1 g in NS IVPB (premixed)  1 g Intravenous PRN Aaron Ontiveros PA-C        calcium gluconate 1 g in NS IVPB (premixed)  2 g Intravenous PRN Aaron Ontiveros PA-C        calcium gluconate 1 g in NS IVPB (premixed)  3 g Intravenous PRN Aaron Ontiveros PA-C        clevidipine (CLEVIPREX) 25 mg/50 mL infusion  0-16 mg/hr Intravenous Continuous Aaron Ontiveros PA-C   Stopped at 05/03/24 1630    dexmedetomidine (PRECEDEX) 400mcg/100mL 0.9% NaCL infusion  0-1.4 mcg/kg/hr Intravenous Continuous Aaron Ontiveros PA-C 7.19 mL/hr at 05/04/24 0657 0.4 mcg/kg/hr at 05/04/24 0657    dextrose 10% bolus 125 mL 125 mL  12.5 g Intravenous PRN Aaron Ontiveros PA-C        dextrose 10% bolus 250 mL 250 mL  25 g  Intravenous PRN Aaron Ontiveros PA-C        dextrose 5 % and 0.45 % NaCl infusion   Intravenous Continuous Aaron Ontiveros PA-C 100 mL/hr at 05/04/24 0657 Rate Verify at 05/04/24 0657    docusate sodium capsule 100 mg  100 mg Oral BID Aaron Ontiveros PA-C        enoxaparin injection 30 mg  30 mg Subcutaneous Daily Jeovanny Wan MD        EPINEPHrine (ADRENALIN) 5 mg in dextrose 5 % (D5W) 250 mL infusion  0-2 mcg/kg/min Intravenous Continuous Aaron Ontiveros PA-C 12.9 mL/hr at 05/04/24 0657 0.06 mcg/kg/min at 05/04/24 0657    famotidine (PF) injection 20 mg  20 mg Intravenous Daily Aaron Ontiveros PA-C   20 mg at 05/04/24 0853    folic acid tablet 1 mg  1 mg Oral Daily Aaron Ontiveros PA-C        gabapentin capsule 100 mg  100 mg Oral TID Kimberly Catherine MD   100 mg at 05/03/24 2055    HYDROcodone-acetaminophen 5-325 mg per tablet 1 tablet  1 tablet Oral Q4H PRN Jeovanny Wan MD        insulin regular in 0.9 % NaCl 100 unit/100 mL (1 unit/mL) infusion  0-52 Units/hr Intravenous Continuous Aaron Ontiveros PA-C 30 mL/hr at 05/04/24 0854 30 Units/hr at 05/04/24 0854    magnesium sulfate 2g in water 50mL IVPB (premix)  2 g Intravenous PRN Aaron Ontiveros PA-C        magnesium sulfate 2g in water 50mL IVPB (premix)  4 g Intravenous PRN Aaron Ontiveros PA-C        metoprolol tartrate (LOPRESSOR) split tablet 12.5 mg  12.5 mg Oral BID Aaron Ontiveros PA-C        morphine injection 4 mg  4 mg Intravenous Q4H PRN Aaron Ontiveros PA-C   4 mg at 05/03/24 2212    mupirocin 2 % ointment   Nasal BID Aaron Ontiveros PA-C   Given at 05/04/24 0853    ondansetron injection 4 mg  4 mg Intravenous Q4H PRN Aaron Ontiveros PA-C        oxyCODONE immediate release tablet 5 mg  5 mg Oral Q4H PRN Jeovanny Wan MD        oxyCODONE immediate release tablet Tab 10 mg  10 mg Oral Q4H PRN Aaron Ontiveros, PA-C        potassium chloride 20 mEq in 100 mL IVPB (FOR CENTRAL LINE ADMINISTRATION ONLY)  20 mEq  Intravenous PRN Aaron Ontiveros PA-C   Stopped at 05/03/24 1316    potassium chloride 20 mEq in 100 mL IVPB (FOR CENTRAL LINE ADMINISTRATION ONLY)  40 mEq Intravenous PRN Aaron Ontiveros PA-C        potassium chloride 20 mEq in 100 mL IVPB (FOR CENTRAL LINE ADMINISTRATION ONLY)  60 mEq Intravenous PRN Aaron Ontiveros PA-C        sodium phosphate 15 mmol in dextrose 5 % (D5W) 250 mL IVPB  15 mmol Intravenous PRN Aaron Ontiveros PA-C        sodium phosphate 20.01 mmol in dextrose 5 % (D5W) 250 mL IVPB  20.01 mmol Intravenous PRN Aaron Ontiveros PA-C        sodium phosphate 30 mmol in dextrose 5 % (D5W) 250 mL IVPB  30 mmol Intravenous PRN Aaron Ontiveros PA-C         Facility-Administered Medications Ordered in Other Encounters   Medication Dose Route Frequency Provider Last Rate Last Admin    0.9%  NaCl infusion   Intravenous Continuous Rubia Seymour MD   Stopped at 12/04/23 0713    diazePAM tablet 10 mg  10 mg Oral On Call Procedure Emmanuel Riley MD   10 mg at 02/21/24 1031    diphenhydrAMINE capsule 50 mg  50 mg Oral On Call Procedure Rubia Seymour MD   50 mg at 12/04/23 0700    diphenhydrAMINE capsule 50 mg  50 mg Oral On Call Procedure Emmanuel Riley MD   50 mg at 02/21/24 1031    sodium chloride 0.9% flush 10 mL  10 mL Intravenous PRN Rubia Seymour MD         Family History       Problem Relation (Age of Onset)    Diabetes Brother    Kidney disease Father    Lung cancer Mother          Tobacco Use    Smoking status: Former     Types: Cigarettes    Smokeless tobacco: Never    Tobacco comments:     Quit 20 years ago   Substance and Sexual Activity    Alcohol use: Never    Drug use: Never    Sexual activity: Not on file       Review of Systems   Unable to perform ROS: Intubated     Objective:     Vital Signs (Most Recent):  Temp: 98.6 °F (37 °C) (05/04/24 0400)  Pulse: 81 (05/04/24 0745)  Resp: 20 (05/04/24 0745)  BP: (!) 126/56 (05/04/24 0645)  SpO2: 99 % (05/04/24 0745) Vital Signs  (24h Range):  Temp:  [95 °F (35 °C)-99.2 °F (37.3 °C)] 98.6 °F (37 °C)  Pulse:  [67-98] 81  Resp:  [13-25] 20  SpO2:  [96 %-100 %] 99 %  BP: ()/(42-92) 126/56  Arterial Line BP: ()/(48-85) 115/56   Weight: 83.2 kg (183 lb 6.8 oz)  Body mass index is 30.52 kg/m².  SpO2: 99 %       Intake/Output Summary (Last 24 hours) at 5/4/2024 0859  Last data filed at 5/4/2024 0746  Gross per 24 hour   Intake 8983.55 ml   Output 6208 ml   Net 2775.55 ml     Lines/Drains/Airways       Central Venous Catheter Line  Duration             Percutaneous Central Line - Triple Lumen  05/03/24 0823 Internal Jugular Right 1 day              Drain  Duration                  Chest Tube 05/03/24 Tube - 1 Anterior Mediastinal 24 Fr. 1 day         Chest Tube 05/03/24 Tube - 2 Left Pleural 24 Fr. 1 day         NG/OG Tube 05/03/24 0700 Center mouth 1 day         Urethral Catheter 05/03/24 Temperature probe 16 Fr. 1 day              Airway  Duration                  Airway - Non-Surgical 05/03/24 0654 1 day              Peripheral Intravenous Line  Duration                  Peripheral IV - Single Lumen 05/03/24 0545 18 G Distal;Posterior;Right Forearm 1 day                  Significant Labs:  Recent Results (from the past 72 hour(s))   POCT glucose    Collection Time: 05/03/24  5:39 AM   Result Value Ref Range    POCT Glucose 157 (H) 70 - 110 mg/dL   ISTAT PROCEDURE    Collection Time: 05/03/24  7:07 AM   Result Value Ref Range    POC PH 7.295 (L) 7.35 - 7.45    POC PCO2 45.4 (H) 35 - 45 mmHg    POC PO2 47 40 - 60 mmHg    POC HCO3 22.1 (L) 24 - 28 mmol/L    POC BE -4 (L) -2 to 2 mmol/L    POC SATURATED O2 78 95 - 100 %    POC Glucose 149 (H) 70 - 110 mg/dL    POC Sodium 144 136 - 145 mmol/L    POC Potassium 4.0 3.5 - 5.1 mmol/L    POC TCO2 23 (L) 24 - 29 mmol/L    POC Ionized Calcium 1.15 1.06 - 1.42 mmol/L    POC Hematocrit 30 (L) 36 - 54 %PCV    POC HEMOGLOBIN 10 g/dL    Sample VENOUS     FiO2 100    Basic Metabolic Panel    Collection  Time: 05/03/24  7:42 AM   Result Value Ref Range    Sodium Level 145 136 - 145 mmol/L    Potassium Level 3.8 3.5 - 5.1 mmol/L    Chloride 117 (H) 98 - 107 mmol/L    Carbon Dioxide 21 (L) 23 - 31 mmol/L    Glucose Level 182 (H) 82 - 115 mg/dL    Blood Urea Nitrogen 25.8 (H) 9.8 - 20.1 mg/dL    Creatinine 1.65 (H) 0.55 - 1.02 mg/dL    BUN/Creatinine Ratio 16     Calcium Level Total 9.5 8.4 - 10.2 mg/dL    Anion Gap 7.0 mEq/L    eGFR 31 mls/min/1.73/m2   Protime-INR    Collection Time: 05/03/24  7:42 AM   Result Value Ref Range    PT 16.4 (H) 12.5 - 14.5 seconds    INR 1.3 <=1.3   APTT    Collection Time: 05/03/24  7:42 AM   Result Value Ref Range    PTT 30.2 23.2 - 33.7 seconds   CBC with Differential    Collection Time: 05/03/24  7:42 AM   Result Value Ref Range    WBC 8.12 4.50 - 11.50 x10(3)/mcL    RBC 3.42 (L) 4.20 - 5.40 x10(6)/mcL    Hgb 10.0 (L) 12.0 - 16.0 g/dL    Hct 29.9 (L) 37.0 - 47.0 %    MCV 87.4 80.0 - 94.0 fL    MCH 29.2 27.0 - 31.0 pg    MCHC 33.4 33.0 - 36.0 g/dL    RDW 14.1 11.5 - 17.0 %    Platelet 120 (L) 130 - 400 x10(3)/mcL    MPV 9.8 7.4 - 10.4 fL    Neut % 73.1 %    Lymph % 22.4 %    Mono % 1.5 %    Eos % 2.3 %    Basophil % 0.2 %    Lymph # 1.82 0.6 - 4.6 x10(3)/mcL    Neut # 5.93 2.1 - 9.2 x10(3)/mcL    Mono # 0.12 0.1 - 1.3 x10(3)/mcL    Eos # 0.19 0 - 0.9 x10(3)/mcL    Baso # 0.02 <=0.2 x10(3)/mcL    IG# 0.04 0 - 0.04 x10(3)/mcL    IG% 0.5 %    NRBC% 0.0 %   ISTAT PROCEDURE    Collection Time: 05/03/24  8:22 AM   Result Value Ref Range    POC PH 7.341 (L) 7.35 - 7.45    POC PCO2 46.1 (H) 35 - 45 mmHg    POC PO2 45 (LL) 80 - 100 mmHg    POC HCO3 24.9 24 - 28 mmol/L    POC BE -1 -2 to 2 mmol/L    POC SATURATED O2 78 95 - 100 %    POC Glucose 216 (H) 70 - 110 mg/dL    POC Sodium 145 136 - 145 mmol/L    POC Potassium 4.2 3.5 - 5.1 mmol/L    POC TCO2 26 23 - 27 mmol/L    POC Ionized Calcium 0.96 (L) 1.06 - 1.42 mmol/L    POC Hematocrit 19 (LL) 36 - 54 %PCV    POC HEMOGLOBIN 7 g/dL    Sample  ARTERIAL    ISTAT PROCEDURE    Collection Time: 05/03/24  8:36 AM   Result Value Ref Range    POC PH 7.323 (L) 7.35 - 7.45    POC PCO2 38.1 35 - 45 mmHg    POC PO2 353 (H) 80 - 100 mmHg    POC HCO3 19.8 (L) 24 - 28 mmol/L    POC BE -6 (L) -2 to 2 mmol/L    POC SATURATED O2 100 95 - 100 %    POC Glucose 259 (H) 70 - 110 mg/dL    POC Sodium 142 136 - 145 mmol/L    POC Potassium 5.2 (H) 3.5 - 5.1 mmol/L    POC TCO2 21 (L) 23 - 27 mmol/L    POC Ionized Calcium 0.92 (L) 1.06 - 1.42 mmol/L    POC Hematocrit 25 (L) 36 - 54 %PCV    POC HEMOGLOBIN 9 g/dL    Sample ARTERIAL     FiO2 70    ISTAT PROCEDURE    Collection Time: 05/03/24  8:53 AM   Result Value Ref Range    POC PH 7.437 7.35 - 7.45    POC PCO2 35.5 35 - 45 mmHg    POC PO2 329 (H) 80 - 100 mmHg    POC HCO3 24.0 24 - 28 mmol/L    POC BE 0 -2 to 2 mmol/L    POC SATURATED O2 100 95 - 100 %    POC Glucose 227 (H) 70 - 110 mg/dL    POC Sodium 143 136 - 145 mmol/L    POC Potassium 4.6 3.5 - 5.1 mmol/L    POC TCO2 25 23 - 27 mmol/L    POC Ionized Calcium 1.11 1.06 - 1.42 mmol/L    POC Hematocrit 23 (L) 36 - 54 %PCV    POC HEMOGLOBIN 8 g/dL    Sample ARTERIAL     FiO2 70    ISTAT PROCEDURE    Collection Time: 05/03/24  9:15 AM   Result Value Ref Range    POC PH 7.363 7.35 - 7.45    POC PCO2 37.8 35 - 45 mmHg    POC PO2 292 (H) 80 - 100 mmHg    POC HCO3 21.5 (L) 24 - 28 mmol/L    POC BE -4 (L) -2 to 2 mmol/L    POC SATURATED O2 100 95 - 100 %    POC Glucose 195 (H) 70 - 110 mg/dL    POC Sodium 144 136 - 145 mmol/L    POC Potassium 4.1 3.5 - 5.1 mmol/L    POC TCO2 23 23 - 27 mmol/L    POC Ionized Calcium 1.50 (H) 1.06 - 1.42 mmol/L    POC Hematocrit 23 (L) 36 - 54 %PCV    POC HEMOGLOBIN 8 g/dL    Sample ARTERIAL     FiO2 100    ISTAT PROCEDURE    Collection Time: 05/03/24  9:35 AM   Result Value Ref Range    POC PH 7.370 7.35 - 7.45    POC PCO2 33.8 (L) 35 - 45 mmHg    POC PO2 258 (H) 80 - 100 mmHg    POC HCO3 19.5 (L) 24 - 28 mmol/L    POC BE -6 (L) -2 to 2 mmol/L    POC  SATURATED O2 100 95 - 100 %    POC Glucose 180 (H) 70 - 110 mg/dL    POC Sodium 145 136 - 145 mmol/L    POC Potassium 3.7 3.5 - 5.1 mmol/L    POC TCO2 21 (L) 23 - 27 mmol/L    POC Ionized Calcium 1.34 1.06 - 1.42 mmol/L    POC Hematocrit 25 (L) 36 - 54 %PCV    POC HEMOGLOBIN 9 g/dL    Sample ARTERIAL    POCT glucose    Collection Time: 05/03/24 10:13 AM   Result Value Ref Range    POCT Glucose 160 (H) 70 - 110 mg/dL   Magnesium    Collection Time: 05/03/24 10:30 AM   Result Value Ref Range    Magnesium Level 2.70 (H) 1.60 - 2.60 mg/dL   Phosphorus    Collection Time: 05/03/24 10:30 AM   Result Value Ref Range    Phosphorus Level 2.0 (L) 2.3 - 4.7 mg/dL   CBC Without Differential    Collection Time: 05/03/24 10:30 AM   Result Value Ref Range    WBC 10.19 4.50 - 11.50 x10(3)/mcL    RBC 3.55 (L) 4.20 - 5.40 x10(6)/mcL    Hgb 10.3 (L) 12.0 - 16.0 g/dL    Hct 30.7 (L) 37.0 - 47.0 %    MCV 86.5 80.0 - 94.0 fL    MCH 29.0 27.0 - 31.0 pg    MCHC 33.6 33.0 - 36.0 g/dL    RDW 14.0 11.5 - 17.0 %    Platelet 131 130 - 400 x10(3)/mcL    MPV 9.6 7.4 - 10.4 fL    NRBC% 0.0 %   Comprehensive Metabolic Panel    Collection Time: 05/03/24 10:30 AM   Result Value Ref Range    Sodium Level 147 (H) 136 - 145 mmol/L    Potassium Level 3.4 (L) 3.5 - 5.1 mmol/L    Chloride 120 (H) 98 - 107 mmol/L    Carbon Dioxide 20 (L) 23 - 31 mmol/L    Glucose Level 151 (H) 82 - 115 mg/dL    Blood Urea Nitrogen 26.3 (H) 9.8 - 20.1 mg/dL    Creatinine 1.71 (H) 0.55 - 1.02 mg/dL    Calcium Level Total 9.0 8.4 - 10.2 mg/dL    Protein Total 4.7 (L) 5.8 - 7.6 gm/dL    Albumin Level 3.0 (L) 3.4 - 4.8 g/dL    Globulin 1.7 (L) 2.4 - 3.5 gm/dL    Albumin/Globulin Ratio 1.8 1.1 - 2.0 ratio    Bilirubin Total 0.6 <=1.5 mg/dL    Alkaline Phosphatase 32 (L) 40 - 150 unit/L    Alanine Aminotransferase 8 0 - 55 unit/L    Aspartate Aminotransferase 24 5 - 34 unit/L    eGFR 30 mls/min/1.73/m2   Protime-INR    Collection Time: 05/03/24 10:30 AM   Result Value Ref Range     PT 15.8 (H) 12.5 - 14.5 seconds    INR 1.3 <=1.3   RT Blood Gas    Collection Time: 05/03/24 10:35 AM   Result Value Ref Range    Sample Type Arterial Blood     Sample site Arterial Line     Drawn by AS RRT     pH, Blood gas 7.410 7.350 - 7.450    pCO2, Blood gas 32.0 (L) 35.0 - 45.0 mmHg    pO2, Blood gas 160.0 (H) 80.0 - 100.0 mmHg    Sodium, Blood Gas 140 137 - 145 mmol/L    Potassium, Blood Gas 3.2 (L) 3.5 - 5.0 mmol/L    Calcium Level Ionized 1.22 1.12 - 1.23 mmol/L    TOC2, Blood gas 21.3 mmol/L    Base Excess, Blood gas -3.60 (L) -2.00 - 2.00 mmol/L    sO2, Blood gas 99.4 %    HCO3, Blood gas 20.3 (L) 22.0 - 26.0 mmol/L    THb, Blood gas 11.0 (L) 12 - 16 g/dL    O2 Hb, Blood Gas 96.9 94.0 - 97.0 %    CO Hgb 1.0 0.5 - 1.5 %    Met Hgb 1.0 0.4 - 1.5 %    Allens Test N/A     MODE SIMV     Oxygen Device, Blood gas Ventilator     FIO2, Blood gas 50 %    Mech Vt 500 ml    Mech RR 20 b/min    PEEP 5.0 cmH2O    PS 10.0 cmH2O   POCT glucose    Collection Time: 05/03/24 11:08 AM   Result Value Ref Range    POCT Glucose 147 (H) 70 - 110 mg/dL   POCT glucose    Collection Time: 05/03/24 11:52 AM   Result Value Ref Range    POCT Glucose 100 70 - 110 mg/dL   POCT glucose    Collection Time: 05/03/24  1:00 PM   Result Value Ref Range    POCT Glucose 131 (H) 70 - 110 mg/dL   POCT glucose    Collection Time: 05/03/24  1:57 PM   Result Value Ref Range    POCT Glucose 137 (H) 70 - 110 mg/dL   Basic Metabolic Panel    Collection Time: 05/03/24  2:26 PM   Result Value Ref Range    Sodium Level 147 (H) 136 - 145 mmol/L    Potassium Level 4.0 3.5 - 5.1 mmol/L    Chloride 115 (H) 98 - 107 mmol/L    Carbon Dioxide 20 (L) 23 - 31 mmol/L    Glucose Level 168 (H) 82 - 115 mg/dL    Blood Urea Nitrogen 24.3 (H) 9.8 - 20.1 mg/dL    Creatinine 1.77 (H) 0.55 - 1.02 mg/dL    BUN/Creatinine Ratio 14     Calcium Level Total 8.0 (L) 8.4 - 10.2 mg/dL    Anion Gap 12.0 mEq/L    eGFR 29 mls/min/1.73/m2   Hemoglobin and Hematocrit    Collection  Time: 05/03/24  2:26 PM   Result Value Ref Range    Hgb 11.4 (L) 12.0 - 16.0 g/dL    Hct 34.5 (L) 37.0 - 47.0 %   RT Blood Gas    Collection Time: 05/03/24  3:35 PM   Result Value Ref Range    Sample Type Arterial Blood     Sample site Arterial Line     Drawn by AS LRT     pH, Blood gas 7.290 (LL) 7.350 - 7.450    pCO2, Blood gas 49.0 (H) 35.0 - 45.0 mmHg    pO2, Blood gas 86.0 80.0 - 100.0 mmHg    Sodium, Blood Gas 138 137 - 145 mmol/L    Potassium, Blood Gas 3.9 3.5 - 5.0 mmol/L    Calcium Level Ionized 1.08 (L) 1.12 - 1.23 mmol/L    TOC2, Blood gas 25.1 mmol/L    Base Excess, Blood gas -3.30 (L) -2.00 - 2.00 mmol/L    sO2, Blood gas 95.3 %    HCO3, Blood gas 23.6 22.0 - 26.0 mmol/L    THb, Blood gas 12.9 12 - 16 g/dL    O2 Hb, Blood Gas 94.4 94.0 - 97.0 %    CO Hgb 1.2 0.5 - 1.5 %    Met Hgb 1.1 0.4 - 1.5 %    Allens Test N/A     MODE CPAP     Oxygen Device, Blood gas Ventilator     FIO2, Blood gas 30 %    PEEP 5.0 cmH2O    PS 10.0 cmH2O   POCT glucose    Collection Time: 05/03/24  3:44 PM   Result Value Ref Range    POCT Glucose 197 (H) 70 - 110 mg/dL   POCT glucose    Collection Time: 05/03/24  4:46 PM   Result Value Ref Range    POCT Glucose 216 (H) 70 - 110 mg/dL   Blood Gas (ABG)    Collection Time: 05/03/24  5:05 PM   Result Value Ref Range    Sample Type Arterial Blood     Sample site Arterial Line     Drawn by AS LRT     pH, Blood gas 7.290 (LL) 7.350 - 7.450    pCO2, Blood gas 50.0 (H) 35.0 - 45.0 mmHg    pO2, Blood gas 89.0 80.0 - 100.0 mmHg    Sodium, Blood Gas 139 137 - 145 mmol/L    Potassium, Blood Gas 4.2 3.5 - 5.0 mmol/L    Calcium Level Ionized 1.01 (L) 1.12 - 1.23 mmol/L    TOC2, Blood gas 25.5 mmol/L    Base Excess, Blood gas -2.90 (L) -2.00 - 2.00 mmol/L    sO2, Blood gas 95.7 %    HCO3, Blood gas 24.0 22.0 - 26.0 mmol/L    THb, Blood gas 12.1 12 - 16 g/dL    O2 Hb, Blood Gas 96.3 94.0 - 97.0 %    CO Hgb 2.4 (H) 0.5 - 1.5 %    Met Hgb 0.2 (L) 0.4 - 1.5 %    Allens Test N/A     MODE CPAP      FIO2, Blood gas 30 %    PEEP 5.0 cmH2O    PS 10.0 cmH2O   POCT glucose    Collection Time: 05/03/24  5:51 PM   Result Value Ref Range    POCT Glucose 189 (H) 70 - 110 mg/dL   POCT glucose    Collection Time: 05/03/24  6:50 PM   Result Value Ref Range    POCT Glucose 203 (H) 70 - 110 mg/dL   POCT glucose    Collection Time: 05/03/24  8:03 PM   Result Value Ref Range    POCT Glucose 223 (H) 70 - 110 mg/dL   POCT glucose    Collection Time: 05/03/24  9:02 PM   Result Value Ref Range    POCT Glucose 205 (H) 70 - 110 mg/dL   POCT glucose    Collection Time: 05/03/24 10:03 PM   Result Value Ref Range    POCT Glucose 211 (H) 70 - 110 mg/dL   POCT glucose    Collection Time: 05/03/24 11:01 PM   Result Value Ref Range    POCT Glucose 219 (H) 70 - 110 mg/dL   POCT glucose    Collection Time: 05/03/24 11:58 PM   Result Value Ref Range    POCT Glucose 217 (H) 70 - 110 mg/dL   POCT glucose    Collection Time: 05/04/24  1:00 AM   Result Value Ref Range    POCT Glucose 212 (H) 70 - 110 mg/dL   POCT glucose    Collection Time: 05/04/24  2:04 AM   Result Value Ref Range    POCT Glucose 211 (H) 70 - 110 mg/dL   CBC Without Differential    Collection Time: 05/04/24  3:36 AM   Result Value Ref Range    WBC 15.42 (H) 4.50 - 11.50 x10(3)/mcL    RBC 4.36 4.20 - 5.40 x10(6)/mcL    Hgb 12.7 12.0 - 16.0 g/dL    Hct 38.2 37.0 - 47.0 %    MCV 87.6 80.0 - 94.0 fL    MCH 29.1 27.0 - 31.0 pg    MCHC 33.2 33.0 - 36.0 g/dL    RDW 14.6 11.5 - 17.0 %    Platelet 203 130 - 400 x10(3)/mcL    MPV 10.0 7.4 - 10.4 fL    NRBC% 0.0 %   Magnesium    Collection Time: 05/04/24  3:37 AM   Result Value Ref Range    Magnesium Level 2.50 1.60 - 2.60 mg/dL   Phosphorus    Collection Time: 05/04/24  3:37 AM   Result Value Ref Range    Phosphorus Level 1.5 (L) 2.3 - 4.7 mg/dL   Comprehensive Metabolic Panel    Collection Time: 05/04/24  3:37 AM   Result Value Ref Range    Sodium Level 145 136 - 145 mmol/L    Potassium Level 3.6 3.5 - 5.1 mmol/L    Chloride 112 (H) 98  - 107 mmol/L    Carbon Dioxide 19 (L) 23 - 31 mmol/L    Glucose Level 212 (H) 82 - 115 mg/dL    Blood Urea Nitrogen 20.4 (H) 9.8 - 20.1 mg/dL    Creatinine 1.95 (H) 0.55 - 1.02 mg/dL    Calcium Level Total 8.0 (L) 8.4 - 10.2 mg/dL    Protein Total 5.5 (L) 5.8 - 7.6 gm/dL    Albumin Level 3.6 3.4 - 4.8 g/dL    Globulin 1.9 (L) 2.4 - 3.5 gm/dL    Albumin/Globulin Ratio 1.9 1.1 - 2.0 ratio    Bilirubin Total 0.4 <=1.5 mg/dL    Alkaline Phosphatase 33 (L) 40 - 150 unit/L    Alanine Aminotransferase 11 0 - 55 unit/L    Aspartate Aminotransferase 36 (H) 5 - 34 unit/L    eGFR 25 mls/min/1.73/m2   POCT glucose    Collection Time: 05/04/24  3:47 AM   Result Value Ref Range    POCT Glucose 209 (H) 70 - 110 mg/dL   POCT glucose    Collection Time: 05/04/24  4:44 AM   Result Value Ref Range    POCT Glucose 197 (H) 70 - 110 mg/dL   POCT glucose    Collection Time: 05/04/24  6:02 AM   Result Value Ref Range    POCT Glucose 193 (H) 70 - 110 mg/dL   POCT glucose    Collection Time: 05/04/24  6:56 AM   Result Value Ref Range    POCT Glucose 174 (H) 70 - 110 mg/dL   POCT glucose    Collection Time: 05/04/24  7:57 AM   Result Value Ref Range    POCT Glucose 172 (H) 70 - 110 mg/dL   RT Blood Gas    Collection Time: 05/04/24  8:30 AM   Result Value Ref Range    Sample Type Arterial Blood     Sample site Left Brachial Artery     Drawn by AS LRT     pH, Blood gas 7.390 7.350 - 7.450    pCO2, Blood gas 38.0 35.0 - 45.0 mmHg    pO2, Blood gas 88.0 80.0 - 100.0 mmHg    Sodium, Blood Gas 139 137 - 145 mmol/L    Potassium, Blood Gas 3.2 (L) 3.5 - 5.0 mmol/L    Calcium Level Ionized 1.05 (L) 1.12 - 1.23 mmol/L    TOC2, Blood gas 24.2 mmol/L    Base Excess, Blood gas -1.70 mmol/L    sO2, Blood gas 97.0 %    HCO3, Blood gas 23.0 22.0 - 26.0 mmol/L    Allens Test N/A     MODE CPAP     Oxygen Device, Blood gas Ventilator     FIO2, Blood gas 30 %    PEEP 5.0 cmH2O    PS 10.0 cmH2O   POCT glucose    Collection Time: 05/04/24  8:52 AM   Result Value  Ref Range    POCT Glucose 156 (H) 70 - 110 mg/dL     Telemetry: SR     Physical Exam  Constitutional:       General: She is not in acute distress.     Appearance: Normal appearance. She is obese.      Comments: Vented/Sedated   HENT:      Head: Normocephalic.      Mouth/Throat:      Mouth: Mucous membranes are moist.   Eyes:      Extraocular Movements: Extraocular movements intact.      Conjunctiva/sclera: Conjunctivae normal.   Cardiovascular:      Rate and Rhythm: Normal rate and regular rhythm.      Pulses: Normal pulses.      Heart sounds: Murmur heard.   Pulmonary:      Effort: Pulmonary effort is normal. No respiratory distress.      Comments: Ventilator Associated Breath Sounds  Vent Mode: CPAP PSV  Oxygen Concentration (%):  [30-60] 30  Resp Rate Total:  [16 br/min-24 br/min] 24 br/min  Vt Set:  [50 mL-500 mL] 500 mL  PEEP/CPAP:  [5 cmH20] 5 cmH20  Pressure Support:  [10 cmH20] 10 cmH20  Mean Airway Pressure:  [9 fjH23-38 cmH20] 10 cmH20  Abdominal:      Palpations: Abdomen is soft.   Musculoskeletal:         General: No swelling.   Skin:     General: Skin is warm and dry.      Comments: Midline Sternotomy Dressing C/D/I. + CTs   Neurological:      Comments: Vented/Sedated       Home Medications:   Current Facility-Administered Medications on File Prior to Encounter   Medication Dose Route Frequency Provider Last Rate Last Admin    0.9%  NaCl infusion   Intravenous Continuous Rubia Seymour MD   Stopped at 12/04/23 0713    diazePAM tablet 10 mg  10 mg Oral On Call Procedure Emmanuel Riley MD   10 mg at 02/21/24 1031    diphenhydrAMINE capsule 50 mg  50 mg Oral On Call Procedure Rubia Seymour MD   50 mg at 12/04/23 0700    diphenhydrAMINE capsule 50 mg  50 mg Oral On Call Procedure Emmanuel Riley MD   50 mg at 02/21/24 1031    sodium chloride 0.9% flush 10 mL  10 mL Intravenous PRN Rubia Seymour MD         Current Outpatient Medications on File Prior to Encounter   Medication Sig Dispense  "Refill    aspirin (ECOTRIN) 81 MG EC tablet Take 81 mg by mouth once daily.      gabapentin (NEURONTIN) 100 MG capsule TAKE 2 CAPSULES BY MOUTH TWICE DAILY, EVERY MORNING AND AT NOON 60 capsule 6    gabapentin (NEURONTIN) 300 MG capsule TAKE 1 CAPSULE(300 MG) BY MOUTH EVERY EVENING 30 capsule 2    icosapent ethyL (VASCEPA) 1 gram Cap BID      insulin glargine (LANTUS U-100 INSULIN) 100 unit/mL injection Inject 46 units in the am, and 20 units at night 20 mL 6    isosorbide mononitrate (IMDUR) 30 MG 24 hr tablet Take 30 mg by mouth once daily.      loratadine (CLARITIN) 10 mg tablet TAKE 1 TABLET BY MOUTH EVERY DAY 90 tablet 3    nortriptyline (PAMELOR) 25 MG capsule TAKE 1 CAPSULE BY MOUTH EVERY DAY AT BEDTIME FOR SLEEP 30 capsule 3    omeprazole (PRILOSEC) 40 MG capsule TAKE 1 CAPSULE BY MOUTH DAILY AS NEEDED FOR REFLUX (Patient taking differently: Take 40 mg by mouth every morning.) 90 capsule 3    rosuvastatin (CRESTOR) 40 MG Tab Take 40 mg by mouth every evening.      ticagrelor (BRILINTA) 90 mg tablet Take 90 mg by mouth once daily.      TRADJENTA 5 mg Tab tablet TAKE 1 TABLET(5 MG) BY MOUTH EVERY DAY 90 tablet 3    ALCOHOL PREP PADS PadM       clopidogreL (PLAVIX) 75 mg tablet Take 1 tablet (75 mg total) by mouth once daily. 30 tablet 11    COMFORT EZ INSULIN SYRINGE 0.5 mL 31 gauge x 5/16" Syrg       denosumab (PROLIA) 60 mg/mL Syrg Inject 1 mL (60 mg total) into the skin every 6 (six) months. 1 mL 1    fluticasone propionate (FLONASE) 50 mcg/actuation nasal spray 2 sprays (100 mcg total) by Each Nostril route once daily. 16 g 3    nebulizer accessories Kit Please dispense one nebulizer accessories kit for appropriate nebulizer machine. 1 kit 0    nebulizer and compressor Veronica Please dispense one nebulizer machine with appropriate supplies. 1 each 0    pen needle, diabetic 32 gauge x 5/32" Ndle 1 each by Misc.(Non-Drug; Combo Route) route 2 (two) times a day. 100 each 12     Current Inpatient " Medications:    Current Facility-Administered Medications:     albumin human 5% bottle 12.5 g, 12.5 g, Intravenous, PRN, Aaron Ontiveros PA-C, Stopped at 05/03/24 1906    aspirin EC tablet 81 mg, 81 mg, Oral, Daily, Aaron Ontiveros PA-C    calcium gluconate 1 g in NS IVPB (premixed), 1 g, Intravenous, PRN, Aaron Ontiveros PA-ELLEN    calcium gluconate 1 g in NS IVPB (premixed), 2 g, Intravenous, PRN, Aaron Ontiveros PA-C    calcium gluconate 1 g in NS IVPB (premixed), 3 g, Intravenous, PRN, Aaron Ontiveros PA-C    clevidipine (CLEVIPREX) 25 mg/50 mL infusion, 0-16 mg/hr, Intravenous, Continuous, Aaron Ontiveros PA-C, Stopped at 05/03/24 1630    dexmedetomidine (PRECEDEX) 400mcg/100mL 0.9% NaCL infusion, 0-1.4 mcg/kg/hr, Intravenous, Continuous, Aaron Ontiveros PA-C, Last Rate: 7.19 mL/hr at 05/04/24 0657, 0.4 mcg/kg/hr at 05/04/24 0657    dextrose 10% bolus 125 mL 125 mL, 12.5 g, Intravenous, PRN, Aaron Ontiveros PA-C    dextrose 10% bolus 250 mL 250 mL, 25 g, Intravenous, PRN, Aaron Ontiveros PA-C    dextrose 5 % and 0.45 % NaCl infusion, , Intravenous, Continuous, Aaron Ontiveros PA-C, Last Rate: 100 mL/hr at 05/04/24 0657, Rate Verify at 05/04/24 0657    docusate sodium capsule 100 mg, 100 mg, Oral, BID, Aaron Ontiveros PA-C    enoxaparin injection 30 mg, 30 mg, Subcutaneous, Daily, Jeovanny Wan MD    EPINEPHrine (ADRENALIN) 5 mg in dextrose 5 % (D5W) 250 mL infusion, 0-2 mcg/kg/min, Intravenous, Continuous, Aaron Ontiveros PA-C, Last Rate: 12.9 mL/hr at 05/04/24 0657, 0.06 mcg/kg/min at 05/04/24 0657    famotidine (PF) injection 20 mg, 20 mg, Intravenous, Daily, Aaron Ontiveros PA-C, 20 mg at 05/04/24 0853    folic acid tablet 1 mg, 1 mg, Oral, Daily, Aaron Ontiveros PA-C    gabapentin capsule 100 mg, 100 mg, Oral, TID, Kimberly Catherine MD, 100 mg at 05/03/24 2055    HYDROcodone-acetaminophen 5-325 mg per tablet 1 tablet, 1 tablet, Oral, Q4H PRN, Jeovanny Wan MD    insulin  regular in 0.9 % NaCl 100 unit/100 mL (1 unit/mL) infusion, 0-52 Units/hr, Intravenous, Continuous, Aaron Ontiveros PA-C, Last Rate: 30 mL/hr at 05/04/24 0854, 30 Units/hr at 05/04/24 0854    magnesium sulfate 2g in water 50mL IVPB (premix), 2 g, Intravenous, PRN, Aaron Ontiveros PA-C    magnesium sulfate 2g in water 50mL IVPB (premix), 4 g, Intravenous, PRN, Aaron Ontiveros PA-C    metoprolol tartrate (LOPRESSOR) split tablet 12.5 mg, 12.5 mg, Oral, BID, Aaron Ontiveros PA-C    morphine injection 4 mg, 4 mg, Intravenous, Q4H PRN, Aaron Ontiveros PA-C, 4 mg at 05/03/24 2212    mupirocin 2 % ointment, , Nasal, BID, Aaron Ontiveros PA-C, Given at 05/04/24 0853    ondansetron injection 4 mg, 4 mg, Intravenous, Q4H PRN, Aaron Ontiveros PA-C    oxyCODONE immediate release tablet 5 mg, 5 mg, Oral, Q4H PRN, Jeovanny Wan MD    oxyCODONE immediate release tablet Tab 10 mg, 10 mg, Oral, Q4H PRN, Aaron Ontiveros PA-C    potassium chloride 20 mEq in 100 mL IVPB (FOR CENTRAL LINE ADMINISTRATION ONLY), 20 mEq, Intravenous, PRN, Aaron Ontiveros PA-C, Stopped at 05/03/24 1316    potassium chloride 20 mEq in 100 mL IVPB (FOR CENTRAL LINE ADMINISTRATION ONLY), 40 mEq, Intravenous, PRN, Aaron Ontiveros PA-C    potassium chloride 20 mEq in 100 mL IVPB (FOR CENTRAL LINE ADMINISTRATION ONLY), 60 mEq, Intravenous, PRN, Aaron Ontiveros PA-C    sodium phosphate 15 mmol in dextrose 5 % (D5W) 250 mL IVPB, 15 mmol, Intravenous, PRN, Aaron Ontiveros PA-ELLEN    sodium phosphate 20.01 mmol in dextrose 5 % (D5W) 250 mL IVPB, 20.01 mmol, Intravenous, PRN, Aaron Ontiveros PA-C    sodium phosphate 30 mmol in dextrose 5 % (D5W) 250 mL IVPB, 30 mmol, Intravenous, PRN, Aaron Ontiveros PA-C    Facility-Administered Medications Ordered in Other Encounters:     0.9%  NaCl infusion, , Intravenous, Continuous, Rubia Seymour MD, Stopped at 12/04/23 0713    diazePAM tablet 10 mg, 10 mg, Oral, On Call Procedure, Rosemary,  Emmanuel DAVENPORT MD, 10 mg at 02/21/24 1031    diphenhydrAMINE capsule 50 mg, 50 mg, Oral, On Call Procedure, Rubia Seymour MD, 50 mg at 12/04/23 0700    diphenhydrAMINE capsule 50 mg, 50 mg, Oral, On Call Procedure, Emmanuel Riley MD, 50 mg at 02/21/24 1031    sodium chloride 0.9% flush 10 mL, 10 mL, Intravenous, PRN, Rubia Seymour MD  VTE Risk Mitigation (From admission, onward)           Ordered     enoxaparin injection 30 mg  Daily         05/03/24 1336     Place sequential compression device  Until discontinued         05/03/24 0940     IP VTE HIGH RISK PATIENT  Once         05/03/24 0937                  Assessment:   MVCAD    - s/p Sternotomy (5.3.24) - Ungraftable LAD and RCA    - s/p LHC (2.21.24) - 1. Left main-normal. 2. Lad-patent small mid stent, 60-70% stenosis distal to stent edge, IFR 0.86. 3. LCX- codominant, Mid 50%, IFR 0.92. 4. RCA-codominant Mid occlusion in fractured stent, left to right collaterals  Hypotension requiring Pressors    - Hx of HTN  Acute Hypoxemic Respiratory Failure requiring Intubation/Ventilation  BARTOLO/CKD III  DM II  HLD  Hx of MI  Obesity  VI  Chronic Lower Back Pain  GERD  Migraines  OA  No Hx of GIB     Plan:   Vent Management per Primary Team  Wean Pressors for MAP > 65mmHg  Will Add GDMT when PT is Weaned Off Pressors  Once Extubated will Encourage Aggressive Mobilization of PT and Q1HR IS  Will Have Dr. Riley Review films to Evaluate if PT would Benefit from LHC with High Risk PCI Prior to D/C or can be Evaluated as OP  Labs and EKG in AM: CBC, CMP and Mg    Thank you for your consult.     Prakash Peres, JIMBO  Cardiology  [facility]  05/04/2024     Physician addendum:  I have seen and examined this patient as a split-shared visit with the LENCHO d/t complicated medical management of above problems written in assessment and high acuity requiring physician expertise in medical decision-making. I performed the substantive portion of the history and exam.  Above medical decision-making is also formulated by me.    Cardiovascular exam:  S1, S2  Lungs:  fine crackles at bases.  Extremities:  1+ edema bilaterally    Plan:  Plan for high-risk PCI evaluation by Dr. Riley.  Continue Medications as above.  Continue supportive therapy.  We will follow up.      Kevin Barton MD  Cardiologist

## 2024-05-04 NOTE — PROGRESS NOTES
Pulmonary & Critical Care Medicine   Progress Note      Presenting History/HPI:    The patient is an 83-year-old female with a medical history of gout, coronary artery disease, diabetes mellitus type 2, hypertension, GERD, osteoporosis, CKD stage 3, who was seen in the immediate postoperative.  Status post median sternotomy with attempted CABG with inability to bypass the LAD which was found to be a graftable intraoperatively.  She was found to have severe in stent restenosis of the RCA/PDA stent with distal PDA long segment occlusion to the apex.  Due to anatomical difficulty bypass was not performed.  Patient was closed backup with tubes placed for drainage and moved to the ICU.  She was seen intubated still under intraoperative sedation unresponsive.       Interval History:  -patient remains intubated postoperatively all night secondary to persisting respiratory acidosis   -this morning the patient's overall status is markedly improved she is awake alert moving all extremities follows commands still intubated on mechanical ventilation on my exam   -nursing staff did not note any significant overnight issues  -she is still requiring Norepinephrine = 0.04 mcg/kg/min      Scheduled Medications:   Current Facility-Administered Medications   Medication Dose Route Frequency    aspirin  81 mg Oral Daily    docusate sodium  100 mg Oral BID    enoxparin  30 mg Subcutaneous Daily    famotidine (PF)  20 mg Intravenous Daily    folic acid  1 mg Oral Daily    gabapentin  100 mg Oral TID    metoprolol tartrate  12.5 mg Oral BID    mupirocin   Nasal BID    potassium phosphate IVPB  30 mmol Intravenous Once       PRN Medications:     Current Facility-Administered Medications:     albumin human 5%, 12.5 g, Intravenous, PRN    calcium gluconate IVPB, 1 g, Intravenous, PRN    calcium gluconate IVPB, 2 g, Intravenous, PRN    calcium gluconate IVPB, 3 g, Intravenous, PRN    dextrose 10%, 12.5 g, Intravenous, PRN    dextrose 10%,  25 g, Intravenous, PRN    HYDROcodone-acetaminophen, 1 tablet, Oral, Q4H PRN    magnesium sulfate IVPB, 2 g, Intravenous, PRN    magnesium sulfate IVPB, 4 g, Intravenous, PRN    morphine, 4 mg, Intravenous, Q4H PRN    ondansetron, 4 mg, Intravenous, Q4H PRN    oxyCODONE, 5 mg, Oral, Q4H PRN    oxyCODONE, 10 mg, Oral, Q4H PRN    potassium chloride in water, 20 mEq, Intravenous, PRN    potassium chloride in water, 40 mEq, Intravenous, PRN    potassium chloride in water, 60 mEq, Intravenous, PRN    sodium phosphate 15 mmol in dextrose 5 % (D5W) 250 mL IVPB, 15 mmol, Intravenous, PRN    sodium phosphate 20.01 mmol in dextrose 5 % (D5W) 250 mL IVPB, 20.01 mmol, Intravenous, PRN    sodium phosphate 30 mmol in dextrose 5 % (D5W) 250 mL IVPB, 30 mmol, Intravenous, PRN      Infusions:    Current Facility-Administered Medications   Medication Dose Route Frequency Last Rate Last Admin    sodium chloride 0.45%   Intravenous Continuous 75 mL/hr at 05/04/24 1501 Rate Verify at 05/04/24 1501    clevidipine  0-16 mg/hr Intravenous Continuous   Stopped at 05/03/24 1630    EPINEPHrine  0-2 mcg/kg/min Intravenous Continuous 6.5 mL/hr at 05/04/24 1501 0.03 mcg/kg/min at 05/04/24 1501    insulin regular 1 units/mL infusion orderable (CTS POST-OP)  0-52 Units/hr Intravenous Continuous 30 mL/hr at 05/04/24 0854 30 Units/hr at 05/04/24 0854         Fluid Balance:     Intake/Output Summary (Last 24 hours) at 5/4/2024 1618  Last data filed at 5/4/2024 1501  Gross per 24 hour   Intake 3160.59 ml   Output 1884 ml   Net 1276.59 ml         Vital Signs:   Vitals:    05/04/24 1413   BP:    Pulse:    Resp: (!) 26   Temp:          Physical Exam  Vitals and nursing note reviewed.   Constitutional:       General: She is not in acute distress.     Appearance: Normal appearance. She is ill-appearing. She is not toxic-appearing.   HENT:      Head: Normocephalic and atraumatic.      Right Ear: External ear normal.      Left Ear: External ear normal.       Nose: Nose normal.      Mouth/Throat:      Pharynx: No posterior oropharyngeal erythema.      Comments: Unable to visualize posterior oropharynx secondary to endotracheal tube  Eyes:      General: No scleral icterus.     Conjunctiva/sclera: Conjunctivae normal.      Pupils: Pupils are equal, round, and reactive to light.   Neck:      Vascular: No carotid bruit.   Cardiovascular:      Rate and Rhythm: Normal rate and regular rhythm.      Pulses: Normal pulses.      Heart sounds: Normal heart sounds. No murmur heard.     No friction rub. No gallop.      Comments: Median sternotomy wound site clean and dry, mediastinal and chest tube in place with serosanguinous output  Pulmonary:      Effort: Pulmonary effort is normal. No respiratory distress.      Breath sounds: Normal breath sounds. No wheezing, rhonchi or rales.      Comments: Intubated, on mechanical ventilation  Abdominal:      General: Abdomen is flat. Bowel sounds are normal. There is no distension.      Palpations: Abdomen is soft.      Tenderness: There is no abdominal tenderness. There is no guarding or rebound.   Musculoskeletal:         General: No swelling or deformity.      Cervical back: Neck supple. No rigidity or tenderness.   Skin:     General: Skin is warm and dry.      Capillary Refill: Capillary refill takes less than 2 seconds.      Findings: No erythema or rash.   Neurological:      Comments: Unable to fully assess neurologic status and orientation secondary to patient being intubated, sedated and nonverbal   Psychiatric:      Comments: Unable to fully assess as patient is intubated and nonverbal           Ventilator Settings  Vent Mode: CPAP PSV (05/04/24 0806)  Ventilator Initiated: Yes (05/03/24 1000)  Set Rate: 20 BPM (05/04/24 0510)  Vt Set: 500 mL (05/04/24 0510)  Pressure Support: 10 cmH20 (05/04/24 0806)  PEEP/CPAP: 5 cmH20 (05/04/24 0806)  Oxygen Concentration (%): 30 (05/04/24 0806)  Peak Airway Pressure: 20 cmH20 (05/04/24  0510)  Total Ve: 8.8 L/m (05/04/24 0806)  F/VT Ratio<105 (RSBI): (!) 44.94 (05/03/24 2215)      Laboratory Studies:   Recent Labs   Lab 05/04/24  0830   PH 7.390   PCO2 38.0   PO2 88.0   HCO3 23.0     Recent Labs   Lab 05/04/24  0336   WBC 15.42*   RBC 4.36   HGB 12.7   HCT 38.2      MCV 87.6   MCH 29.1   MCHC 33.2     Recent Labs   Lab 05/04/24  0337   GLUCOSE 212*      K 3.6   CO2 19*   BUN 20.4*   CREATININE 1.95*   CALCIUM 8.0*   MG 2.50         Microbiology Data:   Microbiology Results (last 7 days)       ** No results found for the last 168 hours. **              Imaging:   X-Ray Chest AP Portable  Narrative: EXAMINATION:  XR CHEST AP PORTABLE    CLINICAL HISTORY:  Post-op;    TECHNIQUE:  Single frontal view of the chest was performed.    COMPARISON:  05/03/2024    FINDINGS:  LINES AND TUBES: Endotracheal tube projects over the trachea with tip above the gabriel.  Enteric tube courses below the diaphragm.  Right IJ CVC tip projects over the SVC.  There are anterior mediastinal and pleural drains.  EKG/telemetry leads overlie the chest.    MEDIASTINUM AND SHUBHAM: The cardiac silhouette is normal. Aortic atherosclerosis.    LUNGS: Retrocardiac opacity.    PLEURA:Trace left pleural effusion.No pneumothorax.    BONES: Postop sternotomy.  Impression: Probable retrocardiac atelectasis.  Trace left effusion.    Electronically signed by: Enid Gordon  Date:    05/04/2024  Time:    09:06          Assessment and Plan    Assessment:  POD 1 s/p median sternotomy without CABG  HTN  HLD  CAD  Gout  Post op hypotension          Plan:  -extubate  -titrate vasopressor to keep map  > 65  -resume home meds when appropriate  -extubate        Jeovanny Wan MD  5/4/2024  Pulmonology/Critical Care

## 2024-05-04 NOTE — PLAN OF CARE
Problem: Adult Inpatient Plan of Care  Goal: Plan of Care Review  Outcome: Progressing  Flowsheets (Taken 5/4/2024 0327)  Plan of Care Reviewed With: patient  Goal: Patient-Specific Goal (Individualized)  Outcome: Progressing  Goal: Absence of Hospital-Acquired Illness or Injury  Outcome: Progressing  Intervention: Identify and Manage Fall Risk  Flowsheets (Taken 5/4/2024 0327)  Safety Promotion/Fall Prevention:   assistive device/personal item within reach   Fall Risk signage in place   Fall Risk reviewed with patient/family   lighting adjusted   medications reviewed   pulse ox   side rails raised x 3   instructed to call staff for mobility  Intervention: Prevent Skin Injury  Flowsheets (Taken 5/4/2024 0328)  Body Position: turned  Skin Protection:   transparent dressing maintained   pulse oximeter probe site changed   silicone foam dressing in place   incontinence pads utilized   protective footwear used  Device Skin Pressure Protection:   absorbent pad utilized/changed   adhesive use limited   positioning supports utilized   pressure points protected   tubing/devices free from skin contact  Intervention: Prevent and Manage VTE (Venous Thromboembolism) Risk  Flowsheets (Taken 5/4/2024 0328)  VTE Prevention/Management:   remove, assess skin, and reapply compression stockings   bleeding precations maintained   bleeding risk assessed   dorsiflexion/plantar flexion performed   intravenous hydration  Intervention: Prevent Infection  Flowsheets (Taken 5/4/2024 0328)  Infection Prevention:   environmental surveillance performed   equipment surfaces disinfected   hand hygiene promoted   personal protective equipment utilized   rest/sleep promoted   single patient room provided  Goal: Optimal Comfort and Wellbeing  Outcome: Progressing  Intervention: Monitor Pain and Promote Comfort  Flowsheets (Taken 5/4/2024 0328)  Pain Management Interventions:   relaxation techniques promoted   quiet environment facilitated    position adjusted   pillow support provided   pain management plan reviewed with patient/caregiver  Intervention: Provide Person-Centered Care  Flowsheets (Taken 5/4/2024 0328)  Trust Relationship/Rapport: care explained  Goal: Readiness for Transition of Care  Outcome: Progressing     Problem: Diabetes Comorbidity  Goal: Blood Glucose Level Within Targeted Range  Outcome: Progressing  Intervention: Monitor and Manage Glycemia  Flowsheets (Taken 5/4/2024 0328)  Glycemic Management:   blood glucose monitored   insulin infusion adjusted     Problem: Infection  Goal: Absence of Infection Signs and Symptoms  Outcome: Progressing  Intervention: Prevent or Manage Infection  Flowsheets (Taken 5/4/2024 0328)  Fever Reduction/Comfort Measures:   lightweight bedding   lightweight clothing     Problem: Wound  Goal: Optimal Coping  Outcome: Progressing  Intervention: Support Patient and Family Response  Flowsheets (Taken 5/4/2024 0328)  Supportive Measures:   relaxation techniques promoted   self-care encouraged   positive reinforcement provided  Family/Support System Care:   caregiver stress acknowledged   involvement promoted   presence promoted   self-care encouraged   support provided  Goal: Optimal Functional Ability  Outcome: Progressing  Intervention: Optimize Functional Ability  Flowsheets (Taken 5/4/2024 0328)  Activity Management:   Ankle pumps - L1   Arm raise - L1   Rolling - L1  Activity Assistance Provided: assistance, 2 people  Goal: Absence of Infection Signs and Symptoms  Outcome: Progressing  Intervention: Prevent or Manage Infection  Flowsheets (Taken 5/4/2024 0328)  Fever Reduction/Comfort Measures:   lightweight bedding   lightweight clothing  Goal: Improved Oral Intake  Outcome: Progressing  Goal: Optimal Pain Control and Function  Outcome: Progressing  Intervention: Prevent or Manage Pain  Flowsheets (Taken 5/4/2024 0328)  Sleep/Rest Enhancement:   awakenings minimized   consistent schedule promoted    regular sleep/rest pattern promoted   room darkened   relaxation techniques promoted   natural light exposure provided   noise level reduced  Pain Management Interventions:   relaxation techniques promoted   quiet environment facilitated   position adjusted   pillow support provided   pain management plan reviewed with patient/caregiver  Goal: Skin Health and Integrity  Outcome: Progressing  Intervention: Optimize Skin Protection  Flowsheets (Taken 5/4/2024 0328)  Pressure Reduction Techniques:   frequent weight shift encouraged   heels elevated off bed   positioned off wounds   pressure points protected   weight shift assistance provided  Pressure Reduction Devices:   specialty bed utilized   pressure-redistributing mattress utilized   positioning supports utilized   heel offloading device utilized   foam padding utilized  Skin Protection:   transparent dressing maintained   pulse oximeter probe site changed   silicone foam dressing in place   incontinence pads utilized   protective footwear used  Activity Management:   Ankle pumps - L1   Arm raise - L1   Rolling - L1  Head of Bed (HOB) Positioning: HOB at 30 degrees  Goal: Optimal Wound Healing  Outcome: Progressing  Intervention: Promote Wound Healing  Flowsheets (Taken 5/4/2024 0328)  Sleep/Rest Enhancement:   awakenings minimized   consistent schedule promoted   regular sleep/rest pattern promoted   room darkened   relaxation techniques promoted   natural light exposure provided   noise level reduced     Problem: Fall Injury Risk  Goal: Absence of Fall and Fall-Related Injury  Outcome: Progressing  Intervention: Identify and Manage Contributors  Flowsheets (Taken 5/4/2024 0328)  Self-Care Promotion: independence encouraged  Medication Review/Management: medications reviewed  Intervention: Promote Injury-Free Environment  Flowsheets (Taken 5/4/2024 0328)  Safety Promotion/Fall Prevention:   assistive device/personal item within reach   Fall Risk signage in  place   Fall Risk reviewed with patient/family   medications reviewed   lighting adjusted   pulse ox   side rails raised x 3   instructed to call staff for mobility     Problem: Skin Injury Risk Increased  Goal: Skin Health and Integrity  Outcome: Progressing  Intervention: Optimize Skin Protection  Flowsheets (Taken 5/4/2024 1628)  Pressure Reduction Techniques:   frequent weight shift encouraged   heels elevated off bed   positioned off wounds   pressure points protected   weight shift assistance provided  Pressure Reduction Devices:   specialty bed utilized   pressure-redistributing mattress utilized   positioning supports utilized   heel offloading device utilized   foam padding utilized  Skin Protection:   transparent dressing maintained   pulse oximeter probe site changed   silicone foam dressing in place   incontinence pads utilized   protective footwear used  Activity Management:   Ankle pumps - L1   Arm raise - L1   Rolling - L1  Head of Bed (HOB) Positioning: HOB at 30 degrees

## 2024-05-04 NOTE — PT/OT/SLP PROGRESS
Physical Therapy      Patient Name:  Rosalba Whitlock   MRN:  [id number]    Patient not seen today secondary to RN hold. Pt recently extubated, hypotensive, not medically appropriate to mobilize. Will f/u as schedule permits.

## 2024-05-04 NOTE — CONSULTS
Consult note  Nephrology    Admit Date: 5/3/2024   LOS: 1 day     SUBJECTIVE:     CC:  CKD 3-4    HPI:  84 Y/O female with history of CAD and , CKD 3-4 , DM 2 , HTN , Gout , GERD , admitted to hospital for CABG  , pt went to surgery yesterday but was unsuccessful  and pt admitted to ICU on levophed and extubated and off of vent  Has very good urine output and her creatinin is better   Pt denies any complaint except nausea today     PMH:  1   CKD 3-4  2   DM 2  3   HTN  4   gout   5   CAD with stents, H/O MI  6   GERD  7   osteoporosis    Allergy:  Amlodipine    Social history:  Former smoker  No alcohol    Family History:       Mother had lung CA  Father had kidney disease   Brother had DM 2    Review of Systems:  States she is feeling ok except chest pain due to surgery  and nausea  Constitutional: No fever or chills  Respiratory: No cough or shortness of breath  Cardiovascular: No chest pain or palpitations  Gastrointestinal: No  vomiting  Neurological: No confusion or weakness  No dysuria or frequency or hematuria     OBJECTIVE:     Vital Signs Range (Last 24H):  Vitals:    05/04/24 1115   BP: (!) 97/51   Pulse: 91   Resp: (!) 24   Temp:        Temp:  [97.7 °F (36.5 °C)-99.2 °F (37.3 °C)]   Pulse:  [67-98]   Resp:  [13-25]   BP: ()/(42-85)   SpO2:  [96 %-100 %]   Arterial Line BP: ()/(48-84)     I & O (Last 24H):  Intake/Output Summary (Last 24 hours) at 5/4/2024 1333  Last data filed at 5/4/2024 1210  Gross per 24 hour   Intake 4341.99 ml   Output 2770 ml   Net 1571.99 ml       Physical Exam:  Alert , oriented , in NAD  Head   normal   Neck   supple   Chest   symmetric , incision of CABG at midline present , chest tubes x 2 in   Lungs   clear   Heart   RRR  Abdomen   Soft , non tender   Ext   no edema     Laboratory Data:    Recent Labs   Lab 04/29/24  2300 05/03/24  0742 05/04/24  0337      < > 145   K 4.4   < > 3.6   *  --   --    CO2 17*   < > 19*   BUN 35*   < > 20.4*   CREATININE  2.44*   < > 1.95*   GLUCOSE  --    < > 212*   CALCIUM 9.4   < > 8.0*   PHOS  --    < > 1.5*    < > = values in this interval not displayed.     Lab Results   Component Value Date    .4 (H) 04/26/2024    CALCIUM 8.0 (L) 05/04/2024    CAION 1.05 (L) 05/04/2024    PHOS 1.5 (L) 05/04/2024     Lab Results   Component Value Date    IRON 54 03/17/2023    TIBC 229 (L) 03/17/2023    FERRITIN 215.75 (H) 03/17/2023       Medications:  Medication list was reviewed and changes noted under Assessment/Plan.    Diagnostic Results:    Reviewed most recent CT/US/Echo/MRI    ASSESSMENT/PLAN:   1   CKD 3-4  2   CAD , unsuccessful CABG  3   HTN  4   DM 2  5   hypotension , on low dose of levophed  6   gout  7   hypophosphatemia   8  Osteoporosis    Plan   potassium phos 30 mmol IVPB x 1             Labs in AM

## 2024-05-04 NOTE — PROGRESS NOTES
Pod 1  Intubated  Epi  Resp acidosis last night  Vss  Heart sinus  Wounds c/d/I  Hct 38  Cr 1.95  Cts draining  Wean vent, c/s renal

## 2024-05-04 NOTE — NURSING
Nurses Note -- 4 Eyes      5/4/2024   3:32 AM      Skin assessed during: Q Shift Change      [x] No Altered Skin Integrity Present    [x]Prevention Measures Documented      [] Yes- Altered Skin Integrity Present or Discovered   [] LDA Added if Not in Epic (Describe Wound)   [] New Altered Skin Integrity was Present on Admit and Documented in LDA   [] Wound Image Taken    Wound Care Consulted? No    Attending Nurse:  Mary Jane Dennis RN/Staff Member:  LEONARDO Cruz

## 2024-05-05 LAB
ALBUMIN SERPL-MCNC: 3.2 G/DL (ref 3.4–4.8)
ALBUMIN/GLOB SERPL: 1.4 RATIO (ref 1.1–2)
ALP SERPL-CCNC: 61 UNIT/L (ref 40–150)
ALT SERPL-CCNC: 7 UNIT/L (ref 0–55)
AST SERPL-CCNC: 27 UNIT/L (ref 5–34)
BASOPHILS # BLD AUTO: 0.05 X10(3)/MCL
BASOPHILS NFR BLD AUTO: 0.3 %
BILIRUB SERPL-MCNC: 0.6 MG/DL
BUN SERPL-MCNC: 16.6 MG/DL (ref 9.8–20.1)
CALCIUM SERPL-MCNC: 7.5 MG/DL (ref 8.4–10.2)
CHLORIDE SERPL-SCNC: 108 MMOL/L (ref 98–107)
CO2 SERPL-SCNC: 20 MMOL/L (ref 23–31)
CREAT SERPL-MCNC: 1.88 MG/DL (ref 0.55–1.02)
EOSINOPHIL # BLD AUTO: 0.14 X10(3)/MCL (ref 0–0.9)
EOSINOPHIL NFR BLD AUTO: 0.9 %
ERYTHROCYTE [DISTWIDTH] IN BLOOD BY AUTOMATED COUNT: 15.2 % (ref 11.5–17)
GFR SERPLBLD CREATININE-BSD FMLA CKD-EPI: 27 MLS/MIN/1.73/M2
GLOBULIN SER-MCNC: 2.3 GM/DL (ref 2.4–3.5)
GLUCOSE SERPL-MCNC: 86 MG/DL (ref 82–115)
HCT VFR BLD AUTO: 40.7 % (ref 37–47)
HGB BLD-MCNC: 13 G/DL (ref 12–16)
IMM GRANULOCYTES # BLD AUTO: 0.08 X10(3)/MCL (ref 0–0.04)
IMM GRANULOCYTES NFR BLD AUTO: 0.5 %
LYMPHOCYTES # BLD AUTO: 2.47 X10(3)/MCL (ref 0.6–4.6)
LYMPHOCYTES NFR BLD AUTO: 16.7 %
MAGNESIUM SERPL-MCNC: 2.1 MG/DL (ref 1.6–2.6)
MCH RBC QN AUTO: 28.9 PG (ref 27–31)
MCHC RBC AUTO-ENTMCNC: 31.9 G/DL (ref 33–36)
MCV RBC AUTO: 90.4 FL (ref 80–94)
MONOCYTES # BLD AUTO: 1.28 X10(3)/MCL (ref 0.1–1.3)
MONOCYTES NFR BLD AUTO: 8.7 %
NEUTROPHILS # BLD AUTO: 10.74 X10(3)/MCL (ref 2.1–9.2)
NEUTROPHILS NFR BLD AUTO: 72.9 %
NRBC BLD AUTO-RTO: 0 %
PHOSPHATE SERPL-MCNC: 3.4 MG/DL (ref 2.3–4.7)
PLATELET # BLD AUTO: 146 X10(3)/MCL (ref 130–400)
PMV BLD AUTO: 10.3 FL (ref 7.4–10.4)
POCT GLUCOSE: 172 MG/DL (ref 70–110)
POCT GLUCOSE: 78 MG/DL (ref 70–110)
POCT GLUCOSE: 97 MG/DL (ref 70–110)
POTASSIUM SERPL-SCNC: 4.7 MMOL/L (ref 3.5–5.1)
PROT SERPL-MCNC: 5.5 GM/DL (ref 5.8–7.6)
RBC # BLD AUTO: 4.5 X10(6)/MCL (ref 4.2–5.4)
SODIUM SERPL-SCNC: 138 MMOL/L (ref 136–145)
WBC # SPEC AUTO: 14.76 X10(3)/MCL (ref 4.5–11.5)

## 2024-05-05 PROCEDURE — 83735 ASSAY OF MAGNESIUM: CPT | Performed by: NURSE PRACTITIONER

## 2024-05-05 PROCEDURE — 25000003 PHARM REV CODE 250: Performed by: NURSE PRACTITIONER

## 2024-05-05 PROCEDURE — 27000221 HC OXYGEN, UP TO 24 HOURS

## 2024-05-05 PROCEDURE — 80053 COMPREHEN METABOLIC PANEL: CPT | Performed by: NURSE PRACTITIONER

## 2024-05-05 PROCEDURE — 21400001 HC TELEMETRY ROOM

## 2024-05-05 PROCEDURE — 25000003 PHARM REV CODE 250

## 2024-05-05 PROCEDURE — 36415 COLL VENOUS BLD VENIPUNCTURE: CPT | Performed by: NURSE PRACTITIONER

## 2024-05-05 PROCEDURE — 84100 ASSAY OF PHOSPHORUS: CPT | Performed by: INTERNAL MEDICINE

## 2024-05-05 PROCEDURE — 97166 OT EVAL MOD COMPLEX 45 MIN: CPT

## 2024-05-05 PROCEDURE — 99900035 HC TECH TIME PER 15 MIN (STAT)

## 2024-05-05 PROCEDURE — 63600175 PHARM REV CODE 636 W HCPCS: Performed by: PHYSICIAN ASSISTANT

## 2024-05-05 PROCEDURE — 97163 PT EVAL HIGH COMPLEX 45 MIN: CPT

## 2024-05-05 PROCEDURE — 25000003 PHARM REV CODE 250: Performed by: INTERNAL MEDICINE

## 2024-05-05 PROCEDURE — 93010 ELECTROCARDIOGRAM REPORT: CPT | Mod: ,,, | Performed by: INTERNAL MEDICINE

## 2024-05-05 PROCEDURE — 85025 COMPLETE CBC W/AUTO DIFF WBC: CPT | Performed by: NURSE PRACTITIONER

## 2024-05-05 PROCEDURE — 99024 POSTOP FOLLOW-UP VISIT: CPT | Mod: ,,, | Performed by: PHYSICIAN ASSISTANT

## 2024-05-05 PROCEDURE — 93005 ELECTROCARDIOGRAM TRACING: CPT

## 2024-05-05 PROCEDURE — 94760 N-INVAS EAR/PLS OXIMETRY 1: CPT

## 2024-05-05 PROCEDURE — 63600175 PHARM REV CODE 636 W HCPCS: Performed by: INTERNAL MEDICINE

## 2024-05-05 PROCEDURE — 25000003 PHARM REV CODE 250: Performed by: PHYSICIAN ASSISTANT

## 2024-05-05 PROCEDURE — 94799 UNLISTED PULMONARY SVC/PX: CPT

## 2024-05-05 RX ORDER — GLUCAGON 1 MG
1 KIT INJECTION
Status: DISCONTINUED | OUTPATIENT
Start: 2024-05-05 | End: 2024-05-13 | Stop reason: HOSPADM

## 2024-05-05 RX ORDER — ATORVASTATIN CALCIUM 40 MG/1
40 TABLET, FILM COATED ORAL DAILY
Status: DISCONTINUED | OUTPATIENT
Start: 2024-05-05 | End: 2024-05-13 | Stop reason: HOSPADM

## 2024-05-05 RX ORDER — IBUPROFEN 200 MG
16 TABLET ORAL
Status: DISCONTINUED | OUTPATIENT
Start: 2024-05-05 | End: 2024-05-13 | Stop reason: HOSPADM

## 2024-05-05 RX ORDER — INSULIN ASPART 100 [IU]/ML
0-10 INJECTION, SOLUTION INTRAVENOUS; SUBCUTANEOUS
Status: DISCONTINUED | OUTPATIENT
Start: 2024-05-05 | End: 2024-05-13 | Stop reason: HOSPADM

## 2024-05-05 RX ORDER — IBUPROFEN 200 MG
24 TABLET ORAL
Status: DISCONTINUED | OUTPATIENT
Start: 2024-05-05 | End: 2024-05-13 | Stop reason: HOSPADM

## 2024-05-05 RX ADMIN — FOLIC ACID 1 MG: 1 TABLET ORAL at 08:05

## 2024-05-05 RX ADMIN — MUPIROCIN: 20 OINTMENT TOPICAL at 08:05

## 2024-05-05 RX ADMIN — HYDROCODONE BITARTRATE AND ACETAMINOPHEN 1 TABLET: 5; 325 TABLET ORAL at 02:05

## 2024-05-05 RX ADMIN — ASPIRIN 81 MG: 81 TABLET, COATED ORAL at 08:05

## 2024-05-05 RX ADMIN — HYDROCODONE BITARTRATE AND ACETAMINOPHEN 1 TABLET: 5; 325 TABLET ORAL at 12:05

## 2024-05-05 RX ADMIN — ATORVASTATIN CALCIUM 40 MG: 40 TABLET, FILM COATED ORAL at 02:05

## 2024-05-05 RX ADMIN — INSULIN ASPART 2 UNITS: 100 INJECTION, SOLUTION INTRAVENOUS; SUBCUTANEOUS at 05:05

## 2024-05-05 RX ADMIN — HYDROCODONE BITARTRATE AND ACETAMINOPHEN 1 TABLET: 5; 325 TABLET ORAL at 10:05

## 2024-05-05 RX ADMIN — GABAPENTIN 100 MG: 100 CAPSULE ORAL at 02:05

## 2024-05-05 RX ADMIN — ENOXAPARIN SODIUM 30 MG: 30 INJECTION SUBCUTANEOUS at 05:05

## 2024-05-05 RX ADMIN — GABAPENTIN 100 MG: 100 CAPSULE ORAL at 08:05

## 2024-05-05 RX ADMIN — DOCUSATE SODIUM 100 MG: 100 CAPSULE, LIQUID FILLED ORAL at 08:05

## 2024-05-05 RX ADMIN — FAMOTIDINE 20 MG: 10 INJECTION, SOLUTION INTRAVENOUS at 08:05

## 2024-05-05 RX ADMIN — HYDROCODONE BITARTRATE AND ACETAMINOPHEN 1 TABLET: 5; 325 TABLET ORAL at 06:05

## 2024-05-05 RX ADMIN — OXYCODONE HYDROCHLORIDE 5 MG: 5 TABLET ORAL at 08:05

## 2024-05-05 RX ADMIN — METOPROLOL TARTRATE 12.5 MG: 25 TABLET, FILM COATED ORAL at 08:05

## 2024-05-05 NOTE — PT/OT/SLP EVAL
Occupational Therapy  Evaluation    Name: Rosalba Whitlock  MRN: [id number]  Admitting Diagnosis: Severe CAD s/p median sternotomy c attempted CABG, hypotension   Recent Surgery: Procedure(s) (LRB):  CORONARY ARTERY BYPASS GRAFT (CABG) (N/A)  SURGICAL PROCUREMENT, VEIN, ENDOSCOPIC (Left)  PLATING, STERNAL (N/A) 2 Days Post-Op    Recommendations:     Discharge therapy intensity: Moderate Intensity Therapy   Discharge Equipment Recommendations:  to be determined by next level of care  Barriers to discharge:  Other (Comment) (Ongoing medical needs, severity of deficits)    Assessment:     Rosalba Whitlock is a 83 y.o. female with a medical diagnosis of Severe CAD s/p median sternotomy c attempted CABG, hypotension. Pt reported prior to admit she was IND c ADLs and ambulating with either a quad cane or a RW. She presents with the following performance deficits affecting function: weakness, impaired endurance, impaired self care skills, impaired functional mobility, gait instability, impaired balance, pain. Pt required Max A for bed mobility and standing during evaluation. Recommend moderate intensity therapy at d/c.     Rehab Prognosis: Good; patient would benefit from acute skilled OT services to address these deficits and reach maximum level of function.       Plan:     Patient to be seen 5 x/week to address the above listed problems via self-care/home management, therapeutic activities, therapeutic exercises  Plan of Care Expires: 06/02/24  Plan of Care Reviewed with: patient, family    Subjective     Chief Complaint: Pain at CT site  Patient/Family Comments/goals: To return to PLOF     Occupational Profile:  Living Environment: Pt lives in a [facility] c 5 stairs and B rails to enter. Reported she has a tub/shower c  a shower chair.   Previous level of function: IND c ADLs. Ambulates with either a quad cane or a RW. Reported she uses a RW whenever she is feeling weaker.   Roles and Routines: Mother, Grandmother   Equipment  Used at Home: walker, rolling, cane, quad, shower chair  Assistance upon Discharge: Pt reported her daughter can check on her as needed and her grandson lives next door.     Pain/Comfort:  Pain Rating 1: 8/10  Location 1:  (CT site)  Pain Addressed 1: Reposition, Distraction    Patients cultural, spiritual, Orthodoxy conflicts given the current situation: no    Objective:     OT communicated with RN prior to session.      Patient was found supine with chest tube, telemetry, pulse ox (continuous), blood pressure cuff, peripheral IV, PureWick, central line upon OT entry to room.    General Precautions: Standard, fall, sternal  Orthopedic Precautions: N/A  Braces: N/A    Vital Signs: Blood Pressure: 126/69  HR: 88 bpm  Sp02: 93 %  Supplemental 02: 5L nasal cannula     Bed Mobility:    Patient completed Scooting/Bridging with maximal assistance  Patient completed Supine to Sit with maximal assistance  Patient completed Sit to Supine with maximal assistance    Functional Mobility/Transfers:  Patient completed Sit <> Stand Transfer with maximal assistance  with  hand-held assist   Functional Mobility: Max A to pivot BLE towards HOB x2.     Activities of Daily Living:  Lower Body Dressing: maximal assistance    Toileting: total assistance purewick   Functional Cognition:  Intact  Affect: Cooperative and Anxious    Visual Perceptual Skills:  Intact    Upper Extremity Function:  Right Upper Extremity:   WFL    Left Upper Extremity:  WFL      Therapeutic Positioning  Risk for acquired pressure injuries is increased due to relative decrease in mobility d/t hospitalization  and impaired mobility.    OT interventions performed during the course of today's session:   Education was provided on benefits of and recommendations for therapeutic positioning    Skin assessment: all bony prominences were assessed    Findings: no redness or breakdown noted    OT recommendations for therapeutic positioning throughout hospitalization:    Follow [facility] Pressure Injury Prevention Protocol  Patient Education:  Patient provided with verbal education education regarding OT role/goals/POC, post op precautions, fall prevention, safety awareness, and Discharge/DME recommendations.  Understanding was verbalized.     Patient left HOB elevated with all lines intact and call button in reach.    GOALS:   Multidisciplinary Problems       Occupational Therapy Goals          Problem: Occupational Therapy    Goal Priority Disciplines Outcome Interventions   Occupational Therapy Goal     OT, PT/OT Progressing    Description: LTG: Pt will perform basic ADLs and ADL transfers with Modified independence and good compliance to sternal precautions using LRAD by discharge.    STG: to be met by 6/2/24:    Pt will complete grooming standing at sink with LRAD with SBA.  Pt will complete UB dressing with SBA.  Pt will complete LB dressing with SBA using LRAD and AE prn.  Pt will complete toileting with SBA using LRAD.  Pt will complete functional mobility to/from toilet and toilet transfer with SBA using LRAD.   Pt will independently verbalize sternal precautions with >90% accuracy.                        History:     Past Medical History:   Diagnosis Date    Amnesia 06/13/2022    Benign paroxysmal positional vertigo, bilateral 06/13/2022    Bronchitis 06/08/2022    Chronic gouty arthritis 06/13/2022    Coronary artery disease involving native coronary artery of native heart without angina pectoris 12/04/2023    Diabetes mellitus, type 2     Essential hypertension 04/21/2016    Gastroesophageal reflux disease without esophagitis 04/21/2016    GERD (gastroesophageal reflux disease)     Gout, unspecified     Heart attack     Low back pain 06/13/2022    lower back pain bilateral with movement; radiates down right leg to thigh    Low vitamin D level 06/13/2022    Metabolic acidosis 01/12/2023    Microalbuminuria 01/12/2023    Migraine headache 04/21/2016    Osteoporosis  06/13/2022    Peripheral edema 06/13/2022    Physical deconditioning 06/13/2022    Pure hypercholesterolemia 06/13/2022    SOBOE (shortness of breath on exertion)     Spondylosis of lumbar spine 06/13/2022    Stage 3 chronic kidney disease 06/13/2022    Type 2 diabetes mellitus 06/13/2022    Weakness          Past Surgical History:   Procedure Laterality Date    COLONOSCOPY      EPIDURAL STEROID INJECTION INTO CERVICAL SPINE      EYE SURGERY Bilateral     cataract extraction    FOOT SURGERY Bilateral     HYSTERECTOMY      LEFT HEART CATHETERIZATION Left 12/04/2023    Procedure: Left heart cath;  Surgeon: Rubia Seymour MD;  Location: [facility] CATH LAB;  Service: Cardiology;  Laterality: Left;  LHC +/- PCI    REPAIR, CHRONIC TOTAL OCCLUSION, CORONARY N/A 02/21/2024    Procedure: Repair, Chronic Total Occlusion, Coronary;  Surgeon: Emmanuel Riley MD;  Location: [facility] CATH LAB;  Service: Cardiology;  Laterality: N/A;   PCI RCA *START WITH PICTURES OF THE LAD AND CONSIDER IFR IF LAD LOOKS WORSE*  8F EBU 3.5 GUIDE 90 CM LM VIA RRA, 7F AL-1 GUIDE SH RCA VIA RT GROIN    STENT, DRUG ELUTING, SINGLE VESSEL, CORONARY  12/04/2023    Procedure: Stent, Drug Eluting, Single Vessel, Coronary;  Surgeon: Rubia Seymour MD;  Location: [facility] CATH LAB;  Service: Cardiology;;    TONSILLECTOMY      UPPER GASTROINTESTINAL ENDOSCOPY         Time Tracking:     OT Date of Treatment: 05/05/24  OT Start Time: 1054  OT Stop Time: 1119  OT Total Time (min): 25 min    Billable Minutes:Evaluation Moderate complexity     5/5/2024

## 2024-05-05 NOTE — PROGRESS NOTES
" [facility]    Cardiology  Progress Note    Patient Name: Rosalba Whitlock  MRN: [id number]  Admission Date: 5/3/2024  Hospital Length of Stay: 2 days  Code Status: Full Code   Attending Provider: Jeovanny Wan MD   Consulting Provider: JIMBO Mcdonald  Primary Care Physician: Eleazar Ramos MD  Principal Problem:<principal problem not specified>    Patient information was obtained from past medical records and ER records.     Subjective:     Chief Complaint/Reason for Consult: s/p Sternotomy with Ungraftable Anatomy x 2 Vessels    HPI: Ms. Whitlock is an 82 y/o female who is known to [facility], Dr. Fry. The patient was recently worked up by Dr. 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 She was referred to CV Surgery for a CABG Evaluation. She was deemed a candidate for CABG and on 5.3.24 she underwent a Sternotomy, unfortunately, she had ungraftable anatomy to both the RCA and LAD. She was transferred to ICU for further management. [facility] has been consulted for Recommendations.     5.5.24: NAD. Sitting in Bedside Chair. Denies SOB and Palps. + CP/Incisional. "I am ok." Off Pressors.     PMH: Vertigo, Amnesia, OA, MVCAD/Ungraftable post Sternotomy, DM II, HTN, GERD, MI, Chronic Lower Back pain, Migraines, HLD, CKD Stage III, Osteoporosis, Obesity, VI  PSH: Sternotomy/No Graftable Vessels (5.3.24), Angiogram, Colonoscopy, Eye Surgery, Foot Surgery, KIA, Tonsillectomy, Epidural Steroid Injections, EGD  Family History: Father, D, ESRD; Mother, D, DVT  Social History:     Previous Cardiac Diagnostics:   [facility] 2.21.24:  Findings:  1. Left main-normal  2. Lad-patent small mid stent, 60-70% stenosis distal to stent edge, IFR 0.86  3. LCX- codominant, Mid 50%, IFR 0.92  4. RCA-codominant Mid occlusion in fractured stent, left to right collaterals  Plan:  1. Maximize medical management  2. Discharge home today  3. Consult CTS as outpatient to evaluate for two-vessel bypass to distal LAD and poor " and small.  Patient does not have good stenting options for apical LAD and RCA as well.  Will continue medical MNGT for now.    ECHO 11.21.23:  The study quality is average.   The left ventricle is normal in size. Global left ventricular systolic function is normal. The left ventricular ejection fraction is 60%. Left ventricular diastolic function is normal. Normal wall thickness.  No significant valvular dysfunction noted.     Carotid US 11.21.23:  The study quality is average.   1-39% stenosis in the mid right internal carotid artery based on Bluth Criteria.   1-39% stenosis in the proximal left internal carotid artery based on Bluth Criteria.   Antegrade right vertebral artery flow.   Antegrade left vertebral artery flow.    Review of patient's allergies indicates:   Allergen Reactions    Amlodipine      Skin crawling      Current Facility-Administered Medications   Medication Dose Route Frequency Provider Last Rate Last Admin    0.45% NaCl infusion   Intravenous Continuous Frederick Hicks PA 50 mL/hr at 05/05/24 1056 Rate Verify at 05/05/24 1056    albumin human 5% bottle 12.5 g  12.5 g Intravenous PRN Aaron Ontiveros PA-C   Stopped at 05/03/24 1906    aspirin EC tablet 81 mg  81 mg Oral Daily Aaron Ontiveros PA-C   81 mg at 05/05/24 0805    calcium gluconate 1 g in NS IVPB (premixed)  1 g Intravenous PRN Aaron Ontiveros PA-C        calcium gluconate 1 g in NS IVPB (premixed)  2 g Intravenous PRN Aaron Ontiveros PA-C        calcium gluconate 1 g in NS IVPB (premixed)  3 g Intravenous PRN Aaron Ontiveros PA-C        clevidipine (CLEVIPREX) 25 mg/50 mL infusion  0-16 mg/hr Intravenous Continuous Aaron Ontiveros PA-C   Stopped at 05/03/24 1630    dextrose 10% bolus 125 mL 125 mL  12.5 g Intravenous PRN Aaron Ontiveros PA-C        dextrose 10% bolus 125 mL 125 mL  12.5 g Intravenous PRN Frederick Hicks PA        dextrose 10% bolus 250 mL 250 mL  25 g Intravenous PRN Aaron Ontiveros PA-C         dextrose 10% bolus 250 mL 250 mL  25 g Intravenous PRN Frederick Hicks PA        docusate sodium capsule 100 mg  100 mg Oral BID Aaron Ontiveros PA-C   100 mg at 05/05/24 0805    enoxaparin injection 30 mg  30 mg Subcutaneous Daily Jeovanny Wan MD   30 mg at 05/04/24 1756    EPINEPHrine (ADRENALIN) 5 mg in dextrose 5 % (D5W) 250 mL infusion  0-2 mcg/kg/min Intravenous Continuous Aaron Ontiveros PA-C   Stopped at 05/04/24 1755    famotidine (PF) injection 20 mg  20 mg Intravenous Daily Aaron Ontiveros PA-C   20 mg at 05/05/24 0805    folic acid tablet 1 mg  1 mg Oral Daily Aaron Ontiveros PA-C   1 mg at 05/05/24 0805    gabapentin capsule 100 mg  100 mg Oral TID Kimberly Catherine MD   100 mg at 05/05/24 0805    glucagon (human recombinant) injection 1 mg  1 mg Intramuscular PRN Frederick Hicks PA        glucose chewable tablet 16 g  16 g Oral PRN Frederick Hicks PA        glucose chewable tablet 24 g  24 g Oral PRN Frederick Hicks PA        HYDROcodone-acetaminophen 5-325 mg per tablet 1 tablet  1 tablet Oral Q4H PRN Jeovanny Wan MD   1 tablet at 05/05/24 1037    magnesium sulfate 2g in water 50mL IVPB (premix)  2 g Intravenous PRN Aaron Ontiveros PA-C        magnesium sulfate 2g in water 50mL IVPB (premix)  4 g Intravenous PRN Aaron Ontiveros PA-C        metoprolol tartrate (LOPRESSOR) split tablet 12.5 mg  12.5 mg Oral BID Aaron Ontiveros PA-C        morphine injection 4 mg  4 mg Intravenous Q4H PRN Aaron Ontiveros PA-C   4 mg at 05/03/24 2212    mupirocin 2 % ointment   Nasal BID Aaron Ontiveros PA-C   Given at 05/05/24 0805    ondansetron injection 4 mg  4 mg Intravenous Q4H PRN Aaron Ontiveros PA-C   4 mg at 05/04/24 1319    oxyCODONE immediate release tablet 5 mg  5 mg Oral Q4H PRN Jeovanny Wan MD   5 mg at 05/04/24 1413    oxyCODONE immediate release tablet Tab 10 mg  10 mg Oral Q4H PRN Aaron Ontiveros, PA-C        potassium chloride 20 mEq in 100 mL IVPB  (FOR CENTRAL LINE ADMINISTRATION ONLY)  20 mEq Intravenous PRN Aaron Ontiveros PA-C   Stopped at 05/03/24 1316    potassium chloride 20 mEq in 100 mL IVPB (FOR CENTRAL LINE ADMINISTRATION ONLY)  40 mEq Intravenous PRN Aaron Ontiveros PA-C        potassium chloride 20 mEq in 100 mL IVPB (FOR CENTRAL LINE ADMINISTRATION ONLY)  60 mEq Intravenous PRN Aaron Ontiveros PA-C        sodium phosphate 15 mmol in dextrose 5 % (D5W) 250 mL IVPB  15 mmol Intravenous PRN Aaron Ontiveros PA-C        sodium phosphate 20.01 mmol in dextrose 5 % (D5W) 250 mL IVPB  20.01 mmol Intravenous PRN Aaron Ontiveros PA-C        sodium phosphate 30 mmol in dextrose 5 % (D5W) 250 mL IVPB  30 mmol Intravenous PRN Aaron Ontiveros PA-C         Facility-Administered Medications Ordered in Other Encounters   Medication Dose Route Frequency Provider Last Rate Last Admin    0.9%  NaCl infusion   Intravenous Continuous Rubia Seymour MD   Stopped at 12/04/23 0713    diazePAM tablet 10 mg  10 mg Oral On Call Procedure Emmanuel Riley MD   10 mg at 02/21/24 1031    diphenhydrAMINE capsule 50 mg  50 mg Oral On Call Procedure Rubia Seymour MD   50 mg at 12/04/23 0700    diphenhydrAMINE capsule 50 mg  50 mg Oral On Call Procedure Emmanuel Riley MD   50 mg at 02/21/24 1031    sodium chloride 0.9% flush 10 mL  10 mL Intravenous PRN Rubia Seymour MD         Review of Systems   Constitutional:  Positive for fatigue.   Respiratory:  Negative for shortness of breath.    Cardiovascular:  Positive for chest pain (Incisional). Negative for palpitations and leg swelling.   All other systems reviewed and are negative.    Objective:     Vital Signs (Most Recent):  Temp: 98 °F (36.7 °C) (05/05/24 0337)  Pulse: 89 (05/05/24 1100)  Resp: (!) 23 (05/05/24 1100)  BP: 123/61 (05/05/24 1100)  SpO2: (!) 93 % (05/05/24 1100) Vital Signs (24h Range):  Temp:  [98 °F (36.7 °C)-99 °F (37.2 °C)] 98 °F (36.7 °C)  Pulse:  [] 89  Resp:  [14-35]  23  SpO2:  [79 %-100 %] 93 %  BP: ()/(41-88) 123/61   Weight: 83.2 kg (183 lb 6.8 oz)  Body mass index is 30.52 kg/m².  SpO2: (!) 93 %       Intake/Output Summary (Last 24 hours) at 5/5/2024 1219  Last data filed at 5/5/2024 1056  Gross per 24 hour   Intake 1763.54 ml   Output 1748 ml   Net 15.54 ml     Lines/Drains/Airways       Central Venous Catheter Line  Duration             Percutaneous Central Line - Triple Lumen  05/03/24 0823 Internal Jugular Right 2 days              Drain  Duration                  Chest Tube 05/03/24 Tube - 1 Anterior Mediastinal 24 Fr. 2 days         Chest Tube 05/03/24 Tube - 2 Left Pleural 24 Fr. 2 days    Female External Urinary Catheter w/ Suction 05/05/24 0600 <1 day              Peripheral Intravenous Line  Duration                  Peripheral IV - Single Lumen 05/03/24 0545 18 G Distal;Posterior;Right Forearm 2 days                  Significant Labs:  Recent Results (from the past 72 hour(s))   POCT glucose    Collection Time: 05/03/24  5:39 AM   Result Value Ref Range    POCT Glucose 157 (H) 70 - 110 mg/dL   ISTAT PROCEDURE    Collection Time: 05/03/24  7:07 AM   Result Value Ref Range    POC PH 7.295 (L) 7.35 - 7.45    POC PCO2 45.4 (H) 35 - 45 mmHg    POC PO2 47 40 - 60 mmHg    POC HCO3 22.1 (L) 24 - 28 mmol/L    POC BE -4 (L) -2 to 2 mmol/L    POC SATURATED O2 78 95 - 100 %    POC Glucose 149 (H) 70 - 110 mg/dL    POC Sodium 144 136 - 145 mmol/L    POC Potassium 4.0 3.5 - 5.1 mmol/L    POC TCO2 23 (L) 24 - 29 mmol/L    POC Ionized Calcium 1.15 1.06 - 1.42 mmol/L    POC Hematocrit 30 (L) 36 - 54 %PCV    POC HEMOGLOBIN 10 g/dL    Sample VENOUS     FiO2 100    Basic Metabolic Panel    Collection Time: 05/03/24  7:42 AM   Result Value Ref Range    Sodium Level 145 136 - 145 mmol/L    Potassium Level 3.8 3.5 - 5.1 mmol/L    Chloride 117 (H) 98 - 107 mmol/L    Carbon Dioxide 21 (L) 23 - 31 mmol/L    Glucose Level 182 (H) 82 - 115 mg/dL    Blood Urea Nitrogen 25.8 (H) 9.8 -  20.1 mg/dL    Creatinine 1.65 (H) 0.55 - 1.02 mg/dL    BUN/Creatinine Ratio 16     Calcium Level Total 9.5 8.4 - 10.2 mg/dL    Anion Gap 7.0 mEq/L    eGFR 31 mls/min/1.73/m2   Protime-INR    Collection Time: 05/03/24  7:42 AM   Result Value Ref Range    PT 16.4 (H) 12.5 - 14.5 seconds    INR 1.3 <=1.3   APTT    Collection Time: 05/03/24  7:42 AM   Result Value Ref Range    PTT 30.2 23.2 - 33.7 seconds   CBC with Differential    Collection Time: 05/03/24  7:42 AM   Result Value Ref Range    WBC 8.12 4.50 - 11.50 x10(3)/mcL    RBC 3.42 (L) 4.20 - 5.40 x10(6)/mcL    Hgb 10.0 (L) 12.0 - 16.0 g/dL    Hct 29.9 (L) 37.0 - 47.0 %    MCV 87.4 80.0 - 94.0 fL    MCH 29.2 27.0 - 31.0 pg    MCHC 33.4 33.0 - 36.0 g/dL    RDW 14.1 11.5 - 17.0 %    Platelet 120 (L) 130 - 400 x10(3)/mcL    MPV 9.8 7.4 - 10.4 fL    Neut % 73.1 %    Lymph % 22.4 %    Mono % 1.5 %    Eos % 2.3 %    Basophil % 0.2 %    Lymph # 1.82 0.6 - 4.6 x10(3)/mcL    Neut # 5.93 2.1 - 9.2 x10(3)/mcL    Mono # 0.12 0.1 - 1.3 x10(3)/mcL    Eos # 0.19 0 - 0.9 x10(3)/mcL    Baso # 0.02 <=0.2 x10(3)/mcL    IG# 0.04 0 - 0.04 x10(3)/mcL    IG% 0.5 %    NRBC% 0.0 %   ISTAT PROCEDURE    Collection Time: 05/03/24  8:22 AM   Result Value Ref Range    POC PH 7.341 (L) 7.35 - 7.45    POC PCO2 46.1 (H) 35 - 45 mmHg    POC PO2 45 (LL) 80 - 100 mmHg    POC HCO3 24.9 24 - 28 mmol/L    POC BE -1 -2 to 2 mmol/L    POC SATURATED O2 78 95 - 100 %    POC Glucose 216 (H) 70 - 110 mg/dL    POC Sodium 145 136 - 145 mmol/L    POC Potassium 4.2 3.5 - 5.1 mmol/L    POC TCO2 26 23 - 27 mmol/L    POC Ionized Calcium 0.96 (L) 1.06 - 1.42 mmol/L    POC Hematocrit 19 (LL) 36 - 54 %PCV    POC HEMOGLOBIN 7 g/dL    Sample ARTERIAL    ISTAT PROCEDURE    Collection Time: 05/03/24  8:36 AM   Result Value Ref Range    POC PH 7.323 (L) 7.35 - 7.45    POC PCO2 38.1 35 - 45 mmHg    POC PO2 353 (H) 80 - 100 mmHg    POC HCO3 19.8 (L) 24 - 28 mmol/L    POC BE -6 (L) -2 to 2 mmol/L    POC SATURATED O2 100 95 -  100 %    POC Glucose 259 (H) 70 - 110 mg/dL    POC Sodium 142 136 - 145 mmol/L    POC Potassium 5.2 (H) 3.5 - 5.1 mmol/L    POC TCO2 21 (L) 23 - 27 mmol/L    POC Ionized Calcium 0.92 (L) 1.06 - 1.42 mmol/L    POC Hematocrit 25 (L) 36 - 54 %PCV    POC HEMOGLOBIN 9 g/dL    Sample ARTERIAL     FiO2 70    ISTAT PROCEDURE    Collection Time: 05/03/24  8:53 AM   Result Value Ref Range    POC PH 7.437 7.35 - 7.45    POC PCO2 35.5 35 - 45 mmHg    POC PO2 329 (H) 80 - 100 mmHg    POC HCO3 24.0 24 - 28 mmol/L    POC BE 0 -2 to 2 mmol/L    POC SATURATED O2 100 95 - 100 %    POC Glucose 227 (H) 70 - 110 mg/dL    POC Sodium 143 136 - 145 mmol/L    POC Potassium 4.6 3.5 - 5.1 mmol/L    POC TCO2 25 23 - 27 mmol/L    POC Ionized Calcium 1.11 1.06 - 1.42 mmol/L    POC Hematocrit 23 (L) 36 - 54 %PCV    POC HEMOGLOBIN 8 g/dL    Sample ARTERIAL     FiO2 70    ISTAT PROCEDURE    Collection Time: 05/03/24  9:15 AM   Result Value Ref Range    POC PH 7.363 7.35 - 7.45    POC PCO2 37.8 35 - 45 mmHg    POC PO2 292 (H) 80 - 100 mmHg    POC HCO3 21.5 (L) 24 - 28 mmol/L    POC BE -4 (L) -2 to 2 mmol/L    POC SATURATED O2 100 95 - 100 %    POC Glucose 195 (H) 70 - 110 mg/dL    POC Sodium 144 136 - 145 mmol/L    POC Potassium 4.1 3.5 - 5.1 mmol/L    POC TCO2 23 23 - 27 mmol/L    POC Ionized Calcium 1.50 (H) 1.06 - 1.42 mmol/L    POC Hematocrit 23 (L) 36 - 54 %PCV    POC HEMOGLOBIN 8 g/dL    Sample ARTERIAL     FiO2 100    ISTAT PROCEDURE    Collection Time: 05/03/24  9:35 AM   Result Value Ref Range    POC PH 7.370 7.35 - 7.45    POC PCO2 33.8 (L) 35 - 45 mmHg    POC PO2 258 (H) 80 - 100 mmHg    POC HCO3 19.5 (L) 24 - 28 mmol/L    POC BE -6 (L) -2 to 2 mmol/L    POC SATURATED O2 100 95 - 100 %    POC Glucose 180 (H) 70 - 110 mg/dL    POC Sodium 145 136 - 145 mmol/L    POC Potassium 3.7 3.5 - 5.1 mmol/L    POC TCO2 21 (L) 23 - 27 mmol/L    POC Ionized Calcium 1.34 1.06 - 1.42 mmol/L    POC Hematocrit 25 (L) 36 - 54 %PCV    POC HEMOGLOBIN 9 g/dL     Sample ARTERIAL    POCT glucose    Collection Time: 05/03/24 10:13 AM   Result Value Ref Range    POCT Glucose 160 (H) 70 - 110 mg/dL   Magnesium    Collection Time: 05/03/24 10:30 AM   Result Value Ref Range    Magnesium Level 2.70 (H) 1.60 - 2.60 mg/dL   Phosphorus    Collection Time: 05/03/24 10:30 AM   Result Value Ref Range    Phosphorus Level 2.0 (L) 2.3 - 4.7 mg/dL   CBC Without Differential    Collection Time: 05/03/24 10:30 AM   Result Value Ref Range    WBC 10.19 4.50 - 11.50 x10(3)/mcL    RBC 3.55 (L) 4.20 - 5.40 x10(6)/mcL    Hgb 10.3 (L) 12.0 - 16.0 g/dL    Hct 30.7 (L) 37.0 - 47.0 %    MCV 86.5 80.0 - 94.0 fL    MCH 29.0 27.0 - 31.0 pg    MCHC 33.6 33.0 - 36.0 g/dL    RDW 14.0 11.5 - 17.0 %    Platelet 131 130 - 400 x10(3)/mcL    MPV 9.6 7.4 - 10.4 fL    NRBC% 0.0 %   Comprehensive Metabolic Panel    Collection Time: 05/03/24 10:30 AM   Result Value Ref Range    Sodium Level 147 (H) 136 - 145 mmol/L    Potassium Level 3.4 (L) 3.5 - 5.1 mmol/L    Chloride 120 (H) 98 - 107 mmol/L    Carbon Dioxide 20 (L) 23 - 31 mmol/L    Glucose Level 151 (H) 82 - 115 mg/dL    Blood Urea Nitrogen 26.3 (H) 9.8 - 20.1 mg/dL    Creatinine 1.71 (H) 0.55 - 1.02 mg/dL    Calcium Level Total 9.0 8.4 - 10.2 mg/dL    Protein Total 4.7 (L) 5.8 - 7.6 gm/dL    Albumin Level 3.0 (L) 3.4 - 4.8 g/dL    Globulin 1.7 (L) 2.4 - 3.5 gm/dL    Albumin/Globulin Ratio 1.8 1.1 - 2.0 ratio    Bilirubin Total 0.6 <=1.5 mg/dL    Alkaline Phosphatase 32 (L) 40 - 150 unit/L    Alanine Aminotransferase 8 0 - 55 unit/L    Aspartate Aminotransferase 24 5 - 34 unit/L    eGFR 30 mls/min/1.73/m2   Protime-INR    Collection Time: 05/03/24 10:30 AM   Result Value Ref Range    PT 15.8 (H) 12.5 - 14.5 seconds    INR 1.3 <=1.3   RT Blood Gas    Collection Time: 05/03/24 10:35 AM   Result Value Ref Range    Sample Type Arterial Blood     Sample site Arterial Line     Drawn by AS RRT     pH, Blood gas 7.410 7.350 - 7.450    pCO2, Blood gas 32.0 (L) 35.0 - 45.0  mmHg    pO2, Blood gas 160.0 (H) 80.0 - 100.0 mmHg    Sodium, Blood Gas 140 137 - 145 mmol/L    Potassium, Blood Gas 3.2 (L) 3.5 - 5.0 mmol/L    Calcium Level Ionized 1.22 1.12 - 1.23 mmol/L    TOC2, Blood gas 21.3 mmol/L    Base Excess, Blood gas -3.60 (L) -2.00 - 2.00 mmol/L    sO2, Blood gas 99.4 %    HCO3, Blood gas 20.3 (L) 22.0 - 26.0 mmol/L    THb, Blood gas 11.0 (L) 12 - 16 g/dL    O2 Hb, Blood Gas 96.9 94.0 - 97.0 %    CO Hgb 1.0 0.5 - 1.5 %    Met Hgb 1.0 0.4 - 1.5 %    Allens Test N/A     MODE SIMV     Oxygen Device, Blood gas Ventilator     FIO2, Blood gas 50 %    Mech Vt 500 ml    Mech RR 20 b/min    PEEP 5.0 cmH2O    PS 10.0 cmH2O   POCT glucose    Collection Time: 05/03/24 11:08 AM   Result Value Ref Range    POCT Glucose 147 (H) 70 - 110 mg/dL   POCT glucose    Collection Time: 05/03/24 11:52 AM   Result Value Ref Range    POCT Glucose 100 70 - 110 mg/dL   POCT glucose    Collection Time: 05/03/24  1:00 PM   Result Value Ref Range    POCT Glucose 131 (H) 70 - 110 mg/dL   POCT glucose    Collection Time: 05/03/24  1:57 PM   Result Value Ref Range    POCT Glucose 137 (H) 70 - 110 mg/dL   Basic Metabolic Panel    Collection Time: 05/03/24  2:26 PM   Result Value Ref Range    Sodium Level 147 (H) 136 - 145 mmol/L    Potassium Level 4.0 3.5 - 5.1 mmol/L    Chloride 115 (H) 98 - 107 mmol/L    Carbon Dioxide 20 (L) 23 - 31 mmol/L    Glucose Level 168 (H) 82 - 115 mg/dL    Blood Urea Nitrogen 24.3 (H) 9.8 - 20.1 mg/dL    Creatinine 1.77 (H) 0.55 - 1.02 mg/dL    BUN/Creatinine Ratio 14     Calcium Level Total 8.0 (L) 8.4 - 10.2 mg/dL    Anion Gap 12.0 mEq/L    eGFR 29 mls/min/1.73/m2   Hemoglobin and Hematocrit    Collection Time: 05/03/24  2:26 PM   Result Value Ref Range    Hgb 11.4 (L) 12.0 - 16.0 g/dL    Hct 34.5 (L) 37.0 - 47.0 %   RT Blood Gas    Collection Time: 05/03/24  3:35 PM   Result Value Ref Range    Sample Type Arterial Blood     Sample site Arterial Line     Drawn by AS LRT     pH, Blood gas  7.290 (LL) 7.350 - 7.450    pCO2, Blood gas 49.0 (H) 35.0 - 45.0 mmHg    pO2, Blood gas 86.0 80.0 - 100.0 mmHg    Sodium, Blood Gas 138 137 - 145 mmol/L    Potassium, Blood Gas 3.9 3.5 - 5.0 mmol/L    Calcium Level Ionized 1.08 (L) 1.12 - 1.23 mmol/L    TOC2, Blood gas 25.1 mmol/L    Base Excess, Blood gas -3.30 (L) -2.00 - 2.00 mmol/L    sO2, Blood gas 95.3 %    HCO3, Blood gas 23.6 22.0 - 26.0 mmol/L    THb, Blood gas 12.9 12 - 16 g/dL    O2 Hb, Blood Gas 94.4 94.0 - 97.0 %    CO Hgb 1.2 0.5 - 1.5 %    Met Hgb 1.1 0.4 - 1.5 %    Allens Test N/A     MODE CPAP     Oxygen Device, Blood gas Ventilator     FIO2, Blood gas 30 %    PEEP 5.0 cmH2O    PS 10.0 cmH2O   POCT glucose    Collection Time: 05/03/24  3:44 PM   Result Value Ref Range    POCT Glucose 197 (H) 70 - 110 mg/dL   POCT glucose    Collection Time: 05/03/24  4:46 PM   Result Value Ref Range    POCT Glucose 216 (H) 70 - 110 mg/dL   Blood Gas (ABG)    Collection Time: 05/03/24  5:05 PM   Result Value Ref Range    Sample Type Arterial Blood     Sample site Arterial Line     Drawn by AS LRT     pH, Blood gas 7.290 (LL) 7.350 - 7.450    pCO2, Blood gas 50.0 (H) 35.0 - 45.0 mmHg    pO2, Blood gas 89.0 80.0 - 100.0 mmHg    Sodium, Blood Gas 139 137 - 145 mmol/L    Potassium, Blood Gas 4.2 3.5 - 5.0 mmol/L    Calcium Level Ionized 1.01 (L) 1.12 - 1.23 mmol/L    TOC2, Blood gas 25.5 mmol/L    Base Excess, Blood gas -2.90 (L) -2.00 - 2.00 mmol/L    sO2, Blood gas 95.7 %    HCO3, Blood gas 24.0 22.0 - 26.0 mmol/L    THb, Blood gas 12.1 12 - 16 g/dL    O2 Hb, Blood Gas 96.3 94.0 - 97.0 %    CO Hgb 2.4 (H) 0.5 - 1.5 %    Met Hgb 0.2 (L) 0.4 - 1.5 %    Allens Test N/A     MODE CPAP     FIO2, Blood gas 30 %    PEEP 5.0 cmH2O    PS 10.0 cmH2O   POCT glucose    Collection Time: 05/03/24  5:51 PM   Result Value Ref Range    POCT Glucose 189 (H) 70 - 110 mg/dL   POCT glucose    Collection Time: 05/03/24  6:50 PM   Result Value Ref Range    POCT Glucose 203 (H) 70 - 110 mg/dL    POCT glucose    Collection Time: 05/03/24  8:03 PM   Result Value Ref Range    POCT Glucose 223 (H) 70 - 110 mg/dL   POCT glucose    Collection Time: 05/03/24  9:02 PM   Result Value Ref Range    POCT Glucose 205 (H) 70 - 110 mg/dL   POCT glucose    Collection Time: 05/03/24 10:03 PM   Result Value Ref Range    POCT Glucose 211 (H) 70 - 110 mg/dL   POCT glucose    Collection Time: 05/03/24 11:01 PM   Result Value Ref Range    POCT Glucose 219 (H) 70 - 110 mg/dL   POCT glucose    Collection Time: 05/03/24 11:58 PM   Result Value Ref Range    POCT Glucose 217 (H) 70 - 110 mg/dL   POCT glucose    Collection Time: 05/04/24  1:00 AM   Result Value Ref Range    POCT Glucose 212 (H) 70 - 110 mg/dL   POCT glucose    Collection Time: 05/04/24  2:04 AM   Result Value Ref Range    POCT Glucose 211 (H) 70 - 110 mg/dL   CBC Without Differential    Collection Time: 05/04/24  3:36 AM   Result Value Ref Range    WBC 15.42 (H) 4.50 - 11.50 x10(3)/mcL    RBC 4.36 4.20 - 5.40 x10(6)/mcL    Hgb 12.7 12.0 - 16.0 g/dL    Hct 38.2 37.0 - 47.0 %    MCV 87.6 80.0 - 94.0 fL    MCH 29.1 27.0 - 31.0 pg    MCHC 33.2 33.0 - 36.0 g/dL    RDW 14.6 11.5 - 17.0 %    Platelet 203 130 - 400 x10(3)/mcL    MPV 10.0 7.4 - 10.4 fL    NRBC% 0.0 %   Magnesium    Collection Time: 05/04/24  3:37 AM   Result Value Ref Range    Magnesium Level 2.50 1.60 - 2.60 mg/dL   Phosphorus    Collection Time: 05/04/24  3:37 AM   Result Value Ref Range    Phosphorus Level 1.5 (L) 2.3 - 4.7 mg/dL   Comprehensive Metabolic Panel    Collection Time: 05/04/24  3:37 AM   Result Value Ref Range    Sodium Level 145 136 - 145 mmol/L    Potassium Level 3.6 3.5 - 5.1 mmol/L    Chloride 112 (H) 98 - 107 mmol/L    Carbon Dioxide 19 (L) 23 - 31 mmol/L    Glucose Level 212 (H) 82 - 115 mg/dL    Blood Urea Nitrogen 20.4 (H) 9.8 - 20.1 mg/dL    Creatinine 1.95 (H) 0.55 - 1.02 mg/dL    Calcium Level Total 8.0 (L) 8.4 - 10.2 mg/dL    Protein Total 5.5 (L) 5.8 - 7.6 gm/dL    Albumin Level  3.6 3.4 - 4.8 g/dL    Globulin 1.9 (L) 2.4 - 3.5 gm/dL    Albumin/Globulin Ratio 1.9 1.1 - 2.0 ratio    Bilirubin Total 0.4 <=1.5 mg/dL    Alkaline Phosphatase 33 (L) 40 - 150 unit/L    Alanine Aminotransferase 11 0 - 55 unit/L    Aspartate Aminotransferase 36 (H) 5 - 34 unit/L    eGFR 25 mls/min/1.73/m2   POCT glucose    Collection Time: 05/04/24  3:47 AM   Result Value Ref Range    POCT Glucose 209 (H) 70 - 110 mg/dL   POCT glucose    Collection Time: 05/04/24  4:44 AM   Result Value Ref Range    POCT Glucose 197 (H) 70 - 110 mg/dL   POCT glucose    Collection Time: 05/04/24  6:02 AM   Result Value Ref Range    POCT Glucose 193 (H) 70 - 110 mg/dL   POCT glucose    Collection Time: 05/04/24  6:56 AM   Result Value Ref Range    POCT Glucose 174 (H) 70 - 110 mg/dL   POCT glucose    Collection Time: 05/04/24  7:57 AM   Result Value Ref Range    POCT Glucose 172 (H) 70 - 110 mg/dL   RT Blood Gas    Collection Time: 05/04/24  8:30 AM   Result Value Ref Range    Sample Type Arterial Blood     Sample site Left Brachial Artery     Drawn by AS LRT     pH, Blood gas 7.390 7.350 - 7.450    pCO2, Blood gas 38.0 35.0 - 45.0 mmHg    pO2, Blood gas 88.0 80.0 - 100.0 mmHg    Sodium, Blood Gas 139 137 - 145 mmol/L    Potassium, Blood Gas 3.2 (L) 3.5 - 5.0 mmol/L    Calcium Level Ionized 1.05 (L) 1.12 - 1.23 mmol/L    TOC2, Blood gas 24.2 mmol/L    Base Excess, Blood gas -1.70 mmol/L    sO2, Blood gas 97.0 %    HCO3, Blood gas 23.0 22.0 - 26.0 mmol/L    Allens Test N/A     MODE CPAP     Oxygen Device, Blood gas Ventilator     FIO2, Blood gas 30 %    PEEP 5.0 cmH2O    PS 10.0 cmH2O   POCT glucose    Collection Time: 05/04/24  8:52 AM   Result Value Ref Range    POCT Glucose 156 (H) 70 - 110 mg/dL   POCT glucose    Collection Time: 05/04/24  9:56 AM   Result Value Ref Range    POCT Glucose 141 (H) 70 - 110 mg/dL   POCT glucose    Collection Time: 05/04/24 11:09 AM   Result Value Ref Range    POCT Glucose 97 70 - 110 mg/dL   POCT  glucose    Collection Time: 05/04/24 12:11 PM   Result Value Ref Range    POCT Glucose 70 70 - 110 mg/dL   POCT glucose    Collection Time: 05/04/24  1:16 PM   Result Value Ref Range    POCT Glucose 76 70 - 110 mg/dL   POCT glucose    Collection Time: 05/04/24  3:20 PM   Result Value Ref Range    POCT Glucose 97 70 - 110 mg/dL   POCT glucose    Collection Time: 05/04/24  6:08 PM   Result Value Ref Range    POCT Glucose 119 (H) 70 - 110 mg/dL   POCT glucose    Collection Time: 05/04/24  9:28 PM   Result Value Ref Range    POCT Glucose 146 (H) 70 - 110 mg/dL   Comprehensive Metabolic Panel    Collection Time: 05/05/24  4:20 AM   Result Value Ref Range    Sodium Level 138 136 - 145 mmol/L    Potassium Level 4.7 3.5 - 5.1 mmol/L    Chloride 108 (H) 98 - 107 mmol/L    Carbon Dioxide 20 (L) 23 - 31 mmol/L    Glucose Level 86 82 - 115 mg/dL    Blood Urea Nitrogen 16.6 9.8 - 20.1 mg/dL    Creatinine 1.88 (H) 0.55 - 1.02 mg/dL    Calcium Level Total 7.5 (L) 8.4 - 10.2 mg/dL    Protein Total 5.5 (L) 5.8 - 7.6 gm/dL    Albumin Level 3.2 (L) 3.4 - 4.8 g/dL    Globulin 2.3 (L) 2.4 - 3.5 gm/dL    Albumin/Globulin Ratio 1.4 1.1 - 2.0 ratio    Bilirubin Total 0.6 <=1.5 mg/dL    Alkaline Phosphatase 61 40 - 150 unit/L    Alanine Aminotransferase 7 0 - 55 unit/L    Aspartate Aminotransferase 27 5 - 34 unit/L    eGFR 27 mls/min/1.73/m2   Magnesium    Collection Time: 05/05/24  4:20 AM   Result Value Ref Range    Magnesium Level 2.10 1.60 - 2.60 mg/dL   Phosphorus    Collection Time: 05/05/24  4:20 AM   Result Value Ref Range    Phosphorus Level 3.4 2.3 - 4.7 mg/dL   CBC with Differential    Collection Time: 05/05/24  4:20 AM   Result Value Ref Range    WBC 14.76 (H) 4.50 - 11.50 x10(3)/mcL    RBC 4.50 4.20 - 5.40 x10(6)/mcL    Hgb 13.0 12.0 - 16.0 g/dL    Hct 40.7 37.0 - 47.0 %    MCV 90.4 80.0 - 94.0 fL    MCH 28.9 27.0 - 31.0 pg    MCHC 31.9 (L) 33.0 - 36.0 g/dL    RDW 15.2 11.5 - 17.0 %    Platelet 146 130 - 400 x10(3)/mcL    MPV  10.3 7.4 - 10.4 fL    Neut % 72.9 %    Lymph % 16.7 %    Mono % 8.7 %    Eos % 0.9 %    Basophil % 0.3 %    Lymph # 2.47 0.6 - 4.6 x10(3)/mcL    Neut # 10.74 (H) 2.1 - 9.2 x10(3)/mcL    Mono # 1.28 0.1 - 1.3 x10(3)/mcL    Eos # 0.14 0 - 0.9 x10(3)/mcL    Baso # 0.05 <=0.2 x10(3)/mcL    IG# 0.08 (H) 0 - 0.04 x10(3)/mcL    IG% 0.5 %    NRBC% 0.0 %   POCT glucose    Collection Time: 05/05/24  9:13 AM   Result Value Ref Range    POCT Glucose 78 70 - 110 mg/dL   POCT glucose    Collection Time: 05/05/24 11:37 AM   Result Value Ref Range    POCT Glucose 97 70 - 110 mg/dL     EKG:       Telemetry: SR     Physical Exam  Constitutional:       General: She is not in acute distress.     Appearance: Normal appearance. She is obese.   HENT:      Head: Normocephalic.      Mouth/Throat:      Mouth: Mucous membranes are moist.   Eyes:      Extraocular Movements: Extraocular movements intact.      Conjunctiva/sclera: Conjunctivae normal.   Cardiovascular:      Rate and Rhythm: Normal rate and regular rhythm.      Pulses: Normal pulses.      Heart sounds: Murmur heard.   Pulmonary:      Effort: Pulmonary effort is normal. No respiratory distress.      Comments: NC O2  Abdominal:      Palpations: Abdomen is soft.   Musculoskeletal:         General: No swelling.   Skin:     General: Skin is warm.      Comments: Midline Sternotomy Dressing C/D/I. + CTs   Neurological:      General: No focal deficit present.      Mental Status: She is alert and oriented to person, place, and time.   Psychiatric:         Behavior: Behavior normal.         Judgment: Judgment normal.       Home Medications:   Current Facility-Administered Medications on File Prior to Encounter   Medication Dose Route Frequency Provider Last Rate Last Admin    0.9%  NaCl infusion   Intravenous Continuous Rubia Seymour MD   Stopped at 12/04/23 0713    diazePAM tablet 10 mg  10 mg Oral On Call Procedure Emmanuel Riley MD   10 mg at 02/21/24 1031    diphenhydrAMINE  "capsule 50 mg  50 mg Oral On Call Procedure Rubia Seymour MD   50 mg at 12/04/23 0700    diphenhydrAMINE capsule 50 mg  50 mg Oral On Call Procedure Emmanuel Riley MD   50 mg at 02/21/24 1031    sodium chloride 0.9% flush 10 mL  10 mL Intravenous PRN Rubia Seymour MD         Current Outpatient Medications on File Prior to Encounter   Medication Sig Dispense Refill    aspirin (ECOTRIN) 81 MG EC tablet Take 81 mg by mouth once daily.      gabapentin (NEURONTIN) 100 MG capsule TAKE 2 CAPSULES BY MOUTH TWICE DAILY, EVERY MORNING AND AT NOON 60 capsule 6    gabapentin (NEURONTIN) 300 MG capsule TAKE 1 CAPSULE(300 MG) BY MOUTH EVERY EVENING 30 capsule 2    icosapent ethyL (VASCEPA) 1 gram Cap BID      insulin glargine (LANTUS U-100 INSULIN) 100 unit/mL injection Inject 46 units in the am, and 20 units at night 20 mL 6    isosorbide mononitrate (IMDUR) 30 MG 24 hr tablet Take 30 mg by mouth once daily.      loratadine (CLARITIN) 10 mg tablet TAKE 1 TABLET BY MOUTH EVERY DAY 90 tablet 3    nortriptyline (PAMELOR) 25 MG capsule TAKE 1 CAPSULE BY MOUTH EVERY DAY AT BEDTIME FOR SLEEP 30 capsule 3    omeprazole (PRILOSEC) 40 MG capsule TAKE 1 CAPSULE BY MOUTH DAILY AS NEEDED FOR REFLUX (Patient taking differently: Take 40 mg by mouth every morning.) 90 capsule 3    rosuvastatin (CRESTOR) 40 MG Tab Take 40 mg by mouth every evening.      ticagrelor (BRILINTA) 90 mg tablet Take 90 mg by mouth once daily.      TRADJENTA 5 mg Tab tablet TAKE 1 TABLET(5 MG) BY MOUTH EVERY DAY 90 tablet 3    ALCOHOL PREP PADS PadM       clopidogreL (PLAVIX) 75 mg tablet Take 1 tablet (75 mg total) by mouth once daily. 30 tablet 11    COMFORT EZ INSULIN SYRINGE 0.5 mL 31 gauge x 5/16" Syrg       denosumab (PROLIA) 60 mg/mL Syrg Inject 1 mL (60 mg total) into the skin every 6 (six) months. 1 mL 1    fluticasone propionate (FLONASE) 50 mcg/actuation nasal spray 2 sprays (100 mcg total) by Each Nostril route once daily. 16 g 3    nebulizer " "accessories Kit Please dispense one nebulizer accessories kit for appropriate nebulizer machine. 1 kit 0    nebulizer and compressor Veronica Please dispense one nebulizer machine with appropriate supplies. 1 each 0    pen needle, diabetic 32 gauge x 5/32" Ndle 1 each by Misc.(Non-Drug; Combo Route) route 2 (two) times a day. 100 each 12     Current Inpatient Medications:    Current Facility-Administered Medications:     0.45% NaCl infusion, , Intravenous, Continuous, Frederick Hicks PA, Last Rate: 50 mL/hr at 05/05/24 1056, Rate Verify at 05/05/24 1056    albumin human 5% bottle 12.5 g, 12.5 g, Intravenous, PRN, Aaron Ontiveros PA-C, Stopped at 05/03/24 1906    aspirin EC tablet 81 mg, 81 mg, Oral, Daily, Aaron Ontiveros PA-C, 81 mg at 05/05/24 0805    calcium gluconate 1 g in NS IVPB (premixed), 1 g, Intravenous, PRN, Aaron Ontiveros PA-C    calcium gluconate 1 g in NS IVPB (premixed), 2 g, Intravenous, PRN, Aaron Ontiveros PA-C    calcium gluconate 1 g in NS IVPB (premixed), 3 g, Intravenous, PRN, Aaron Ontiveros PA-C    clevidipine (CLEVIPREX) 25 mg/50 mL infusion, 0-16 mg/hr, Intravenous, Continuous, Aaron Ontiveros PA-C, Stopped at 05/03/24 1630    dextrose 10% bolus 125 mL 125 mL, 12.5 g, Intravenous, PRN, Aaron Ontiveros PA-C    dextrose 10% bolus 125 mL 125 mL, 12.5 g, Intravenous, PRN, Frederick Hicks, PA    dextrose 10% bolus 250 mL 250 mL, 25 g, Intravenous, PRN, Aaron Ontiveros PA-C    dextrose 10% bolus 250 mL 250 mL, 25 g, Intravenous, PRN, Frederick Hicks, PA    docusate sodium capsule 100 mg, 100 mg, Oral, BID, Aaron Ontiveros PA-C, 100 mg at 05/05/24 0805    enoxaparin injection 30 mg, 30 mg, Subcutaneous, Daily, Jeovanny Wan MD, 30 mg at 05/04/24 1756    EPINEPHrine (ADRENALIN) 5 mg in dextrose 5 % (D5W) 250 mL infusion, 0-2 mcg/kg/min, Intravenous, Continuous, Aaron Ontiveros, PADenilsonC, Stopped at 05/04/24 1755    famotidine (PF) injection 20 mg, 20 mg, Intravenous, " Daily, Aaron Ontiveros PA-C, 20 mg at 05/05/24 0805    folic acid tablet 1 mg, 1 mg, Oral, Daily, Aaron Ontiveros PA-C, 1 mg at 05/05/24 0805    gabapentin capsule 100 mg, 100 mg, Oral, TID, Kimberly Catherine MD, 100 mg at 05/05/24 0805    glucagon (human recombinant) injection 1 mg, 1 mg, Intramuscular, PRN, Frederick Hicks PA    glucose chewable tablet 16 g, 16 g, Oral, PRN, Frederick Hicks, PA    glucose chewable tablet 24 g, 24 g, Oral, PRN, Frederick Hicks, PA    HYDROcodone-acetaminophen 5-325 mg per tablet 1 tablet, 1 tablet, Oral, Q4H PRN, Jeovanny Wan MD, 1 tablet at 05/05/24 1037    magnesium sulfate 2g in water 50mL IVPB (premix), 2 g, Intravenous, PRN, Aaron Ontiveros PA-C    magnesium sulfate 2g in water 50mL IVPB (premix), 4 g, Intravenous, PRN, Aaron Ontiveros PA-C    metoprolol tartrate (LOPRESSOR) split tablet 12.5 mg, 12.5 mg, Oral, BID, Aaron Ontiveros PA-C    morphine injection 4 mg, 4 mg, Intravenous, Q4H PRN, Aaron Ontiveros PA-C, 4 mg at 05/03/24 2212    mupirocin 2 % ointment, , Nasal, BID, Aaron Ontiveros PA-C, Given at 05/05/24 0805    ondansetron injection 4 mg, 4 mg, Intravenous, Q4H PRN, Aaron Ontiveros PA-C, 4 mg at 05/04/24 1319    oxyCODONE immediate release tablet 5 mg, 5 mg, Oral, Q4H PRN, Jeovanny Wan MD, 5 mg at 05/04/24 1413    oxyCODONE immediate release tablet Tab 10 mg, 10 mg, Oral, Q4H PRN, Aaron Ontiveros PA-C    potassium chloride 20 mEq in 100 mL IVPB (FOR CENTRAL LINE ADMINISTRATION ONLY), 20 mEq, Intravenous, PRN, Aaron Ontiveros PA-C, Stopped at 05/03/24 1316    potassium chloride 20 mEq in 100 mL IVPB (FOR CENTRAL LINE ADMINISTRATION ONLY), 40 mEq, Intravenous, PRNWeston John C, PA-C    potassium chloride 20 mEq in 100 mL IVPB (FOR CENTRAL LINE ADMINISTRATION ONLY), 60 mEq, Intravenous, Weston MCCLENDON John C, PA-C    sodium phosphate 15 mmol in dextrose 5 % (D5W) 250 mL IVPB, 15 mmol, Intravenous, PRN, Aaron Ontiveros PA-C     sodium phosphate 20.01 mmol in dextrose 5 % (D5W) 250 mL IVPB, 20.01 mmol, Intravenous, PRN, Aaron Ontiveros PA-C    sodium phosphate 30 mmol in dextrose 5 % (D5W) 250 mL IVPB, 30 mmol, Intravenous, PRN, Aaron Ontiveros PA-C    Facility-Administered Medications Ordered in Other Encounters:     0.9%  NaCl infusion, , Intravenous, Continuous, Rubia Seymour MD, Stopped at 12/04/23 0713    diazePAM tablet 10 mg, 10 mg, Oral, On Call Procedure, Emmanuel Riley MD, 10 mg at 02/21/24 1031    diphenhydrAMINE capsule 50 mg, 50 mg, Oral, On Call Procedure, Rubia Seymour MD, 50 mg at 12/04/23 0700    diphenhydrAMINE capsule 50 mg, 50 mg, Oral, On Call Procedure, Emmanuel Riley MD, 50 mg at 02/21/24 1031    sodium chloride 0.9% flush 10 mL, 10 mL, Intravenous, PRN, Rubia Seymour MD  VTE Risk Mitigation (From admission, onward)           Ordered     enoxaparin injection 30 mg  Daily         05/03/24 1336     Place sequential compression device  Until discontinued         05/03/24 0940     IP VTE HIGH RISK PATIENT  Once         05/03/24 0937                  Assessment:   MVCAD    - s/p Sternotomy (5.3.24) - Ungraftable LAD and RCA    - s/p LHC (2.21.24) - 1. Left main-normal. 2. Lad-patent small mid stent, 60-70% stenosis distal to stent edge, IFR 0.86. 3. LCX- codominant, Mid 50%, IFR 0.92. 4. RCA-codominant Mid occlusion in fractured stent, left to right collaterals    - ECHO (11.21.23) - LVEF 60%  Hypotension requiring Pressors - Resolved     - Hx of HTN  Acute Hypoxemic Respiratory Failure requiring Intubation/Ventilation - Now on NC  BARTOLO/CKD III  DM II  HLD  Hx of MI  Obesity  VI  Chronic Lower Back Pain  GERD  Migraines  OA  No Hx of GIB     Plan:   Continue ASA and BB  Will Add GDMT when BP Allows  Aggressive Mobilization of PT and Q1HR IS  Will Have Dr. Riley Review films to Evaluate if PT would Benefit from LHC with High Risk PCI Prior to D/C or can be Evaluated as OP (in AM: 5.6.24)  Keep K >  4.0 and Mg > 2.0   Labs in AM: CBC, CMP and Mg    JIMBO Mcdonald  Cardiology  [facility]  05/05/2024     Physician addendum:  I have seen and examined this patient as a split-shared visit with the LENCHO d/t complicated medical management of above problems written in assessment and high acuity requiring physician expertise in medical decision-making. I performed the substantive portion of the history and exam. Above medical decision-making is also formulated by me.    Cardiovascular exam:  S1, S2  Lungs:  fine crackles at bases.  Extremities:  1+ edema bilaterally    Plan:  Heel has 2 chest tubes.  Overall improved.  No pressor support.  Plan to discuss with interventionalist for high-risk PCI.  Medications as above.  Continue supportive therapy.  We will follow up.      Kevin Barton MD  Cardiologist

## 2024-05-05 NOTE — PROGRESS NOTES
Pulmonary & Critical Care Medicine   Progress Note      Presenting History/HPI:    The patient is an 83-year-old female with a medical history of gout, coronary artery disease, diabetes mellitus type 2, hypertension, GERD, osteoporosis, CKD stage 3, who was seen in the immediate postoperative.  Status post median sternotomy with attempted CABG with inability to bypass the LAD which was found to be a graftable intraoperatively.  She was found to have severe in stent restenosis of the RCA/PDA stent with distal PDA long segment occlusion to the apex.  Due to anatomical difficulty bypass was not performed.  Patient was closed backup with tubes placed for drainage and moved to the ICU.  She was seen intubated still under intraoperative sedation unresponsive.       Interval History:  Pt extubated yesterday around 0830 to nasal cannula and has been doing well. Did have a post-op fever yesterday x1 at 1430 yesterday, since resolved and WBC downtrending 15.42 -> 14.76 today. Norepi stopped yesterday around 6pm with some borderline MAPs early this morning but adequate since 0300. Creatinine downtrending as well 1.95 -> 1.88. Did not tolerate up OOB to chair this morning due to fatigue, will need PT assistance. Intake 1198, Output 2223 (1825 u/o, mediastinal CT 46, L pleural ) for net -1025 mL.     Scheduled Medications:   Current Facility-Administered Medications   Medication Dose Route Frequency    aspirin  81 mg Oral Daily    docusate sodium  100 mg Oral BID    enoxparin  30 mg Subcutaneous Daily    famotidine (PF)  20 mg Intravenous Daily    folic acid  1 mg Oral Daily    gabapentin  100 mg Oral TID    metoprolol tartrate  12.5 mg Oral BID    mupirocin   Nasal BID       PRN Medications:     Current Facility-Administered Medications:     albumin human 5%, 12.5 g, Intravenous, PRN    calcium gluconate IVPB, 1 g, Intravenous, PRN    calcium gluconate IVPB, 2 g, Intravenous, PRN    calcium gluconate IVPB, 3 g,  Intravenous, PRN    dextrose 10%, 12.5 g, Intravenous, PRN    dextrose 10%, 12.5 g, Intravenous, PRN    dextrose 10%, 25 g, Intravenous, PRN    dextrose 10%, 25 g, Intravenous, PRN    glucagon (human recombinant), 1 mg, Intramuscular, PRN    glucose, 16 g, Oral, PRN    glucose, 24 g, Oral, PRN    HYDROcodone-acetaminophen, 1 tablet, Oral, Q4H PRN    magnesium sulfate IVPB, 2 g, Intravenous, PRN    magnesium sulfate IVPB, 4 g, Intravenous, PRN    morphine, 4 mg, Intravenous, Q4H PRN    ondansetron, 4 mg, Intravenous, Q4H PRN    oxyCODONE, 5 mg, Oral, Q4H PRN    oxyCODONE, 10 mg, Oral, Q4H PRN    potassium chloride in water, 20 mEq, Intravenous, PRN    potassium chloride in water, 40 mEq, Intravenous, PRN    potassium chloride in water, 60 mEq, Intravenous, PRN    sodium phosphate 15 mmol in dextrose 5 % (D5W) 250 mL IVPB, 15 mmol, Intravenous, PRN    sodium phosphate 20.01 mmol in dextrose 5 % (D5W) 250 mL IVPB, 20.01 mmol, Intravenous, PRN    sodium phosphate 30 mmol in dextrose 5 % (D5W) 250 mL IVPB, 30 mmol, Intravenous, PRN      Infusions:    Current Facility-Administered Medications   Medication Dose Route Frequency Last Rate Last Admin    sodium chloride 0.45%   Intravenous Continuous 50 mL/hr at 05/04/24 1720 Rate Verify at 05/04/24 1720    clevidipine  0-16 mg/hr Intravenous Continuous   Stopped at 05/03/24 1630    EPINEPHrine  0-2 mcg/kg/min Intravenous Continuous 2.2 mL/hr at 05/04/24 1720 0.01 mcg/kg/min at 05/04/24 1720         Fluid Balance:     Intake/Output Summary (Last 24 hours) at 5/5/2024 0921  Last data filed at 5/5/2024 0600  Gross per 24 hour   Intake 1198.08 ml   Output 2123 ml   Net -924.92 ml         Vital Signs:   Vitals:    05/05/24 0630   BP:    Pulse:    Resp: (!) 22   Temp:          Physical Exam  Vitals and nursing note reviewed.   Constitutional:       General: She is not in acute distress.     Appearance: Normal appearance. She is ill-appearing. She is not toxic-appearing.   HENT:       Head: Normocephalic and atraumatic.      Right Ear: External ear normal.      Left Ear: External ear normal.      Nose: Nose normal.      Mouth/Throat:      Mouth: Mucous membranes are moist.      Pharynx: Oropharynx is clear. No posterior oropharyngeal erythema.   Eyes:      General: No scleral icterus.     Conjunctiva/sclera: Conjunctivae normal.      Pupils: Pupils are equal, round, and reactive to light.   Neck:      Vascular: No carotid bruit.   Cardiovascular:      Rate and Rhythm: Normal rate and regular rhythm.      Pulses: Normal pulses.      Heart sounds: Normal heart sounds. No murmur heard.     No friction rub. No gallop.      Comments: Median sternotomy wound site clean and dry, mediastinal and chest tube in place with serosanguinous output  Pulmonary:      Effort: Pulmonary effort is normal. No respiratory distress.      Breath sounds: Normal breath sounds. No wheezing, rhonchi or rales.      Comments: On 2 L NC  Diminished bilateral breath sounds without adventitious sounds  Abdominal:      General: Abdomen is flat. Bowel sounds are normal. There is no distension.      Palpations: Abdomen is soft.      Tenderness: There is no abdominal tenderness. There is no guarding or rebound.   Musculoskeletal:         General: No swelling or deformity.      Cervical back: Neck supple. No rigidity or tenderness.      Right lower leg: No edema.      Left lower leg: No edema.   Skin:     General: Skin is warm and dry.      Capillary Refill: Capillary refill takes less than 2 seconds.      Findings: No erythema or rash.   Neurological:      General: No focal deficit present.      Mental Status: She is oriented to person, place, and time. Mental status is at baseline.      Cranial Nerves: No cranial nerve deficit.      Motor: No weakness.      Comments: AAOx3  Following commands appropriately   Psychiatric:         Mood and Affect: Mood normal.         Behavior: Behavior normal.         Thought Content: Thought  "content normal.           Ventilator Settings:  Off MV, tolerating 2L NC    Laboratory Studies:   No results for input(s): "PH", "PCO2", "PO2", "HCO3", "POCSATURATED", "BE" in the last 24 hours.    Recent Labs   Lab 05/05/24  0420   WBC 14.76*   RBC 4.50   HGB 13.0   HCT 40.7      MCV 90.4   MCH 28.9   MCHC 31.9*     Recent Labs   Lab 05/05/24  0420   GLUCOSE 86      K 4.7   CO2 20*   BUN 16.6   CREATININE 1.88*   CALCIUM 7.5*   MG 2.10         Microbiology Data:   Microbiology Results (last 7 days)       ** No results found for the last 168 hours. **              Imaging:   X-Ray Chest AP Portable  Narrative: EXAMINATION:  XR CHEST AP PORTABLE    CLINICAL HISTORY:  Post-op;    TECHNIQUE:  Single frontal view of the chest was performed.    COMPARISON:  05/03/2024    FINDINGS:  LINES AND TUBES: Endotracheal tube projects over the trachea with tip above the gabriel.  Enteric tube courses below the diaphragm.  Right IJ CVC tip projects over the SVC.  There are anterior mediastinal and pleural drains.  EKG/telemetry leads overlie the chest.    MEDIASTINUM AND SHUBHAM: The cardiac silhouette is normal. Aortic atherosclerosis.    LUNGS: Retrocardiac opacity.    PLEURA:Trace left pleural effusion.No pneumothorax.    BONES: Postop sternotomy.  Impression: Probable retrocardiac atelectasis.  Trace left effusion.    Electronically signed by: Enid Gordon  Date:    05/04/2024  Time:    09:06          Assessment and Plan    Assessment:  POD 2 s/p median sternotomy without CABG  HTN  HLD  CAD  Gout  Post op hypotension-resolved          Plan:  -Admitted to ICU for post-op heart care  -Extubated to 2L NC  -Norepi titrated off yesterday around 6pm yesterday, hemodynamically stable this morning  -CT management per CTS, presumably removing mediastinal today as output minimal and leave L pleural CT  -Resume home meds when appropriate  -Stable for downgrade to floor        Basim Brewer MD  5/5/2024  [employer] IM PGY " III  Pulmonology/Critical Care

## 2024-05-05 NOTE — PROGRESS NOTES
Progress Note  Nephrology    Admit Date: 5/3/2024   LOS: 2 days     SUBJECTIVE:     Follow-up For:  CKD 3-4    Interval History:  84 Y/O female with history of CKD 3-4 , DM 2 , Gout , GERD , admitted to hospital for CABG   Surgery done 2 days ago but it was unsuccessful   Doing good today and denies any complaint except chest pain due to surgery   No fever and chills and no SOB , occ cough   Had 1825 cc of urine output , last 24 hrs     Review of Systems:  Constitutional: No fever or chills  Respiratory: No cough or shortness of breath  Cardiovascular: No chest pain or palpitations  Gastrointestinal: No nausea or vomiting  Neurological: No confusion or weakness  Has magana in with good urine output    OBJECTIVE:     Vital Signs Range (Last 24H):  Vitals:    05/05/24 1100   BP: 123/61   Pulse: 89   Resp: (!) 23   Temp:        Temp:  [98 °F (36.7 °C)-99 °F (37.2 °C)]   Pulse:  []   Resp:  [14-35]   BP: ()/(41-88)   SpO2:  [79 %-100 %]     I & O (Last 24H):  Intake/Output Summary (Last 24 hours) at 5/5/2024 1342  Last data filed at 5/5/2024 1056  Gross per 24 hour   Intake 1763.54 ml   Output 1748 ml   Net 15.54 ml       Physical Exam:  Alert , oriented , in NAD  Head   normal   Neck   supple   Chest   symmetric , no Retraction , 2 chest tubes in , incision of surgery present  Lungs   clear   Heart    RRR  Abdomen   soft , non tender   Ext   no edema       Laboratory Data:    Recent Labs   Lab 04/29/24  2300 05/03/24  0742 05/05/24  0420      < > 138   K 4.4   < > 4.7   *  --   --    CO2 17*   < > 20*   BUN 35*   < > 16.6   CREATININE 2.44*   < > 1.88*   GLUCOSE  --    < > 86   CALCIUM 9.4   < > 7.5*   PHOS  --    < > 3.4    < > = values in this interval not displayed.     Lab Results   Component Value Date    .4 (H) 04/26/2024    CALCIUM 7.5 (L) 05/05/2024    CAION 1.05 (L) 05/04/2024    PHOS 3.4 05/05/2024     Lab Results   Component Value Date    IRON 54 03/17/2023    TIBC 229 (L)  03/17/2023    FERRITIN 215.75 (H) 03/17/2023       Medications:  Medication list was reviewed and changes noted under Assessment/Plan.    Diagnostic Results:    Reviewed most recent CT/US/Echo/MRI    ASSESSMENT/PLAN:   1   CKD 3-4  2   CAD , CABG   3   HTN , off of Levophed  4   DM 2  5   Gout  6   hypophosphatemia  7   Osteoporosis    Plan   labs in AM including BNP and uric acid

## 2024-05-05 NOTE — PLAN OF CARE
Problem: Physical Therapy  Goal: Physical Therapy Goal  Description: Goals to be met by: 6/6/24     Patient will increase functional independence with mobility by performing:    Supine to sit with Modified [place]  Sit to stand transfer with Modified [place]  Gait  x 200 feet with Modified [place] using Rolling Walker.     Outcome: Progressing

## 2024-05-05 NOTE — NURSING
Nurses Note -- 4 Eyes      5/5/2024   3:46 AM      Skin assessed during: Daily Assessment      [x] No Altered Skin Integrity Present    [x]Prevention Measures Documented      [] Yes- Altered Skin Integrity Present or Discovered   [] LDA Added if Not in Epic (Describe Wound)   [] New Altered Skin Integrity was Present on Admit and Documented in LDA   [] Wound Image Taken    Wound Care Consulted? No    Attending Nurse:  Luiz Dennis RN/Staff Member:  LEONARDO Cruz

## 2024-05-05 NOTE — PROGRESS NOTES
Pod 2  Extubated  Off epi  Uop 1825  Vss  Heart sinus  [place] c/d/I  Hct 40  Cr improved 1.88  Cts draining  transfer

## 2024-05-05 NOTE — PLAN OF CARE
Problem: Occupational Therapy  Goal: Occupational Therapy Goal  Description: LTG: Pt will perform basic ADLs and ADL transfers with Modified independence and good compliance to sternal precautions using LRAD by discharge.    STG: to be met by 6/2/24:    Pt will complete grooming standing at sink with LRAD with SBA.  Pt will complete UB dressing with SBA.  Pt will complete LB dressing with SBA using LRAD and AE prn.  Pt will complete toileting with SBA using LRAD.  Pt will complete functional mobility to/from toilet and toilet transfer with SBA using LRAD.   Pt will independently verbalize sternal precautions with >90% accuracy.   Outcome: Progressing

## 2024-05-05 NOTE — PT/OT/SLP EVAL
Physical Therapy Evaluation    Patient Name:  Rosalba Whitlock   MRN:  [id number]    Recommendations:     Discharge therapy intensity: Moderate Intensity Therapy   Discharge Equipment Recommendations: to be determined by next level of care   Barriers to discharge: Impaired mobility and Ongoing medical needs    Assessment:     Rosalba Whitlock is a 83 y.o. female admitted with a medical diagnosis of Severe CAD s/p median sternotomy c attempted CABG, hypotension .  She presents with the following impairments/functional limitations: weakness, impaired endurance, impaired functional mobility, gait instability, pain, impaired balance.    Rehab Prognosis: Good; patient would benefit from acute skilled PT services to address these deficits and reach maximum level of function.    Recent Surgery: Procedure(s) (LRB):  CORONARY ARTERY BYPASS GRAFT (CABG) (N/A)  SURGICAL PROCUREMENT, VEIN, ENDOSCOPIC (Left)  PLATING, STERNAL (N/A) 2 Days Post-Op    Plan:     During this hospitalization, patient would benefit from acute PT services 6 x/week to address the identified rehab impairments via gait training, therapeutic activities, therapeutic exercises, neuromuscular re-education and progress toward the following goals:    Plan of Care Expires:  06/06/24    Subjective     Chief Complaint: Pain in her sternum  Patient/Family Comments/goals: To get better and go home  Pain/Comfort:  Pain Rating 1: 8/10  Location 1: sternal  Pain Addressed 1: Reposition, Distraction    Patients cultural, spiritual, Congregational conflicts given the current situation: no    Living Environment:  Lives alone in a SLH with 5 stairs and B rails  Prior to admission, patients level of function was independent with ADLs but requires an AD for gait.  Equipment used at home: walker, rolling, cane, quad, shower chair.  DME owned (not currently used): none.  Upon discharge, patient will have assistance from her daughter who checks on her and her grandson who lives  next door.    Objective:     Communicated with nurse prior to session.  Patient found HOB elevated with peripheral IV, telemetry, pulse ox (continuous), blood pressure cuff, chest tube, pure wick  upon PT entry to room.    General Precautions: Standard, sternal  Orthopedic Precautions:    Braces:    Respiratory Status: Room air  Blood Pressure: 126/49  HR: 91  Sp02: 97%      Exams:  RLE ROM: WFL  RLE Strength: 3+/5 grossly  LLE ROM: WFL  LLE Strength: 3+/5 grossly  Skin integrity:  sternal incision CDI      Functional Mobility:  Bed Mobility:     Scooting: maximal assistance  Supine to Sit: maximal assistance  Sit to Supine: maximal assistance  Transfers:     Sit to Stand:  maximal assistance with hand-held assist  Gait: Pt requires max A to pivot BLE towards HOB x2, significant apprehension      AM-PAC 6 CLICK MOBILITY  Total Score:        Treatment & Education:  Significant apprehension noted with movement, likely limiting her functional ability.     Patient provided with verbal education education regarding PT role/goals/POC.  Understanding was verbalized.     Patient left HOB elevated with all lines intact, call button in reach, and nurse notified.    GOALS:   Multidisciplinary Problems       Physical Therapy Goals          Problem: Physical Therapy    Goal Priority Disciplines Outcome Goal Variances Interventions   Physical Therapy Goal     PT, PT/OT Progressing     Description: Goals to be met by: 6/6/24     Patient will increase functional independence with mobility by performing:    Supine to sit with Modified [place]  Sit to stand transfer with Modified [place]  Gait  x 200 feet with Modified [place] using Rolling Walker.                          History:     Past Medical History:   Diagnosis Date    Amnesia 06/13/2022    Benign paroxysmal positional vertigo, bilateral 06/13/2022    Bronchitis 06/08/2022    Chronic gouty arthritis 06/13/2022    Coronary artery disease involving native  coronary artery of native heart without angina pectoris 12/04/2023    Diabetes mellitus, type 2     Essential hypertension 04/21/2016    Gastroesophageal reflux disease without esophagitis 04/21/2016    GERD (gastroesophageal reflux disease)     Gout, unspecified     Heart attack     Low back pain 06/13/2022    lower back pain bilateral with movement; radiates down right leg to thigh    Low vitamin D level 06/13/2022    Metabolic acidosis 01/12/2023    Microalbuminuria 01/12/2023    Migraine headache 04/21/2016    Osteoporosis 06/13/2022    Peripheral edema 06/13/2022    Physical deconditioning 06/13/2022    Pure hypercholesterolemia 06/13/2022    SOBOE (shortness of breath on exertion)     Spondylosis of lumbar spine 06/13/2022    Stage 3 chronic kidney disease 06/13/2022    Type 2 diabetes mellitus 06/13/2022    Weakness        Past Surgical History:   Procedure Laterality Date    COLONOSCOPY      EPIDURAL STEROID INJECTION INTO CERVICAL SPINE      EYE SURGERY Bilateral     cataract extraction    FOOT SURGERY Bilateral     HYSTERECTOMY      LEFT HEART CATHETERIZATION Left 12/04/2023    Procedure: Left heart cath;  Surgeon: Rubia Seymour MD;  Location: [facility] CATH LAB;  Service: Cardiology;  Laterality: Left;  LHC +/- PCI    REPAIR, CHRONIC TOTAL OCCLUSION, CORONARY N/A 02/21/2024    Procedure: Repair, Chronic Total Occlusion, Coronary;  Surgeon: Emmanuel Riley MD;  Location: [facility] CATH LAB;  Service: Cardiology;  Laterality: N/A;   PCI RCA *START WITH PICTURES OF THE LAD AND CONSIDER IFR IF LAD LOOKS WORSE*  8F EBU 3.5 GUIDE 90 CM LM VIA RRA, 7F AL-1 GUIDE SH RCA VIA RT GROIN    STENT, DRUG ELUTING, SINGLE VESSEL, CORONARY  12/04/2023    Procedure: Stent, Drug Eluting, Single Vessel, Coronary;  Surgeon: Rubia Seymour MD;  Location: [facility] CATH LAB;  Service: Cardiology;;    TONSILLECTOMY      UPPER GASTROINTESTINAL ENDOSCOPY         Time Tracking:     PT Received On: 05/05/24  PT Start Time: 1050     PT  Stop Time: 1115  PT Total Time (min): 25 min     Billable Minutes: Evaluation 25 05/05/2024

## 2024-05-06 LAB
ALBUMIN SERPL-MCNC: 3.1 G/DL (ref 3.4–4.8)
ALBUMIN/GLOB SERPL: 1.1 RATIO (ref 1.1–2)
ALP SERPL-CCNC: 56 UNIT/L (ref 40–150)
ALT SERPL-CCNC: 5 UNIT/L (ref 0–55)
AST SERPL-CCNC: 21 UNIT/L (ref 5–34)
BASOPHILS # BLD AUTO: 0.04 X10(3)/MCL
BASOPHILS NFR BLD AUTO: 0.3 %
BILIRUB SERPL-MCNC: 0.6 MG/DL
BNP BLD-MCNC: 810.6 PG/ML
BUN SERPL-MCNC: 23.7 MG/DL (ref 9.8–20.1)
CALCIUM SERPL-MCNC: 7.7 MG/DL (ref 8.4–10.2)
CHLORIDE SERPL-SCNC: 107 MMOL/L (ref 98–107)
CO2 SERPL-SCNC: 20 MMOL/L (ref 23–31)
CREAT SERPL-MCNC: 1.87 MG/DL (ref 0.55–1.02)
EOSINOPHIL # BLD AUTO: 0.13 X10(3)/MCL (ref 0–0.9)
EOSINOPHIL NFR BLD AUTO: 1 %
ERYTHROCYTE [DISTWIDTH] IN BLOOD BY AUTOMATED COUNT: 14.5 % (ref 11.5–17)
GFR SERPLBLD CREATININE-BSD FMLA CKD-EPI: 27 MLS/MIN/1.73/M2
GLOBULIN SER-MCNC: 2.7 GM/DL (ref 2.4–3.5)
GLUCOSE SERPL-MCNC: 127 MG/DL (ref 82–115)
HCT VFR BLD AUTO: 41.3 % (ref 37–47)
HGB BLD-MCNC: 13.4 G/DL (ref 12–16)
IMM GRANULOCYTES # BLD AUTO: 0.05 X10(3)/MCL (ref 0–0.04)
IMM GRANULOCYTES NFR BLD AUTO: 0.4 %
LYMPHOCYTES # BLD AUTO: 2.05 X10(3)/MCL (ref 0.6–4.6)
LYMPHOCYTES NFR BLD AUTO: 15.1 %
MAGNESIUM SERPL-MCNC: 2.2 MG/DL (ref 1.6–2.6)
MCH RBC QN AUTO: 29.1 PG (ref 27–31)
MCHC RBC AUTO-ENTMCNC: 32.4 G/DL (ref 33–36)
MCV RBC AUTO: 89.6 FL (ref 80–94)
MONOCYTES # BLD AUTO: 1.01 X10(3)/MCL (ref 0.1–1.3)
MONOCYTES NFR BLD AUTO: 7.4 %
NEUTROPHILS # BLD AUTO: 10.33 X10(3)/MCL (ref 2.1–9.2)
NEUTROPHILS NFR BLD AUTO: 75.8 %
NRBC BLD AUTO-RTO: 0 %
OHS QRS DURATION: 116 MS
OHS QRS DURATION: 68 MS
OHS QRS DURATION: 74 MS
OHS QRS DURATION: 76 MS
OHS QTC CALCULATION: 451 MS
OHS QTC CALCULATION: 457 MS
OHS QTC CALCULATION: 467 MS
OHS QTC CALCULATION: 499 MS
PLATELET # BLD AUTO: 160 X10(3)/MCL (ref 130–400)
PMV BLD AUTO: 10.4 FL (ref 7.4–10.4)
POCT GLUCOSE: 136 MG/DL (ref 70–110)
POCT GLUCOSE: 138 MG/DL (ref 70–110)
POCT GLUCOSE: 150 MG/DL (ref 70–110)
POCT GLUCOSE: 189 MG/DL (ref 70–110)
POTASSIUM SERPL-SCNC: 4.8 MMOL/L (ref 3.5–5.1)
PROT SERPL-MCNC: 5.8 GM/DL (ref 5.8–7.6)
RBC # BLD AUTO: 4.61 X10(6)/MCL (ref 4.2–5.4)
SODIUM SERPL-SCNC: 137 MMOL/L (ref 136–145)
URATE SERPL-MCNC: 4.9 MG/DL (ref 2.6–6)
WBC # SPEC AUTO: 13.61 X10(3)/MCL (ref 4.5–11.5)

## 2024-05-06 PROCEDURE — 25000003 PHARM REV CODE 250: Performed by: NURSE PRACTITIONER

## 2024-05-06 PROCEDURE — 25000003 PHARM REV CODE 250: Performed by: INTERNAL MEDICINE

## 2024-05-06 PROCEDURE — 84550 ASSAY OF BLOOD/URIC ACID: CPT | Performed by: INTERNAL MEDICINE

## 2024-05-06 PROCEDURE — 83735 ASSAY OF MAGNESIUM: CPT | Performed by: NURSE PRACTITIONER

## 2024-05-06 PROCEDURE — 80053 COMPREHEN METABOLIC PANEL: CPT | Performed by: NURSE PRACTITIONER

## 2024-05-06 PROCEDURE — 25000003 PHARM REV CODE 250

## 2024-05-06 PROCEDURE — 99024 POSTOP FOLLOW-UP VISIT: CPT | Mod: ,,,

## 2024-05-06 PROCEDURE — 93010 ELECTROCARDIOGRAM REPORT: CPT | Mod: ,,, | Performed by: INTERNAL MEDICINE

## 2024-05-06 PROCEDURE — 97530 THERAPEUTIC ACTIVITIES: CPT | Mod: CQ

## 2024-05-06 PROCEDURE — 25000003 PHARM REV CODE 250: Performed by: PHYSICIAN ASSISTANT

## 2024-05-06 PROCEDURE — 21400001 HC TELEMETRY ROOM

## 2024-05-06 PROCEDURE — 83880 ASSAY OF NATRIURETIC PEPTIDE: CPT | Performed by: INTERNAL MEDICINE

## 2024-05-06 PROCEDURE — 85025 COMPLETE CBC W/AUTO DIFF WBC: CPT | Performed by: NURSE PRACTITIONER

## 2024-05-06 PROCEDURE — 93005 ELECTROCARDIOGRAM TRACING: CPT

## 2024-05-06 PROCEDURE — 97535 SELF CARE MNGMENT TRAINING: CPT | Mod: CO

## 2024-05-06 PROCEDURE — 63600175 PHARM REV CODE 636 W HCPCS: Performed by: INTERNAL MEDICINE

## 2024-05-06 PROCEDURE — 36415 COLL VENOUS BLD VENIPUNCTURE: CPT | Performed by: NURSE PRACTITIONER

## 2024-05-06 RX ORDER — SODIUM BICARBONATE 650 MG/1
650 TABLET ORAL 2 TIMES DAILY
Status: DISCONTINUED | OUTPATIENT
Start: 2024-05-06 | End: 2024-05-07

## 2024-05-06 RX ADMIN — DOCUSATE SODIUM 100 MG: 100 CAPSULE, LIQUID FILLED ORAL at 08:05

## 2024-05-06 RX ADMIN — FOLIC ACID 1 MG: 1 TABLET ORAL at 08:05

## 2024-05-06 RX ADMIN — ATORVASTATIN CALCIUM 40 MG: 40 TABLET, FILM COATED ORAL at 08:05

## 2024-05-06 RX ADMIN — ASPIRIN 81 MG: 81 TABLET, COATED ORAL at 08:05

## 2024-05-06 RX ADMIN — FAMOTIDINE 20 MG: 10 INJECTION, SOLUTION INTRAVENOUS at 09:05

## 2024-05-06 RX ADMIN — HYDROCODONE BITARTRATE AND ACETAMINOPHEN 1 TABLET: 5; 325 TABLET ORAL at 08:05

## 2024-05-06 RX ADMIN — GABAPENTIN 100 MG: 100 CAPSULE ORAL at 08:05

## 2024-05-06 RX ADMIN — HYDROCODONE BITARTRATE AND ACETAMINOPHEN 1 TABLET: 5; 325 TABLET ORAL at 01:05

## 2024-05-06 RX ADMIN — SODIUM BICARBONATE 650 MG TABLET 650 MG: at 08:05

## 2024-05-06 RX ADMIN — ENOXAPARIN SODIUM 30 MG: 30 INJECTION SUBCUTANEOUS at 04:05

## 2024-05-06 RX ADMIN — METOPROLOL TARTRATE 12.5 MG: 25 TABLET, FILM COATED ORAL at 08:05

## 2024-05-06 NOTE — PT/OT/SLP PROGRESS
Physical Therapy Treatment    Patient Name:  Rosalba Whitlock   MRN:  [id number]    Recommendations:     Discharge therapy intensity: Moderate Intensity Therapy   Discharge Equipment Recommendations: to be determined by next level of care  Barriers to discharge: Impaired mobility and Ongoing medical needs    Assessment:     Rosalba Whitlock is a 83 y.o. female admitted with a medical diagnosis of Severe CAD s/p median sternotomy c attempted CABG, hypotension.  She presents with the following impairments/functional limitations: weakness, impaired endurance, impaired balance, gait instability, pain, impaired self care skills, impaired functional mobility.    Rehab Prognosis: Good; patient would benefit from acute skilled PT services to address these deficits and reach maximum level of function.    Recent Surgery: Procedure(s) (LRB):  CORONARY ARTERY BYPASS GRAFT (CABG) (N/A)  SURGICAL PROCUREMENT, VEIN, ENDOSCOPIC (Left)  PLATING, STERNAL (N/A) 3 Days Post-Op    Plan:     During this hospitalization, patient would benefit from acute PT services 6 x/week to address the identified rehab impairments via gait training, therapeutic activities, therapeutic exercises, neuromuscular re-education and progress toward the following goals:    Plan of Care Expires:  06/06/24    Subjective     Chief Complaint: pain at sternum and flank  Patient/Family Comments/goals: to get better  Pain/Comfort:  Pain Rating 1: other (see comments) (L flank pain)  Pain Addressed 1: Reposition, Distraction, Nurse notified      Objective:     Communicated with nurse prior to session.  Patient found up in chair with pulse ox (continuous), telemetry, PureWick, chest tube, peripheral IV upon PT entry to room.     General Precautions: Standard, sternal  Orthopedic Precautions: N/A  Braces: N/A  Respiratory Status: Room air  Blood Pressure: NT  Skin Integrity: Bruising of sacrum; nurse aware and has ordered wound care      Functional Mobility:  Bed  Mobility:    Sit to supine with max assist  Bridge to reposition pelvis with min assist  Transfers:     Sit<->stand x 2 trials with mod assist x 2 for both trials  Stand step 2' recliner to bed with min assist using RW for balance    Therapeutic Activities/Exercises:      Education:  Patient and family were provided with verbal education education regarding PT role/goals/POC, post-op precautions, and pressure ulcer prevention.  Understanding was verbalized.     Patient left HOB elevated with all lines intact, call button in reach, nurse notified, and wedge in place to offload sacrum.      GOALS:   Multidisciplinary Problems       Physical Therapy Goals          Problem: Physical Therapy    Goal Priority Disciplines Outcome Goal Variances Interventions   Physical Therapy Goal     PT, PT/OT Progressing     Description: Goals to be met by: 6/6/24     Patient will increase functional independence with mobility by performing:    Supine to sit with Modified [place]  Sit to stand transfer with Modified [place]  Gait  x 200 feet with Modified [place] using Rolling Walker.                          Time Tracking:     PT Received On: 05/06/24  PT Start Time: 1021     PT Stop Time: 1048  PT Total Time (min): 27 min     Billable Minutes: Therapeutic Activity 2 units    Treatment Type: Treatment  PT/PTA: PTA     Number of PTA visits since last PT visit: 1 05/06/2024

## 2024-05-06 NOTE — NURSING
Nurses Note -- 4 Eyes      5/6/2024   8:55 AM      Skin assessed during: Admit      [] No Altered Skin Integrity Present    []Prevention Measures Documented      [x] Yes- Altered Skin Integrity Present or Discovered   [] LDA Added if Not in Epic (Describe Wound)   [] New Altered Skin Integrity was Present on Admit and Documented in LDA   [] Wound Image Taken    Wound Care Consulted? Yes    Attending Nurse:  Tania Dennis RN/Staff Member:  Sandy

## 2024-05-06 NOTE — PROGRESS NOTES
"  [facility]    Cardiology  Progress Note    Patient Name: Rosalba Whitlock  MRN: [id number]  Admission Date: 5/3/2024  Hospital Length of Stay: 3 days  Code Status: Full Code   Attending Provider: Loki Estrada MD   Consulting Provider: JIMBO Mcdonald  Primary Care Physician: Eleazar Ramos MD  Principal Problem:<principal problem not specified>    Patient information was obtained from past medical records and ER records.     Subjective:     Chief Complaint/Reason for Consult: s/p Sternotomy with Ungraftable Anatomy x 2 Vessels    HPI: Ms. Whitlock is an 82 y/o female who is known to [facility], Dr. Fry. The patient was recently worked up by Dr. 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 She was referred to CV Surgery for a CABG Evaluation. She was deemed a candidate for CABG and on 5.3.24 she underwent a Sternotomy, unfortunately, she had ungraftable anatomy to both the RCA and LAD. She was transferred to ICU for further management. [facility] has been consulted for Recommendations.     5.5.24: NAD. Sitting in Bedside Chair. Denies SOB and Palps. + CP/Incisional. "I am ok." Off Pressors.   5.6.24: NAD. Sitting in Bedside Chair. Denies SOB and Palps. + CP/Incisional. "I am not feeling the best."     PMH: Vertigo, Amnesia, OA, MVCAD/Ungraftable post Sternotomy, DM II, HTN, GERD, MI, Chronic Lower Back pain, Migraines, HLD, CKD Stage III, Osteoporosis, Obesity, VI  PSH: Sternotomy/No Graftable Vessels (5.3.24), Angiogram, Colonoscopy, Eye Surgery, Foot Surgery, KIA, Tonsillectomy, Epidural Steroid Injections, EGD  Family History: Father, D, ESRD; Mother, D, DVT  Social History:     Previous Cardiac Diagnostics:   [facility] 2.21.24:  Findings:  1. Left main-normal  2. Lad-patent small mid stent, 60-70% stenosis distal to stent edge, IFR 0.86  3. LCX- codominant, Mid 50%, IFR 0.92  4. RCA-codominant Mid occlusion in fractured stent, left to right collaterals  Plan:  1. Maximize medical management  2. " Discharge home today  3. Consult CTS as outpatient to evaluate for two-vessel bypass to distal LAD and poor and small.  Patient does not have good stenting options for apical LAD and RCA as well.  Will continue medical MNGT for now.    ECHO 11.21.23:  The study quality is average.   The left ventricle is normal in size. Global left ventricular systolic function is normal. The left ventricular ejection fraction is 60%. Left ventricular diastolic function is normal. Normal wall thickness.  No significant valvular dysfunction noted.     Carotid US 11.21.23:  The study quality is average.   1-39% stenosis in the mid right internal carotid artery based on Bluth Criteria.   1-39% stenosis in the proximal left internal carotid artery based on Bluth Criteria.   Antegrade right vertebral artery flow.   Antegrade left vertebral artery flow.    Review of patient's allergies indicates:   Allergen Reactions    Amlodipine      Skin crawling      Current Facility-Administered Medications   Medication Dose Route Frequency Provider Last Rate Last Admin    0.45% NaCl infusion   Intravenous Continuous Frederick Hicks PA   Stopped at 05/05/24 1144    albumin human 5% bottle 12.5 g  12.5 g Intravenous PRN Aaron Ontiveros PA-C   Stopped at 05/03/24 1906    aspirin EC tablet 81 mg  81 mg Oral Daily Aaron Ontiveros PA-C   81 mg at 05/06/24 0808    atorvastatin tablet 40 mg  40 mg Oral Daily Prakash Peres ANP   40 mg at 05/06/24 0808    calcium gluconate 1 g in NS IVPB (premixed)  1 g Intravenous PRN Aaron Ontiveros PA-C        calcium gluconate 1 g in NS IVPB (premixed)  2 g Intravenous PRN Aaron Ontiveros PA-C        calcium gluconate 1 g in NS IVPB (premixed)  3 g Intravenous PRN Aaron Ontiveros PA-C        dextrose 10% bolus 125 mL 125 mL  12.5 g Intravenous PRN Aaron Ontiveros PA-C        dextrose 10% bolus 125 mL 125 mL  12.5 g Intravenous PRN Frederick Hicks PA        dextrose 10% bolus 125 mL 125 mL  12.5 g  Intravenous PRN Frederick Hicks PA        dextrose 10% bolus 250 mL 250 mL  25 g Intravenous PRN Aaron Ontiveros PA-C        dextrose 10% bolus 250 mL 250 mL  25 g Intravenous PRN Frederick Hicks PA        dextrose 10% bolus 250 mL 250 mL  25 g Intravenous PRN Frederick Hicks PA        docusate sodium capsule 100 mg  100 mg Oral BID Aaron Ontiveros PA-C   100 mg at 05/06/24 0808    enoxaparin injection 30 mg  30 mg Subcutaneous Daily Jeovanny Wan MD   30 mg at 05/05/24 1755    famotidine (PF) injection 20 mg  20 mg Intravenous Daily Aaron Ontiveros PA-C   20 mg at 05/06/24 0900    folic acid tablet 1 mg  1 mg Oral Daily Aarno Ontiveros PA-C   1 mg at 05/06/24 0808    gabapentin capsule 100 mg  100 mg Oral TID Kimberly Catherine MD   100 mg at 05/06/24 0808    glucagon (human recombinant) injection 1 mg  1 mg Intramuscular PRN Frederick Hicks PA        glucagon (human recombinant) injection 1 mg  1 mg Intramuscular PRN Frederick Hicks P, PA        glucose chewable tablet 16 g  16 g Oral PRN Frederick Hicks, PA        glucose chewable tablet 16 g  16 g Oral PRN Frederick Hicks P, PA        glucose chewable tablet 24 g  24 g Oral PRN Frederick Hicks, PA        glucose chewable tablet 24 g  24 g Oral PRN Frederick Hicks PA        HYDROcodone-acetaminophen 5-325 mg per tablet 1 tablet  1 tablet Oral Q4H PRN Jeovanny Wan MD   1 tablet at 05/05/24 1443    insulin aspart U-100 injection 0-10 Units  0-10 Units Subcutaneous QID (AC + HS) PRN Frederick Hicks PA   2 Units at 05/05/24 1759    magnesium sulfate 2g in water 50mL IVPB (premix)  2 g Intravenous PRN Aaron Ontiveros PA-C        magnesium sulfate 2g in water 50mL IVPB (premix)  4 g Intravenous PRN Aaron Ontiveros PA-C        metoprolol tartrate (LOPRESSOR) split tablet 12.5 mg  12.5 mg Oral BID Aaron Ontiveros PA-C   12.5 mg at 05/05/24 2039    morphine injection 4 mg  4 mg Intravenous Q4H PRN Aaron Ontiveros PA-C    4 mg at 05/03/24 2212    ondansetron injection 4 mg  4 mg Intravenous Q4H PRN Aaron Ontiveros PA-C   4 mg at 05/04/24 1319    oxyCODONE immediate release tablet 10 mg  10 mg Oral Q4H PRN Aaron Ontiveros PA-C        oxyCODONE immediate release tablet 5 mg  5 mg Oral Q4H PRN Jeovanny Wan MD   5 mg at 05/05/24 2039    potassium chloride 20 mEq in 100 mL IVPB (FOR CENTRAL LINE ADMINISTRATION ONLY)  20 mEq Intravenous PRN Aaron Ontiveros PA-C   Stopped at 05/03/24 1316    potassium chloride 20 mEq in 100 mL IVPB (FOR CENTRAL LINE ADMINISTRATION ONLY)  40 mEq Intravenous PRN Aaron Ontiveros PA-C        potassium chloride 20 mEq in 100 mL IVPB (FOR CENTRAL LINE ADMINISTRATION ONLY)  60 mEq Intravenous Aaron Rae PA-C        sodium phosphate 15 mmol in dextrose 5 % (D5W) 250 mL IVPB  15 mmol Intravenous PRAaron Montgomery PA-C        sodium phosphate 20.01 mmol in dextrose 5 % (D5W) 250 mL IVPB  20.01 mmol Intravenous PRN Aaron Ontiveros PA-C        sodium phosphate 30 mmol in dextrose 5 % (D5W) 250 mL IVPB  30 mmol Intravenous Aaron Rae PA-C         Facility-Administered Medications Ordered in Other Encounters   Medication Dose Route Frequency Provider Last Rate Last Admin    0.9%  NaCl infusion   Intravenous Continuous Rubia Seymour MD   Stopped at 12/04/23 0713    diazePAM tablet 10 mg  10 mg Oral On Call Procedure Emmanuel Riley MD   10 mg at 02/21/24 1031    diphenhydrAMINE capsule 50 mg  50 mg Oral On Call Procedure Rubia Seymour MD   50 mg at 12/04/23 0700    diphenhydrAMINE capsule 50 mg  50 mg Oral On Call Procedure Emmanuel Riley MD   50 mg at 02/21/24 1031    sodium chloride 0.9% flush 10 mL  10 mL Intravenous PRN Rubia Seymour MD         Review of Systems   Constitutional:  Positive for fatigue.   Respiratory:  Negative for shortness of breath.    Cardiovascular:  Positive for chest pain (Incisional). Negative for palpitations and leg swelling.   All  other systems reviewed and are negative.    Objective:     Vital Signs (Most Recent):  Temp: 97.9 °F (36.6 °C) (05/06/24 1124)  Pulse: 108 (05/06/24 1124)  Resp: 18 (05/06/24 0834)  BP: 121/74 (05/06/24 1124)  SpO2: (!) 92 % (05/06/24 1124) Vital Signs (24h Range):  Temp:  [97.6 °F (36.4 °C)-99 °F (37.2 °C)] 97.9 °F (36.6 °C)  Pulse:  [] 108  Resp:  [12-26] 18  SpO2:  [67 %-100 %] 92 %  BP: ()/(50-74) 121/74   Weight: 77.4 kg (170 lb 10.2 oz)  Body mass index is 28.4 kg/m².  SpO2: (!) 92 %       Intake/Output Summary (Last 24 hours) at 5/6/2024 1126  Last data filed at 5/6/2024 0800  Gross per 24 hour   Intake 440.3 ml   Output 1741 ml   Net -1300.7 ml     Lines/Drains/Airways       Drain  Duration                  Chest Tube 05/03/24 Tube - 1 Anterior Mediastinal 24 Fr. 3 days         Chest Tube 05/03/24 Tube - 2 Left Pleural 24 Fr. 3 days    Female External Urinary Catheter w/ Suction 05/05/24 0600 1 day              Peripheral Intravenous Line  Duration                  Peripheral IV - Single Lumen 05/03/24 0545 18 G Distal;Posterior;Right Forearm 3 days                  Significant Labs:  Recent Results (from the past 72 hour(s))   POCT glucose    Collection Time: 05/03/24 11:52 AM   Result Value Ref Range    POCT Glucose 100 70 - 110 mg/dL   POCT glucose    Collection Time: 05/03/24  1:00 PM   Result Value Ref Range    POCT Glucose 131 (H) 70 - 110 mg/dL   POCT glucose    Collection Time: 05/03/24  1:57 PM   Result Value Ref Range    POCT Glucose 137 (H) 70 - 110 mg/dL   Basic Metabolic Panel    Collection Time: 05/03/24  2:26 PM   Result Value Ref Range    Sodium Level 147 (H) 136 - 145 mmol/L    Potassium Level 4.0 3.5 - 5.1 mmol/L    Chloride 115 (H) 98 - 107 mmol/L    Carbon Dioxide 20 (L) 23 - 31 mmol/L    Glucose Level 168 (H) 82 - 115 mg/dL    Blood Urea Nitrogen 24.3 (H) 9.8 - 20.1 mg/dL    Creatinine 1.77 (H) 0.55 - 1.02 mg/dL    BUN/Creatinine Ratio 14     Calcium Level Total 8.0 (L) 8.4 -  10.2 mg/dL    Anion Gap 12.0 mEq/L    eGFR 29 mls/min/1.73/m2   Hemoglobin and Hematocrit    Collection Time: 05/03/24  2:26 PM   Result Value Ref Range    Hgb 11.4 (L) 12.0 - 16.0 g/dL    Hct 34.5 (L) 37.0 - 47.0 %   RT Blood Gas    Collection Time: 05/03/24  3:35 PM   Result Value Ref Range    Sample Type Arterial Blood     Sample site Arterial Line     Drawn by AS LRT     pH, Blood gas 7.290 (LL) 7.350 - 7.450    pCO2, Blood gas 49.0 (H) 35.0 - 45.0 mmHg    pO2, Blood gas 86.0 80.0 - 100.0 mmHg    Sodium, Blood Gas 138 137 - 145 mmol/L    Potassium, Blood Gas 3.9 3.5 - 5.0 mmol/L    Calcium Level Ionized 1.08 (L) 1.12 - 1.23 mmol/L    TOC2, Blood gas 25.1 mmol/L    Base Excess, Blood gas -3.30 (L) -2.00 - 2.00 mmol/L    sO2, Blood gas 95.3 %    HCO3, Blood gas 23.6 22.0 - 26.0 mmol/L    THb, Blood gas 12.9 12 - 16 g/dL    O2 Hb, Blood Gas 94.4 94.0 - 97.0 %    CO Hgb 1.2 0.5 - 1.5 %    Met Hgb 1.1 0.4 - 1.5 %    Allens Test N/A     MODE CPAP     Oxygen Device, Blood gas Ventilator     FIO2, Blood gas 30 %    PEEP 5.0 cmH2O    PS 10.0 cmH2O   POCT glucose    Collection Time: 05/03/24  3:44 PM   Result Value Ref Range    POCT Glucose 197 (H) 70 - 110 mg/dL   POCT glucose    Collection Time: 05/03/24  4:46 PM   Result Value Ref Range    POCT Glucose 216 (H) 70 - 110 mg/dL   Blood Gas (ABG)    Collection Time: 05/03/24  5:05 PM   Result Value Ref Range    Sample Type Arterial Blood     Sample site Arterial Line     Drawn by AS LRT     pH, Blood gas 7.290 (LL) 7.350 - 7.450    pCO2, Blood gas 50.0 (H) 35.0 - 45.0 mmHg    pO2, Blood gas 89.0 80.0 - 100.0 mmHg    Sodium, Blood Gas 139 137 - 145 mmol/L    Potassium, Blood Gas 4.2 3.5 - 5.0 mmol/L    Calcium Level Ionized 1.01 (L) 1.12 - 1.23 mmol/L    TOC2, Blood gas 25.5 mmol/L    Base Excess, Blood gas -2.90 (L) -2.00 - 2.00 mmol/L    sO2, Blood gas 95.7 %    HCO3, Blood gas 24.0 22.0 - 26.0 mmol/L    THb, Blood gas 12.1 12 - 16 g/dL    O2 Hb, Blood Gas 96.3 94.0 -  97.0 %    CO Hgb 2.4 (H) 0.5 - 1.5 %    Met Hgb 0.2 (L) 0.4 - 1.5 %    Allens Test N/A     MODE CPAP     FIO2, Blood gas 30 %    PEEP 5.0 cmH2O    PS 10.0 cmH2O   POCT glucose    Collection Time: 05/03/24  5:51 PM   Result Value Ref Range    POCT Glucose 189 (H) 70 - 110 mg/dL   EKG 12-lead    Collection Time: 05/03/24  6:21 PM   Result Value Ref Range    QRS Duration 116 ms    [facility] QTC Calculation 499 ms   POCT glucose    Collection Time: 05/03/24  6:50 PM   Result Value Ref Range    POCT Glucose 203 (H) 70 - 110 mg/dL   POCT glucose    Collection Time: 05/03/24  8:03 PM   Result Value Ref Range    POCT Glucose 223 (H) 70 - 110 mg/dL   POCT glucose    Collection Time: 05/03/24  9:02 PM   Result Value Ref Range    POCT Glucose 205 (H) 70 - 110 mg/dL   POCT glucose    Collection Time: 05/03/24 10:03 PM   Result Value Ref Range    POCT Glucose 211 (H) 70 - 110 mg/dL   POCT glucose    Collection Time: 05/03/24 11:01 PM   Result Value Ref Range    POCT Glucose 219 (H) 70 - 110 mg/dL   POCT glucose    Collection Time: 05/03/24 11:58 PM   Result Value Ref Range    POCT Glucose 217 (H) 70 - 110 mg/dL   POCT glucose    Collection Time: 05/04/24  1:00 AM   Result Value Ref Range    POCT Glucose 212 (H) 70 - 110 mg/dL   POCT glucose    Collection Time: 05/04/24  2:04 AM   Result Value Ref Range    POCT Glucose 211 (H) 70 - 110 mg/dL   EKG 12-LEAD    Collection Time: 05/04/24  3:05 AM   Result Value Ref Range    QRS Duration 74 ms    [facility] QTC Calculation 451 ms   CBC Without Differential    Collection Time: 05/04/24  3:36 AM   Result Value Ref Range    WBC 15.42 (H) 4.50 - 11.50 x10(3)/mcL    RBC 4.36 4.20 - 5.40 x10(6)/mcL    Hgb 12.7 12.0 - 16.0 g/dL    Hct 38.2 37.0 - 47.0 %    MCV 87.6 80.0 - 94.0 fL    MCH 29.1 27.0 - 31.0 pg    MCHC 33.2 33.0 - 36.0 g/dL    RDW 14.6 11.5 - 17.0 %    Platelet 203 130 - 400 x10(3)/mcL    MPV 10.0 7.4 - 10.4 fL    NRBC% 0.0 %   Magnesium    Collection Time: 05/04/24  3:37 AM   Result Value  Ref Range    Magnesium Level 2.50 1.60 - 2.60 mg/dL   Phosphorus    Collection Time: 05/04/24  3:37 AM   Result Value Ref Range    Phosphorus Level 1.5 (L) 2.3 - 4.7 mg/dL   Comprehensive Metabolic Panel    Collection Time: 05/04/24  3:37 AM   Result Value Ref Range    Sodium Level 145 136 - 145 mmol/L    Potassium Level 3.6 3.5 - 5.1 mmol/L    Chloride 112 (H) 98 - 107 mmol/L    Carbon Dioxide 19 (L) 23 - 31 mmol/L    Glucose Level 212 (H) 82 - 115 mg/dL    Blood Urea Nitrogen 20.4 (H) 9.8 - 20.1 mg/dL    Creatinine 1.95 (H) 0.55 - 1.02 mg/dL    Calcium Level Total 8.0 (L) 8.4 - 10.2 mg/dL    Protein Total 5.5 (L) 5.8 - 7.6 gm/dL    Albumin Level 3.6 3.4 - 4.8 g/dL    Globulin 1.9 (L) 2.4 - 3.5 gm/dL    Albumin/Globulin Ratio 1.9 1.1 - 2.0 ratio    Bilirubin Total 0.4 <=1.5 mg/dL    Alkaline Phosphatase 33 (L) 40 - 150 unit/L    Alanine Aminotransferase 11 0 - 55 unit/L    Aspartate Aminotransferase 36 (H) 5 - 34 unit/L    eGFR 25 mls/min/1.73/m2   POCT glucose    Collection Time: 05/04/24  3:47 AM   Result Value Ref Range    POCT Glucose 209 (H) 70 - 110 mg/dL   POCT glucose    Collection Time: 05/04/24  4:44 AM   Result Value Ref Range    POCT Glucose 197 (H) 70 - 110 mg/dL   POCT glucose    Collection Time: 05/04/24  6:02 AM   Result Value Ref Range    POCT Glucose 193 (H) 70 - 110 mg/dL   POCT glucose    Collection Time: 05/04/24  6:56 AM   Result Value Ref Range    POCT Glucose 174 (H) 70 - 110 mg/dL   POCT glucose    Collection Time: 05/04/24  7:57 AM   Result Value Ref Range    POCT Glucose 172 (H) 70 - 110 mg/dL   RT Blood Gas    Collection Time: 05/04/24  8:30 AM   Result Value Ref Range    Sample Type Arterial Blood     Sample site Left Brachial Artery     Drawn by AS LRT     pH, Blood gas 7.390 7.350 - 7.450    pCO2, Blood gas 38.0 35.0 - 45.0 mmHg    pO2, Blood gas 88.0 80.0 - 100.0 mmHg    Sodium, Blood Gas 139 137 - 145 mmol/L    Potassium, Blood Gas 3.2 (L) 3.5 - 5.0 mmol/L    Calcium Level Ionized  1.05 (L) 1.12 - 1.23 mmol/L    TOC2, Blood gas 24.2 mmol/L    Base Excess, Blood gas -1.70 mmol/L    sO2, Blood gas 97.0 %    HCO3, Blood gas 23.0 22.0 - 26.0 mmol/L    Allens Test N/A     MODE CPAP     Oxygen Device, Blood gas Ventilator     FIO2, Blood gas 30 %    PEEP 5.0 cmH2O    PS 10.0 cmH2O   POCT glucose    Collection Time: 05/04/24  8:52 AM   Result Value Ref Range    POCT Glucose 156 (H) 70 - 110 mg/dL   POCT glucose    Collection Time: 05/04/24  9:56 AM   Result Value Ref Range    POCT Glucose 141 (H) 70 - 110 mg/dL   POCT glucose    Collection Time: 05/04/24 11:09 AM   Result Value Ref Range    POCT Glucose 97 70 - 110 mg/dL   POCT glucose    Collection Time: 05/04/24 12:11 PM   Result Value Ref Range    POCT Glucose 70 70 - 110 mg/dL   POCT glucose    Collection Time: 05/04/24  1:16 PM   Result Value Ref Range    POCT Glucose 76 70 - 110 mg/dL   POCT glucose    Collection Time: 05/04/24  3:20 PM   Result Value Ref Range    POCT Glucose 97 70 - 110 mg/dL   POCT glucose    Collection Time: 05/04/24  6:08 PM   Result Value Ref Range    POCT Glucose 119 (H) 70 - 110 mg/dL   POCT glucose    Collection Time: 05/04/24  9:28 PM   Result Value Ref Range    POCT Glucose 146 (H) 70 - 110 mg/dL   Comprehensive Metabolic Panel    Collection Time: 05/05/24  4:20 AM   Result Value Ref Range    Sodium Level 138 136 - 145 mmol/L    Potassium Level 4.7 3.5 - 5.1 mmol/L    Chloride 108 (H) 98 - 107 mmol/L    Carbon Dioxide 20 (L) 23 - 31 mmol/L    Glucose Level 86 82 - 115 mg/dL    Blood Urea Nitrogen 16.6 9.8 - 20.1 mg/dL    Creatinine 1.88 (H) 0.55 - 1.02 mg/dL    Calcium Level Total 7.5 (L) 8.4 - 10.2 mg/dL    Protein Total 5.5 (L) 5.8 - 7.6 gm/dL    Albumin Level 3.2 (L) 3.4 - 4.8 g/dL    Globulin 2.3 (L) 2.4 - 3.5 gm/dL    Albumin/Globulin Ratio 1.4 1.1 - 2.0 ratio    Bilirubin Total 0.6 <=1.5 mg/dL    Alkaline Phosphatase 61 40 - 150 unit/L    Alanine Aminotransferase 7 0 - 55 unit/L    Aspartate Aminotransferase 27  5 - 34 unit/L    eGFR 27 mls/min/1.73/m2   Magnesium    Collection Time: 05/05/24  4:20 AM   Result Value Ref Range    Magnesium Level 2.10 1.60 - 2.60 mg/dL   Phosphorus    Collection Time: 05/05/24  4:20 AM   Result Value Ref Range    Phosphorus Level 3.4 2.3 - 4.7 mg/dL   CBC with Differential    Collection Time: 05/05/24  4:20 AM   Result Value Ref Range    WBC 14.76 (H) 4.50 - 11.50 x10(3)/mcL    RBC 4.50 4.20 - 5.40 x10(6)/mcL    Hgb 13.0 12.0 - 16.0 g/dL    Hct 40.7 37.0 - 47.0 %    MCV 90.4 80.0 - 94.0 fL    MCH 28.9 27.0 - 31.0 pg    MCHC 31.9 (L) 33.0 - 36.0 g/dL    RDW 15.2 11.5 - 17.0 %    Platelet 146 130 - 400 x10(3)/mcL    MPV 10.3 7.4 - 10.4 fL    Neut % 72.9 %    Lymph % 16.7 %    Mono % 8.7 %    Eos % 0.9 %    Basophil % 0.3 %    Lymph # 2.47 0.6 - 4.6 x10(3)/mcL    Neut # 10.74 (H) 2.1 - 9.2 x10(3)/mcL    Mono # 1.28 0.1 - 1.3 x10(3)/mcL    Eos # 0.14 0 - 0.9 x10(3)/mcL    Baso # 0.05 <=0.2 x10(3)/mcL    IG# 0.08 (H) 0 - 0.04 x10(3)/mcL    IG% 0.5 %    NRBC% 0.0 %   EKG 12-LEAD    Collection Time: 05/05/24  4:25 AM   Result Value Ref Range    QRS Duration 76 ms    [facility] QTC Calculation 467 ms   POCT glucose    Collection Time: 05/05/24  9:13 AM   Result Value Ref Range    POCT Glucose 78 70 - 110 mg/dL   POCT glucose    Collection Time: 05/05/24 11:37 AM   Result Value Ref Range    POCT Glucose 97 70 - 110 mg/dL   POCT glucose    Collection Time: 05/05/24  4:18 PM   Result Value Ref Range    POCT Glucose 172 (H) 70 - 110 mg/dL   POCT glucose    Collection Time: 05/06/24 12:05 AM   Result Value Ref Range    POCT Glucose 136 (H) 70 - 110 mg/dL   CBC with Differential    Collection Time: 05/06/24  5:24 AM   Result Value Ref Range    WBC 13.61 (H) 4.50 - 11.50 x10(3)/mcL    RBC 4.61 4.20 - 5.40 x10(6)/mcL    Hgb 13.4 12.0 - 16.0 g/dL    Hct 41.3 37.0 - 47.0 %    MCV 89.6 80.0 - 94.0 fL    MCH 29.1 27.0 - 31.0 pg    MCHC 32.4 (L) 33.0 - 36.0 g/dL    RDW 14.5 11.5 - 17.0 %    Platelet 160 130 - 400  x10(3)/mcL    MPV 10.4 7.4 - 10.4 fL    Neut % 75.8 %    Lymph % 15.1 %    Mono % 7.4 %    Eos % 1.0 %    Basophil % 0.3 %    Lymph # 2.05 0.6 - 4.6 x10(3)/mcL    Neut # 10.33 (H) 2.1 - 9.2 x10(3)/mcL    Mono # 1.01 0.1 - 1.3 x10(3)/mcL    Eos # 0.13 0 - 0.9 x10(3)/mcL    Baso # 0.04 <=0.2 x10(3)/mcL    IG# 0.05 (H) 0 - 0.04 x10(3)/mcL    IG% 0.4 %    NRBC% 0.0 %   Comprehensive Metabolic Panel    Collection Time: 05/06/24  6:19 AM   Result Value Ref Range    Sodium Level 137 136 - 145 mmol/L    Potassium Level 4.8 3.5 - 5.1 mmol/L    Chloride 107 98 - 107 mmol/L    Carbon Dioxide 20 (L) 23 - 31 mmol/L    Glucose Level 127 (H) 82 - 115 mg/dL    Blood Urea Nitrogen 23.7 (H) 9.8 - 20.1 mg/dL    Creatinine 1.87 (H) 0.55 - 1.02 mg/dL    Calcium Level Total 7.7 (L) 8.4 - 10.2 mg/dL    Protein Total 5.8 5.8 - 7.6 gm/dL    Albumin Level 3.1 (L) 3.4 - 4.8 g/dL    Globulin 2.7 2.4 - 3.5 gm/dL    Albumin/Globulin Ratio 1.1 1.1 - 2.0 ratio    Bilirubin Total 0.6 <=1.5 mg/dL    Alkaline Phosphatase 56 40 - 150 unit/L    Alanine Aminotransferase 5 0 - 55 unit/L    Aspartate Aminotransferase 21 5 - 34 unit/L    eGFR 27 mls/min/1.73/m2   Magnesium    Collection Time: 05/06/24  6:19 AM   Result Value Ref Range    Magnesium Level 2.20 1.60 - 2.60 mg/dL   Uric Acid    Collection Time: 05/06/24  6:19 AM   Result Value Ref Range    Uric Acid 4.9 2.6 - 6.0 mg/dL   BNP    Collection Time: 05/06/24  6:19 AM   Result Value Ref Range    Natriuretic Peptide 810.6 (H) <=100.0 pg/mL   EKG 12-lead    Collection Time: 05/06/24  8:37 AM   Result Value Ref Range    QRS Duration 68 ms    [facility] QTC Calculation 457 ms     EKG:       Telemetry: SR     Physical Exam  Constitutional:       General: She is not in acute distress.     Appearance: Normal appearance. She is obese.   HENT:      Head: Normocephalic.      Mouth/Throat:      Mouth: Mucous membranes are moist.   Eyes:      Conjunctiva/sclera: Conjunctivae normal.   Cardiovascular:      Rate and  Rhythm: Normal rate and regular rhythm.      Pulses: Normal pulses.      Heart sounds: Murmur heard.   Pulmonary:      Effort: Pulmonary effort is normal. No respiratory distress.      Comments: NC O2  Abdominal:      Palpations: Abdomen is soft.   Musculoskeletal:         General: Normal range of motion.   Skin:     General: Skin is warm.      Comments: Midline Sternotomy Dressing C/D/I. + CTs   Neurological:      General: No focal deficit present.      Mental Status: She is alert and oriented to person, place, and time.   Psychiatric:         Mood and Affect: Mood normal.         Behavior: Behavior normal.         Judgment: Judgment normal.       Home Medications:   Current Facility-Administered Medications on File Prior to Encounter   Medication Dose Route Frequency Provider Last Rate Last Admin    0.9%  NaCl infusion   Intravenous Continuous Rubia Seymour MD   Stopped at 12/04/23 0713    diazePAM tablet 10 mg  10 mg Oral On Call Procedure Emmanuel Riley MD   10 mg at 02/21/24 1031    diphenhydrAMINE capsule 50 mg  50 mg Oral On Call Procedure Rubia Seymour MD   50 mg at 12/04/23 0700    diphenhydrAMINE capsule 50 mg  50 mg Oral On Call Procedure Emmanuel Riley MD   50 mg at 02/21/24 1031    sodium chloride 0.9% flush 10 mL  10 mL Intravenous PRN Rubia Seymour MD         Current Outpatient Medications on File Prior to Encounter   Medication Sig Dispense Refill    aspirin (ECOTRIN) 81 MG EC tablet Take 81 mg by mouth once daily.      gabapentin (NEURONTIN) 100 MG capsule TAKE 2 CAPSULES BY MOUTH TWICE DAILY, EVERY MORNING AND AT NOON 60 capsule 6    gabapentin (NEURONTIN) 300 MG capsule TAKE 1 CAPSULE(300 MG) BY MOUTH EVERY EVENING 30 capsule 2    icosapent ethyL (VASCEPA) 1 gram Cap BID      insulin glargine (LANTUS U-100 INSULIN) 100 unit/mL injection Inject 46 units in the am, and 20 units at night 20 mL 6    isosorbide mononitrate (IMDUR) 30 MG 24 hr tablet Take 30 mg by mouth once daily.    "   loratadine (CLARITIN) 10 mg tablet TAKE 1 TABLET BY MOUTH EVERY DAY 90 tablet 3    nortriptyline (PAMELOR) 25 MG capsule TAKE 1 CAPSULE BY MOUTH EVERY DAY AT BEDTIME FOR SLEEP 30 capsule 3    omeprazole (PRILOSEC) 40 MG capsule TAKE 1 CAPSULE BY MOUTH DAILY AS NEEDED FOR REFLUX (Patient taking differently: Take 40 mg by mouth every morning.) 90 capsule 3    rosuvastatin (CRESTOR) 40 MG Tab Take 40 mg by mouth every evening.      ticagrelor (BRILINTA) 90 mg tablet Take 90 mg by mouth once daily.      TRADJENTA 5 mg Tab tablet TAKE 1 TABLET(5 MG) BY MOUTH EVERY DAY 90 tablet 3    ALCOHOL PREP PADS PadM       clopidogreL (PLAVIX) 75 mg tablet Take 1 tablet (75 mg total) by mouth once daily. 30 tablet 11    COMFORT EZ INSULIN SYRINGE 0.5 mL 31 gauge x 5/16" Syrg       denosumab (PROLIA) 60 mg/mL Syrg Inject 1 mL (60 mg total) into the skin every 6 (six) months. 1 mL 1    fluticasone propionate (FLONASE) 50 mcg/actuation nasal spray 2 sprays (100 mcg total) by Each Nostril route once daily. 16 g 3    nebulizer accessories Kit Please dispense one nebulizer accessories kit for appropriate nebulizer machine. 1 kit 0    nebulizer and compressor Veronica Please dispense one nebulizer machine with appropriate supplies. 1 each 0    pen needle, diabetic 32 gauge x 5/32" Ndle 1 each by Misc.(Non-Drug; Combo Route) route 2 (two) times a day. 100 each 12     Current Inpatient Medications:    Current Facility-Administered Medications:     0.45% NaCl infusion, , Intravenous, Continuous, Frederick Hicks PA, Stopped at 05/05/24 1144    albumin human 5% bottle 12.5 g, 12.5 g, Intravenous, PRN, Aaron Ontiveros PA-C, Stopped at 05/03/24 1906    aspirin EC tablet 81 mg, 81 mg, Oral, Daily, Aaron Ontiveros PA-C, 81 mg at 05/06/24 0808    atorvastatin tablet 40 mg, 40 mg, Oral, Daily, Prakash Peres ANP, 40 mg at 05/06/24 0808    calcium gluconate 1 g in NS IVPB (premixed), 1 g, Intravenous, PRN, Aaron Ontiveros, RANI    calcium " gluconate 1 g in NS IVPB (premixed), 2 g, Intravenous, PRN, Aaron Ontiveros, PA-C    calcium gluconate 1 g in NS IVPB (premixed), 3 g, Intravenous, PRN, Aaron Ontiveros PA-C    dextrose 10% bolus 125 mL 125 mL, 12.5 g, Intravenous, PRN, Aaron Ontiveros, PA-C    dextrose 10% bolus 125 mL 125 mL, 12.5 g, Intravenous, PRN, Langlinais, Frederick P, PA    dextrose 10% bolus 125 mL 125 mL, 12.5 g, Intravenous, PRN, Langlinais, Frederick P, PA    dextrose 10% bolus 250 mL 250 mL, 25 g, Intravenous, PRN, Aaron Ontiveros, PA-C    dextrose 10% bolus 250 mL 250 mL, 25 g, Intravenous, PRN, Langlinais, Frederick P, PA    dextrose 10% bolus 250 mL 250 mL, 25 g, Intravenous, PRN, Langlinais, Frederick P, PA    docusate sodium capsule 100 mg, 100 mg, Oral, BID, Aaron Ontiveros PA-C, 100 mg at 05/06/24 0808    enoxaparin injection 30 mg, 30 mg, Subcutaneous, Daily, Jeovanny Wan MD, 30 mg at 05/05/24 1755    famotidine (PF) injection 20 mg, 20 mg, Intravenous, Daily, Aaron Ontiveros PA-C, 20 mg at 05/06/24 0900    folic acid tablet 1 mg, 1 mg, Oral, Daily, Aaron Ontiveros PA-C, 1 mg at 05/06/24 0808    gabapentin capsule 100 mg, 100 mg, Oral, TID, Kimberly Catherine MD, 100 mg at 05/06/24 0808    glucagon (human recombinant) injection 1 mg, 1 mg, Intramuscular, PRN, Georgess, Frederick P, PA    glucagon (human recombinant) injection 1 mg, 1 mg, Intramuscular, PRN, Cameronais, Frederick P, PA    glucose chewable tablet 16 g, 16 g, Oral, PRN, Langlinais, Frederick P, PA    glucose chewable tablet 16 g, 16 g, Oral, PRN, Langlinais, Frederick P, PA    glucose chewable tablet 24 g, 24 g, Oral, PRN, Langlinais, Frederick P, PA    glucose chewable tablet 24 g, 24 g, Oral, PRN, Frederick Hicks PA    HYDROcodone-acetaminophen 5-325 mg per tablet 1 tablet, 1 tablet, Oral, Q4H PRN, Jeovanny Wan MD, 1 tablet at 05/05/24 1446    insulin aspart U-100 injection 0-10 Units, 0-10 Units, Subcutaneous, QID (AC + HS) PRN, Frederick Hicks PA, 2 Units at 05/05/24 4191     magnesium sulfate 2g in water 50mL IVPB (premix), 2 g, Intravenous, PRN, Aaron Ontiveros PA-C    magnesium sulfate 2g in water 50mL IVPB (premix), 4 g, Intravenous, PRN, Aaron Ontiveros PA-C    metoprolol tartrate (LOPRESSOR) split tablet 12.5 mg, 12.5 mg, Oral, BID, Aaron Ontiveros PA-C, 12.5 mg at 05/05/24 2039    morphine injection 4 mg, 4 mg, Intravenous, Q4H PRN, Aaron Ontiveros PA-C, 4 mg at 05/03/24 2212    ondansetron injection 4 mg, 4 mg, Intravenous, Q4H PRN, Aaron Ontiveros PA-C, 4 mg at 05/04/24 1319    oxyCODONE immediate release tablet 10 mg, 10 mg, Oral, Q4H PRN, Aaron Ontiveros PA-C    oxyCODONE immediate release tablet 5 mg, 5 mg, Oral, Q4H PRN, Jeovanny Wan MD, 5 mg at 05/05/24 2039    potassium chloride 20 mEq in 100 mL IVPB (FOR CENTRAL LINE ADMINISTRATION ONLY), 20 mEq, Intravenous, PRN, Aaron Ontiveros PA-C, Stopped at 05/03/24 1316    potassium chloride 20 mEq in 100 mL IVPB (FOR CENTRAL LINE ADMINISTRATION ONLY), 40 mEq, Intravenous, PRN, Aaron Ontiveros PA-ELLEN    potassium chloride 20 mEq in 100 mL IVPB (FOR CENTRAL LINE ADMINISTRATION ONLY), 60 mEq, Intravenous, PRN, Aaron Ontiveors PA-C    sodium phosphate 15 mmol in dextrose 5 % (D5W) 250 mL IVPB, 15 mmol, Intravenous, PRN, Aaron Ontiveros PA-C    sodium phosphate 20.01 mmol in dextrose 5 % (D5W) 250 mL IVPB, 20.01 mmol, Intravenous, PRN, Aaron Ontiveros PA-ELLEN    sodium phosphate 30 mmol in dextrose 5 % (D5W) 250 mL IVPB, 30 mmol, Intravenous, PRN, Aaron Ontiveros PA-C    Facility-Administered Medications Ordered in Other Encounters:     0.9%  NaCl infusion, , Intravenous, Continuous, Rubia Seymour MD, Stopped at 12/04/23 0713    diazePAM tablet 10 mg, 10 mg, Oral, On Call Procedure, Emmanuel Riley MD, 10 mg at 02/21/24 1031    diphenhydrAMINE capsule 50 mg, 50 mg, Oral, On Call Procedure, Rubia Seymour MD, 50 mg at 12/04/23 0700    diphenhydrAMINE capsule 50 mg, 50 mg, Oral, On Call Procedure,  Emmanuel Riley MD, 50 mg at 02/21/24 1031    sodium chloride 0.9% flush 10 mL, 10 mL, Intravenous, PRN, Rubia Seymour MD  VTE Risk Mitigation (From admission, onward)           Ordered     enoxaparin injection 30 mg  Daily         05/03/24 1336     Place sequential compression device  Until discontinued         05/03/24 0940     IP VTE HIGH RISK PATIENT  Once         05/03/24 0937                  Assessment:   MVCAD    - s/p Sternotomy (5.3.24) - Ungraftable LAD and RCA    - s/p LHC (2.21.24) - 1. Left main-normal. 2. Lad-patent small mid stent, 60-70% stenosis distal to stent edge, IFR 0.86. 3. LCX- codominant, Mid 50%, IFR 0.92. 4. RCA-codominant Mid occlusion in fractured stent, left to right collaterals    - ECHO (11.21.23) - LVEF 60%  Hypotension requiring Pressors - Resolved     - Hx of HTN  Acute Hypoxemic Respiratory Failure requiring Intubation/Ventilation - Now on NC  BARTOLO/CKD III - Improving   Leukocytosis - Improving   DM II  HLD  Hx of MI  Obesity  VI  Chronic Lower Back Pain  GERD  Migraines  OA  No Hx of GIB     Plan:   Continue ASA, Statin and BB  Will Add GDMT when BP Allows  Aggressive Mobilization of PT and Q1HR IS  Will Have Dr. Riley Review films to Evaluate if PT would Benefit from LHC with High Risk PCI Prior to D/C or can be Evaluated as OP - Discussed with Dr. Riley - Follow up as OP for Solidification of Plan of Care  Keep K > 4.0 and Mg > 2.0   Labs in AM: CBC, CMP and Mg    Prakash Peres, JIMBO  Cardiology  [facility]  05/06/2024     Physician addendum:  I have seen and examined this patient as a split-shared visit with the LENCHO d/t complicated medical management of above problems written in assessment and high acuity requiring physician expertise in medical decision-making. I performed the substantive portion of the history and exam. Above medical decision-making is also formulated by me.    Cardiovascular exam:  S1, S2  Lungs:  fine crackles at  bases.  Extremities:  1+ edema bilaterally    Plan:  Plan for high-risk PCI to be evaluated as outpatient.  Medications as above.  Continue supportive therapy.  We will follow up.      Kevin Barton MD  Cardiologist

## 2024-05-06 NOTE — PROGRESS NOTES
CT SURGERY PROGRESS NOTE  Rosalba Whitlock  83 y.o.  1940    Patients Procedure: Procedure(s) (LRB):  CORONARY ARTERY BYPASS GRAFT (CABG) (N/A)  SURGICAL PROCUREMENT, VEIN, ENDOSCOPIC (Left)  PLATING, STERNAL (N/A)    Subjective  Interval History: Patient is postoperative day 3.  No acute events overnight.   Up in chair this morning, no distress.  Remains on supplemental oxygen - 3-4 L NC with oxygen saturation in the mid 90s at the time of my exam.  Denies any chest pain, shortness of breath, or palpitations.     Review of Systems   Constitutional:  Negative for chills, fever and malaise/fatigue.   Respiratory:  Negative for cough, shortness of breath and wheezing.    Cardiovascular:  Positive for chest pain (incisional). Negative for palpitations.   Gastrointestinal:  Negative for nausea and vomiting.       Medication List  Infusions  Current Facility-Administered Medications   Medication Dose Route Frequency Last Rate Last Admin    sodium chloride 0.45%   Intravenous Continuous   Stopped at 05/05/24 1144     Scheduled  Current Facility-Administered Medications   Medication Dose Route Frequency    aspirin  81 mg Oral Daily    atorvastatin  40 mg Oral Daily    docusate sodium  100 mg Oral BID    enoxparin  30 mg Subcutaneous Daily    famotidine (PF)  20 mg Intravenous Daily    folic acid  1 mg Oral Daily    gabapentin  100 mg Oral TID    metoprolol tartrate  12.5 mg Oral BID       Objective:  Recent Vitals:  Temp:  [97.6 °F (36.4 °C)-99 °F (37.2 °C)] 97.6 °F (36.4 °C)  Pulse:  [71-99] 98  Resp:  [12-28] 20  SpO2:  [67 %-100 %] 97 %  BP: ()/(41-66) 119/62    Physical Exam  Constitutional:       General: She is not in acute distress.     Interventions: Nasal cannula in place.   HENT:      Head: Normocephalic and atraumatic.   Cardiovascular:      Rate and Rhythm: Normal rate and regular rhythm.      Pulses: Normal pulses.      Heart sounds: Normal heart sounds. No murmur heard.     No friction rub. No  gallop.   Pulmonary:      Effort: Pulmonary effort is normal. No respiratory distress.      Breath sounds: Normal breath sounds. No wheezing, rhonchi or rales.   Abdominal:      General: There is no distension.      Palpations: Abdomen is soft.   Musculoskeletal:      Cervical back: Normal range of motion and neck supple.      Right lower leg: No edema.      Left lower leg: No edema.   Skin:     General: Skin is warm and dry.      Comments: Median sternotomy incision dressed - c/d/I  Ct x 2 in place.  Dressed. C/d/i   Neurological:      Mental Status: She is alert and oriented to person, place, and time.   Psychiatric:         Mood and Affect: Mood normal.         Behavior: Behavior normal.          I/O last 24 hrs:  Intake/Output - Last 3 Shifts         05/04 0700 05/05 0659 05/05 0700 05/06 0659 05/06 0700  05/07 0659    P.O.  400     I.V. (mL/kg) 1037.4 (12.5) 919.9 (11.9)     Blood       NG/GT       IV Piggyback 168.2      Total Intake(mL/kg) 1205.6 (14.5) 1319.9 (17.1)     Urine (mL/kg/hr) 1825 (0.9) 1325 (0.7)     Chest Tube 398 316     Total Output 2223 1641     Net -1017.4 -321.1            Urine Occurrence  2 x             Labs  CBC:   Recent Labs   Lab 05/06/24 0524   WBC 13.61*   RBC 4.61   HGB 13.4   HCT 41.3      MCV 89.6   MCH 29.1   MCHC 32.4*     CMP:   Recent Labs   Lab 05/06/24 0619   CALCIUM 7.7*   ALBUMIN 3.1*      K 4.8   CO2 20*   BUN 23.7*   CREATININE 1.87*   ALKPHOS 56   ALT 5   AST 21   BILITOT 0.6     LFTs:   Recent Labs   Lab 05/06/24  0619   ALT 5   AST 21   ALKPHOS 56   BILITOT 0.6   ALBUMIN 3.1*     All pertinent labs from the last 24 hours have been reviewed.      Imaging:   CXR: X-Ray Chest AP Portable    Result Date: 5/6/2024  No adverse interval change. Electronically signed by: David Gomez Date:    05/06/2024 Time:    07:03   I have reviewed all pertinent imaging results/findings within the past 24 hours.        ASSESSMENT/PLAN:    Severe coronary artery  disease with instent restenosis   Type II DM  HTN  GERD  HLD  Stage 3 CKD    -s/p median sternotomy with attempted CABG and sternal plating  -Mediastinal ct output: 70 cc / 24 hrs.  L ct output: 250 cc / 24 hrs.  Chest tubes placed to water seal.  -CXR with bilateral pleural effusions, L > R  -Cr 1.87 Nephrology recs noted  -Cardiology also following  -H&H stable: 13.4 / 41.3  -Continue to work with PT / OT  -Aggressive IS use.  Wean oxygen as tolerated.  -Awaiting telemetry bed    Case and plan of care discussed with Dr. Sean Cardenas, LIAM

## 2024-05-06 NOTE — PROGRESS NOTES
Chief complaint: chest pain    Interval History:  Pt has incisional CP waiting for pain meds, o/w no acute c/o, no SOB/N/V    Review of Systems   Constitutional:  Positive for appetite change.   HENT: Negative.     Respiratory: Negative.     Cardiovascular: Negative.    Gastrointestinal: Negative.    Genitourinary: Negative.    Musculoskeletal:         Incisional CP   Neurological:  Positive for weakness.   Psychiatric/Behavioral: Negative.        all else Neg    Current Facility-Administered Medications   Medication Dose Route Frequency    aspirin  81 mg Oral Daily    atorvastatin  40 mg Oral Daily    docusate sodium  100 mg Oral BID    enoxparin  30 mg Subcutaneous Daily    famotidine (PF)  20 mg Intravenous Daily    folic acid  1 mg Oral Daily    gabapentin  100 mg Oral TID    metoprolol tartrate  12.5 mg Oral BID       Objective     VITAL SIGNS: 24 HR MIN & MAX LAST    Temp  Min: 97.6 °F (36.4 °C)  Max: 99 °F (37.2 °C)  97.9 °F (36.6 °C)    BP  Min: 88/64  Max: 128/57  121/74     Pulse  Min: 88  Max: 108  108     Resp  Min: 13  Max: 25  18    SpO2  Min: 67 %  Max: 100 %  (!) 92 %      Wt Readings from Last 3 Encounters:   05/06/24 77.4 kg (170 lb 10.2 oz)   04/30/24 73.7 kg (162 lb 6.4 oz)   04/25/24 72.6 kg (160 lb)       Intake/Output Summary (Last 24 hours) at 5/6/2024 1336  Last data filed at 5/6/2024 0800  Gross per 24 hour   Intake 440.3 ml   Output 1741 ml   Net -1300.7 ml       Physical Exam  Constitutional:       General: She is not in acute distress.  Cardiovascular:      Rate and Rhythm: Normal rate.   Pulmonary:      Effort: Pulmonary effort is normal. No respiratory distress.      Comments: CT's in place  Abdominal:      General: Bowel sounds are normal.      Palpations: Abdomen is soft.      Tenderness: There is no abdominal tenderness.   Musculoskeletal:         General: No swelling.      Cervical back: Normal range of motion.   Neurological:      Mental Status: She is alert and oriented to  person, place, and time.   Psychiatric:         Mood and Affect: Mood normal.          Recent Labs     05/04/24  0337 05/05/24  0420 05/06/24  0619    138 137   K 3.6 4.7 4.8   CHLORIDE 112* 108* 107   CO2 19* 20* 20*   BUN 20.4* 16.6 23.7*   CREATININE 1.95* 1.88* 1.87*   GLUCOSE 212* 86 127*   CALCIUM 8.0* 7.5* 7.7*   MG 2.50 2.10 2.20   PHOS 1.5* 3.4  --    ALBUMIN 3.6 3.2* 3.1*      Recent Labs     05/04/24  0336 05/05/24  0420 05/06/24  0524   WBC 15.42* 14.76* 13.61*   HGB 12.7 13.0 13.4   HCT 38.2 40.7 41.3    146 160         Assessment & Plan     1   CKD 4 (baseline Cr 1.8-2) - GFR stable at baseline, she appears euvolemic  2   CAD - s/p sternotomy unable to perform CAB, for outpt f/u cards to consider high-risk PCI  3   HTN -  BP meds on hold for hypotension, monitor  4   DM 2  5   Gout  6   NAGMA - appears chronic on outpt labs, add Na bicarb 650 bid  7   Osteoporosis

## 2024-05-06 NOTE — PLAN OF CARE
05/06/24 0940   Discharge Assessment   Assessment Type Discharge Planning Assessment   Confirmed/corrected address, phone number and insurance Yes   Confirmed Demographics Correct on Facesheet   Source of Information patient   When was your last doctors appointment? 04/30/24   Does patient/caregiver understand observation status Yes   Communicated SIENA with patient/caregiver Yes   Reason For Admission CAD   People in Home alone   Facility Arrived From: home   Do you expect to return to your current living situation? Yes   Do you have help at home or someone to help you manage your care at home? Yes   Who are your caregiver(s) and their phone number(s)? family   Prior to hospitilization cognitive status: Alert/Oriented   Current cognitive status: Not Oriented to Time   Walking or Climbing Stairs Difficulty yes   Walking or Climbing Stairs ambulation difficulty, requires equipment   Mobility Management walker prn   Dressing/Bathing Difficulty no   Home Layout Able to live on 1st floor  (3 steps to enter home)   Equipment Currently Used at Home walker, rolling;shower chair   Patient currently being followed by outpatient case management? No   Do you currently have service(s) that help you manage your care at home? No   Do you take prescription medications? Yes   Do you have any problems affording any of your prescribed medications? No   Is the patient taking medications as prescribed? yes   Who is going to help you get home at discharge? family   How do you get to doctors appointments? car, drives self;family or friend will provide  (reports drives short distances)   Are you on dialysis? No   Discharge Plan A Home Health   Discharge Plan B Home   DME Needed Upon Discharge  none   Discharge Plan discussed with: Patient   Transition of Care Barriers None   Housing Stability   In the last 12 months, was there a time when you were not able to pay the mortgage or rent on time? N   At any time in the past 12 months, were  you homeless or living in a shelter (including now)? N   Transportation Needs   In the past 12 months, has lack of transportation kept you from medical appointments or from getting medications? no   In the past 12 months, has lack of transportation kept you from meetings, work, or from getting things needed for daily living? No   Food Insecurity   Within the past 12 months, you worried that your food would run out before you got the money to buy more. Never true   Within the past 12 months, the food you bought just didn't last and you didn't have money to get more. Never true     Pt reports that she lives alone, drives short distances. He has not used Hh services. Uses a walker, shower chair. Other needs TBD.

## 2024-05-06 NOTE — PROGRESS NOTES
[address]  Wound Care    Patient Name:  Rosalba Whitlock   MRN:  [id number]  Date: 5/6/2024  Diagnosis: <principal problem not specified>    History:     Past Medical History:   Diagnosis Date    Amnesia 06/13/2022    Benign paroxysmal positional vertigo, bilateral 06/13/2022    Bronchitis 06/08/2022    Chronic gouty arthritis 06/13/2022    Coronary artery disease involving native coronary artery of native heart without angina pectoris 12/04/2023    Diabetes mellitus, type 2     Essential hypertension 04/21/2016    Gastroesophageal reflux disease without esophagitis 04/21/2016    GERD (gastroesophageal reflux disease)     Gout, unspecified     Heart attack     Low back pain 06/13/2022    lower back pain bilateral with movement; radiates down right leg to thigh    Low vitamin D level 06/13/2022    Metabolic acidosis 01/12/2023    Microalbuminuria 01/12/2023    Migraine headache 04/21/2016    Osteoporosis 06/13/2022    Peripheral edema 06/13/2022    Physical deconditioning 06/13/2022    Pure hypercholesterolemia 06/13/2022    SOBOE (shortness of breath on exertion)     Spondylosis of lumbar spine 06/13/2022    Stage 3 chronic kidney disease 06/13/2022    Type 2 diabetes mellitus 06/13/2022    Weakness        Social History     Socioeconomic History    Marital status:    Tobacco Use    Smoking status: Former     Types: Cigarettes    Smokeless tobacco: Never    Tobacco comments:     Quit 20 years ago   Substance and Sexual Activity    Alcohol use: Never    Drug use: Never     Social Determinants of Health     Food Insecurity: No Food Insecurity (5/6/2024)    Hunger Vital Sign     Worried About Running Out of Food in the Last Year: Never true     Ran Out of Food in the Last Year: Never true   Transportation Needs: No Transportation Needs (5/6/2024)    PRAPARE - Transportation     Lack of Transportation (Medical): No     Lack of Transportation (Non-Medical): No   Housing  Stability: Low Risk  (5/6/2024)    Housing Stability Vital Sign     Unable to Pay for Housing in the Last Year: No     Homeless in the Last Year: No       Precautions:     Allergies as of 04/25/2024 - Reviewed 04/25/2024   Allergen Reaction Noted    Amlodipine  06/02/2015       WO Assessment Details/Treatment      05/06/24 1412   WOCN Assessment   Visit Date 05/06/24   Visit Time 1412   Consult Type New   WOCN Speciality Wound   Intervention chart review;assessed;applied;orders   Teaching on-going        Wound 05/06/23 0900 Pressure Injury Buttocks   Date First Assessed/Time First Assessed: 05/06/23 0900   Primary Wound Type: Pressure Injury  Location: Buttocks   Wound Image    Pressure Injury Stage DTPI   Dressing Appearance Open to air   Drainage Amount None   Drainage Characteristics/Odor No odor   Appearance Intact;Maroon;Purple;Dry   Tissue loss description Not applicable  (DTI)   Black (%), Wound Tissue Color 0 %   Red (%), Wound Tissue Color 100 %   Yellow (%), Wound Tissue Color 0 %   Periwound Area Intact;Dry;Pink   Wound Edges Irregular   Care Cleansed with:;Soap and water   Dressing Applied;Foam     WOCN consulted for buttocks. Discussed plan of care with nurse Tania. Family at bedside. Introduced self and explained reason for visit, patient agreeable to be seen. Buttocks: Cleanse with hibiclens, dry well. Apply foam and change every 2 days. Area to be assessed every shift. Nursing to continue with treatment recommendations and other preventative measures. ADDIE mattress ordered from [employer]. Answered all questions to the satisfaction of the patient and family. Will follow up.  05/06/2024

## 2024-05-06 NOTE — PT/OT/SLP PROGRESS
Occupational Therapy   Treatment    Name: Rosalba Whitlock  MRN: [id number]  Admitting Diagnosis:  severe CAD  3 Days Post-Op    Recommendations:     Recommended therapy intensity at discharge: Moderate Intensity Therapy   Discharge Equipment Recommendations:  to be determined by next level of care  Barriers to discharge:       Assessment:     Rosalba Whitlock is a 83 y.o. female with a medical diagnosis of severe CAD.  She presents with increased anxiety, much encouragement and motivation required, Cueing required throughout session for adherence to sternal pxns. Recommending Mod intensity therapy pending progress. Performance deficits affecting function are weakness, impaired endurance, impaired self care skills, impaired functional mobility, gait instability, impaired balance, pain.     Rehab Prognosis:  Fair; patient would benefit from acute skilled OT services to address these deficits and reach maximum level of function.       Plan:     Patient to be seen 5 x/week to address the above listed problems via self-care/home management, therapeutic activities, therapeutic exercises  Plan of Care Expires: 06/02/24  Plan of Care Reviewed with: patient    Subjective     Pain/Comfort:       Objective:     Communicated with: RN prior to session.  Patient found up in chair with   upon OT entry to room.    General Precautions: Standard, sternal    Orthopedic Precautions:N/A  Braces: N/A  Respiratory Status: Room air       Occupational Performance:   Pt. Requiring total A for LB dressing at this time limited ROM, educated on AE for LB dressing, pt. Would benefit from reacher and sock aide in future sessions.  Pt. Performing grooming task (washing face) using R UE.  (Sit to stand- Mod A) with increased cueing for adherence to sternal pxns.  Pt. Performing stand step t/f from BS chair to EOB using RW for UE support with balance, Mod A increased cueing required for step progression.  Pt. Left in bed with all needs within  reach.       Therapeutic Positioning    OT interventions performed during the course of today's session in an effort to prevent and/or reduce acquired pressure injuries:   Therapeutic positioning was provided at the conclusion of session to offload all bony prominences for the prevention and/or reduction of pressure injuries    Skin assessment: all bony prominences were assessed    Findings: known area of altered skin integrity at buttocks/ breakdown noted, RN notified wound care consulted per RN.     [facility] 6 Click ADL:      Patient Education:  Patient provided with verbal education education regarding fall prevention and safety awareness.  Additional teaching is warranted.      Patient left HOB elevated with all lines intact and call button in reach.    GOALS:   Multidisciplinary Problems       Occupational Therapy Goals          Problem: Occupational Therapy    Goal Priority Disciplines Outcome Interventions   Occupational Therapy Goal     OT, PT/OT Progressing    Description: LTG: Pt will perform basic ADLs and ADL transfers with Modified independence and good compliance to sternal precautions using LRAD by discharge.    STG: to be met by 6/2/24:    Pt will complete grooming standing at sink with LRAD with SBA.  Pt will complete UB dressing with SBA.  Pt will complete LB dressing with SBA using LRAD and AE prn.  Pt will complete toileting with SBA using LRAD.  Pt will complete functional mobility to/from toilet and toilet transfer with SBA using LRAD.   Pt will independently verbalize sternal precautions with >90% accuracy.                        Time Tracking:     OT Date of Treatment: 05/06/24  OT Start Time: 1018  OT Stop Time: 1046  OT Total Time (min): 28 min    Billable Minutes:Self Care/Home Management 2    OT/SUSAN: SUSAN     Number of SUSAN visits since last OT visit: 1    5/6/2024

## 2024-05-07 LAB
ABO + RH BLD: NORMAL
ALBUMIN SERPL-MCNC: 2.7 G/DL (ref 3.4–4.8)
ALBUMIN/GLOB SERPL: 0.9 RATIO (ref 1.1–2)
ALP SERPL-CCNC: 55 UNIT/L (ref 40–150)
ALT SERPL-CCNC: 5 UNIT/L (ref 0–55)
AST SERPL-CCNC: 36 UNIT/L (ref 5–34)
BASOPHILS # BLD AUTO: 0.04 X10(3)/MCL
BASOPHILS NFR BLD AUTO: 0.4 %
BILIRUB SERPL-MCNC: 0.5 MG/DL
BLD PROD TYP BPU: NORMAL
BLOOD UNIT EXPIRATION DATE: NORMAL
BLOOD UNIT TYPE CODE: 5100
BUN SERPL-MCNC: 27.5 MG/DL (ref 9.8–20.1)
CALCIUM SERPL-MCNC: 7.6 MG/DL (ref 8.4–10.2)
CHLORIDE SERPL-SCNC: 111 MMOL/L (ref 98–107)
CO2 SERPL-SCNC: 18 MMOL/L (ref 23–31)
CREAT SERPL-MCNC: 1.67 MG/DL (ref 0.55–1.02)
CROSSMATCH INTERPRETATION: NORMAL
DISPENSE STATUS: NORMAL
EOSINOPHIL # BLD AUTO: 0.09 X10(3)/MCL (ref 0–0.9)
EOSINOPHIL NFR BLD AUTO: 0.8 %
ERYTHROCYTE [DISTWIDTH] IN BLOOD BY AUTOMATED COUNT: 14.1 % (ref 11.5–17)
GFR SERPLBLD CREATININE-BSD FMLA CKD-EPI: 31 MLS/MIN/1.73/M2
GLOBULIN SER-MCNC: 2.9 GM/DL (ref 2.4–3.5)
GLUCOSE SERPL-MCNC: 147 MG/DL (ref 82–115)
HCT VFR BLD AUTO: 40.4 % (ref 37–47)
HGB BLD-MCNC: 13 G/DL (ref 12–16)
IMM GRANULOCYTES # BLD AUTO: 0.06 X10(3)/MCL (ref 0–0.04)
IMM GRANULOCYTES NFR BLD AUTO: 0.5 %
LYMPHOCYTES # BLD AUTO: 1.54 X10(3)/MCL (ref 0.6–4.6)
LYMPHOCYTES NFR BLD AUTO: 13.7 %
MAGNESIUM SERPL-MCNC: 2.3 MG/DL (ref 1.6–2.6)
MCH RBC QN AUTO: 28.7 PG (ref 27–31)
MCHC RBC AUTO-ENTMCNC: 32.2 G/DL (ref 33–36)
MCV RBC AUTO: 89.2 FL (ref 80–94)
MONOCYTES # BLD AUTO: 0.75 X10(3)/MCL (ref 0.1–1.3)
MONOCYTES NFR BLD AUTO: 6.7 %
NEUTROPHILS # BLD AUTO: 8.73 X10(3)/MCL (ref 2.1–9.2)
NEUTROPHILS NFR BLD AUTO: 77.9 %
NRBC BLD AUTO-RTO: 0 %
PHOSPHATE SERPL-MCNC: 2.2 MG/DL (ref 2.3–4.7)
PLATELET # BLD AUTO: 176 X10(3)/MCL (ref 130–400)
PMV BLD AUTO: 9.6 FL (ref 7.4–10.4)
POCT GLUCOSE: 178 MG/DL (ref 70–110)
POCT GLUCOSE: 246 MG/DL (ref 70–110)
POTASSIUM SERPL-SCNC: 5.2 MMOL/L (ref 3.5–5.1)
PROT SERPL-MCNC: 5.6 GM/DL (ref 5.8–7.6)
RBC # BLD AUTO: 4.53 X10(6)/MCL (ref 4.2–5.4)
SODIUM SERPL-SCNC: 138 MMOL/L (ref 136–145)
UNIT NUMBER: NORMAL
WBC # SPEC AUTO: 11.21 X10(3)/MCL (ref 4.5–11.5)

## 2024-05-07 PROCEDURE — 94760 N-INVAS EAR/PLS OXIMETRY 1: CPT

## 2024-05-07 PROCEDURE — 27000221 HC OXYGEN, UP TO 24 HOURS

## 2024-05-07 PROCEDURE — 25000003 PHARM REV CODE 250: Performed by: INTERNAL MEDICINE

## 2024-05-07 PROCEDURE — 80053 COMPREHEN METABOLIC PANEL: CPT | Performed by: NURSE PRACTITIONER

## 2024-05-07 PROCEDURE — 63600175 PHARM REV CODE 636 W HCPCS: Performed by: INTERNAL MEDICINE

## 2024-05-07 PROCEDURE — 25000003 PHARM REV CODE 250: Performed by: PHYSICIAN ASSISTANT

## 2024-05-07 PROCEDURE — 99024 POSTOP FOLLOW-UP VISIT: CPT | Mod: ,,, | Performed by: PHYSICIAN ASSISTANT

## 2024-05-07 PROCEDURE — 21400001 HC TELEMETRY ROOM

## 2024-05-07 PROCEDURE — 97116 GAIT TRAINING THERAPY: CPT | Mod: CQ

## 2024-05-07 PROCEDURE — 99900035 HC TECH TIME PER 15 MIN (STAT)

## 2024-05-07 PROCEDURE — 63600175 PHARM REV CODE 636 W HCPCS: Performed by: PHYSICIAN ASSISTANT

## 2024-05-07 PROCEDURE — 97530 THERAPEUTIC ACTIVITIES: CPT | Mod: CQ

## 2024-05-07 PROCEDURE — 83735 ASSAY OF MAGNESIUM: CPT | Performed by: NURSE PRACTITIONER

## 2024-05-07 PROCEDURE — 84100 ASSAY OF PHOSPHORUS: CPT | Performed by: INTERNAL MEDICINE

## 2024-05-07 PROCEDURE — 25000003 PHARM REV CODE 250: Performed by: NURSE PRACTITIONER

## 2024-05-07 PROCEDURE — 25000003 PHARM REV CODE 250

## 2024-05-07 PROCEDURE — 36415 COLL VENOUS BLD VENIPUNCTURE: CPT | Performed by: NURSE PRACTITIONER

## 2024-05-07 PROCEDURE — 85025 COMPLETE CBC W/AUTO DIFF WBC: CPT | Performed by: NURSE PRACTITIONER

## 2024-05-07 PROCEDURE — 25000003 PHARM REV CODE 250: Performed by: THORACIC SURGERY (CARDIOTHORACIC VASCULAR SURGERY)

## 2024-05-07 RX ORDER — DIPHENHYDRAMINE HCL 25 MG
25 CAPSULE ORAL ONCE
Status: COMPLETED | OUTPATIENT
Start: 2024-05-07 | End: 2024-05-07

## 2024-05-07 RX ORDER — SODIUM BICARBONATE 650 MG/1
1300 TABLET ORAL 2 TIMES DAILY
Status: DISCONTINUED | OUTPATIENT
Start: 2024-05-07 | End: 2024-05-08

## 2024-05-07 RX ORDER — METOPROLOL TARTRATE 25 MG/1
25 TABLET, FILM COATED ORAL 3 TIMES DAILY
Status: DISCONTINUED | OUTPATIENT
Start: 2024-05-07 | End: 2024-05-13 | Stop reason: HOSPADM

## 2024-05-07 RX ADMIN — SODIUM BICARBONATE 650 MG TABLET 650 MG: at 08:05

## 2024-05-07 RX ADMIN — HYDROCODONE BITARTRATE AND ACETAMINOPHEN 1 TABLET: 5; 325 TABLET ORAL at 02:05

## 2024-05-07 RX ADMIN — ATORVASTATIN CALCIUM 40 MG: 40 TABLET, FILM COATED ORAL at 08:05

## 2024-05-07 RX ADMIN — METOPROLOL TARTRATE 25 MG: 25 TABLET, FILM COATED ORAL at 02:05

## 2024-05-07 RX ADMIN — SODIUM BICARBONATE 650 MG TABLET 1300 MG: at 08:05

## 2024-05-07 RX ADMIN — DOCUSATE SODIUM 100 MG: 100 CAPSULE, LIQUID FILLED ORAL at 08:05

## 2024-05-07 RX ADMIN — FAMOTIDINE 20 MG: 10 INJECTION, SOLUTION INTRAVENOUS at 08:05

## 2024-05-07 RX ADMIN — GABAPENTIN 100 MG: 100 CAPSULE ORAL at 08:05

## 2024-05-07 RX ADMIN — HYDROCODONE BITARTRATE AND ACETAMINOPHEN 1 TABLET: 5; 325 TABLET ORAL at 06:05

## 2024-05-07 RX ADMIN — DIPHENHYDRAMINE HYDROCHLORIDE 25 MG: 25 CAPSULE ORAL at 08:05

## 2024-05-07 RX ADMIN — FOLIC ACID 1 MG: 1 TABLET ORAL at 08:05

## 2024-05-07 RX ADMIN — METOPROLOL TARTRATE 12.5 MG: 25 TABLET, FILM COATED ORAL at 08:05

## 2024-05-07 RX ADMIN — METOPROLOL TARTRATE 25 MG: 25 TABLET, FILM COATED ORAL at 08:05

## 2024-05-07 RX ADMIN — HYDROCODONE BITARTRATE AND ACETAMINOPHEN 1 TABLET: 5; 325 TABLET ORAL at 04:05

## 2024-05-07 RX ADMIN — INSULIN ASPART 2 UNITS: 100 INJECTION, SOLUTION INTRAVENOUS; SUBCUTANEOUS at 01:05

## 2024-05-07 RX ADMIN — ASPIRIN 81 MG: 81 TABLET, COATED ORAL at 08:05

## 2024-05-07 RX ADMIN — GABAPENTIN 100 MG: 100 CAPSULE ORAL at 02:05

## 2024-05-07 RX ADMIN — INSULIN ASPART 4 UNITS: 100 INJECTION, SOLUTION INTRAVENOUS; SUBCUTANEOUS at 05:05

## 2024-05-07 RX ADMIN — ENOXAPARIN SODIUM 30 MG: 30 INJECTION SUBCUTANEOUS at 05:05

## 2024-05-07 RX ADMIN — HYDROCODONE BITARTRATE AND ACETAMINOPHEN 1 TABLET: 5; 325 TABLET ORAL at 11:05

## 2024-05-07 NOTE — PROGRESS NOTES
"  [facility]    Cardiology  Progress Note    Patient Name: Rosalba Whitlock  MRN: [id number]  Admission Date: 5/3/2024  Hospital Length of Stay: 4 days  Code Status: Full Code   Attending Provider: Loki Estrada MD   Consulting Provider: JIMBO Mcdonald  Primary Care Physician: Eleazar Ramos MD  Principal Problem:<principal problem not specified>    Patient information was obtained from past medical records and ER records.     Subjective:     Chief Complaint/Reason for Consult: s/p Sternotomy with Ungraftable Anatomy x 2 Vessels    HPI: Ms. Whitlock is an 82 y/o female who is known to [facility], Dr. Fry. The patient was recently worked up by Dr. 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 She was referred to CV Surgery for a CABG Evaluation. She was deemed a candidate for CABG and on 5.3.24 she underwent a Sternotomy, unfortunately, she had ungraftable anatomy to both the RCA and LAD. She was transferred to ICU for further management. [facility] has been consulted for Recommendations.     5.5.24: NAD. Sitting in Bedside Chair. Denies SOB and Palps. + CP/Incisional. "I am ok." Off Pressors.   5.6.24: NAD. Sitting in Bedside Chair. Denies SOB and Palps. + CP/Incisional. "I am not feeling the best."   5.7.24: NAD. Sitting in Bedside Chair. Denies SOB and Palps. + CP/Incisional. "I am feeling much better."     PMH: Vertigo, Amnesia, OA, MVCAD/Ungraftable post Sternotomy, DM II, HTN, GERD, MI, Chronic Lower Back pain, Migraines, HLD, CKD Stage III, Osteoporosis, Obesity, VI  PSH: Sternotomy/No Graftable Vessels (5.3.24), Angiogram, Colonoscopy, Eye Surgery, Foot Surgery, KIA, Tonsillectomy, Epidural Steroid Injections, EGD  Family History: Father, D, ESRD; Mother, D, DVT  Social History:     Previous Cardiac Diagnostics:   [facility] 2.21.24:  Findings:  1. Left main-normal  2. Lad-patent small mid stent, 60-70% stenosis distal to stent edge, IFR 0.86  3. LCX- codominant, Mid 50%, IFR 0.92  4. " RCA-codominant Mid occlusion in fractured stent, left to right collaterals  Plan:  1. Maximize medical management  2. Discharge home today  3. Consult CTS as outpatient to evaluate for two-vessel bypass to distal LAD and poor and small.  Patient does not have good stenting options for apical LAD and RCA as well.  Will continue medical MNGT for now.    ECHO 11.21.23:  The study quality is average.   The left ventricle is normal in size. Global left ventricular systolic function is normal. The left ventricular ejection fraction is 60%. Left ventricular diastolic function is normal. Normal wall thickness.  No significant valvular dysfunction noted.     Carotid US 11.21.23:  The study quality is average.   1-39% stenosis in the mid right internal carotid artery based on Bluth Criteria.   1-39% stenosis in the proximal left internal carotid artery based on Bluth Criteria.   Antegrade right vertebral artery flow.   Antegrade left vertebral artery flow.    Review of patient's allergies indicates:   Allergen Reactions    Amlodipine      Skin crawling      Current Facility-Administered Medications on File Prior to Encounter   Medication    0.9%  NaCl infusion    diazePAM tablet 10 mg    diphenhydrAMINE capsule 50 mg    diphenhydrAMINE capsule 50 mg    sodium chloride 0.9% flush 10 mL     Current Outpatient Medications on File Prior to Encounter   Medication Sig    aspirin (ECOTRIN) 81 MG EC tablet Take 81 mg by mouth once daily.    gabapentin (NEURONTIN) 100 MG capsule TAKE 2 CAPSULES BY MOUTH TWICE DAILY, EVERY MORNING AND AT NOON    gabapentin (NEURONTIN) 300 MG capsule TAKE 1 CAPSULE(300 MG) BY MOUTH EVERY EVENING    icosapent ethyL (VASCEPA) 1 gram Cap BID    insulin glargine (LANTUS U-100 INSULIN) 100 unit/mL injection Inject 46 units in the am, and 20 units at night    isosorbide mononitrate (IMDUR) 30 MG 24 hr tablet Take 30 mg by mouth once daily.    loratadine (CLARITIN) 10 mg tablet TAKE 1 TABLET BY MOUTH EVERY DAY  "   nortriptyline (PAMELOR) 25 MG capsule TAKE 1 CAPSULE BY MOUTH EVERY DAY AT BEDTIME FOR SLEEP    omeprazole (PRILOSEC) 40 MG capsule TAKE 1 CAPSULE BY MOUTH DAILY AS NEEDED FOR REFLUX (Patient taking differently: Take 40 mg by mouth every morning.)    rosuvastatin (CRESTOR) 40 MG Tab Take 40 mg by mouth every evening.    ticagrelor (BRILINTA) 90 mg tablet Take 90 mg by mouth once daily.    TRADJENTA 5 mg Tab tablet TAKE 1 TABLET(5 MG) BY MOUTH EVERY DAY    ALCOHOL PREP PADS PadM     clopidogreL (PLAVIX) 75 mg tablet Take 1 tablet (75 mg total) by mouth once daily.    COMFORT EZ INSULIN SYRINGE 0.5 mL 31 gauge x 5/16" Syrg     denosumab (PROLIA) 60 mg/mL Syrg Inject 1 mL (60 mg total) into the skin every 6 (six) months.    fluticasone propionate (FLONASE) 50 mcg/actuation nasal spray 2 sprays (100 mcg total) by Each Nostril route once daily.    nebulizer accessories Kit Please dispense one nebulizer accessories kit for appropriate nebulizer machine.    nebulizer and compressor Veronica Please dispense one nebulizer machine with appropriate supplies.    pen needle, diabetic 32 gauge x 5/32" Ndle 1 each by Misc.(Non-Drug; Combo Route) route 2 (two) times a day.     Review of Systems   Constitutional:  Positive for fatigue. Negative for fever.   Respiratory:  Negative for shortness of breath.    Cardiovascular:  Positive for chest pain (Incisional). Negative for palpitations and leg swelling.   All other systems reviewed and are negative.    Objective:     Vital Signs (Most Recent):  Temp: 97.8 °F (36.6 °C) (05/07/24 1126)  Pulse: 107 (05/07/24 1126)  Resp: (!) 21 (05/07/24 1126)  BP: (!) 145/64 (05/07/24 1126)  SpO2: (!) 94 % (05/07/24 1126) Vital Signs (24h Range):  Temp:  [97.5 °F (36.4 °C)-98.6 °F (37 °C)] 97.8 °F (36.6 °C)  Pulse:  [] 107  Resp:  [18-21] 21  SpO2:  [90 %-99 %] 94 %  BP: ()/(42-72) 145/64   Weight: 72.4 kg (159 lb 9.8 oz)  Body mass index is 26.56 kg/m².  SpO2: (!) 94 %       Intake/Output " Summary (Last 24 hours) at 5/7/2024 1259  Last data filed at 5/7/2024 0604  Gross per 24 hour   Intake 0 ml   Output 685 ml   Net -685 ml     Lines/Drains/Airways       Drain  Duration                  Chest Tube 05/03/24 Tube - 1 Anterior Mediastinal 24 Fr. 4 days         Chest Tube 05/03/24 Tube - 2 Left Pleural 24 Fr. 4 days    Female External Urinary Catheter w/ Suction 05/05/24 0600 2 days              Peripheral Intravenous Line  Duration                  Peripheral IV - Single Lumen 05/03/24 0545 18 G Distal;Posterior;Right Forearm 4 days                  Significant Labs:  Recent Results (from the past 72 hour(s))   POCT glucose    Collection Time: 05/04/24  1:16 PM   Result Value Ref Range    POCT Glucose 76 70 - 110 mg/dL   POCT glucose    Collection Time: 05/04/24  3:20 PM   Result Value Ref Range    POCT Glucose 97 70 - 110 mg/dL   POCT glucose    Collection Time: 05/04/24  6:08 PM   Result Value Ref Range    POCT Glucose 119 (H) 70 - 110 mg/dL   POCT glucose    Collection Time: 05/04/24  9:28 PM   Result Value Ref Range    POCT Glucose 146 (H) 70 - 110 mg/dL   Comprehensive Metabolic Panel    Collection Time: 05/05/24  4:20 AM   Result Value Ref Range    Sodium Level 138 136 - 145 mmol/L    Potassium Level 4.7 3.5 - 5.1 mmol/L    Chloride 108 (H) 98 - 107 mmol/L    Carbon Dioxide 20 (L) 23 - 31 mmol/L    Glucose Level 86 82 - 115 mg/dL    Blood Urea Nitrogen 16.6 9.8 - 20.1 mg/dL    Creatinine 1.88 (H) 0.55 - 1.02 mg/dL    Calcium Level Total 7.5 (L) 8.4 - 10.2 mg/dL    Protein Total 5.5 (L) 5.8 - 7.6 gm/dL    Albumin Level 3.2 (L) 3.4 - 4.8 g/dL    Globulin 2.3 (L) 2.4 - 3.5 gm/dL    Albumin/Globulin Ratio 1.4 1.1 - 2.0 ratio    Bilirubin Total 0.6 <=1.5 mg/dL    Alkaline Phosphatase 61 40 - 150 unit/L    Alanine Aminotransferase 7 0 - 55 unit/L    Aspartate Aminotransferase 27 5 - 34 unit/L    eGFR 27 mls/min/1.73/m2   Magnesium    Collection Time: 05/05/24  4:20 AM   Result Value Ref Range     Magnesium Level 2.10 1.60 - 2.60 mg/dL   Phosphorus    Collection Time: 05/05/24  4:20 AM   Result Value Ref Range    Phosphorus Level 3.4 2.3 - 4.7 mg/dL   CBC with Differential    Collection Time: 05/05/24  4:20 AM   Result Value Ref Range    WBC 14.76 (H) 4.50 - 11.50 x10(3)/mcL    RBC 4.50 4.20 - 5.40 x10(6)/mcL    Hgb 13.0 12.0 - 16.0 g/dL    Hct 40.7 37.0 - 47.0 %    MCV 90.4 80.0 - 94.0 fL    MCH 28.9 27.0 - 31.0 pg    MCHC 31.9 (L) 33.0 - 36.0 g/dL    RDW 15.2 11.5 - 17.0 %    Platelet 146 130 - 400 x10(3)/mcL    MPV 10.3 7.4 - 10.4 fL    Neut % 72.9 %    Lymph % 16.7 %    Mono % 8.7 %    Eos % 0.9 %    Basophil % 0.3 %    Lymph # 2.47 0.6 - 4.6 x10(3)/mcL    Neut # 10.74 (H) 2.1 - 9.2 x10(3)/mcL    Mono # 1.28 0.1 - 1.3 x10(3)/mcL    Eos # 0.14 0 - 0.9 x10(3)/mcL    Baso # 0.05 <=0.2 x10(3)/mcL    IG# 0.08 (H) 0 - 0.04 x10(3)/mcL    IG% 0.5 %    NRBC% 0.0 %   EKG 12-LEAD    Collection Time: 05/05/24  4:25 AM   Result Value Ref Range    QRS Duration 76 ms    [facility] QTC Calculation 467 ms   POCT glucose    Collection Time: 05/05/24  9:13 AM   Result Value Ref Range    POCT Glucose 78 70 - 110 mg/dL   POCT glucose    Collection Time: 05/05/24 11:37 AM   Result Value Ref Range    POCT Glucose 97 70 - 110 mg/dL   POCT glucose    Collection Time: 05/05/24  4:18 PM   Result Value Ref Range    POCT Glucose 172 (H) 70 - 110 mg/dL   POCT glucose    Collection Time: 05/06/24 12:05 AM   Result Value Ref Range    POCT Glucose 136 (H) 70 - 110 mg/dL   CBC with Differential    Collection Time: 05/06/24  5:24 AM   Result Value Ref Range    WBC 13.61 (H) 4.50 - 11.50 x10(3)/mcL    RBC 4.61 4.20 - 5.40 x10(6)/mcL    Hgb 13.4 12.0 - 16.0 g/dL    Hct 41.3 37.0 - 47.0 %    MCV 89.6 80.0 - 94.0 fL    MCH 29.1 27.0 - 31.0 pg    MCHC 32.4 (L) 33.0 - 36.0 g/dL    RDW 14.5 11.5 - 17.0 %    Platelet 160 130 - 400 x10(3)/mcL    MPV 10.4 7.4 - 10.4 fL    Neut % 75.8 %    Lymph % 15.1 %    Mono % 7.4 %    Eos % 1.0 %    Basophil % 0.3 %     Lymph # 2.05 0.6 - 4.6 x10(3)/mcL    Neut # 10.33 (H) 2.1 - 9.2 x10(3)/mcL    Mono # 1.01 0.1 - 1.3 x10(3)/mcL    Eos # 0.13 0 - 0.9 x10(3)/mcL    Baso # 0.04 <=0.2 x10(3)/mcL    IG# 0.05 (H) 0 - 0.04 x10(3)/mcL    IG% 0.4 %    NRBC% 0.0 %   Comprehensive Metabolic Panel    Collection Time: 05/06/24  6:19 AM   Result Value Ref Range    Sodium Level 137 136 - 145 mmol/L    Potassium Level 4.8 3.5 - 5.1 mmol/L    Chloride 107 98 - 107 mmol/L    Carbon Dioxide 20 (L) 23 - 31 mmol/L    Glucose Level 127 (H) 82 - 115 mg/dL    Blood Urea Nitrogen 23.7 (H) 9.8 - 20.1 mg/dL    Creatinine 1.87 (H) 0.55 - 1.02 mg/dL    Calcium Level Total 7.7 (L) 8.4 - 10.2 mg/dL    Protein Total 5.8 5.8 - 7.6 gm/dL    Albumin Level 3.1 (L) 3.4 - 4.8 g/dL    Globulin 2.7 2.4 - 3.5 gm/dL    Albumin/Globulin Ratio 1.1 1.1 - 2.0 ratio    Bilirubin Total 0.6 <=1.5 mg/dL    Alkaline Phosphatase 56 40 - 150 unit/L    Alanine Aminotransferase 5 0 - 55 unit/L    Aspartate Aminotransferase 21 5 - 34 unit/L    eGFR 27 mls/min/1.73/m2   Magnesium    Collection Time: 05/06/24  6:19 AM   Result Value Ref Range    Magnesium Level 2.20 1.60 - 2.60 mg/dL   Uric Acid    Collection Time: 05/06/24  6:19 AM   Result Value Ref Range    Uric Acid 4.9 2.6 - 6.0 mg/dL   BNP    Collection Time: 05/06/24  6:19 AM   Result Value Ref Range    Natriuretic Peptide 810.6 (H) <=100.0 pg/mL   POCT glucose    Collection Time: 05/06/24  8:05 AM   Result Value Ref Range    POCT Glucose 138 (H) 70 - 110 mg/dL   EKG 12-lead    Collection Time: 05/06/24  8:37 AM   Result Value Ref Range    QRS Duration 68 ms    [facility] QTC Calculation 457 ms   POCT glucose    Collection Time: 05/06/24 11:41 AM   Result Value Ref Range    POCT Glucose 189 (H) 70 - 110 mg/dL   POCT glucose    Collection Time: 05/06/24  5:02 PM   Result Value Ref Range    POCT Glucose 150 (H) 70 - 110 mg/dL   Comprehensive Metabolic Panel    Collection Time: 05/07/24  4:38 AM   Result Value Ref Range    Sodium Level 138  136 - 145 mmol/L    Potassium Level 5.2 (H) 3.5 - 5.1 mmol/L    Chloride 111 (H) 98 - 107 mmol/L    Carbon Dioxide 18 (L) 23 - 31 mmol/L    Glucose Level 147 (H) 82 - 115 mg/dL    Blood Urea Nitrogen 27.5 (H) 9.8 - 20.1 mg/dL    Creatinine 1.67 (H) 0.55 - 1.02 mg/dL    Calcium Level Total 7.6 (L) 8.4 - 10.2 mg/dL    Protein Total 5.6 (L) 5.8 - 7.6 gm/dL    Albumin Level 2.7 (L) 3.4 - 4.8 g/dL    Globulin 2.9 2.4 - 3.5 gm/dL    Albumin/Globulin Ratio 0.9 (L) 1.1 - 2.0 ratio    Bilirubin Total 0.5 <=1.5 mg/dL    Alkaline Phosphatase 55 40 - 150 unit/L    Alanine Aminotransferase 5 0 - 55 unit/L    Aspartate Aminotransferase 36 (H) 5 - 34 unit/L    eGFR 31 mls/min/1.73/m2   Magnesium    Collection Time: 05/07/24  4:38 AM   Result Value Ref Range    Magnesium Level 2.30 1.60 - 2.60 mg/dL   Phosphorus    Collection Time: 05/07/24  4:38 AM   Result Value Ref Range    Phosphorus Level 2.2 (L) 2.3 - 4.7 mg/dL   CBC with Differential    Collection Time: 05/07/24  7:54 AM   Result Value Ref Range    WBC 11.21 4.50 - 11.50 x10(3)/mcL    RBC 4.53 4.20 - 5.40 x10(6)/mcL    Hgb 13.0 12.0 - 16.0 g/dL    Hct 40.4 37.0 - 47.0 %    MCV 89.2 80.0 - 94.0 fL    MCH 28.7 27.0 - 31.0 pg    MCHC 32.2 (L) 33.0 - 36.0 g/dL    RDW 14.1 11.5 - 17.0 %    Platelet 176 130 - 400 x10(3)/mcL    MPV 9.6 7.4 - 10.4 fL    Neut % 77.9 %    Lymph % 13.7 %    Mono % 6.7 %    Eos % 0.8 %    Basophil % 0.4 %    Lymph # 1.54 0.6 - 4.6 x10(3)/mcL    Neut # 8.73 2.1 - 9.2 x10(3)/mcL    Mono # 0.75 0.1 - 1.3 x10(3)/mcL    Eos # 0.09 0 - 0.9 x10(3)/mcL    Baso # 0.04 <=0.2 x10(3)/mcL    IG# 0.06 (H) 0 - 0.04 x10(3)/mcL    IG% 0.5 %    NRBC% 0.0 %   POCT glucose    Collection Time: 05/07/24 11:14 AM   Result Value Ref Range    POCT Glucose 178 (H) 70 - 110 mg/dL     Telemetry: SR     Physical Exam  Constitutional:       General: She is not in acute distress.     Appearance: Normal appearance. She is obese.   HENT:      Head: Normocephalic.      Mouth/Throat:       Mouth: Mucous membranes are moist.   Eyes:      Extraocular Movements: Extraocular movements intact.      Conjunctiva/sclera: Conjunctivae normal.   Cardiovascular:      Rate and Rhythm: Normal rate and regular rhythm.      Pulses: Normal pulses.      Heart sounds: Murmur heard.   Pulmonary:      Effort: Pulmonary effort is normal. No respiratory distress.      Comments: NC O2  Abdominal:      Palpations: Abdomen is soft.   Musculoskeletal:         General: Normal range of motion.   Skin:     General: Skin is warm.      Comments: Midline Sternotomy Dressing C/D/I   Neurological:      General: No focal deficit present.      Mental Status: She is alert and oriented to person, place, and time. Mental status is at baseline.   Psychiatric:         Mood and Affect: Mood normal.         Behavior: Behavior normal.         Judgment: Judgment normal.       Home Medications:   Current Facility-Administered Medications on File Prior to Encounter   Medication Dose Route Frequency Provider Last Rate Last Admin    0.9%  NaCl infusion   Intravenous Continuous Rubia Seymour MD   Stopped at 12/04/23 0713    diazePAM tablet 10 mg  10 mg Oral On Call Procedure Emmanuel Riley MD   10 mg at 02/21/24 1031    diphenhydrAMINE capsule 50 mg  50 mg Oral On Call Procedure Rubia Seymour MD   50 mg at 12/04/23 0700    diphenhydrAMINE capsule 50 mg  50 mg Oral On Call Procedure Emmanuel Riley MD   50 mg at 02/21/24 1031    sodium chloride 0.9% flush 10 mL  10 mL Intravenous PRN Rubia Seymour MD         Current Outpatient Medications on File Prior to Encounter   Medication Sig Dispense Refill    aspirin (ECOTRIN) 81 MG EC tablet Take 81 mg by mouth once daily.      gabapentin (NEURONTIN) 100 MG capsule TAKE 2 CAPSULES BY MOUTH TWICE DAILY, EVERY MORNING AND AT NOON 60 capsule 6    gabapentin (NEURONTIN) 300 MG capsule TAKE 1 CAPSULE(300 MG) BY MOUTH EVERY EVENING 30 capsule 2    icosapent ethyL (VASCEPA) 1 gram Cap BID       "insulin glargine (LANTUS U-100 INSULIN) 100 unit/mL injection Inject 46 units in the am, and 20 units at night 20 mL 6    isosorbide mononitrate (IMDUR) 30 MG 24 hr tablet Take 30 mg by mouth once daily.      loratadine (CLARITIN) 10 mg tablet TAKE 1 TABLET BY MOUTH EVERY DAY 90 tablet 3    nortriptyline (PAMELOR) 25 MG capsule TAKE 1 CAPSULE BY MOUTH EVERY DAY AT BEDTIME FOR SLEEP 30 capsule 3    omeprazole (PRILOSEC) 40 MG capsule TAKE 1 CAPSULE BY MOUTH DAILY AS NEEDED FOR REFLUX (Patient taking differently: Take 40 mg by mouth every morning.) 90 capsule 3    rosuvastatin (CRESTOR) 40 MG Tab Take 40 mg by mouth every evening.      ticagrelor (BRILINTA) 90 mg tablet Take 90 mg by mouth once daily.      TRADJENTA 5 mg Tab tablet TAKE 1 TABLET(5 MG) BY MOUTH EVERY DAY 90 tablet 3    ALCOHOL PREP PADS PadM       clopidogreL (PLAVIX) 75 mg tablet Take 1 tablet (75 mg total) by mouth once daily. 30 tablet 11    COMFORT EZ INSULIN SYRINGE 0.5 mL 31 gauge x 5/16" Syrg       denosumab (PROLIA) 60 mg/mL Syrg Inject 1 mL (60 mg total) into the skin every 6 (six) months. 1 mL 1    fluticasone propionate (FLONASE) 50 mcg/actuation nasal spray 2 sprays (100 mcg total) by Each Nostril route once daily. 16 g 3    nebulizer accessories Kit Please dispense one nebulizer accessories kit for appropriate nebulizer machine. 1 kit 0    nebulizer and compressor Veronica Please dispense one nebulizer machine with appropriate supplies. 1 each 0    pen needle, diabetic 32 gauge x 5/32" Ndle 1 each by Misc.(Non-Drug; Combo Route) route 2 (two) times a day. 100 each 12     Current Inpatient Medications:    Current Facility-Administered Medications:     0.45% NaCl infusion, , Intravenous, Continuous, Frederick Hicks PA, Stopped at 05/05/24 1144    albumin human 5% bottle 12.5 g, 12.5 g, Intravenous, PRN, Aaron Ontiveros PA-C, Stopped at 05/03/24 1906    aspirin EC tablet 81 mg, 81 mg, Oral, Daily, Aaron Ontiveros PA-C, 81 mg at 05/07/24 " 0853    atorvastatin tablet 40 mg, 40 mg, Oral, Daily, Prakash Peres, JIMBO, 40 mg at 05/07/24 0853    calcium gluconate 1 g in NS IVPB (premixed), 1 g, Intravenous, PRN, Aaron Ontiveros, PA-C    calcium gluconate 1 g in NS IVPB (premixed), 2 g, Intravenous, PRN, Weston, Aaron HUGHES, PA-C    calcium gluconate 1 g in NS IVPB (premixed), 3 g, Intravenous, PRN, Aaron Ontiveros, PA-C    dextrose 10% bolus 125 mL 125 mL, 12.5 g, Intravenous, PRN, Weston, Aaron HUGHES, PA-C    dextrose 10% bolus 125 mL 125 mL, 12.5 g, Intravenous, PRN, Langlinais, Frederick P, PA    dextrose 10% bolus 125 mL 125 mL, 12.5 g, Intravenous, PRN, Langlinais, Frederick P, PA    dextrose 10% bolus 250 mL 250 mL, 25 g, Intravenous, PRN, Aaron Ontiveros, PA-C    dextrose 10% bolus 250 mL 250 mL, 25 g, Intravenous, PRN, Langlinais, Frederick P, PA    dextrose 10% bolus 250 mL 250 mL, 25 g, Intravenous, PRN, Cameronais, Frederick P, PA    docusate sodium capsule 100 mg, 100 mg, Oral, BID, Aaron Ontiveros PA-C, 100 mg at 05/07/24 0853    enoxaparin injection 30 mg, 30 mg, Subcutaneous, Daily, Jeovanny Wan MD, 30 mg at 05/06/24 1658    famotidine (PF) injection 20 mg, 20 mg, Intravenous, Daily, Aaron Ontiveros PA-C, 20 mg at 05/07/24 0853    folic acid tablet 1 mg, 1 mg, Oral, Daily, Aaron Ontiveros PA-C, 1 mg at 05/07/24 0853    gabapentin capsule 100 mg, 100 mg, Oral, TID, Kimberly Catherine MD, 100 mg at 05/07/24 0853    glucagon (human recombinant) injection 1 mg, 1 mg, Intramuscular, PRN, Frederick Hicks P, PA    glucagon (human recombinant) injection 1 mg, 1 mg, Intramuscular, PRN, Frederick Hicks, PA    glucose chewable tablet 16 g, 16 g, Oral, PRN, Kurt, Frederick P, PA    glucose chewable tablet 16 g, 16 g, Oral, PRN, Frederick Hicks P, PA    glucose chewable tablet 24 g, 24 g, Oral, PRN, Kurt, Frederick P, PA    glucose chewable tablet 24 g, 24 g, Oral, PRN, Frederick Hicks P, PA    HYDROcodone-acetaminophen 5-325 mg per tablet 1 tablet, 1 tablet,  Oral, Q4H PRN, Jeovanny Wan MD, 1 tablet at 05/07/24 1110    insulin aspart U-100 injection 0-10 Units, 0-10 Units, Subcutaneous, QID (AC + HS) PRN, Frederick Hicks PA, 2 Units at 05/05/24 1759    magnesium sulfate 2g in water 50mL IVPB (premix), 2 g, Intravenous, PRN, Aaron Ontiveros PA-C    magnesium sulfate 2g in water 50mL IVPB (premix), 4 g, Intravenous, PRN, Aaron Ontiveros PA-C    metoprolol tartrate (LOPRESSOR) split tablet 12.5 mg, 12.5 mg, Oral, BID, Aaron Ontiveros PA-C, 12.5 mg at 05/07/24 0853    morphine injection 4 mg, 4 mg, Intravenous, Q4H PRN, Aaron Ontiveros PA-C, 4 mg at 05/03/24 2212    ondansetron injection 4 mg, 4 mg, Intravenous, Q4H PRN, Aaron Ontiveros PA-C, 4 mg at 05/04/24 1319    oxyCODONE immediate release tablet 10 mg, 10 mg, Oral, Q4H PRN, Aaron Ontiveros PA-C    oxyCODONE immediate release tablet 5 mg, 5 mg, Oral, Q4H PRN, Jeovanny Wan MD, 5 mg at 05/05/24 2039    potassium chloride 20 mEq in 100 mL IVPB (FOR CENTRAL LINE ADMINISTRATION ONLY), 20 mEq, Intravenous, PRN, Aaron Ontiveros PA-C, Stopped at 05/03/24 1316    potassium chloride 20 mEq in 100 mL IVPB (FOR CENTRAL LINE ADMINISTRATION ONLY), 40 mEq, Intravenous, PRN, Aaron Ontiveros PA-C    potassium chloride 20 mEq in 100 mL IVPB (FOR CENTRAL LINE ADMINISTRATION ONLY), 60 mEq, Intravenous, PRN, Aaron Ontiveros PA-C    sodium bicarbonate tablet 1,300 mg, 1,300 mg, Oral, BID, Miller Zimmerman MD    sodium phosphate 15 mmol in dextrose 5 % (D5W) 250 mL IVPB, 15 mmol, Intravenous, PRN, Aaron Ontiveros PA-C    sodium phosphate 20.01 mmol in dextrose 5 % (D5W) 250 mL IVPB, 20.01 mmol, Intravenous, PRN, Aaron Ontiveros PA-C    sodium phosphate 30 mmol in dextrose 5 % (D5W) 250 mL IVPB, 30 mmol, Intravenous, PRN, Aaron Ontiveros PA-C    Facility-Administered Medications Ordered in Other Encounters:     0.9%  NaCl infusion, , Intravenous, Continuous, Rubia Seymour MD, Stopped at  12/04/23 0713    diazePAM tablet 10 mg, 10 mg, Oral, On Call Procedure, Emmanuel Riley MD, 10 mg at 02/21/24 1031    diphenhydrAMINE capsule 50 mg, 50 mg, Oral, On Call Procedure, Rubia Seymour MD, 50 mg at 12/04/23 0700    diphenhydrAMINE capsule 50 mg, 50 mg, Oral, On Call Procedure, Emmanuel Riley MD, 50 mg at 02/21/24 1031    sodium chloride 0.9% flush 10 mL, 10 mL, Intravenous, PRN, Rubia Seymour MD  VTE Risk Mitigation (From admission, onward)           Ordered     enoxaparin injection 30 mg  Daily         05/03/24 1336     Place sequential compression device  Until discontinued         05/03/24 0940     IP VTE HIGH RISK PATIENT  Once         05/03/24 0937                  Assessment:   MVCAD    - s/p Sternotomy (5.3.24) - Ungraftable LAD and RCA    - s/p LHC (2.21.24) - 1. Left main-normal. 2. Lad-patent small mid stent, 60-70% stenosis distal to stent edge, IFR 0.86. 3. LCX- codominant, Mid 50%, IFR 0.92. 4. RCA-codominant Mid occlusion in fractured stent, left to right collaterals    - ECHO (11.21.23) - LVEF 60%  Hypotension requiring Pressors - Resolved     - Hx of HTN  Acute Hypoxemic Respiratory Failure requiring Intubation/Ventilation - Now on NC  BARTOLO/CKD III - Improving   Leukocytosis - Resolved   DM II  Diabetic Neuropathy  HLD  Hx of MI  Obesity  VI  Chronic Lower Back Pain  GERD  Migraines  OA  Electrolytes - Hyperkalemia  No Hx of GIB     Plan:   Continue ASA, Statin and BB  Increase Metoprolol Tartrate to 25mg PO TID  Will Add GDMT when BP Allows  Aggressive Mobilization of PT and Q1HR IS  Follow up as OP for Solidification of Plan of Care with Dr. Riley   Keep K > 4.0 and Mg > 2.0   Labs in AM: CBC, CMP and Mg    JIMBO Mcdonald  Cardiology  [facility]  05/07/2024     I agree with the findings of the complexity of problems addressed and take responsibility for the plan's risks and complications. I approved the plan documented by Prakash Peres NP.  Increase  lopressor

## 2024-05-07 NOTE — PT/OT/SLP PROGRESS
Physical Therapy Treatment    Patient Name:  Rosalba Whitlock   MRN:  [id number]    Recommendations:     Discharge therapy intensity: Moderate Intensity Therapy   Discharge Equipment Recommendations: to be determined by next level of care  Barriers to discharge: Impaired mobility and Ongoing medical needs    Assessment:     Rosalba Whitlock is a 83 y.o. female admitted with a medical diagnosis of Severe CAD s/p median sternotomy c attempted CABG, hypotension.  She presents with the following impairments/functional limitations: weakness, impaired endurance, impaired balance, gait instability, pain, impaired self care skills, impaired functional mobility.    Rehab Prognosis: Good; patient would benefit from acute skilled PT services to address these deficits and reach maximum level of function.    Recent Surgery: Procedure(s) (LRB):  CORONARY ARTERY BYPASS GRAFT (CABG) (N/A)  SURGICAL PROCUREMENT, VEIN, ENDOSCOPIC (Left)  PLATING, STERNAL (N/A) 4 Days Post-Op    Plan:     During this hospitalization, patient would benefit from acute PT services 6 x/week to address the identified rehab impairments via gait training, therapeutic activities, therapeutic exercises, neuromuscular re-education and progress toward the following goals:    Plan of Care Expires:  06/06/24    Subjective     Chief Complaint:   Patient/Family Comments/goals:   Pain/Comfort:         Objective:     Communicated with nursing prior to session.  Patient found HOB elevated with pulse ox (continuous), telemetry, PureWick, oxygen upon PT entry to room.     General Precautions: Standard, sternal  Orthopedic Precautions: N/A  Braces: N/A  Respiratory Status: Nasal cannula, flow 2 L/min  Blood Pressure: NT    Functional Mobility:  Bed Mobility:     Supine to Sit: moderate assistance  Transfers:     Sit to Stand:  moderate assistance and of 2 persons with rolling walker  VC for sternal pxns  Bed to Chair: minimum assistance with  rolling walker  using  Step  Transfer  Gait: 20' w/RW, CGA      Education:  Patient provided with verbal education education regarding post-op precautions.  Understanding was verbalized, however additional teaching warranted.     Patient left up in chair with all lines intact, call button in reach, nurse notified, and family present    GOALS:   Multidisciplinary Problems       Physical Therapy Goals          Problem: Physical Therapy    Goal Priority Disciplines Outcome Goal Variances Interventions   Physical Therapy Goal     PT, PT/OT Progressing     Description: Goals to be met by: 6/6/24     Patient will increase functional independence with mobility by performing:    Supine to sit with Modified [place]  Sit to stand transfer with Modified [place]  Gait  x 200 feet with Modified [place] using Rolling Walker.                          Time Tracking:     PT Received On: 05/07/24  PT Start Time: 1400     PT Stop Time: 1426  PT Total Time (min): 26 min     Billable Minutes: Gait Training 10 and Therapeutic Activity 16    Treatment Type: Treatment  PT/PTA: PTA     Number of PTA visits since last PT visit: 2     05/07/2024

## 2024-05-07 NOTE — PROGRESS NOTES
Chief complaint: none    Interval History:  No acute events, CT's removed this morning, pt notes appetite improved, sternal CP better controlled    Review of Systems   Constitutional: Negative.    HENT: Negative.     Respiratory:  Positive for cough.    Cardiovascular: Negative.    Gastrointestinal: Negative.    Genitourinary: Negative.    Musculoskeletal:         Sternal wound CP controlled   Neurological:  Positive for weakness.   Psychiatric/Behavioral: Negative.        all else Neg     aspirin  81 mg Oral Daily    atorvastatin  40 mg Oral Daily    docusate sodium  100 mg Oral BID    enoxparin  30 mg Subcutaneous Daily    famotidine (PF)  20 mg Intravenous Daily    folic acid  1 mg Oral Daily    gabapentin  100 mg Oral TID    metoprolol tartrate  12.5 mg Oral BID    sodium bicarbonate  1,300 mg Oral BID       Objective     VITAL SIGNS: 24 HR MIN & MAX LAST    Temp  Min: 97.5 °F (36.4 °C)  Max: 98.6 °F (37 °C)  98 °F (36.7 °C)    BP  Min: 97/42  Max: 153/72  119/72     Pulse  Min: 98  Max: 108  107     Resp  Min: 18  Max: 21  20    SpO2  Min: 90 %  Max: 99 %  (!) 94 %      Wt Readings from Last 3 Encounters:   05/07/24 72.4 kg (159 lb 9.8 oz)   04/30/24 73.7 kg (162 lb 6.4 oz)   04/25/24 72.6 kg (160 lb)       Intake/Output Summary (Last 24 hours) at 5/7/2024 1027  Last data filed at 5/7/2024 0604  Gross per 24 hour   Intake 0 ml   Output 685 ml   Net -685 ml       Physical Exam  Constitutional:       General: She is not in acute distress.  Cardiovascular:      Rate and Rhythm: Normal rate.   Pulmonary:      Effort: Pulmonary effort is normal. No respiratory distress.   Abdominal:      General: Bowel sounds are normal.      Palpations: Abdomen is soft.      Tenderness: There is no abdominal tenderness.   Musculoskeletal:         General: No swelling.      Cervical back: Normal range of motion.   Neurological:      Mental Status: She is alert and oriented to person, place, and time.      Motor: Weakness  present.   Psychiatric:         Mood and Affect: Mood normal.          Recent Labs     05/05/24  0420 05/06/24  0619 05/07/24  0438    137 138   K 4.7 4.8 5.2*   CHLORIDE 108* 107 111*   CO2 20* 20* 18*   BUN 16.6 23.7* 27.5*   CREATININE 1.88* 1.87* 1.67*   GLUCOSE 86 127* 147*   CALCIUM 7.5* 7.7* 7.6*   MG 2.10 2.20 2.30   PHOS 3.4  --  2.2*   ALBUMIN 3.2* 3.1* 2.7*      Recent Labs     05/05/24  0420 05/06/24  0524 05/07/24  0754   WBC 14.76* 13.61* 11.21   HGB 13.0 13.4 13.0   HCT 40.7 41.3 40.4    160 176         Assessment & Plan     1   CKD 4 (baseline Cr 1.8) - GFR improved to better than baseline, she appears euvolemic  2   CAD - s/p sternotomy unable to perform CAB, for outpt f/u cards to consider high-risk PCI  3   HTN -  BP meds on hold for hypotension, monitor  4   DM 2  5   Gout  6   NAGMA - appears chronic on outpt labs, increase Na bicarb 1300 bid  7   Osteoporosis  8   Mod malnutrition - add supplement shakes

## 2024-05-07 NOTE — PROGRESS NOTES
Pod 4  Doing better  Working c pt  Vss  Heart sinus  Wounds c/d/I  Cts min  Cxr stable  Dc cts, increase activity

## 2024-05-08 LAB
ALBUMIN SERPL-MCNC: 2.9 G/DL (ref 3.4–4.8)
ALBUMIN/GLOB SERPL: 1 RATIO (ref 1.1–2)
ALP SERPL-CCNC: 63 UNIT/L (ref 40–150)
ALT SERPL-CCNC: 7 UNIT/L (ref 0–55)
AST SERPL-CCNC: 23 UNIT/L (ref 5–34)
BASOPHILS # BLD AUTO: 0.04 X10(3)/MCL
BASOPHILS NFR BLD AUTO: 0.5 %
BILIRUB SERPL-MCNC: 0.5 MG/DL
BUN SERPL-MCNC: 27.9 MG/DL (ref 9.8–20.1)
CALCIUM SERPL-MCNC: 8.3 MG/DL (ref 8.4–10.2)
CHLORIDE SERPL-SCNC: 108 MMOL/L (ref 98–107)
CO2 SERPL-SCNC: 23 MMOL/L (ref 23–31)
CREAT SERPL-MCNC: 1.52 MG/DL (ref 0.55–1.02)
EOSINOPHIL # BLD AUTO: 0.17 X10(3)/MCL (ref 0–0.9)
EOSINOPHIL NFR BLD AUTO: 2 %
ERYTHROCYTE [DISTWIDTH] IN BLOOD BY AUTOMATED COUNT: 14 % (ref 11.5–17)
GFR SERPLBLD CREATININE-BSD FMLA CKD-EPI: 34 ML/MIN/1.73/M2
GLOBULIN SER-MCNC: 3 GM/DL (ref 2.4–3.5)
GLUCOSE SERPL-MCNC: 155 MG/DL (ref 82–115)
HCT VFR BLD AUTO: 38.8 % (ref 37–47)
HGB BLD-MCNC: 12.8 G/DL (ref 12–16)
IMM GRANULOCYTES # BLD AUTO: 0.04 X10(3)/MCL (ref 0–0.04)
IMM GRANULOCYTES NFR BLD AUTO: 0.5 %
LYMPHOCYTES # BLD AUTO: 1.78 X10(3)/MCL (ref 0.6–4.6)
LYMPHOCYTES NFR BLD AUTO: 21.2 %
MAGNESIUM SERPL-MCNC: 2.4 MG/DL (ref 1.6–2.6)
MCH RBC QN AUTO: 28.9 PG (ref 27–31)
MCHC RBC AUTO-ENTMCNC: 33 G/DL (ref 33–36)
MCV RBC AUTO: 87.6 FL (ref 80–94)
MONOCYTES # BLD AUTO: 0.86 X10(3)/MCL (ref 0.1–1.3)
MONOCYTES NFR BLD AUTO: 10.3 %
NEUTROPHILS # BLD AUTO: 5.49 X10(3)/MCL (ref 2.1–9.2)
NEUTROPHILS NFR BLD AUTO: 65.5 %
NRBC BLD AUTO-RTO: 0 %
PHOSPHATE SERPL-MCNC: 2.3 MG/DL (ref 2.3–4.7)
PLATELET # BLD AUTO: 194 X10(3)/MCL (ref 130–400)
PMV BLD AUTO: 9.8 FL (ref 7.4–10.4)
POCT GLUCOSE: 185 MG/DL (ref 70–110)
POCT GLUCOSE: 217 MG/DL (ref 70–110)
POCT GLUCOSE: 236 MG/DL (ref 70–110)
POTASSIUM SERPL-SCNC: 4.6 MMOL/L (ref 3.5–5.1)
PROT SERPL-MCNC: 5.9 GM/DL (ref 5.8–7.6)
RBC # BLD AUTO: 4.43 X10(6)/MCL (ref 4.2–5.4)
SODIUM SERPL-SCNC: 140 MMOL/L (ref 136–145)
WBC # SPEC AUTO: 8.38 X10(3)/MCL (ref 4.5–11.5)

## 2024-05-08 PROCEDURE — 99024 POSTOP FOLLOW-UP VISIT: CPT | Mod: ,,, | Performed by: PHYSICIAN ASSISTANT

## 2024-05-08 PROCEDURE — 63600175 PHARM REV CODE 636 W HCPCS: Performed by: PHYSICIAN ASSISTANT

## 2024-05-08 PROCEDURE — 83735 ASSAY OF MAGNESIUM: CPT | Performed by: NURSE PRACTITIONER

## 2024-05-08 PROCEDURE — 97535 SELF CARE MNGMENT TRAINING: CPT

## 2024-05-08 PROCEDURE — 97110 THERAPEUTIC EXERCISES: CPT

## 2024-05-08 PROCEDURE — 25000003 PHARM REV CODE 250

## 2024-05-08 PROCEDURE — 80053 COMPREHEN METABOLIC PANEL: CPT | Performed by: NURSE PRACTITIONER

## 2024-05-08 PROCEDURE — 25000003 PHARM REV CODE 250: Performed by: INTERNAL MEDICINE

## 2024-05-08 PROCEDURE — 21400001 HC TELEMETRY ROOM

## 2024-05-08 PROCEDURE — 25000003 PHARM REV CODE 250: Performed by: PHYSICIAN ASSISTANT

## 2024-05-08 PROCEDURE — 25000003 PHARM REV CODE 250: Performed by: NURSE PRACTITIONER

## 2024-05-08 PROCEDURE — 63600175 PHARM REV CODE 636 W HCPCS: Performed by: INTERNAL MEDICINE

## 2024-05-08 PROCEDURE — 85025 COMPLETE CBC W/AUTO DIFF WBC: CPT | Performed by: NURSE PRACTITIONER

## 2024-05-08 PROCEDURE — 97116 GAIT TRAINING THERAPY: CPT

## 2024-05-08 PROCEDURE — 36415 COLL VENOUS BLD VENIPUNCTURE: CPT | Performed by: NURSE PRACTITIONER

## 2024-05-08 PROCEDURE — 84100 ASSAY OF PHOSPHORUS: CPT | Performed by: INTERNAL MEDICINE

## 2024-05-08 PROCEDURE — 27000221 HC OXYGEN, UP TO 24 HOURS

## 2024-05-08 PROCEDURE — 97530 THERAPEUTIC ACTIVITIES: CPT

## 2024-05-08 RX ORDER — POLYETHYLENE GLYCOL 3350 17 G/17G
17 POWDER, FOR SOLUTION ORAL DAILY
Status: DISCONTINUED | OUTPATIENT
Start: 2024-05-08 | End: 2024-05-13 | Stop reason: HOSPADM

## 2024-05-08 RX ORDER — LACTULOSE 10 G/15ML
30 SOLUTION ORAL ONCE
Status: COMPLETED | OUTPATIENT
Start: 2024-05-08 | End: 2024-05-08

## 2024-05-08 RX ORDER — SODIUM BICARBONATE 650 MG/1
650 TABLET ORAL 2 TIMES DAILY
Status: DISCONTINUED | OUTPATIENT
Start: 2024-05-08 | End: 2024-05-13 | Stop reason: HOSPADM

## 2024-05-08 RX ADMIN — INSULIN ASPART 4 UNITS: 100 INJECTION, SOLUTION INTRAVENOUS; SUBCUTANEOUS at 05:05

## 2024-05-08 RX ADMIN — METOPROLOL TARTRATE 25 MG: 25 TABLET, FILM COATED ORAL at 03:05

## 2024-05-08 RX ADMIN — ENOXAPARIN SODIUM 30 MG: 30 INJECTION SUBCUTANEOUS at 05:05

## 2024-05-08 RX ADMIN — HYDROCODONE BITARTRATE AND ACETAMINOPHEN 1 TABLET: 5; 325 TABLET ORAL at 08:05

## 2024-05-08 RX ADMIN — METOPROLOL TARTRATE 25 MG: 25 TABLET, FILM COATED ORAL at 08:05

## 2024-05-08 RX ADMIN — GABAPENTIN 100 MG: 100 CAPSULE ORAL at 08:05

## 2024-05-08 RX ADMIN — HYDROCODONE BITARTRATE AND ACETAMINOPHEN 1 TABLET: 5; 325 TABLET ORAL at 05:05

## 2024-05-08 RX ADMIN — FAMOTIDINE 20 MG: 10 INJECTION, SOLUTION INTRAVENOUS at 08:05

## 2024-05-08 RX ADMIN — LACTULOSE 30 G: 10 SOLUTION ORAL at 11:05

## 2024-05-08 RX ADMIN — ASPIRIN 81 MG: 81 TABLET, COATED ORAL at 08:05

## 2024-05-08 RX ADMIN — HYDROCODONE BITARTRATE AND ACETAMINOPHEN 1 TABLET: 5; 325 TABLET ORAL at 03:05

## 2024-05-08 RX ADMIN — SODIUM BICARBONATE 650 MG TABLET 650 MG: at 08:05

## 2024-05-08 RX ADMIN — POLYETHYLENE GLYCOL 3350 17 G: 17 POWDER, FOR SOLUTION ORAL at 03:05

## 2024-05-08 RX ADMIN — SODIUM BICARBONATE 650 MG TABLET 1300 MG: at 08:05

## 2024-05-08 RX ADMIN — FOLIC ACID 1 MG: 1 TABLET ORAL at 08:05

## 2024-05-08 RX ADMIN — GABAPENTIN 100 MG: 100 CAPSULE ORAL at 03:05

## 2024-05-08 RX ADMIN — DOCUSATE SODIUM 100 MG: 100 CAPSULE, LIQUID FILLED ORAL at 08:05

## 2024-05-08 RX ADMIN — INSULIN ASPART 4 UNITS: 100 INJECTION, SOLUTION INTRAVENOUS; SUBCUTANEOUS at 11:05

## 2024-05-08 RX ADMIN — HYDROCODONE BITARTRATE AND ACETAMINOPHEN 1 TABLET: 5; 325 TABLET ORAL at 01:05

## 2024-05-08 RX ADMIN — ATORVASTATIN CALCIUM 40 MG: 40 TABLET, FILM COATED ORAL at 08:05

## 2024-05-08 NOTE — PT/OT/SLP PROGRESS
Physical Therapy Treatment    Patient Name:  Rosalba Whitlock   MRN:  [id number]    Recommendations:     Discharge therapy intensity: Moderate Intensity Therapy   Discharge Equipment Recommendations: to be determined by next level of care  Barriers to discharge: Impaired mobility and Ongoing medical needs    Assessment:     Rosalba Whitlock is a 83 y.o. female admitted with a medical diagnosis of Severe CAD s/p median sternotomy c attempted CABG, hypotension.  She presents with the following impairments/functional limitations: weakness, impaired endurance, impaired functional mobility, impaired self care skills, gait instability, pain, edema, impaired cardiopulmonary response to activity. Pt provides good effort but is easily fatigued with exertion. Requires frequent cues for sternal precautions during mobility.    Rehab Prognosis: Good; patient would benefit from acute skilled PT services to address these deficits and reach maximum level of function.    Recent Surgery: Procedure(s) (LRB):  CORONARY ARTERY BYPASS GRAFT (CABG) (N/A)  SURGICAL PROCUREMENT, VEIN, ENDOSCOPIC (Left)  PLATING, STERNAL (N/A) 5 Days Post-Op    Plan:     During this hospitalization, patient would benefit from acute PT services 6 x/week to address the identified rehab impairments via gait training, therapeutic activities, therapeutic exercises, neuromuscular re-education and progress toward the following goals:    Plan of Care Expires:  06/06/24    Subjective     Chief Complaint: pain at sternum and flank  Patient/Family Comments/goals: to get better  Pain/Comfort:  Pain Rating 1: 8/10  Location - Side 1: Left  Location 1: sternal  Pain Addressed 1: Reposition, Distraction, Nurse notified      Objective:     Communicated with nurse prior to session.  Patient found up in chair with pulse ox (continuous), telemetry, PureWick, oxygen upon PT entry to room.     General Precautions: Standard, fall, sternal  Orthopedic Precautions: N/A  Braces:  N/A  Respiratory Status: Room air  Skin Integrity: all skin intact, bruising to sacrum and buttocks region, nursing aware    Functional Mobility:  Bed Mobility:    Sit to supine: max assist  Transfers:     Sit<>stand:   Recliner-mod A with verbal cues for sequencing and forward weight shift  Toilet- max A  Gait:  20ft x 2 trials with RW, min A. Tactile cues for posture. Slow jonas    Therapeutic Activities/Exercises:  LE there-ex reclined in chair: single knee to chest and SLR, AAROM for LLE, 10 reps   LE there-ex unsupported sitting: LAQ x 10 reps each LE    Education:  Patient and family were provided with verbal education education regarding PT role/goals/POC, post-op precautions, and pressure ulcer prevention.  Understanding was verbalized.     Patient left HOB elevated with all lines intact, call button in reach, nurse notified, and wedge in place to offload sacrum.      GOALS:   Multidisciplinary Problems       Physical Therapy Goals          Problem: Physical Therapy    Goal Priority Disciplines Outcome Goal Variances Interventions   Physical Therapy Goal     PT, PT/OT Progressing     Description: Goals to be met by: 6/6/24     Patient will increase functional independence with mobility by performing:    Supine to sit with Modified [employer]  Sit to stand transfer with Modified [employer]  Gait  x 200 feet with Modified [employer] using Rolling Walker.                          Time Tracking:     PT Received On: 05/08/24  PT Start Time: 1431     PT Stop Time: 1510  PT Total Time (min): 39 min     Billable Minutes: Gait Training 10, Therapeutic Activity 15, and Therapeutic Exercise 14    Treatment Type: Treatment  PT/PTA: PT     Number of PTA visits since last PT visit: 3     05/08/2024

## 2024-05-08 NOTE — PT/OT/SLP PROGRESS
Occupational Therapy   Treatment    Name: Rosalba Whitlock  MRN: [id number]  Admitting Diagnosis: Severe CAD s/p median sternotomy c attempted CABG, hypotension   5 Days Post-Op    Recommendations:     Recommended therapy intensity at discharge: Moderate Intensity Therapy   Discharge Equipment Recommendations:  to be determined by next level of care  Barriers to discharge:  Other (Comment) (ongoing medical needs)    Assessment:     Rosalba Whitlock is a 83 y.o. female with a medical diagnosis of severe CAD s/p median sternotomy c attempted CABG, hypotension.  Performance deficits affecting function are weakness, impaired endurance, impaired self care skills, impaired functional mobility, pain, edema, impaired cardiopulmonary response to activity, decreased upper extremity function, other (comment) (sternal precautions). She required min A for functional mobility within room using rolling walker and required cueing throughout session for adherence to sternal precautions. Pt fatigued quickly with activity and required x2 seated rest breaks while ambulating from bedside to sink in room with a chair follow. Noted SpO2 to desaturate (85%-89%) with mobility, but able to recover to 93%-95% with seated rest breaks on 2 L/min nasal cannula. Continue to recommend moderate intensity therapy upon d/c.     Rehab Prognosis:  Good; patient would benefit from acute skilled OT services to address these deficits and reach maximum level of function.       Plan:     Patient to be seen 5 x/week to address the above listed problems via self-care/home management, therapeutic activities, therapeutic exercises  Plan of Care Expires: 06/02/24  Plan of Care Reviewed with: patient, sibling    Subjective     Pain/Comfort:  Pain Rating 1: 6/10  Location - Side 1: Left  Location 1: sternal  Pain Addressed 1: Reposition, Distraction, Nurse notified    Objective:     Communicated with: RN prior to session.  Patient found up in chair with pulse ox  (continuous), telemetry, PureWick, oxygen, peripheral IV upon OT entry to room.    General Precautions: Standard, fall, sternal    Orthopedic Precautions:N/A  Braces: N/A  Respiratory Status: Nasal cannula, flow 2 L/min  Vital Signs: Blood Pressure: sittin/63, standin/74     Occupational Performance:     Bed Mobility:    Pt up in chair upon OT entry.      Functional Mobility/Transfers:  Patient completed Sit <> Stand Transfer with minimum assistance  with  rolling walker   Functional Mobility: Pt completed x4 sit<>stands from recliner with min A using rolling walker. Pt required x2 seated rest breaks while ambulating within room and required verbal cueing for adherence to sternal precautions.     Activities of Daily Living:  Grooming: minimum assistance to brush teeth while standing at the sink.   Toileting: minimum assistance for perineal hygiene while standing after void via purewick.     Therapeutic Positioning    OT interventions performed during the course of today's session in an effort to prevent and/or reduce acquired pressure injuries:   Therapeutic positioning was provided at the conclusion of session to offload all bony prominences for the prevention and/or reduction of pressure injuries    Skin assessment: all bony prominences were assessed    Findings: known area of altered skin integrity at sternum.     [facility] 6 Click ADL: 14    Patient Education:  Patient and sister  provided with verbal education education regarding OT role/goals/POC, post op precautions, fall prevention, safety awareness, Discharge/DME recommendations, and pressure ulcer prevention.  Understanding was verbalized.      Patient left up in chair with all lines intact, call button in reach, geomat cushion, RN notified, and sister present.    GOALS:   Multidisciplinary Problems       Occupational Therapy Goals          Problem: Occupational Therapy    Goal Priority Disciplines Outcome Interventions   Occupational Therapy Goal      OT, PT/OT Progressing    Description: LTG: Pt will perform basic ADLs and ADL transfers with Modified independence and good compliance to sternal precautions using LRAD by discharge.    STG: to be met by 6/2/24:    Pt will complete grooming standing at sink with LRAD with SBA.  Pt will complete UB dressing with SBA.  Pt will complete LB dressing with SBA using LRAD and AE prn.  Pt will complete toileting with SBA using LRAD.  Pt will complete functional mobility to/from toilet and toilet transfer with SBA using LRAD.   Pt will independently verbalize sternal precautions with >90% accuracy.                        Time Tracking:     OT Date of Treatment: 05/08/24  OT Start Time: 1021  OT Stop Time: 1057  OT Total Time (min): 36 min    Billable Minutes:Self Care/Home Management 36 minutes.     OT/SUSAN: OT     Number of SUSAN visits since last OT visit: 2    5/8/2024

## 2024-05-08 NOTE — PROGRESS NOTES
" [facility]    Cardiology  Progress Note    Patient Name: Rosalba Whitlock  MRN: [id number]  Admission Date: 5/3/2024  Hospital Length of Stay: 5 days  Code Status: Full Code   Attending Provider: Loki Estrada MD   Consulting Provider: JIMBO Mcdonald  Primary Care Physician: Eleazar Ramos MD  Principal Problem:<principal problem not specified>    Patient information was obtained from past medical records and ER records.     Subjective:     Chief Complaint/Reason for Consult: s/p Sternotomy with Ungraftable Anatomy x 2 Vessels    HPI: Ms. Whitlock is an 82 y/o female who is known to [facility], Dr. Fry. The patient was recently worked up by Dr. 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 She was referred to CV Surgery for a CABG Evaluation. She was deemed a candidate for CABG and on 5.3.24 she underwent a Sternotomy, unfortunately, she had ungraftable anatomy to both the RCA and LAD. She was transferred to ICU for further management. [facility] has been consulted for Recommendations.     5.5.24: NAD. Sitting in Bedside Chair. Denies SOB and Palps. + CP/Incisional. "I am ok." Off Pressors.   5.6.24: NAD. Sitting in Bedside Chair. Denies SOB and Palps. + CP/Incisional. "I am not feeling the best."   5.7.24: NAD. Sitting in Bedside Chair. Denies SOB and Palps. + CP/Incisional. "I am feeling much better."   5.8.24: NAD. Sitting in Bedside Chair. Denies SOB and Palps. + CP/Incisional. BUN/Crea 27.9/1.52, AST/ALT 23/7, K 4.6     PMH: Vertigo, Amnesia, OA, MVCAD/Ungraftable post Sternotomy, DM II, HTN, GERD, MI, Chronic Lower Back pain, Migraines, HLD, CKD Stage III, Osteoporosis, Obesity, VI  PSH: Sternotomy/No Graftable Vessels (5.3.24), Angiogram, Colonoscopy, Eye Surgery, Foot Surgery, KIA, Tonsillectomy, Epidural Steroid Injections, EGD  Family History: Father, D, ESRD; Mother, D, DVT  Social History:     Previous Cardiac Diagnostics:   [facility] 2.21.24:  Findings:  1. Left main-normal  2. Lad-patent " small mid stent, 60-70% stenosis distal to stent edge, IFR 0.86  3. LCX- codominant, Mid 50%, IFR 0.92  4. RCA-codominant Mid occlusion in fractured stent, left to right collaterals  Plan:  1. Maximize medical management  2. Discharge home today  3. Consult CTS as outpatient to evaluate for two-vessel bypass to distal LAD and poor and small.  Patient does not have good stenting options for apical LAD and RCA as well.  Will continue medical MNGT for now.    ECHO 11.21.23:  The study quality is average.   The left ventricle is normal in size. Global left ventricular systolic function is normal. The left ventricular ejection fraction is 60%. Left ventricular diastolic function is normal. Normal wall thickness.  No significant valvular dysfunction noted.     Carotid US 11.21.23:  The study quality is average.   1-39% stenosis in the mid right internal carotid artery based on Bluth Criteria.   1-39% stenosis in the proximal left internal carotid artery based on Bluth Criteria.   Antegrade right vertebral artery flow.   Antegrade left vertebral artery flow.    Review of patient's allergies indicates:   Allergen Reactions    Amlodipine      Skin crawling      Current Facility-Administered Medications on File Prior to Encounter   Medication    0.9%  NaCl infusion    diazePAM tablet 10 mg    diphenhydrAMINE capsule 50 mg    diphenhydrAMINE capsule 50 mg    sodium chloride 0.9% flush 10 mL     Current Outpatient Medications on File Prior to Encounter   Medication Sig    aspirin (ECOTRIN) 81 MG EC tablet Take 81 mg by mouth once daily.    gabapentin (NEURONTIN) 100 MG capsule TAKE 2 CAPSULES BY MOUTH TWICE DAILY, EVERY MORNING AND AT NOON    gabapentin (NEURONTIN) 300 MG capsule TAKE 1 CAPSULE(300 MG) BY MOUTH EVERY EVENING    icosapent ethyL (VASCEPA) 1 gram Cap BID    insulin glargine (LANTUS U-100 INSULIN) 100 unit/mL injection Inject 46 units in the am, and 20 units at night    isosorbide mononitrate (IMDUR) 30 MG 24 hr  "tablet Take 30 mg by mouth once daily.    loratadine (CLARITIN) 10 mg tablet TAKE 1 TABLET BY MOUTH EVERY DAY    nortriptyline (PAMELOR) 25 MG capsule TAKE 1 CAPSULE BY MOUTH EVERY DAY AT BEDTIME FOR SLEEP    omeprazole (PRILOSEC) 40 MG capsule TAKE 1 CAPSULE BY MOUTH DAILY AS NEEDED FOR REFLUX (Patient taking differently: Take 40 mg by mouth every morning.)    rosuvastatin (CRESTOR) 40 MG Tab Take 40 mg by mouth every evening.    ticagrelor (BRILINTA) 90 mg tablet Take 90 mg by mouth once daily.    TRADJENTA 5 mg Tab tablet TAKE 1 TABLET(5 MG) BY MOUTH EVERY DAY    ALCOHOL PREP PADS PadM     clopidogreL (PLAVIX) 75 mg tablet Take 1 tablet (75 mg total) by mouth once daily.    COMFORT EZ INSULIN SYRINGE 0.5 mL 31 gauge x 5/16" Syrg     denosumab (PROLIA) 60 mg/mL Syrg Inject 1 mL (60 mg total) into the skin every 6 (six) months.    fluticasone propionate (FLONASE) 50 mcg/actuation nasal spray 2 sprays (100 mcg total) by Each Nostril route once daily.    nebulizer accessories Kit Please dispense one nebulizer accessories kit for appropriate nebulizer machine.    nebulizer and compressor Veronica Please dispense one nebulizer machine with appropriate supplies.    pen needle, diabetic 32 gauge x 5/32" Ndle 1 each by Misc.(Non-Drug; Combo Route) route 2 (two) times a day.     Review of Systems   Constitutional:  Positive for fatigue. Negative for fever.   Respiratory:  Negative for shortness of breath.    Cardiovascular:  Positive for chest pain (Incisional). Negative for palpitations.   All other systems reviewed and are negative.    Objective:     Vital Signs (Most Recent):  Temp: 98 °F (36.7 °C) (05/08/24 1123)  Pulse: 93 (05/08/24 1123)  Resp: 16 (05/08/24 0858)  BP: 116/74 (05/08/24 1123)  SpO2: 96 % (05/08/24 1123) Vital Signs (24h Range):  Temp:  [97.7 °F (36.5 °C)-98 °F (36.7 °C)] 98 °F (36.7 °C)  Pulse:  [] 93  Resp:  [16-20] 16  SpO2:  [92 %-96 %] 96 %  BP: (101-121)/(65-76) 116/74   Weight: 76.4 kg (168 lb " 8 oz)  Body mass index is 28.04 kg/m².  SpO2: 96 %       Intake/Output Summary (Last 24 hours) at 5/8/2024 1301  Last data filed at 5/8/2024 0346  Gross per 24 hour   Intake 360 ml   Output 750 ml   Net -390 ml     Lines/Drains/Airways       Drain  Duration             Female External Urinary Catheter w/ Suction 05/05/24 0600 3 days              Peripheral Intravenous Line  Duration                  Peripheral IV - Single Lumen 05/03/24 0545 18 G Distal;Posterior;Right Forearm 5 days                  Significant Labs:  Recent Results (from the past 72 hour(s))   POCT glucose    Collection Time: 05/05/24  4:18 PM   Result Value Ref Range    POCT Glucose 172 (H) 70 - 110 mg/dL   POCT glucose    Collection Time: 05/06/24 12:05 AM   Result Value Ref Range    POCT Glucose 136 (H) 70 - 110 mg/dL   CBC with Differential    Collection Time: 05/06/24  5:24 AM   Result Value Ref Range    WBC 13.61 (H) 4.50 - 11.50 x10(3)/mcL    RBC 4.61 4.20 - 5.40 x10(6)/mcL    Hgb 13.4 12.0 - 16.0 g/dL    Hct 41.3 37.0 - 47.0 %    MCV 89.6 80.0 - 94.0 fL    MCH 29.1 27.0 - 31.0 pg    MCHC 32.4 (L) 33.0 - 36.0 g/dL    RDW 14.5 11.5 - 17.0 %    Platelet 160 130 - 400 x10(3)/mcL    MPV 10.4 7.4 - 10.4 fL    Neut % 75.8 %    Lymph % 15.1 %    Mono % 7.4 %    Eos % 1.0 %    Basophil % 0.3 %    Lymph # 2.05 0.6 - 4.6 x10(3)/mcL    Neut # 10.33 (H) 2.1 - 9.2 x10(3)/mcL    Mono # 1.01 0.1 - 1.3 x10(3)/mcL    Eos # 0.13 0 - 0.9 x10(3)/mcL    Baso # 0.04 <=0.2 x10(3)/mcL    IG# 0.05 (H) 0 - 0.04 x10(3)/mcL    IG% 0.4 %    NRBC% 0.0 %   Comprehensive Metabolic Panel    Collection Time: 05/06/24  6:19 AM   Result Value Ref Range    Sodium Level 137 136 - 145 mmol/L    Potassium Level 4.8 3.5 - 5.1 mmol/L    Chloride 107 98 - 107 mmol/L    Carbon Dioxide 20 (L) 23 - 31 mmol/L    Glucose Level 127 (H) 82 - 115 mg/dL    Blood Urea Nitrogen 23.7 (H) 9.8 - 20.1 mg/dL    Creatinine 1.87 (H) 0.55 - 1.02 mg/dL    Calcium Level Total 7.7 (L) 8.4 - 10.2 mg/dL     Protein Total 5.8 5.8 - 7.6 gm/dL    Albumin Level 3.1 (L) 3.4 - 4.8 g/dL    Globulin 2.7 2.4 - 3.5 gm/dL    Albumin/Globulin Ratio 1.1 1.1 - 2.0 ratio    Bilirubin Total 0.6 <=1.5 mg/dL    Alkaline Phosphatase 56 40 - 150 unit/L    Alanine Aminotransferase 5 0 - 55 unit/L    Aspartate Aminotransferase 21 5 - 34 unit/L    eGFR 27 mls/min/1.73/m2   Magnesium    Collection Time: 05/06/24  6:19 AM   Result Value Ref Range    Magnesium Level 2.20 1.60 - 2.60 mg/dL   Uric Acid    Collection Time: 05/06/24  6:19 AM   Result Value Ref Range    Uric Acid 4.9 2.6 - 6.0 mg/dL   BNP    Collection Time: 05/06/24  6:19 AM   Result Value Ref Range    Natriuretic Peptide 810.6 (H) <=100.0 pg/mL   POCT glucose    Collection Time: 05/06/24  8:05 AM   Result Value Ref Range    POCT Glucose 138 (H) 70 - 110 mg/dL   EKG 12-lead    Collection Time: 05/06/24  8:37 AM   Result Value Ref Range    QRS Duration 68 ms    [facility] QTC Calculation 457 ms   POCT glucose    Collection Time: 05/06/24 11:41 AM   Result Value Ref Range    POCT Glucose 189 (H) 70 - 110 mg/dL   POCT glucose    Collection Time: 05/06/24  5:02 PM   Result Value Ref Range    POCT Glucose 150 (H) 70 - 110 mg/dL   Comprehensive Metabolic Panel    Collection Time: 05/07/24  4:38 AM   Result Value Ref Range    Sodium Level 138 136 - 145 mmol/L    Potassium Level 5.2 (H) 3.5 - 5.1 mmol/L    Chloride 111 (H) 98 - 107 mmol/L    Carbon Dioxide 18 (L) 23 - 31 mmol/L    Glucose Level 147 (H) 82 - 115 mg/dL    Blood Urea Nitrogen 27.5 (H) 9.8 - 20.1 mg/dL    Creatinine 1.67 (H) 0.55 - 1.02 mg/dL    Calcium Level Total 7.6 (L) 8.4 - 10.2 mg/dL    Protein Total 5.6 (L) 5.8 - 7.6 gm/dL    Albumin Level 2.7 (L) 3.4 - 4.8 g/dL    Globulin 2.9 2.4 - 3.5 gm/dL    Albumin/Globulin Ratio 0.9 (L) 1.1 - 2.0 ratio    Bilirubin Total 0.5 <=1.5 mg/dL    Alkaline Phosphatase 55 40 - 150 unit/L    Alanine Aminotransferase 5 0 - 55 unit/L    Aspartate Aminotransferase 36 (H) 5 - 34 unit/L    eGFR 31  mls/min/1.73/m2   Magnesium    Collection Time: 05/07/24  4:38 AM   Result Value Ref Range    Magnesium Level 2.30 1.60 - 2.60 mg/dL   Phosphorus    Collection Time: 05/07/24  4:38 AM   Result Value Ref Range    Phosphorus Level 2.2 (L) 2.3 - 4.7 mg/dL   CBC with Differential    Collection Time: 05/07/24  7:54 AM   Result Value Ref Range    WBC 11.21 4.50 - 11.50 x10(3)/mcL    RBC 4.53 4.20 - 5.40 x10(6)/mcL    Hgb 13.0 12.0 - 16.0 g/dL    Hct 40.4 37.0 - 47.0 %    MCV 89.2 80.0 - 94.0 fL    MCH 28.7 27.0 - 31.0 pg    MCHC 32.2 (L) 33.0 - 36.0 g/dL    RDW 14.1 11.5 - 17.0 %    Platelet 176 130 - 400 x10(3)/mcL    MPV 9.6 7.4 - 10.4 fL    Neut % 77.9 %    Lymph % 13.7 %    Mono % 6.7 %    Eos % 0.8 %    Basophil % 0.4 %    Lymph # 1.54 0.6 - 4.6 x10(3)/mcL    Neut # 8.73 2.1 - 9.2 x10(3)/mcL    Mono # 0.75 0.1 - 1.3 x10(3)/mcL    Eos # 0.09 0 - 0.9 x10(3)/mcL    Baso # 0.04 <=0.2 x10(3)/mcL    IG# 0.06 (H) 0 - 0.04 x10(3)/mcL    IG% 0.5 %    NRBC% 0.0 %   POCT glucose    Collection Time: 05/07/24 11:14 AM   Result Value Ref Range    POCT Glucose 178 (H) 70 - 110 mg/dL   POCT glucose    Collection Time: 05/07/24  4:11 PM   Result Value Ref Range    POCT Glucose 246 (H) 70 - 110 mg/dL   Comprehensive Metabolic Panel    Collection Time: 05/08/24  3:46 AM   Result Value Ref Range    Sodium Level 140 136 - 145 mmol/L    Potassium Level 4.6 3.5 - 5.1 mmol/L    Chloride 108 (H) 98 - 107 mmol/L    Carbon Dioxide 23 23 - 31 mmol/L    Glucose Level 155 (H) 82 - 115 mg/dL    Blood Urea Nitrogen 27.9 (H) 9.8 - 20.1 mg/dL    Creatinine 1.52 (H) 0.55 - 1.02 mg/dL    Calcium Level Total 8.3 (L) 8.4 - 10.2 mg/dL    Protein Total 5.9 5.8 - 7.6 gm/dL    Albumin Level 2.9 (L) 3.4 - 4.8 g/dL    Globulin 3.0 2.4 - 3.5 gm/dL    Albumin/Globulin Ratio 1.0 (L) 1.1 - 2.0 ratio    Bilirubin Total 0.5 <=1.5 mg/dL    Alkaline Phosphatase 63 40 - 150 unit/L    Alanine Aminotransferase 7 0 - 55 unit/L    Aspartate Aminotransferase 23 5 - 34  unit/L    eGFR 34 mL/min/1.73/m2   Magnesium    Collection Time: 05/08/24  3:46 AM   Result Value Ref Range    Magnesium Level 2.40 1.60 - 2.60 mg/dL   Phosphorus    Collection Time: 05/08/24  3:46 AM   Result Value Ref Range    Phosphorus Level 2.3 2.3 - 4.7 mg/dL   CBC with Differential    Collection Time: 05/08/24  3:46 AM   Result Value Ref Range    WBC 8.38 4.50 - 11.50 x10(3)/mcL    RBC 4.43 4.20 - 5.40 x10(6)/mcL    Hgb 12.8 12.0 - 16.0 g/dL    Hct 38.8 37.0 - 47.0 %    MCV 87.6 80.0 - 94.0 fL    MCH 28.9 27.0 - 31.0 pg    MCHC 33.0 33.0 - 36.0 g/dL    RDW 14.0 11.5 - 17.0 %    Platelet 194 130 - 400 x10(3)/mcL    MPV 9.8 7.4 - 10.4 fL    Neut % 65.5 %    Lymph % 21.2 %    Mono % 10.3 %    Eos % 2.0 %    Basophil % 0.5 %    Lymph # 1.78 0.6 - 4.6 x10(3)/mcL    Neut # 5.49 2.1 - 9.2 x10(3)/mcL    Mono # 0.86 0.1 - 1.3 x10(3)/mcL    Eos # 0.17 0 - 0.9 x10(3)/mcL    Baso # 0.04 <=0.2 x10(3)/mcL    IG# 0.04 0 - 0.04 x10(3)/mcL    IG% 0.5 %    NRBC% 0.0 %     Telemetry: SR     Physical Exam  Constitutional:       General: She is not in acute distress.     Appearance: Normal appearance. She is obese.   HENT:      Head: Normocephalic.      Mouth/Throat:      Mouth: Mucous membranes are moist.   Eyes:      Conjunctiva/sclera: Conjunctivae normal.   Cardiovascular:      Rate and Rhythm: Normal rate and regular rhythm.      Pulses: Normal pulses.      Heart sounds: Murmur heard.   Pulmonary:      Effort: Pulmonary effort is normal. No respiratory distress.      Breath sounds: Decreased breath sounds present.      Comments: NC O2  Abdominal:      Palpations: Abdomen is soft.   Musculoskeletal:         General: Normal range of motion.   Skin:     General: Skin is warm.      Comments: Midline Sternotomy Dressing C/D/I   Neurological:      General: No focal deficit present.      Mental Status: She is alert and oriented to person, place, and time.   Psychiatric:         Mood and Affect: Mood normal.         Behavior:  Behavior normal.       Home Medications:   Current Facility-Administered Medications on File Prior to Encounter   Medication Dose Route Frequency Provider Last Rate Last Admin    0.9%  NaCl infusion   Intravenous Continuous Rubia Seymour MD   Stopped at 12/04/23 0713    diazePAM tablet 10 mg  10 mg Oral On Call Procedure Emmanuel Riley MD   10 mg at 02/21/24 1031    diphenhydrAMINE capsule 50 mg  50 mg Oral On Call Procedure Rubia Seymour MD   50 mg at 12/04/23 0700    diphenhydrAMINE capsule 50 mg  50 mg Oral On Call Procedure Emmanuel Riley MD   50 mg at 02/21/24 1031    sodium chloride 0.9% flush 10 mL  10 mL Intravenous PRN Rubia Seymour MD         Current Outpatient Medications on File Prior to Encounter   Medication Sig Dispense Refill    aspirin (ECOTRIN) 81 MG EC tablet Take 81 mg by mouth once daily.      gabapentin (NEURONTIN) 100 MG capsule TAKE 2 CAPSULES BY MOUTH TWICE DAILY, EVERY MORNING AND AT NOON 60 capsule 6    gabapentin (NEURONTIN) 300 MG capsule TAKE 1 CAPSULE(300 MG) BY MOUTH EVERY EVENING 30 capsule 2    icosapent ethyL (VASCEPA) 1 gram Cap BID      insulin glargine (LANTUS U-100 INSULIN) 100 unit/mL injection Inject 46 units in the am, and 20 units at night 20 mL 6    isosorbide mononitrate (IMDUR) 30 MG 24 hr tablet Take 30 mg by mouth once daily.      loratadine (CLARITIN) 10 mg tablet TAKE 1 TABLET BY MOUTH EVERY DAY 90 tablet 3    nortriptyline (PAMELOR) 25 MG capsule TAKE 1 CAPSULE BY MOUTH EVERY DAY AT BEDTIME FOR SLEEP 30 capsule 3    omeprazole (PRILOSEC) 40 MG capsule TAKE 1 CAPSULE BY MOUTH DAILY AS NEEDED FOR REFLUX (Patient taking differently: Take 40 mg by mouth every morning.) 90 capsule 3    rosuvastatin (CRESTOR) 40 MG Tab Take 40 mg by mouth every evening.      ticagrelor (BRILINTA) 90 mg tablet Take 90 mg by mouth once daily.      TRADJENTA 5 mg Tab tablet TAKE 1 TABLET(5 MG) BY MOUTH EVERY DAY 90 tablet 3    ALCOHOL PREP PADS PadM       clopidogreL  "(PLAVIX) 75 mg tablet Take 1 tablet (75 mg total) by mouth once daily. 30 tablet 11    COMFORT EZ INSULIN SYRINGE 0.5 mL 31 gauge x 5/16" Syrg       denosumab (PROLIA) 60 mg/mL Syrg Inject 1 mL (60 mg total) into the skin every 6 (six) months. 1 mL 1    fluticasone propionate (FLONASE) 50 mcg/actuation nasal spray 2 sprays (100 mcg total) by Each Nostril route once daily. 16 g 3    nebulizer accessories Kit Please dispense one nebulizer accessories kit for appropriate nebulizer machine. 1 kit 0    nebulizer and compressor Veronica Please dispense one nebulizer machine with appropriate supplies. 1 each 0    pen needle, diabetic 32 gauge x 5/32" Ndle 1 each by Misc.(Non-Drug; Combo Route) route 2 (two) times a day. 100 each 12     Current Inpatient Medications:    Current Facility-Administered Medications:     albumin human 5% bottle 12.5 g, 12.5 g, Intravenous, PRN, Aaron Ontiveros PA-C, Stopped at 05/03/24 1906    aspirin EC tablet 81 mg, 81 mg, Oral, Daily, Aaron Ontiveros PA-C, 81 mg at 05/08/24 0858    atorvastatin tablet 40 mg, 40 mg, Oral, Daily, Prakash Peres ANP, 40 mg at 05/08/24 0858    calcium gluconate 1 g in NS IVPB (premixed), 1 g, Intravenous, PRN, Aaron Ontiveros PA-C    calcium gluconate 1 g in NS IVPB (premixed), 2 g, Intravenous, PRN, Aaron Ontiveros PA-C    calcium gluconate 1 g in NS IVPB (premixed), 3 g, Intravenous, PRN, Aaron Ontiveros PA-C    dextrose 10% bolus 125 mL 125 mL, 12.5 g, Intravenous, PRN, Aaron Ontiveros PA-ELLEN    dextrose 10% bolus 125 mL 125 mL, 12.5 g, Intravenous, PRN, Frederick Hicks P, PA    dextrose 10% bolus 125 mL 125 mL, 12.5 g, Intravenous, PRN, LanglinaiFrederick ratliff P, PA    dextrose 10% bolus 250 mL 250 mL, 25 g, Intravenous, PRN, Weston, Aaron C, PA-C    dextrose 10% bolus 250 mL 250 mL, 25 g, Intravenous, PRN, Frederick Hicks, PA    dextrose 10% bolus 250 mL 250 mL, 25 g, Intravenous, PRN, Frederick Hicks, PA    docusate sodium capsule 100 mg, 100 mg, " Oral, BID, Aaron Ontiveros PA-C, 100 mg at 05/08/24 0858    enoxaparin injection 30 mg, 30 mg, Subcutaneous, Daily, Jeovanny Wan MD, 30 mg at 05/07/24 1731    famotidine (PF) injection 20 mg, 20 mg, Intravenous, Daily, Aaron Ontiveros PA-C, 20 mg at 05/08/24 0858    folic acid tablet 1 mg, 1 mg, Oral, Daily, Aaron Ontiveros PA-C, 1 mg at 05/08/24 0858    gabapentin capsule 100 mg, 100 mg, Oral, TID, Kimberly Catherine MD, 100 mg at 05/08/24 0858    glucagon (human recombinant) injection 1 mg, 1 mg, Intramuscular, PRN, Frederick Hicks, PA    glucagon (human recombinant) injection 1 mg, 1 mg, Intramuscular, PRN, Kurt, Frederick P, PA    glucose chewable tablet 16 g, 16 g, Oral, PRN, Kurt, Frederick P, PA    glucose chewable tablet 16 g, 16 g, Oral, PRN, Kurt, Frederick P, PA    glucose chewable tablet 24 g, 24 g, Oral, PRN, Kurt, Frederick P, PA    glucose chewable tablet 24 g, 24 g, Oral, PRN, Frederick Hicks P, PA    HYDROcodone-acetaminophen 5-325 mg per tablet 1 tablet, 1 tablet, Oral, Q4H PRN, Jeovanny Wan MD, 1 tablet at 05/08/24 0858    insulin aspart U-100 injection 0-10 Units, 0-10 Units, Subcutaneous, QID (AC + HS) PRN, Frederick Hicks, PA, 4 Units at 05/08/24 1129    magnesium sulfate 2g in water 50mL IVPB (premix), 2 g, Intravenous, PRN, Aaron Ontiveros PA-C    magnesium sulfate 2g in water 50mL IVPB (premix), 4 g, Intravenous, PRN, Aaron Ontiveros PA-C    metoprolol tartrate (LOPRESSOR) tablet 25 mg, 25 mg, Oral, TID, Prakash Peres ANP, 25 mg at 05/08/24 0858    morphine injection 4 mg, 4 mg, Intravenous, Q4H PRN, Aaron Ontiveros PA-C, 4 mg at 05/03/24 2212    ondansetron injection 4 mg, 4 mg, Intravenous, Q4H PRN, Aaron Ontiveros PA-C, 4 mg at 05/04/24 1319    oxyCODONE immediate release tablet 10 mg, 10 mg, Oral, Q4H PRN, Aaron Ontiveros PA-C    oxyCODONE immediate release tablet 5 mg, 5 mg, Oral, Q4H PRN, Jeovanny Wan MD, 5 mg at 05/05/24 2039    polyethylene  glycol packet 17 g, 17 g, Oral, Daily, Miller Zimmerman MD    potassium chloride 20 mEq in 100 mL IVPB (FOR CENTRAL LINE ADMINISTRATION ONLY), 20 mEq, Intravenous, PRN, Aaron Ontiveros PA-C, Stopped at 05/03/24 1316    potassium chloride 20 mEq in 100 mL IVPB (FOR CENTRAL LINE ADMINISTRATION ONLY), 40 mEq, Intravenous, PRN, Aaron Ontiveros PA-C    potassium chloride 20 mEq in 100 mL IVPB (FOR CENTRAL LINE ADMINISTRATION ONLY), 60 mEq, Intravenous, PRN, Aaron Ontiveros PA-C    sodium bicarbonate tablet 650 mg, 650 mg, Oral, BID, Miller Zimmerman MD    sodium phosphate 15 mmol in dextrose 5 % (D5W) 250 mL IVPB, 15 mmol, Intravenous, PRN, Aaron Ontiveros PA-C    sodium phosphate 20.01 mmol in dextrose 5 % (D5W) 250 mL IVPB, 20.01 mmol, Intravenous, PRNWeston John C, PA-C    sodium phosphate 30 mmol in dextrose 5 % (D5W) 250 mL IVPB, 30 mmol, Intravenous, PRN, Aaron Ontiveros PA-C    Facility-Administered Medications Ordered in Other Encounters:     0.9%  NaCl infusion, , Intravenous, Continuous, Rubia Seymour MD, Stopped at 12/04/23 0713    diazePAM tablet 10 mg, 10 mg, Oral, On Call Procedure, Emmanuel Riley MD, 10 mg at 02/21/24 1031    diphenhydrAMINE capsule 50 mg, 50 mg, Oral, On Call Procedure, Rubia Seymour MD, 50 mg at 12/04/23 0700    diphenhydrAMINE capsule 50 mg, 50 mg, Oral, On Call Procedure, Emmanuel Riley MD, 50 mg at 02/21/24 1031    sodium chloride 0.9% flush 10 mL, 10 mL, Intravenous, PRN, Rubia Seymour MD  VTE Risk Mitigation (From admission, onward)           Ordered     enoxaparin injection 30 mg  Daily         05/03/24 1336     Place sequential compression device  Until discontinued         05/03/24 0940     IP VTE HIGH RISK PATIENT  Once         05/03/24 0937                  Assessment:   MVCAD    - s/p Sternotomy (5.3.24) - Ungraftable LAD and RCA    - s/p LHC (2.21.24) - 1. Left main-normal. 2. Lad-patent small mid stent, 60-70% stenosis  distal to stent edge, IFR 0.86. 3. LCX- codominant, Mid 50%, IFR 0.92. 4. RCA-codominant Mid occlusion in fractured stent, left to right collaterals    - ECHO (11.21.23) - LVEF 60%  Hypotension requiring Pressors - Resolved     - Hx of HTN  Acute Hypoxemic Respiratory Failure requiring Intubation/Ventilation - Now on NC  BARTOLO/CKD III - Improving   Leukocytosis - Resolved   DM II  Diabetic Neuropathy  HLD  Hx of MI  Obesity  VI  Chronic Lower Back Pain  GERD  Migraines  OA  Electrolytes - Hyperkalemia - Resolved   No Hx of GIB     Plan:   Continue ASA, Statin and BB  Will Add GDMT when BP Allows  Aggressive Mobilization of PT and Q1HR IS  Follow up as OP for Solidification of Plan of Care with Dr. Rosemary Ruiz K > 4.0 and Mg > 2.0   Labs in AM: CBC, CMP and Mg    JIMBO Mcdonald  Cardiology  [facility]  05/08/2024   I agree with the findings of the complexity of problems addressed and take responsibility for the plan's risks and complications. I approved the plan documented by Prakash Peres NP.

## 2024-05-08 NOTE — PLAN OF CARE
[facility] SNF list provided . Pt is only interested in Tcu at this time. I explained that we normally obtain 2 choices in the event that TCU is full. Referral sent to TCU    TCU is OON - will have to obtain more choices

## 2024-05-08 NOTE — PROGRESS NOTES
Chief complaint: constipation    Interval History:  NO acute c/o, post-op CP controlled with pain meds, eating well    Review of Systems   Constitutional: Negative.    HENT: Negative.     Respiratory: Negative.     Cardiovascular: Negative.    Gastrointestinal:  Positive for constipation.   Genitourinary: Negative.    Musculoskeletal:         Post-op pain controlled with meds   Neurological:  Positive for weakness.   Psychiatric/Behavioral: Negative.        all else Neg     aspirin  81 mg Oral Daily    atorvastatin  40 mg Oral Daily    docusate sodium  100 mg Oral BID    enoxparin  30 mg Subcutaneous Daily    famotidine (PF)  20 mg Intravenous Daily    folic acid  1 mg Oral Daily    gabapentin  100 mg Oral TID    metoprolol tartrate  25 mg Oral TID    sodium bicarbonate  1,300 mg Oral BID       Objective     VITAL SIGNS: 24 HR MIN & MAX LAST    Temp  Min: 97.7 °F (36.5 °C)  Max: 97.9 °F (36.6 °C)  97.9 °F (36.6 °C)    BP  Min: 101/67  Max: 145/64  106/73     Pulse  Min: 89  Max: 107  89     Resp  Min: 16  Max: 21  16    SpO2  Min: 92 %  Max: 96 %  96 %      Wt Readings from Last 3 Encounters:   05/08/24 76.4 kg (168 lb 8 oz)   04/30/24 73.7 kg (162 lb 6.4 oz)   04/25/24 72.6 kg (160 lb)       Intake/Output Summary (Last 24 hours) at 5/8/2024 0922  Last data filed at 5/8/2024 0346  Gross per 24 hour   Intake 1050 ml   Output 1450 ml   Net -400 ml       Physical Exam  Constitutional:       General: She is not in acute distress.  Cardiovascular:      Rate and Rhythm: Normal rate.   Pulmonary:      Effort: Pulmonary effort is normal. No respiratory distress.   Abdominal:      General: Bowel sounds are normal.      Palpations: Abdomen is soft.      Tenderness: There is no abdominal tenderness.   Musculoskeletal:         General: No swelling.      Cervical back: Normal range of motion.   Neurological:      Mental Status: She is alert and oriented to person, place, and time.   Psychiatric:         Mood and Affect: Mood  normal.          Recent Labs     05/06/24  0619 05/06/24  0619 05/07/24  0438 05/08/24  0346     --  138 140   K 4.8  --  5.2* 4.6   CHLORIDE 107  --  111* 108*   CO2 20*  --  18* 23   BUN 23.7*  --  27.5* 27.9*   CREATININE 1.87*  --  1.67* 1.52*   GLUCOSE 127*  --  147* 155*   CALCIUM 7.7*  --  7.6* 8.3*   MG 2.20  --  2.30 2.40   PHOS  --    < > 2.2* 2.3   ALBUMIN 3.1*  --  2.7* 2.9*    < > = values in this interval not displayed.      Recent Labs     05/06/24 0524 05/07/24  0754 05/08/24  0346   WBC 13.61* 11.21 8.38   HGB 13.4 13.0 12.8   HCT 41.3 40.4 38.8    176 194         Assessment & Plan     1   CKD 4 (baseline Cr 1.8) - GFR improved to better than baseline, she appears euvolemic, monitor volume status to determine potential diuretic needs.  If diuretics needed would start with torsemide 20 mg daily  2   CAD - s/p sternotomy unable to perform CAB, for outpt f/u cards to consider high-risk PCI  3   HTN -  BP meds on hold for hypotension, monitor  4   DM 2  5   Gout  6   NAGMA - appears chronic on outpt labs, much improved, will reduce Na bicarb to 650 bid, target serum bicarb 23-25  7   Osteoporosis  8   Mod malnutrition - supplement shakes added  9   Constipation - lactulose x 1, miralax daily    Will sign off, she will f/u outpt Dr Mercado

## 2024-05-09 LAB
ALBUMIN SERPL-MCNC: 2.8 G/DL (ref 3.4–4.8)
ALBUMIN/GLOB SERPL: 1 RATIO (ref 1.1–2)
ALP SERPL-CCNC: 53 UNIT/L (ref 40–150)
ALT SERPL-CCNC: 5 UNIT/L (ref 0–55)
AST SERPL-CCNC: 20 UNIT/L (ref 5–34)
BASOPHILS # BLD AUTO: 0.04 X10(3)/MCL
BASOPHILS NFR BLD AUTO: 0.5 %
BILIRUB SERPL-MCNC: 0.4 MG/DL
BUN SERPL-MCNC: 23.5 MG/DL (ref 9.8–20.1)
CALCIUM SERPL-MCNC: 8.2 MG/DL (ref 8.4–10.2)
CHLORIDE SERPL-SCNC: 107 MMOL/L (ref 98–107)
CO2 SERPL-SCNC: 23 MMOL/L (ref 23–31)
CREAT SERPL-MCNC: 1.3 MG/DL (ref 0.55–1.02)
EOSINOPHIL # BLD AUTO: 0.27 X10(3)/MCL (ref 0–0.9)
EOSINOPHIL NFR BLD AUTO: 3.4 %
ERYTHROCYTE [DISTWIDTH] IN BLOOD BY AUTOMATED COUNT: 14 % (ref 11.5–17)
GFR SERPLBLD CREATININE-BSD FMLA CKD-EPI: 41 ML/MIN/1.73/M2
GLOBULIN SER-MCNC: 2.7 GM/DL (ref 2.4–3.5)
GLUCOSE SERPL-MCNC: 183 MG/DL (ref 82–115)
HCT VFR BLD AUTO: 34.8 % (ref 37–47)
HGB BLD-MCNC: 11.7 G/DL (ref 12–16)
IMM GRANULOCYTES # BLD AUTO: 0.04 X10(3)/MCL (ref 0–0.04)
IMM GRANULOCYTES NFR BLD AUTO: 0.5 %
LYMPHOCYTES # BLD AUTO: 2.14 X10(3)/MCL (ref 0.6–4.6)
LYMPHOCYTES NFR BLD AUTO: 27.1 %
MAGNESIUM SERPL-MCNC: 2.1 MG/DL (ref 1.6–2.6)
MCH RBC QN AUTO: 29 PG (ref 27–31)
MCHC RBC AUTO-ENTMCNC: 33.6 G/DL (ref 33–36)
MCV RBC AUTO: 86.1 FL (ref 80–94)
MONOCYTES # BLD AUTO: 0.79 X10(3)/MCL (ref 0.1–1.3)
MONOCYTES NFR BLD AUTO: 10 %
NEUTROPHILS # BLD AUTO: 4.62 X10(3)/MCL (ref 2.1–9.2)
NEUTROPHILS NFR BLD AUTO: 58.5 %
NRBC BLD AUTO-RTO: 0 %
PLATELET # BLD AUTO: 230 X10(3)/MCL (ref 130–400)
PMV BLD AUTO: 9.9 FL (ref 7.4–10.4)
POCT GLUCOSE: 174 MG/DL (ref 70–110)
POCT GLUCOSE: 176 MG/DL (ref 70–110)
POCT GLUCOSE: 190 MG/DL (ref 70–110)
POTASSIUM SERPL-SCNC: 4.5 MMOL/L (ref 3.5–5.1)
PROT SERPL-MCNC: 5.5 GM/DL (ref 5.8–7.6)
RBC # BLD AUTO: 4.04 X10(6)/MCL (ref 4.2–5.4)
SODIUM SERPL-SCNC: 139 MMOL/L (ref 136–145)
WBC # SPEC AUTO: 7.9 X10(3)/MCL (ref 4.5–11.5)

## 2024-05-09 PROCEDURE — 25000003 PHARM REV CODE 250: Performed by: INTERNAL MEDICINE

## 2024-05-09 PROCEDURE — 27000221 HC OXYGEN, UP TO 24 HOURS

## 2024-05-09 PROCEDURE — 99024 POSTOP FOLLOW-UP VISIT: CPT | Mod: ,,, | Performed by: PHYSICIAN ASSISTANT

## 2024-05-09 PROCEDURE — 97530 THERAPEUTIC ACTIVITIES: CPT

## 2024-05-09 PROCEDURE — 63600175 PHARM REV CODE 636 W HCPCS: Performed by: INTERNAL MEDICINE

## 2024-05-09 PROCEDURE — 83735 ASSAY OF MAGNESIUM: CPT | Performed by: NURSE PRACTITIONER

## 2024-05-09 PROCEDURE — 36415 COLL VENOUS BLD VENIPUNCTURE: CPT | Performed by: NURSE PRACTITIONER

## 2024-05-09 PROCEDURE — 25000003 PHARM REV CODE 250: Performed by: NURSE PRACTITIONER

## 2024-05-09 PROCEDURE — 85025 COMPLETE CBC W/AUTO DIFF WBC: CPT | Performed by: NURSE PRACTITIONER

## 2024-05-09 PROCEDURE — 94760 N-INVAS EAR/PLS OXIMETRY 1: CPT

## 2024-05-09 PROCEDURE — 25000003 PHARM REV CODE 250

## 2024-05-09 PROCEDURE — 25000003 PHARM REV CODE 250: Performed by: PHYSICIAN ASSISTANT

## 2024-05-09 PROCEDURE — 21400001 HC TELEMETRY ROOM

## 2024-05-09 PROCEDURE — 63600175 PHARM REV CODE 636 W HCPCS: Performed by: PHYSICIAN ASSISTANT

## 2024-05-09 PROCEDURE — 80053 COMPREHEN METABOLIC PANEL: CPT | Performed by: NURSE PRACTITIONER

## 2024-05-09 PROCEDURE — 25000003 PHARM REV CODE 250: Performed by: THORACIC SURGERY (CARDIOTHORACIC VASCULAR SURGERY)

## 2024-05-09 RX ORDER — FAMOTIDINE 20 MG/1
20 TABLET, FILM COATED ORAL DAILY
Status: DISCONTINUED | OUTPATIENT
Start: 2024-05-09 | End: 2024-05-13 | Stop reason: HOSPADM

## 2024-05-09 RX ADMIN — SODIUM BICARBONATE 650 MG TABLET 650 MG: at 09:05

## 2024-05-09 RX ADMIN — INSULIN ASPART 2 UNITS: 100 INJECTION, SOLUTION INTRAVENOUS; SUBCUTANEOUS at 09:05

## 2024-05-09 RX ADMIN — FOLIC ACID 1 MG: 1 TABLET ORAL at 09:05

## 2024-05-09 RX ADMIN — METOPROLOL TARTRATE 25 MG: 25 TABLET, FILM COATED ORAL at 09:05

## 2024-05-09 RX ADMIN — GABAPENTIN 100 MG: 100 CAPSULE ORAL at 09:05

## 2024-05-09 RX ADMIN — DOCUSATE SODIUM 100 MG: 100 CAPSULE, LIQUID FILLED ORAL at 09:05

## 2024-05-09 RX ADMIN — FAMOTIDINE 20 MG: 20 TABLET, FILM COATED ORAL at 09:05

## 2024-05-09 RX ADMIN — HYDROCODONE BITARTRATE AND ACETAMINOPHEN 1 TABLET: 5; 325 TABLET ORAL at 02:05

## 2024-05-09 RX ADMIN — INSULIN ASPART 2 UNITS: 100 INJECTION, SOLUTION INTRAVENOUS; SUBCUTANEOUS at 04:05

## 2024-05-09 RX ADMIN — HYDROCODONE BITARTRATE AND ACETAMINOPHEN 1 TABLET: 5; 325 TABLET ORAL at 06:05

## 2024-05-09 RX ADMIN — METOPROLOL TARTRATE 25 MG: 25 TABLET, FILM COATED ORAL at 02:05

## 2024-05-09 RX ADMIN — GABAPENTIN 100 MG: 100 CAPSULE ORAL at 02:05

## 2024-05-09 RX ADMIN — ASPIRIN 81 MG: 81 TABLET, COATED ORAL at 09:05

## 2024-05-09 RX ADMIN — ENOXAPARIN SODIUM 30 MG: 30 INJECTION SUBCUTANEOUS at 04:05

## 2024-05-09 RX ADMIN — ATORVASTATIN CALCIUM 40 MG: 40 TABLET, FILM COATED ORAL at 09:05

## 2024-05-09 RX ADMIN — HYDROCODONE BITARTRATE AND ACETAMINOPHEN 1 TABLET: 5; 325 TABLET ORAL at 09:05

## 2024-05-09 NOTE — PT/OT/SLP PROGRESS
"Physical Therapy Treatment    Patient Name:  Rosalba Whitlock   MRN:  [id number]    Recommendations:     Discharge therapy intensity: Moderate Intensity Therapy   Discharge Equipment Recommendations: to be determined by next level of care  Barriers to discharge: Impaired mobility and Ongoing medical needs    Assessment:     Rosalba Whitlock is a 83 y.o. female admitted with a medical diagnosis of Severe CAD s/p median sternotomy c attempted CABG, hypotension.  She presents with the following impairments/functional limitations: weakness, impaired endurance, impaired functional mobility, impaired self care skills, gait instability, pain, edema, impaired cardiopulmonary response to activity. The pt required CGA to mod A for functional mobility today with RW. Patient's SpO2 noted to desaturate to 82-87% with functional mobility, but quickly recovers to % with seated rest while on 3 L/min via nasal cannula. Pt fatigues quickly and required frequent verbal and tactile cuing to adhere to sternal precautions.     Rehab Prognosis: Good; patient would benefit from acute skilled PT services to address these deficits and reach maximum level of function.    Recent Surgery: Procedure(s) (LRB):  CORONARY ARTERY BYPASS GRAFT (CABG) (N/A)  SURGICAL PROCUREMENT, VEIN, ENDOSCOPIC (Left)  PLATING, STERNAL (N/A) 6 Days Post-Op    Plan:     During this hospitalization, patient would benefit from acute PT services 6 x/week to address the identified rehab impairments via gait training, therapeutic activities, therapeutic exercises, neuromuscular re-education and progress toward the following goals:    Plan of Care Expires:  06/06/24    Subjective     Chief Complaint: "I've been moving so much today"  Patient/Family Comments/goals: agreeable to treatment session  Pain/Comfort:  Pain Rating 1: other (see comments) (unrated)  Location - Side 1: Left  Location 1: arm  Pain Addressed 1: Reposition, Distraction      Objective: "     Communicated with RN prior to session.  Patient found  toiletting in restroom  with pulse ox (continuous), telemetry, oxygen, peripheral IV upon PT entry to room.     General Precautions: Standard, fall, sternal  Orthopedic Precautions: N/A  Braces: N/A  Respiratory Status: Nasal cannula, flow 3 L/min; SpO2 desaturated to 82-87% with mobility, recovers to % with seated rest    Functional Mobility:  Transfers:     Sit to Stand:  minimum assistance with rolling walker. Verbal and tactile cuing for adherence to sternal precautions  Bed to Chair: contact guard assistance with  rolling walker  using  Step Transfer  Toilet Transfer: moderate assistance with  rolling walker  using  Step Transfer; (+) BM  Gait: The pt ambulated ~15 ft with RW and CGA. VC for posture. Slow pace.    Education:  Patient provided with verbal education education regarding PT role/goals/POC, post-op precautions, fall prevention, and safety awareness.  Understanding was verbalized, however additional teaching warranted.     Patient left up in chair with all lines intact and call button in reach    GOALS:   Multidisciplinary Problems       Physical Therapy Goals          Problem: Physical Therapy    Goal Priority Disciplines Outcome Goal Variances Interventions   Physical Therapy Goal     PT, PT/OT Progressing     Description: Goals to be met by: 6/6/24     Patient will increase functional independence with mobility by performing:    Supine to sit with Modified [place]  Sit to stand transfer with Modified [place]  Gait  x 200 feet with Modified [place] using Rolling Walker.                          Time Tracking:     PT Received On:    PT Start Time: 1340     PT Stop Time: 1400  PT Total Time (min): 20 min     Billable Minutes: Therapeutic Activity 20    Treatment Type: Treatment  PT/PTA: PT     Number of PTA visits since last PT visit: 4     05/09/2024

## 2024-05-09 NOTE — PLAN OF CARE
Problem: Adult Inpatient Plan of Care  Goal: Plan of Care Review  Outcome: Progressing  Goal: Patient-Specific Goal (Individualized)  Outcome: Progressing  Goal: Absence of Hospital-Acquired Illness or Injury  Outcome: Progressing  Goal: Optimal Comfort and Wellbeing  Outcome: Progressing  Goal: Readiness for Transition of Care  Outcome: Progressing     Problem: Diabetes Comorbidity  Goal: Blood Glucose Level Within Targeted Range  Outcome: Progressing     Problem: Infection  Goal: Absence of Infection Signs and Symptoms  Outcome: Progressing     Problem: Wound  Goal: Optimal Coping  Outcome: Progressing  Goal: Optimal Functional Ability  Outcome: Progressing  Goal: Absence of Infection Signs and Symptoms  Outcome: Progressing  Goal: Improved Oral Intake  Outcome: Progressing  Goal: Optimal Pain Control and Function  Outcome: Progressing  Goal: Skin Health and Integrity  Outcome: Progressing  Goal: Optimal Wound Healing  Outcome: Progressing     Problem: Skin Injury Risk Increased  Goal: Skin Health and Integrity  Outcome: Progressing

## 2024-05-09 NOTE — PLAN OF CARE
JOS Germain called and she will ask for gap exception to accept pt insurance. She will keep me updated as she knows more.    Hemostasis: Electrocautery

## 2024-05-09 NOTE — PROGRESS NOTES
Inpatient Nutrition Evaluation    Admit Date: 5/3/2024   Total duration of encounter: 6 days   Patient Age: 83 y.o.    Nutrition Recommendation/Prescription     Continue diabetic diet as tolerated  Continue Boost Glucose Control TID to provide 190 kcal and 16 g protein per serving. 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 RCA  Hypotension requiring Pressors - Resolved   Acute Hypoxemic Respiratory Failure requiring Intubation/Ventilation - Now on NC  BARTOLO/CKD III - Improved   Leukocytosis - Resolved   Diabetic Neuropathy   Electrolytes - Hyperkalemia - Resolved     Relevant Medical History: Vertigo, Amnesia, OA, MVCAD/Ungraftable post Sternotomy, DM II, HTN, GERD, MI, Chronic Lower Back pain, Migraines, HLD, CKD Stage III, Osteoporosis, Obesity, VI     Scheduled Medications:  aspirin, 81 mg, Daily  atorvastatin, 40 mg, Daily  docusate sodium, 100 mg, BID  enoxparin, 30 mg, Daily  famotidine, 20 mg, Daily  folic acid, 1 mg, Daily  gabapentin, 100 mg, TID  metoprolol tartrate, 25 mg, TID  polyethylene glycol, 17 g, Daily  sodium bicarbonate, 650 mg, BID    Continuous Infusions:   PRN Medications:   Current Facility-Administered Medications:     albumin human 5%, 12.5 g, Intravenous, PRN    calcium gluconate IVPB, 1 g, Intravenous, PRN    calcium gluconate IVPB, 2 g, Intravenous, PRN    calcium gluconate IVPB, 3 g, Intravenous, PRN    dextrose 10%, 12.5 g, Intravenous, PRN    dextrose 10%, 12.5 g, Intravenous, PRN    dextrose 10%, 12.5 g, Intravenous, PRN    dextrose 10%, 25 g, Intravenous, PRN    dextrose 10%, 25 g, Intravenous, PRN    dextrose 10%, 25 g, Intravenous, PRN    glucagon (human  recombinant), 1 mg, Intramuscular, PRN    glucagon (human recombinant), 1 mg, Intramuscular, PRN    glucose, 16 g, Oral, PRN    glucose, 16 g, Oral, PRN    glucose, 24 g, Oral, PRN    glucose, 24 g, Oral, PRN    HYDROcodone-acetaminophen, 1 tablet, Oral, Q4H PRN    insulin aspart U-100, 0-10 Units, Subcutaneous, QID (AC + HS) PRN    magnesium sulfate IVPB, 2 g, Intravenous, PRN    magnesium sulfate IVPB, 4 g, Intravenous, PRN    morphine, 4 mg, Intravenous, Q4H PRN    ondansetron, 4 mg, Intravenous, Q4H PRN    oxyCODONE, 10 mg, Oral, Q4H PRN    oxyCODONE, 5 mg, Oral, Q4H PRN    potassium chloride in water, 20 mEq, Intravenous, PRN    potassium chloride in water, 40 mEq, Intravenous, PRN    potassium chloride in water, 60 mEq, Intravenous, PRN    sodium phosphate 15 mmol in dextrose 5 % (D5W) 250 mL IVPB, 15 mmol, Intravenous, PRN    sodium phosphate 20.01 mmol in dextrose 5 % (D5W) 250 mL IVPB, 20.01 mmol, Intravenous, PRN    sodium phosphate 30 mmol in dextrose 5 % (D5W) 250 mL IVPB, 30 mmol, Intravenous, PRN    Recent Labs   Lab 05/03/24  1030 05/03/24  1426 05/04/24  0336 05/04/24  0337 05/05/24  0420 05/06/24  0524 05/06/24  0619 05/07/24  0438 05/07/24  0754 05/08/24  0346 05/09/24  0414   * 147*  --  145 138  --  137 138  --  140 139   K 3.4* 4.0  --  3.6 4.7  --  4.8 5.2*  --  4.6 4.5   CALCIUM 9.0 8.0*  --  8.0* 7.5*  --  7.7* 7.6*  --  8.3* 8.2*   PHOS 2.0*  --   --  1.5* 3.4  --   --  2.2*  --  2.3  --    MG 2.70*  --   --  2.50 2.10  --  2.20 2.30  --  2.40 2.10   CHLORIDE 120* 115*  --  112* 108*  --  107 111*  --  108* 107   CO2 20* 20*  --  19* 20*  --  20* 18*  --  23 23   BUN 26.3* 24.3*  --  20.4* 16.6  --  23.7* 27.5*  --  27.9* 23.5*   CREATININE 1.71* 1.77*  --  1.95* 1.88*  --  1.87* 1.67*  --  1.52* 1.30*   EGFRNORACEVR 30 29  --  25 27  --  27 31  --  34 41   GLUCOSE 151* 168*  --  212* 86  --  127* 147*  --  155* 183*   BILITOT 0.6  --   --  0.4 0.6  --  0.6 0.5  --  0.5 0.4   ALKPHOS  "32*  --   --  33* 61  --  56 55  --  63 53   ALT 8  --   --  11 7  --  5 5  --  7 5   AST 24  --   --  36* 27  --  21 36*  --  23 20   ALBUMIN 3.0*  --   --  3.6 3.2*  --  3.1* 2.7*  --  2.9* 2.8*   WBC 10.19  --  15.42*  --  14.76* 13.61*  --   --  11.21 8.38 7.90   HGB 10.3* 11.4* 12.7  --  13.0 13.4  --   --  13.0 12.8 11.7*   HCT 30.7* 34.5* 38.2  --  40.7 41.3  --   --  40.4 38.8 34.8*     Nutrition Orders:  Diet diabetic 1800 Calorie  Dietary nutrition supplements Boost Glucose Control - Vanilla; All Meals    Appetite/Oral Intake: fair/50-75% of meals  Factors Affecting Nutritional Intake: decreased appetite  Food/Yazidism/Cultural Preferences: unable to obtain  Food Allergies: no known food allergies  Last Bowel Movement: 24  Wound(s):     Wound 23 0900 Pressure Injury Buttocks-Tissue loss description: Not applicable (DTI)     Comments    : Pt not in room at time of visit. RN reports fair intake, stating patient does not like meat. Pt drinking ONS TID, will continue. No reports of N/V per notes, last BM . Per MST, no reports of decreased appetite or unintentional weight loss PTA. Per EMR weight history, UBW ~155-165#. Weight appears stable.    Anthropometrics    Height: 5' 5" (165.1 cm), Height Method: Stated  Last Weight: 75 kg (165 lb 6.4 oz) (24 0617), Weight Method: Standard Scale  BMI (Calculated): 27.5  BMI Classification: overweight (BMI 25-29.9)     Ideal Body Weight (IBW), Female: 125 lb     % Ideal Body Weight, Female (lb): 126.81 %                    Usual Body Weight (UBW), k.6 kg  % Usual Body Weight: 102.15     Usual Weight Provided By: EMR weight history    Wt Readings from Last 5 Encounters:   24 75 kg (165 lb 6.4 oz)   24 73.7 kg (162 lb 6.4 oz)   24 72.6 kg (160 lb)   24 73.6 kg (162 lb 3.2 oz)   24 73.2 kg (161 lb 6 oz)     Weight Change(s) Since Admission: +3.1 kg  Wt Readings from Last 1 Encounters:   24 0617 75 kg (165 lb " 6.4 oz)   05/08/24 0536 76.4 kg (168 lb 8 oz)   05/07/24 0604 72.4 kg (159 lb 9.8 oz)   05/06/24 0545 77.4 kg (170 lb 10.2 oz)   05/04/24 0600 83.2 kg (183 lb 6.8 oz)   05/03/24 0546 71.9 kg (158 lb 8.2 oz)   Admit Weight: 71.9 kg (158 lb 8.2 oz) (05/03/24 0546), Weight Method: Standard Scale    Patient Education     Not applicable.    Nutrition Goals & Monitoring     Dietitian will monitor: food and beverage intake, weight, electrolyte/renal panel, glucose/endocrine profile, and gastrointestinal profile    Nutrition Risk/Follow-Up: low (follow-up in 5-7 days)  Patients assigned 'low nutrition risk' status do not qualify for a full nutritional assessment but will be monitored and re-evaluated in a 5-7 day time period. Please consult if re-evaluation needed sooner.

## 2024-05-09 NOTE — PROGRESS NOTES
" [facility]    Cardiology  Progress Note    Patient Name: Rosalba Whitlock  MRN: [id number]  Admission Date: 5/3/2024  Hospital Length of Stay: 6 days  Code Status: Full Code   Attending Provider: Loki Estrada MD   Consulting Provider: JIMBO Mcdonald  Primary Care Physician: Eleazar Ramos MD  Principal Problem:<principal problem not specified>    Patient information was obtained from past medical records and ER records.     Subjective:     Chief Complaint/Reason for Consult: s/p Sternotomy with Ungraftable Anatomy x 2 Vessels    HPI: Ms. Whitlock is an 84 y/o female who is known to [facility], Dr. Fry. The patient was recently worked up by Dr. 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 She was referred to CV Surgery for a CABG Evaluation. She was deemed a candidate for CABG and on 5.3.24 she underwent a Sternotomy, unfortunately, she had ungraftable anatomy to both the RCA and LAD. She was transferred to ICU for further management. [facility] has been consulted for Recommendations.     5.5.24: NAD. Sitting in Bedside Chair. Denies SOB and Palps. + CP/Incisional. "I am ok." Off Pressors.   5.6.24: NAD. Sitting in Bedside Chair. Denies SOB and Palps. + CP/Incisional. "I am not feeling the best."   5.7.24: NAD. Sitting in Bedside Chair. Denies SOB and Palps. + CP/Incisional. "I am feeling much better."   5.8.24: NAD. Sitting in Bedside Chair. Denies SOB and Palps. + CP/Incisional. BUN/Crea 27.9/1.52, AST/ALT 23/7, K 4.6   5.9.24: NAD. Sitting in Bedside Chair. Denies SOB and Palps. + CP/Incisional. Bun/Crea 23.5/1.30, LFTs Normalized, K 4.5, Mg 2.1    PMH: Vertigo, Amnesia, OA, MVCAD/Ungraftable post Sternotomy, DM II, HTN, GERD, MI, Chronic Lower Back pain, Migraines, HLD, CKD Stage III, Osteoporosis, Obesity, VI  PSH: Sternotomy/No Graftable Vessels (5.3.24), Angiogram, Colonoscopy, Eye Surgery, Foot Surgery, KIA, Tonsillectomy, Epidural Steroid Injections, EGD  Family History: Father, D, " ESRD; Mother, D, DVT  Social History:     Previous Cardiac Diagnostics:   [facility] 2.21.24:  Findings:  1. Left main-normal  2. Lad-patent small mid stent, 60-70% stenosis distal to stent edge, IFR 0.86  3. LCX- codominant, Mid 50%, IFR 0.92  4. RCA-codominant Mid occlusion in fractured stent, left to right collaterals  Plan:  1. Maximize medical management  2. Discharge home today  3. Consult CTS as outpatient to evaluate for two-vessel bypass to distal LAD and poor and small.  Patient does not have good stenting options for apical LAD and RCA as well.  Will continue medical MNGT for now.    ECHO 11.21.23:  The study quality is average.   The left ventricle is normal in size. Global left ventricular systolic function is normal. The left ventricular ejection fraction is 60%. Left ventricular diastolic function is normal. Normal wall thickness.  No significant valvular dysfunction noted.     Carotid US 11.21.23:  The study quality is average.   1-39% stenosis in the mid right internal carotid artery based on Bluth Criteria.   1-39% stenosis in the proximal left internal carotid artery based on Bluth Criteria.   Antegrade right vertebral artery flow.   Antegrade left vertebral artery flow.    Review of patient's allergies indicates:   Allergen Reactions    Amlodipine      Skin crawling      Current Facility-Administered Medications on File Prior to Encounter   Medication    0.9%  NaCl infusion    diazePAM tablet 10 mg    diphenhydrAMINE capsule 50 mg    diphenhydrAMINE capsule 50 mg    sodium chloride 0.9% flush 10 mL     Current Outpatient Medications on File Prior to Encounter   Medication Sig    aspirin (ECOTRIN) 81 MG EC tablet Take 81 mg by mouth once daily.    gabapentin (NEURONTIN) 100 MG capsule TAKE 2 CAPSULES BY MOUTH TWICE DAILY, EVERY MORNING AND AT NOON    gabapentin (NEURONTIN) 300 MG capsule TAKE 1 CAPSULE(300 MG) BY MOUTH EVERY EVENING    icosapent ethyL (VASCEPA) 1 gram Cap BID    insulin glargine (LANTUS  "U-100 INSULIN) 100 unit/mL injection Inject 46 units in the am, and 20 units at night    isosorbide mononitrate (IMDUR) 30 MG 24 hr tablet Take 30 mg by mouth once daily.    loratadine (CLARITIN) 10 mg tablet TAKE 1 TABLET BY MOUTH EVERY DAY    nortriptyline (PAMELOR) 25 MG capsule TAKE 1 CAPSULE BY MOUTH EVERY DAY AT BEDTIME FOR SLEEP    omeprazole (PRILOSEC) 40 MG capsule TAKE 1 CAPSULE BY MOUTH DAILY AS NEEDED FOR REFLUX (Patient taking differently: Take 40 mg by mouth every morning.)    rosuvastatin (CRESTOR) 40 MG Tab Take 40 mg by mouth every evening.    ticagrelor (BRILINTA) 90 mg tablet Take 90 mg by mouth once daily.    TRADJENTA 5 mg Tab tablet TAKE 1 TABLET(5 MG) BY MOUTH EVERY DAY    ALCOHOL PREP PADS PadM     clopidogreL (PLAVIX) 75 mg tablet Take 1 tablet (75 mg total) by mouth once daily.    COMFORT EZ INSULIN SYRINGE 0.5 mL 31 gauge x 5/16" Syrg     denosumab (PROLIA) 60 mg/mL Syrg Inject 1 mL (60 mg total) into the skin every 6 (six) months.    fluticasone propionate (FLONASE) 50 mcg/actuation nasal spray 2 sprays (100 mcg total) by Each Nostril route once daily.    nebulizer accessories Kit Please dispense one nebulizer accessories kit for appropriate nebulizer machine.    nebulizer and compressor Veronica Please dispense one nebulizer machine with appropriate supplies.    pen needle, diabetic 32 gauge x 5/32" Ndle 1 each by Misc.(Non-Drug; Combo Route) route 2 (two) times a day.     Review of Systems   Constitutional:  Positive for fatigue. Negative for fever.   Respiratory:  Negative for cough and shortness of breath.    Cardiovascular:  Positive for chest pain (Incisional). Negative for palpitations and leg swelling.   All other systems reviewed and are negative.    Objective:     Vital Signs (Most Recent):  Temp: 97.9 °F (36.6 °C) (05/09/24 1208)  Pulse: 87 (05/09/24 1208)  Resp: 18 (05/09/24 1208)  BP: 116/68 (05/09/24 1208)  SpO2: 96 % (05/09/24 1208) Vital Signs (24h Range):  Temp:  [97.8 °F " (36.6 °C)-98.7 °F (37.1 °C)] 97.9 °F (36.6 °C)  Pulse:  [82-95] 87  Resp:  [16-22] 18  SpO2:  [95 %-98 %] 96 %  BP: (113-134)/(61-79) 116/68   Weight: 75 kg (165 lb 6.4 oz)  Body mass index is 27.52 kg/m².  SpO2: 96 %       Intake/Output Summary (Last 24 hours) at 5/9/2024 1318  Last data filed at 5/9/2024 0100  Gross per 24 hour   Intake 520 ml   Output 950 ml   Net -430 ml     Lines/Drains/Airways       Drain  Duration             Female External Urinary Catheter w/ Suction 05/05/24 0600 4 days              Peripheral Intravenous Line  Duration                  Peripheral IV - Single Lumen 05/03/24 0545 18 G Distal;Posterior;Right Forearm 6 days                  Significant Labs:  Recent Results (from the past 72 hour(s))   POCT glucose    Collection Time: 05/06/24  5:02 PM   Result Value Ref Range    POCT Glucose 150 (H) 70 - 110 mg/dL   Comprehensive Metabolic Panel    Collection Time: 05/07/24  4:38 AM   Result Value Ref Range    Sodium 138 136 - 145 mmol/L    Potassium 5.2 (H) 3.5 - 5.1 mmol/L    Chloride 111 (H) 98 - 107 mmol/L    CO2 18 (L) 23 - 31 mmol/L    Glucose 147 (H) 82 - 115 mg/dL    Blood Urea Nitrogen 27.5 (H) 9.8 - 20.1 mg/dL    Creatinine 1.67 (H) 0.55 - 1.02 mg/dL    Calcium 7.6 (L) 8.4 - 10.2 mg/dL    Protein Total 5.6 (L) 5.8 - 7.6 gm/dL    Albumin 2.7 (L) 3.4 - 4.8 g/dL    Globulin 2.9 2.4 - 3.5 gm/dL    Albumin/Globulin Ratio 0.9 (L) 1.1 - 2.0 ratio    Bilirubin Total 0.5 <=1.5 mg/dL    ALP 55 40 - 150 unit/L    ALT 5 0 - 55 unit/L    AST 36 (H) 5 - 34 unit/L    eGFR 31 mls/min/1.73/m2   Magnesium    Collection Time: 05/07/24  4:38 AM   Result Value Ref Range    Magnesium Level 2.30 1.60 - 2.60 mg/dL   Phosphorus    Collection Time: 05/07/24  4:38 AM   Result Value Ref Range    Phosphorus Level 2.2 (L) 2.3 - 4.7 mg/dL   CBC with Differential    Collection Time: 05/07/24  7:54 AM   Result Value Ref Range    WBC 11.21 4.50 - 11.50 x10(3)/mcL    RBC 4.53 4.20 - 5.40 x10(6)/mcL    Hgb 13.0 12.0  - 16.0 g/dL    Hct 40.4 37.0 - 47.0 %    MCV 89.2 80.0 - 94.0 fL    MCH 28.7 27.0 - 31.0 pg    MCHC 32.2 (L) 33.0 - 36.0 g/dL    RDW 14.1 11.5 - 17.0 %    Platelet 176 130 - 400 x10(3)/mcL    MPV 9.6 7.4 - 10.4 fL    Neut % 77.9 %    Lymph % 13.7 %    Mono % 6.7 %    Eos % 0.8 %    Basophil % 0.4 %    Lymph # 1.54 0.6 - 4.6 x10(3)/mcL    Neut # 8.73 2.1 - 9.2 x10(3)/mcL    Mono # 0.75 0.1 - 1.3 x10(3)/mcL    Eos # 0.09 0 - 0.9 x10(3)/mcL    Baso # 0.04 <=0.2 x10(3)/mcL    IG# 0.06 (H) 0 - 0.04 x10(3)/mcL    IG% 0.5 %    NRBC% 0.0 %   POCT glucose    Collection Time: 05/07/24 11:14 AM   Result Value Ref Range    POCT Glucose 178 (H) 70 - 110 mg/dL   POCT glucose    Collection Time: 05/07/24  4:11 PM   Result Value Ref Range    POCT Glucose 246 (H) 70 - 110 mg/dL   Comprehensive Metabolic Panel    Collection Time: 05/08/24  3:46 AM   Result Value Ref Range    Sodium 140 136 - 145 mmol/L    Potassium 4.6 3.5 - 5.1 mmol/L    Chloride 108 (H) 98 - 107 mmol/L    CO2 23 23 - 31 mmol/L    Glucose 155 (H) 82 - 115 mg/dL    Blood Urea Nitrogen 27.9 (H) 9.8 - 20.1 mg/dL    Creatinine 1.52 (H) 0.55 - 1.02 mg/dL    Calcium 8.3 (L) 8.4 - 10.2 mg/dL    Protein Total 5.9 5.8 - 7.6 gm/dL    Albumin 2.9 (L) 3.4 - 4.8 g/dL    Globulin 3.0 2.4 - 3.5 gm/dL    Albumin/Globulin Ratio 1.0 (L) 1.1 - 2.0 ratio    Bilirubin Total 0.5 <=1.5 mg/dL    ALP 63 40 - 150 unit/L    ALT 7 0 - 55 unit/L    AST 23 5 - 34 unit/L    eGFR 34 mL/min/1.73/m2   Magnesium    Collection Time: 05/08/24  3:46 AM   Result Value Ref Range    Magnesium Level 2.40 1.60 - 2.60 mg/dL   Phosphorus    Collection Time: 05/08/24  3:46 AM   Result Value Ref Range    Phosphorus Level 2.3 2.3 - 4.7 mg/dL   CBC with Differential    Collection Time: 05/08/24  3:46 AM   Result Value Ref Range    WBC 8.38 4.50 - 11.50 x10(3)/mcL    RBC 4.43 4.20 - 5.40 x10(6)/mcL    Hgb 12.8 12.0 - 16.0 g/dL    Hct 38.8 37.0 - 47.0 %    MCV 87.6 80.0 - 94.0 fL    MCH 28.9 27.0 - 31.0 pg    MCHC  33.0 33.0 - 36.0 g/dL    RDW 14.0 11.5 - 17.0 %    Platelet 194 130 - 400 x10(3)/mcL    MPV 9.8 7.4 - 10.4 fL    Neut % 65.5 %    Lymph % 21.2 %    Mono % 10.3 %    Eos % 2.0 %    Basophil % 0.5 %    Lymph # 1.78 0.6 - 4.6 x10(3)/mcL    Neut # 5.49 2.1 - 9.2 x10(3)/mcL    Mono # 0.86 0.1 - 1.3 x10(3)/mcL    Eos # 0.17 0 - 0.9 x10(3)/mcL    Baso # 0.04 <=0.2 x10(3)/mcL    IG# 0.04 0 - 0.04 x10(3)/mcL    IG% 0.5 %    NRBC% 0.0 %   POCT glucose    Collection Time: 05/08/24 11:04 AM   Result Value Ref Range    POCT Glucose 236 (H) 70 - 110 mg/dL   POCT glucose    Collection Time: 05/08/24  3:44 PM   Result Value Ref Range    POCT Glucose 217 (H) 70 - 110 mg/dL   POCT glucose    Collection Time: 05/08/24  8:31 PM   Result Value Ref Range    POCT Glucose 185 (H) 70 - 110 mg/dL   Comprehensive Metabolic Panel    Collection Time: 05/09/24  4:14 AM   Result Value Ref Range    Sodium 139 136 - 145 mmol/L    Potassium 4.5 3.5 - 5.1 mmol/L    Chloride 107 98 - 107 mmol/L    CO2 23 23 - 31 mmol/L    Glucose 183 (H) 82 - 115 mg/dL    Blood Urea Nitrogen 23.5 (H) 9.8 - 20.1 mg/dL    Creatinine 1.30 (H) 0.55 - 1.02 mg/dL    Calcium 8.2 (L) 8.4 - 10.2 mg/dL    Protein Total 5.5 (L) 5.8 - 7.6 gm/dL    Albumin 2.8 (L) 3.4 - 4.8 g/dL    Globulin 2.7 2.4 - 3.5 gm/dL    Albumin/Globulin Ratio 1.0 (L) 1.1 - 2.0 ratio    Bilirubin Total 0.4 <=1.5 mg/dL    ALP 53 40 - 150 unit/L    ALT 5 0 - 55 unit/L    AST 20 5 - 34 unit/L    eGFR 41 mL/min/1.73/m2   Magnesium    Collection Time: 05/09/24  4:14 AM   Result Value Ref Range    Magnesium Level 2.10 1.60 - 2.60 mg/dL   CBC with Differential    Collection Time: 05/09/24  4:14 AM   Result Value Ref Range    WBC 7.90 4.50 - 11.50 x10(3)/mcL    RBC 4.04 (L) 4.20 - 5.40 x10(6)/mcL    Hgb 11.7 (L) 12.0 - 16.0 g/dL    Hct 34.8 (L) 37.0 - 47.0 %    MCV 86.1 80.0 - 94.0 fL    MCH 29.0 27.0 - 31.0 pg    MCHC 33.6 33.0 - 36.0 g/dL    RDW 14.0 11.5 - 17.0 %    Platelet 230 130 - 400 x10(3)/mcL    MPV 9.9  7.4 - 10.4 fL    Neut % 58.5 %    Lymph % 27.1 %    Mono % 10.0 %    Eos % 3.4 %    Basophil % 0.5 %    Lymph # 2.14 0.6 - 4.6 x10(3)/mcL    Neut # 4.62 2.1 - 9.2 x10(3)/mcL    Mono # 0.79 0.1 - 1.3 x10(3)/mcL    Eos # 0.27 0 - 0.9 x10(3)/mcL    Baso # 0.04 <=0.2 x10(3)/mcL    IG# 0.04 0 - 0.04 x10(3)/mcL    IG% 0.5 %    NRBC% 0.0 %   POCT glucose    Collection Time: 05/09/24  4:47 AM   Result Value Ref Range    POCT Glucose 174 (H) 70 - 110 mg/dL   POCT glucose    Collection Time: 05/09/24 11:24 AM   Result Value Ref Range    POCT Glucose 190 (H) 70 - 110 mg/dL     Telemetry: SR     Physical Exam  Constitutional:       General: She is not in acute distress.     Appearance: Normal appearance. She is obese.   HENT:      Head: Normocephalic.      Mouth/Throat:      Mouth: Mucous membranes are moist.   Eyes:      Conjunctiva/sclera: Conjunctivae normal.   Cardiovascular:      Rate and Rhythm: Normal rate and regular rhythm.      Pulses: Normal pulses.      Heart sounds: Murmur heard.   Pulmonary:      Effort: Pulmonary effort is normal. No respiratory distress.      Breath sounds: Decreased breath sounds present.      Comments: NC O2  Abdominal:      Palpations: Abdomen is soft.   Musculoskeletal:         General: Normal range of motion.   Skin:     General: Skin is warm.      Comments: Midline Sternotomy Dressing C/D/I   Neurological:      General: No focal deficit present.      Mental Status: She is alert and oriented to person, place, and time.   Psychiatric:         Mood and Affect: Mood normal.         Behavior: Behavior normal.       Home Medications:   Current Facility-Administered Medications on File Prior to Encounter   Medication Dose Route Frequency Provider Last Rate Last Admin    0.9%  NaCl infusion   Intravenous Continuous Rubia Seymour MD   Stopped at 12/04/23 0713    diazePAM tablet 10 mg  10 mg Oral On Call Procedure Emmanuel Riley MD   10 mg at 02/21/24 1031    diphenhydrAMINE capsule 50 mg   "50 mg Oral On Call Procedure Rubia Seymour MD   50 mg at 12/04/23 0700    diphenhydrAMINE capsule 50 mg  50 mg Oral On Call Procedure Emmanuel Riley MD   50 mg at 02/21/24 1031    sodium chloride 0.9% flush 10 mL  10 mL Intravenous PRN Rubia Seymour MD         Current Outpatient Medications on File Prior to Encounter   Medication Sig Dispense Refill    aspirin (ECOTRIN) 81 MG EC tablet Take 81 mg by mouth once daily.      gabapentin (NEURONTIN) 100 MG capsule TAKE 2 CAPSULES BY MOUTH TWICE DAILY, EVERY MORNING AND AT NOON 60 capsule 6    gabapentin (NEURONTIN) 300 MG capsule TAKE 1 CAPSULE(300 MG) BY MOUTH EVERY EVENING 30 capsule 2    icosapent ethyL (VASCEPA) 1 gram Cap BID      insulin glargine (LANTUS U-100 INSULIN) 100 unit/mL injection Inject 46 units in the am, and 20 units at night 20 mL 6    isosorbide mononitrate (IMDUR) 30 MG 24 hr tablet Take 30 mg by mouth once daily.      loratadine (CLARITIN) 10 mg tablet TAKE 1 TABLET BY MOUTH EVERY DAY 90 tablet 3    nortriptyline (PAMELOR) 25 MG capsule TAKE 1 CAPSULE BY MOUTH EVERY DAY AT BEDTIME FOR SLEEP 30 capsule 3    omeprazole (PRILOSEC) 40 MG capsule TAKE 1 CAPSULE BY MOUTH DAILY AS NEEDED FOR REFLUX (Patient taking differently: Take 40 mg by mouth every morning.) 90 capsule 3    rosuvastatin (CRESTOR) 40 MG Tab Take 40 mg by mouth every evening.      ticagrelor (BRILINTA) 90 mg tablet Take 90 mg by mouth once daily.      TRADJENTA 5 mg Tab tablet TAKE 1 TABLET(5 MG) BY MOUTH EVERY DAY 90 tablet 3    ALCOHOL PREP PADS PadM       clopidogreL (PLAVIX) 75 mg tablet Take 1 tablet (75 mg total) by mouth once daily. 30 tablet 11    COMFORT EZ INSULIN SYRINGE 0.5 mL 31 gauge x 5/16" Syrg       denosumab (PROLIA) 60 mg/mL Syrg Inject 1 mL (60 mg total) into the skin every 6 (six) months. 1 mL 1    fluticasone propionate (FLONASE) 50 mcg/actuation nasal spray 2 sprays (100 mcg total) by Each Nostril route once daily. 16 g 3    nebulizer accessories Kit " "Please dispense one nebulizer accessories kit for appropriate nebulizer machine. 1 kit 0    nebulizer and compressor Veronica Please dispense one nebulizer machine with appropriate supplies. 1 each 0    pen needle, diabetic 32 gauge x 5/32" Ndle 1 each by Misc.(Non-Drug; Combo Route) route 2 (two) times a day. 100 each 12     Current Inpatient Medications:    Current Facility-Administered Medications:     albumin human 5% bottle 12.5 g, 12.5 g, Intravenous, PRN, Aaron Ontiveros, PA-C, Stopped at 05/03/24 1906    aspirin EC tablet 81 mg, 81 mg, Oral, Daily, Aaron Ontiveros, PA-C, 81 mg at 05/09/24 0935    atorvastatin tablet 40 mg, 40 mg, Oral, Daily, Prakash Peres ANP, 40 mg at 05/09/24 0935    calcium gluconate 1 g in NS IVPB (premixed), 1 g, Intravenous, PRN, Weston, Aaron C, PA-C    calcium gluconate 1 g in NS IVPB (premixed), 2 g, Intravenous, PRN, Weston, Aaron C, PA-C    calcium gluconate 1 g in NS IVPB (premixed), 3 g, Intravenous, PRN, Weston, Aaron C, PA-C    dextrose 10% bolus 125 mL 125 mL, 12.5 g, Intravenous, PRN, Weston, Aaron C, PA-C    dextrose 10% bolus 125 mL 125 mL, 12.5 g, Intravenous, PRN, Langlinais, Frederick P, PA    dextrose 10% bolus 125 mL 125 mL, 12.5 g, Intravenous, PRN, Langlinais, Frederick P, PA    dextrose 10% bolus 250 mL 250 mL, 25 g, Intravenous, PRN, Weston, Aaron C, PA-C    dextrose 10% bolus 250 mL 250 mL, 25 g, Intravenous, PRN, Langlinais, Frederick P, PA    dextrose 10% bolus 250 mL 250 mL, 25 g, Intravenous, PRN, Langlinais, Frederick P, PA    docusate sodium capsule 100 mg, 100 mg, Oral, BID, Aaron Ontiveros, PA-C, 100 mg at 05/09/24 0934    enoxaparin injection 30 mg, 30 mg, Subcutaneous, Daily, Jeovanny Wan MD, 30 mg at 05/08/24 1737    famotidine tablet 20 mg, 20 mg, Oral, Daily, Loki Estrada MD, 20 mg at 05/09/24 0934    folic acid tablet 1 mg, 1 mg, Oral, Daily, Aaron Ontievros PA-C, 1 mg at 05/09/24 0934    gabapentin capsule 100 mg, 100 mg, Oral, TID, Kimberly Catherine MD, " 100 mg at 05/09/24 0934    glucagon (human recombinant) injection 1 mg, 1 mg, Intramuscular, PRN, Frederick Hicks P, PA    glucagon (human recombinant) injection 1 mg, 1 mg, Intramuscular, PRN, Kurt Frederick P, PA    glucose chewable tablet 16 g, 16 g, Oral, PRN, Seelinais, Frederick P, PA    glucose chewable tablet 16 g, 16 g, Oral, PRN, Seelinais, Frederick P, PA    glucose chewable tablet 24 g, 24 g, Oral, PRN, Seelinais, Frederick P, PA    glucose chewable tablet 24 g, 24 g, Oral, PRN, Georgess, Frederick P, PA    HYDROcodone-acetaminophen 5-325 mg per tablet 1 tablet, 1 tablet, Oral, Q4H PRN, Jeovanny Wan MD, 1 tablet at 05/09/24 0935    insulin aspart U-100 injection 0-10 Units, 0-10 Units, Subcutaneous, QID (AC + HS) PRN, Frederick Hicks, PA, 4 Units at 05/08/24 1737    magnesium sulfate 2g in water 50mL IVPB (premix), 2 g, Intravenous, PRN, Aaron Ontiveros PA-C    magnesium sulfate 2g in water 50mL IVPB (premix), 4 g, Intravenous, PRN, Aaron Ontiveros PA-C    metoprolol tartrate (LOPRESSOR) tablet 25 mg, 25 mg, Oral, TID, Prakash Peres, JIMBO, 25 mg at 05/09/24 0934    morphine injection 4 mg, 4 mg, Intravenous, Q4H PRN, Aaron Ontiveros PA-C, 4 mg at 05/03/24 2212    ondansetron injection 4 mg, 4 mg, Intravenous, Q4H PRN, Aaron Ontiveros PA-C, 4 mg at 05/04/24 1319    oxyCODONE immediate release tablet 10 mg, 10 mg, Oral, Q4H PRN, Aaron Ontiveros PA-C    oxyCODONE immediate release tablet 5 mg, 5 mg, Oral, Q4H PRN, Jeovanny Wan MD, 5 mg at 05/05/24 2039    polyethylene glycol packet 17 g, 17 g, Oral, Daily, Elsie, Miller J., MD, 17 g at 05/08/24 1502    potassium chloride 20 mEq in 100 mL IVPB (FOR CENTRAL LINE ADMINISTRATION ONLY), 20 mEq, Intravenous, PRN, Aaron Ontiveros PA-C, Stopped at 05/03/24 1316    potassium chloride 20 mEq in 100 mL IVPB (FOR CENTRAL LINE ADMINISTRATION ONLY), 40 mEq, Intravenous, PRN, Aaron Ontiveros PA-C    potassium chloride 20 mEq in 100 mL IVPB (FOR  CENTRAL LINE ADMINISTRATION ONLY), 60 mEq, Intravenous, PRN, Aaron Ontiveros PA-C    sodium bicarbonate tablet 650 mg, 650 mg, Oral, BID, Miller Zimmerman MD, 650 mg at 05/09/24 0935    sodium phosphate 15 mmol in dextrose 5 % (D5W) 250 mL IVPB, 15 mmol, Intravenous, PRN, Aaron Ontiveros, PA-ELLEN    sodium phosphate 20.01 mmol in dextrose 5 % (D5W) 250 mL IVPB, 20.01 mmol, Intravenous, PRN, Aaron Ontiveros, PA-ELLEN    sodium phosphate 30 mmol in dextrose 5 % (D5W) 250 mL IVPB, 30 mmol, Intravenous, PRN, Aaron Ontiveros PA-C    Facility-Administered Medications Ordered in Other Encounters:     0.9%  NaCl infusion, , Intravenous, Continuous, Rubia Seymour MD, Stopped at 12/04/23 0713    diazePAM tablet 10 mg, 10 mg, Oral, On Call Procedure, Emmanuel Riley MD, 10 mg at 02/21/24 1031    diphenhydrAMINE capsule 50 mg, 50 mg, Oral, On Call Procedure, Rubia Seymour MD, 50 mg at 12/04/23 0700    diphenhydrAMINE capsule 50 mg, 50 mg, Oral, On Call Procedure, Emmanuel Riley MD, 50 mg at 02/21/24 1031    sodium chloride 0.9% flush 10 mL, 10 mL, Intravenous, PRN, Rubia Seymour MD  VTE Risk Mitigation (From admission, onward)           Ordered     enoxaparin injection 30 mg  Daily         05/03/24 1336     Place sequential compression device  Until discontinued         05/03/24 0940     IP VTE HIGH RISK PATIENT  Once         05/03/24 0937                  Assessment:   MVCAD    - s/p Sternotomy (5.3.24) - Ungraftable LAD and RCA    - s/p LHC (2.21.24) - 1. Left main-normal. 2. Lad-patent small mid stent, 60-70% stenosis distal to stent edge, IFR 0.86. 3. LCX- codominant, Mid 50%, IFR 0.92. 4. RCA-codominant Mid occlusion in fractured stent, left to right collaterals    - ECHO (11.21.23) - LVEF 60%  Hypotension requiring Pressors - Resolved     - Hx of HTN  Acute Hypoxemic Respiratory Failure requiring Intubation/Ventilation - Now on NC  BARTOLO/CKD III - Improving   Leukocytosis - Resolved   DM  II  Diabetic Neuropathy  HLD  Hx of MI  Obesity  VI  Chronic Lower Back Pain  GERD  Migraines  OA  Electrolytes - Hyperkalemia - Resolved   No Hx of GIB     Plan:   Continue ASA, Statin and BB  Aggressive Mobilization of PT and Q1HR IS  Follow up as OP for Solidification of Plan of Care with Dr. Riley   Labs in AM: CBC, CMP and Mg    JIMBO Mcdonald  Cardiology  [facility]  05/09/2024     I agree with the findings of the complexity of problems addressed and take responsibility for the plan's risks and complications. I approved the plan documented by Prakash Peres NP.

## 2024-05-09 NOTE — PRE ADMISSION SCREENING
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    SNF Admission Criteria:    Primary: Rehab Services     Actively treated hospital diagnosis/diagnoses: Renal Insufficiency  and Diabetes Mellitus    Facility Status: Accept     Referring Physician:  Loki Estrada MD    Admitting Physician:  Loki Estrada MD    Primary Care Physician:  Eleazar Ramos MD    History         Patient Active Problem List    Diagnosis Date Noted    Coronary artery disease involving native coronary artery of native heart without angina pectoris 12/04/2023    MGUS (monoclonal gammopathy of unknown significance) 10/24/2023    Microalbuminuria 01/12/2023    Metabolic acidosis 01/12/2023    Chronic gouty arthritis 06/13/2022    Benign paroxysmal positional vertigo, bilateral 06/13/2022    Low back pain 06/13/2022    Low vitamin D level 06/13/2022    Osteoporosis 06/13/2022    Peripheral edema 06/13/2022    Physical deconditioning 06/13/2022    Spondylosis of lumbar spine 06/13/2022    Pure hypercholesterolemia 06/13/2022    Stage 3 chronic kidney disease 06/13/2022    Type 2 diabetes mellitus 06/13/2022    Gastroesophageal reflux disease without esophagitis 04/21/2016    Essential hypertension 04/21/2016    Migraine headache 04/21/2016         Previous Specialties/Consulted physicians:      Cardiology , Nephrology , and Other: Cardiothoracic Surgery      Past and Current Medical History    Past Medical History:   Diagnosis Date    Amnesia 06/13/2022    Benign paroxysmal positional vertigo, bilateral 06/13/2022    Bronchitis 06/08/2022    Chronic gouty arthritis 06/13/2022    Coronary artery disease involving native coronary artery of native heart without angina pectoris 12/04/2023    Diabetes mellitus, type 2     Essential  hypertension 04/21/2016    Gastroesophageal reflux disease without esophagitis 04/21/2016    GERD (gastroesophageal reflux disease)     Gout, unspecified     Heart attack     Low back pain 06/13/2022    lower back pain bilateral with movement; radiates down right leg to thigh    Low vitamin D level 06/13/2022    Metabolic acidosis 01/12/2023    Microalbuminuria 01/12/2023    Migraine headache 04/21/2016    Osteoporosis 06/13/2022    Peripheral edema 06/13/2022    Physical deconditioning 06/13/2022    Pure hypercholesterolemia 06/13/2022    SOBOE (shortness of breath on exertion)     Spondylosis of lumbar spine 06/13/2022    Stage 3 chronic kidney disease 06/13/2022    Type 2 diabetes mellitus 06/13/2022    Weakness            History of Present Illness     The patient is an 83-year-old female with a medical history of gout, coronary artery disease, diabetes mellitus type 2, hypertension, GERD, osteoporosis, CKD stage 3, who was seen in the immediate postoperative. Status post median sternotomy with attempted CABG with inability to bypass the LAD which was found to be a graftable intraoperatively. She was found to have severe in stent restenosis of the RCA/PDA stent with distal PDA long segment occlusion to the apex. Due to anatomical difficulty bypass was not performed. Patient was closed backup with tubes placed for drainage.    Date of Procedure: 5/3/2024      Procedure:  1.  Median sternotomy with attempted coronary artery bypass grafting  2.  Plating of sternal defect with the Wiser (formerly [employer]) sternal plating system and stainless steel wire      Surgeon: KACI Estrada      Assistant: Aaron Ontiveros     Pre-Operative Diagnosis:  1.  Severe coronary artery disease with InStent restenosis  2.  Occluded RCA/PDA stent after heavily stented proximal right coronary artery  3. Distal LAD stent with in stent restenosis in a very small LAD  4.  Advanced frailty     Post-Operative Diagnosis:  Same.  The patient had intra  myocardial LAD with mid/distal LAD stenting to the apex.  LAD was ungraftable as was the PDA     Anesthesia: General     Operative Findings (including complications, if any):  Severe InStent restenosis of RCA/PDA stent with distal PDA long segment occlusion to the apex.  Lad was deeply intra myocardial with with mid to distal LAD stent terminating in an extremely small ongoing LAD less than 1 mm to the apex.  LAD was intra myocardial necessitating cardioplegic arrest to palpate previously placed LAD stent and identified distal LAD segment and this vessel was not graftable beyond the stent.      Assessment:   MVCAD    - s/p Sternotomy (5.3.24) - Ungraftable LAD and RCA    - s/p LHC (2.21.24) - 1. Left main-normal. 2. Lad-patent small mid stent, 60-70% stenosis distal to stent edge, IFR 0.86. 3. LCX- codominant, Mid 50%, IFR 0.92. 4. RCA-codominant Mid occlusion in fractured stent, left to right collaterals    - ECHO (11.21.23) - LVEF 60%  Hypotension requiring Pressors - Resolved     - Hx of HTN  Acute Hypoxemic Respiratory Failure requiring Intubation/Ventilation - Now on NC  BARTOLO/CKD III - Improving   Leukocytosis - Resolved   DM II  Diabetic Neuropathy  HLD  Hx of MI  Obesity  VI  Chronic Lower Back Pain  GERD  Migraines  OA  Electrolytes - Hyperkalemia - Resolved   No Hx of GIB      Plan:   Continue ASA, Statin and BB  Will Add GDMT when BP Allows  Aggressive Mobilization of PT and Q1HR IS  Follow up as OP for Solidification of Plan of Care with Dr. Riley   Keep K > 4.0 and Mg > 2.0   Labs in AM: CBC, CMP and Mg    Patient is requiring SNF placement for continued PT/OT and o2 management and weaning. She will have [facility] upon discharge from SNF.          Patient Traveled outside of the [place] in the last 3 months? no     Patient discharged from this LTAC to SNF within the last 45 days? no    Patient discharged from this LTAC to Rehab within the last 27 days? no    Prior residence:  home    Prior Post-Acute Services: N/A     Allergies:   Review of patient's allergies indicates:   Allergen Reactions    Amlodipine      Skin crawling        Has patient received the current influenza vaccine (Oct 1 - March 31)? Unknown     Has patient received PPD skin test prior to admit? N/A     Code Status: Full Code    Orientation: Time, Place, and Person    Speech: normal     Vital Signs:     Date 05/09/24 05/09/24   Blood Pressure 134/79 121/70   Pulse 95 92   Respiratory Rate 18 16   O2 Saturation 98 97   Temperature 98.7        Bowel/Bladder: incontinent of bladder and continent of bowel  Bowel/Bladder Appliance: external urinary catheter    Dialysis: N/A         Peripheral IV - Single Lumen 05/03/24 0545 18 G Distal;Posterior;Right Forearm (Active)   Site Assessment Clean;Dry;Intact;No redness;No swelling 05/09/24 0800   Extremity Assessment Distal to IV No abnormal discoloration 05/09/24 0800   Line Status Saline locked 05/09/24 0800   Dressing Status Clean;Intact;Dry 05/09/24 0800   Dressing Intervention Integrity maintained 05/09/24 0800   Number of days: 6       Female External Urinary Catheter w/ Suction 05/05/24 0600 (Active)   Skin no redness;no breakdown 05/08/24 1600   Tolerance no signs/symptoms of discomfort 05/08/24 1600   Suction Continuous suction at 40 mmHg 05/08/24 0800   Date of last wick change 05/08/24 05/08/24 0800   Time of last wick change 0900 05/08/24 0800   Output (mL) 100 mL 05/06/24 0800   Number of days: 4       CBGs/Accuchecks: Yes     Precautions: Fall and Cardiac    Restraints: No     Isolation Precautions: N/A       Facility-Administered Medications as of 5/9/2024   Medication Dose Route Frequency Provider Last Rate Last Admin    0.9%  NaCl infusion   Intravenous Continuous Rubia Seymour MD   Stopped at 12/04/23 0713    [COMPLETED] acetaminophen 1,000 mg/100 mL (10 mg/mL) injection 1,000 mg  1,000 mg Intravenous Q6H Jeovanny Wan MD   Stopped at 05/04/24 0615     albumin human 5% bottle 12.5 g  12.5 g Intravenous PRN Aaron Ontiveros PA-C   Stopped at 05/03/24 1906    aspirin EC tablet 81 mg  81 mg Oral Daily Aaron Ontiveros PA-C   81 mg at 05/09/24 0935    atorvastatin tablet 40 mg  40 mg Oral Daily Prakash Peres ANP   40 mg at 05/09/24 0935    calcium gluconate 1 g in NS IVPB (premixed)  1 g Intravenous PRN Aaron Ontiveros PA-C        calcium gluconate 1 g in NS IVPB (premixed)  2 g Intravenous PRN Aaron Ontiveros PA-C        calcium gluconate 1 g in NS IVPB (premixed)  3 g Intravenous PRN Aaron Ontiveros PA-C        [COMPLETED] cefazolin (ANCEF) 2 gram in dextrose 5% 50 mL IVPB (premix)  2 g Intravenous Q12H Aaron Ontiveros PA-C   Stopped at 05/04/24 0648    dextrose 10% bolus 125 mL 125 mL  12.5 g Intravenous PRN Aaron Ontiveros PA-C        dextrose 10% bolus 125 mL 125 mL  12.5 g Intravenous PRN Langdiego, Frederick P, PA        dextrose 10% bolus 125 mL 125 mL  12.5 g Intravenous PRN Langdiego, Frederick P, PA        dextrose 10% bolus 250 mL 250 mL  25 g Intravenous PRN Aaron Ontiveros PA-C        dextrose 10% bolus 250 mL 250 mL  25 g Intravenous PRN Langkoryais, Frederick P, PA        dextrose 10% bolus 250 mL 250 mL  25 g Intravenous PRN Langdiego, Frederick P, PA        diazePAM tablet 10 mg  10 mg Oral On Call Procedure Emmanuel Riley MD   10 mg at 02/21/24 1031    [COMPLETED] diphenhydrAMINE capsule 25 mg  25 mg Oral Once Loki Estrada MD   25 mg at 05/07/24 2009    diphenhydrAMINE capsule 50 mg  50 mg Oral On Call Procedure Rubia Seymour MD   50 mg at 12/04/23 0700    diphenhydrAMINE capsule 50 mg  50 mg Oral On Call Procedure Emmanuel Riley MD   50 mg at 02/21/24 1031    docusate sodium capsule 100 mg  100 mg Oral BID Aaron Ontiveros PA-C   100 mg at 05/09/24 0934    enoxaparin injection 30 mg  30 mg Subcutaneous Daily Jeovanny Wan MD   30 mg at 05/08/24 1737    famotidine tablet 20 mg  20 mg Oral Daily Loki Estrada MD   20 mg at 05/09/24  0934    folic acid tablet 1 mg  1 mg Oral Daily Aaron Ontiveros PA-C   1 mg at 24 0934    gabapentin capsule 100 mg  100 mg Oral TID Kimberly Catherine MD   100 mg at 24 0934    glucagon (human recombinant) injection 1 mg  1 mg Intramuscular PRN Frederick Hicks PA        glucagon (human recombinant) injection 1 mg  1 mg Intramuscular PRN Frederick Hicks P, PA        glucose chewable tablet 16 g  16 g Oral PRN Frederick Hicks P, PA        glucose chewable tablet 16 g  16 g Oral PRN Frederick Hicks P, PA        glucose chewable tablet 24 g  24 g Oral PRN Frederick Hicks, PA        glucose chewable tablet 24 g  24 g Oral PRN Frederick Hicks P, PA        HYDROcodone-acetaminophen 5-325 mg per tablet 1 tablet  1 tablet Oral Q4H PRN Jeovanny Wan MD   1 tablet at 24 0935    insulin aspart U-100 injection 0-10 Units  0-10 Units Subcutaneous QID (AC + HS) PRN Frederick Hicks PA   4 Units at 24 1737    [COMPLETED] lactulose 10 gram/15 ml solution 30 g  30 g Oral Once Miller Zimmerman MD   30 g at 24 1129    magnesium sulfate 2g in water 50mL IVPB (premix)  2 g Intravenous PRN Aaron Ontiveros PA-C        magnesium sulfate 2g in water 50mL IVPB (premix)  4 g Intravenous PRN Aaron Ontiveros PA-C        metoprolol tartrate (LOPRESSOR) tablet 25 mg  25 mg Oral TID Prakash Peres ANP   25 mg at 24 0934    morphine injection 4 mg  4 mg Intravenous Q4H PRN Aaron Ontiveros PA-C   4 mg at 24 2212    [] mupirocin 2 % ointment   Nasal BID Aaron Ontiveros PA-C   Given at 24 0805    ondansetron injection 4 mg  4 mg Intravenous Q4H PRN Aaron Ontiveros PA-C   4 mg at 24 1319    oxyCODONE immediate release tablet 10 mg  10 mg Oral Q4H PRN Aaron Ontiveros, PADenilsonC        oxyCODONE immediate release tablet 5 mg  5 mg Oral Q4H PRN Jeovanny Wan MD   5 mg at 24    polyethylene glycol packet 17 g  17 g Oral Daily Miller Zimmerman  MD NAJMA   17 g at 05/08/24 1502    potassium chloride 20 mEq in 100 mL IVPB (FOR CENTRAL LINE ADMINISTRATION ONLY)  20 mEq Intravenous PRN Aaron Ontiveros PA-C   Stopped at 05/03/24 1316    potassium chloride 20 mEq in 100 mL IVPB (FOR CENTRAL LINE ADMINISTRATION ONLY)  40 mEq Intravenous PRN Aaron Ontiveros PA-C        potassium chloride 20 mEq in 100 mL IVPB (FOR CENTRAL LINE ADMINISTRATION ONLY)  60 mEq Intravenous PRN Aaron Ontiveros PA-C        [COMPLETED] potassium phosphate 30 mmol in dextrose 5 % (D5W) 500 mL infusion  30 mmol Intravenous Once Kushal Kamara MD   Stopped at 05/04/24 2118    sodium bicarbonate tablet 650 mg  650 mg Oral BID Miller Zimmerman MD   650 mg at 05/09/24 0935    sodium chloride 0.9% flush 10 mL  10 mL Intravenous PRN Rubia Seymour MD        sodium phosphate 15 mmol in dextrose 5 % (D5W) 250 mL IVPB  15 mmol Intravenous PRN Aaron Ontiveros PA-C        sodium phosphate 20.01 mmol in dextrose 5 % (D5W) 250 mL IVPB  20.01 mmol Intravenous PRN Aaron Ontiveros PA-C        sodium phosphate 30 mmol in dextrose 5 % (D5W) 250 mL IVPB  30 mmol Intravenous PRN Aaron Ontiveros PA-C         Outpatient Medications as of 5/9/2024   Medication Sig Dispense Refill    aspirin (ECOTRIN) 81 MG EC tablet Take 81 mg by mouth once daily.      gabapentin (NEURONTIN) 100 MG capsule TAKE 2 CAPSULES BY MOUTH TWICE DAILY, EVERY MORNING AND AT NOON 60 capsule 6    gabapentin (NEURONTIN) 300 MG capsule TAKE 1 CAPSULE(300 MG) BY MOUTH EVERY EVENING 30 capsule 2    icosapent ethyL (VASCEPA) 1 gram Cap BID      insulin glargine (LANTUS U-100 INSULIN) 100 unit/mL injection Inject 46 units in the am, and 20 units at night 20 mL 6    isosorbide mononitrate (IMDUR) 30 MG 24 hr tablet Take 30 mg by mouth once daily.      loratadine (CLARITIN) 10 mg tablet TAKE 1 TABLET BY MOUTH EVERY DAY 90 tablet 3    nortriptyline (PAMELOR) 25 MG capsule TAKE 1 CAPSULE BY MOUTH EVERY DAY AT BEDTIME FOR SLEEP 30  "capsule 3    omeprazole (PRILOSEC) 40 MG capsule TAKE 1 CAPSULE BY MOUTH DAILY AS NEEDED FOR REFLUX (Patient taking differently: Take 40 mg by mouth every morning.) 90 capsule 3    rosuvastatin (CRESTOR) 40 MG Tab Take 40 mg by mouth every evening.      ticagrelor (BRILINTA) 90 mg tablet Take 90 mg by mouth once daily.      TRADJENTA 5 mg Tab tablet TAKE 1 TABLET(5 MG) BY MOUTH EVERY DAY 90 tablet 3    ALCOHOL PREP PADS PadM       clopidogreL (PLAVIX) 75 mg tablet Take 1 tablet (75 mg total) by mouth once daily. 30 tablet 11    COMFORT EZ INSULIN SYRINGE 0.5 mL 31 gauge x 5/16" Syrg       denosumab (PROLIA) 60 mg/mL Syrg Inject 1 mL (60 mg total) into the skin every 6 (six) months. 1 mL 1    fluticasone propionate (FLONASE) 50 mcg/actuation nasal spray 2 sprays (100 mcg total) by Each Nostril route once daily. 16 g 3    nebulizer accessories Kit Please dispense one nebulizer accessories kit for appropriate nebulizer machine. 1 kit 0    nebulizer and compressor Veronica Please dispense one nebulizer machine with appropriate supplies. 1 each 0    pen needle, diabetic 32 gauge x 5/32" Ndle 1 each by Misc.(Non-Drug; Combo Route) route 2 (two) times a day. 100 each 12        Cardiovascular:    Cardiovascular Review: Within definable limits (WDL)    Telemetry: No    Rhythm:  NSR    AICD: No      Respiratory:    Oxygen:  3L nasal canula    CPT/Frequency: N/A    Incentive Spirometer/Frequency: N/A    CPAP/Settings: N/A    BiPAP/Settings: N/A    Oxygen Saturation: N/A    Suction Frequency: N/A        Nutrition:      Ht Readings from Last 3 Encounters:   05/03/24 5' 5" (1.651 m)   04/30/24 5' 5" (1.651 m)   04/25/24 5' 5" (1.651 m)     Wt Readings from Last 1 Encounters:   05/09/24 0617 75 kg (165 lb 6.4 oz)   05/08/24 0536 76.4 kg (168 lb 8 oz)   05/07/24 0604 72.4 kg (159 lb 9.8 oz)   05/06/24 0545 77.4 kg (170 lb 10.2 oz)   05/04/24 0600 83.2 kg (183 lb 6.8 oz)   05/03/24 0546 71.9 kg (158 lb 8.2 oz)       Feeding " Status:   Current Diet: Diabetic 1800 calorie   Supplements: Boost with all meals   Tube Feeding: N/A   Flushes: N/A      Integumentary:    Integumentary: Within definable limits (WDL)              Wound 05/03/24 Incision anterior Sternal (Active)   05/03/24    Present on Original Admission: N   Primary Wound Type: Incision   Side:    Orientation: anterior   Location: Sternal   Wound Approximate Age at First Assessment (Weeks):    Wound Number:    Is this injury device related?:    Incision Type:    Closure Method: Sutures   Wound Description (Comments):    Type:    Additional Comments: Prevena   Ankle-Brachial Index:    Pulses:    Removal Indication and Assessment:    Wound Outcome:    Dressing Appearance Dry;Intact;Clean 05/09/24 0800   Drainage Amount None 05/09/24 0800   Appearance Intact;Pink 05/09/24 0800   Periwound Area Intact 05/09/24 0800   Wound Edges Approximated 05/09/24 0800   Care Povidone iodine 05/09/24 0800   Dressing Changed 05/09/24 0800   Number of days: 6            Wound 05/03/24 Incision Left medial Knee (Active)   05/03/24    Present on Original Admission: N   Primary Wound Type: Incision   Side: Left   Orientation: medial   Location: Knee   Wound Approximate Age at First Assessment (Weeks):    Wound Number:    Is this injury device related?:    Incision Type:    Closure Method: Sutures   Wound Description (Comments):    Type:    Additional Comments: Island   Ankle-Brachial Index:    Pulses:    Removal Indication and Assessment:    Wound Outcome:    Dressing Appearance Open to air 05/09/24 0800   Drainage Amount None 05/09/24 0800   Appearance Intact;Pink 05/09/24 0800   Periwound Area Blistered 05/07/24 0800   Dressing Island/border 05/08/24 2000   Number of days: 6            Wound 05/03/24 Incision Left anterior Groin (Active)   05/03/24    Present on Original Admission: N   Primary Wound Type: Incision   Side: Left   Orientation: anterior   Location: Groin   Wound Approximate Age at  First Assessment (Weeks):    Wound Number:    Is this injury device related?:    Incision Type:    Closure Method: Sutures   Wound Description (Comments):    Type:    Additional Comments: Bandaid   Ankle-Brachial Index:    Pulses:    Removal Indication and Assessment:    Wound Outcome:    Dressing Appearance Open to air 05/09/24 0800   Drainage Amount None 05/09/24 0800   Appearance Intact;Pink 05/09/24 0800   Periwound Area Intact 05/09/24 0800   Dressing Island/border 05/07/24 0800   Number of days: 6            Wound 05/06/23 0900 Pressure Injury Buttocks (Active)   05/06/23 0900   Present on Original Admission:    Primary Wound Type: Pressure inj   Side:    Orientation:    Location: Buttocks   Wound Approximate Age at First Assessment (Weeks):    Wound Number:    Is this injury device related?:    Incision Type:    Closure Method:    Wound Description (Comments):    Type:    Additional Comments:    Ankle-Brachial Index:    Pulses:    Removal Indication and Assessment:    Wound Outcome:    Wound Image   05/06/24 1412   Pressure Injury Stage DTPI 05/08/24 2000   Dressing Appearance Dry;Intact;Clean 05/09/24 0800   Drainage Amount None 05/09/24 0800   Drainage Characteristics/Odor No odor 05/08/24 0800   Appearance Intact;Maroon;Purple 05/09/24 0800   Tissue loss description Not applicable 05/06/24 1412   Black (%), Wound Tissue Color 0 % 05/06/24 1412   Red (%), Wound Tissue Color 100 % 05/06/24 1412   Yellow (%), Wound Tissue Color 0 % 05/06/24 1412   Periwound Area Intact;Dry;Pink 05/09/24 0800   Wound Edges Irregular 05/08/24 0800   Care Cleansed with:;Soap and water 05/08/24 0800   Dressing Foam 05/09/24 0800   Dressing Change Due 05/10/24 05/09/24 0800   Number of days: 369         Musculoskeletal:    Transfer assist: Moderate Assistance    Weight Bearing Status: N/A    Comments:  sternal precautions    ADL Assist: Moderate Assistance    Special Equipment: walker    Radiology:    Radiology (Last 168 hours)                05/09 1129 Cardiac monitoring strips     05/09 0650 Cardiac monitoring strips     05/09 0301 Cardiac monitoring strips     05/08 2259 Cardiac monitoring strips     05/08 1932 Cardiac monitoring strips     05/08 1416 Cardiac monitoring strips     05/08 1049 Cardiac monitoring strips     05/08 0618 Cardiac monitoring strips     05/08 0544 X-Ray Chest PA And Lateral       05/08 0228 Cardiac monitoring strips     05/07 2238 Cardiac monitoring strips     05/07 1908 Cardiac monitoring strips     05/07 1548 Cardiac monitoring strips     05/07 1059 Cardiac monitoring strips     05/07 0623 Cardiac monitoring strips     05/07 0538 X-Ray Chest 1 View       05/07 0214 Cardiac monitoring strips     05/07 0214 Cardiac monitoring strips     05/07 0214 Cardiac monitoring strips     05/06 1451 Cardiac monitoring strips     05/06 1044 Cardiac monitoring strips     05/06 0837 Cardiac monitoring strips     05/06 0428 X-Ray Chest AP Portable       05/05 0421 X-Ray Chest AP Portable       05/04 0500 X-Ray Chest AP Portable       05/03 1055 X-Ray Chest AP Portable       05/03 0959 Cardiac catheterization       05/03 0000 Cardiac monitoring strips              X-Ray Chest PA And Lateral  Narrative: EXAMINATION:  XR CHEST PA AND LATERAL    CLINICAL HISTORY:  chest tube removal;    TECHNIQUE:  Two views of the chest    COMPARISON:  05/07/2024    FINDINGS:  Postsurgical changes of median sternotomy remain with left lower lobe opacification.  No acute osseous abnormality.  Impression: As above.    Electronically signed by: David Gomez  Date:    05/08/2024  Time:    06:19      Lab/Cultures:    Comprehensive Metabolic Panel [[id number]] (Abnormal) Collected: 05/09/24 0414   Specimen: Blood Updated: 05/09/24 0538    Sodium 139 mmol/L     Potassium 4.5 mmol/L     Chloride 107 mmol/L     CO2 23 mmol/L     Glucose 183 High  mg/dL     Blood Urea Nitrogen 23.5 High  mg/dL     Creatinine 1.30 High  mg/dL     Calcium 8.2 Low  mg/dL      "Protein Total 5.5 Low  gm/dL     Albumin 2.8 Low  g/dL     Globulin 2.7 gm/dL     Albumin/Globulin Ratio 1.0 Low  ratio     Bilirubin Total 0.4 mg/dL     ALP 53 unit/L     ALT 5 unit/L     AST 20 unit/L     eGFR 41 mL/min/1.73/m2      CBC with Differential [[id number]] (Abnormal) Collected: 05/09/24 0414   Specimen: Blood Updated: 05/09/24 0515    WBC 7.90 x10(3)/mcL     RBC 4.04 Low  x10(6)/mcL     Hgb 11.7 Low  g/dL     Hct 34.8 Low  %     MCV 86.1 fL     MCH 29.0 pg     MCHC 33.6 g/dL     RDW 14.0 %     Platelet 230 x10(3)/mcL     MPV 9.9 fL     Neut % 58.5 %     Lymph % 27.1 %     Mono % 10.0 %     Eos % 3.4 %     Basophil % 0.5 %     Lymph # 2.14 x10(3)/mcL     Neut # 4.62 x10(3)/mcL     Mono # 0.79 x10(3)/mcL     Eos # 0.27 x10(3)/mcL     Baso # 0.04 x10(3)/mcL     IG# 0.04 x10(3)/mcL     IG% 0.5 %     NRBC% 0.0 %        BUN   Date Value Ref Range Status   04/29/2024 35 (H) 8 - 27 mg/dL Final   01/29/2024 35 (H) 8 - 27 mg/dL Final     Blood Urea Nitrogen   Date Value Ref Range Status   05/09/2024 23.5 (H) 9.8 - 20.1 mg/dL Final   05/08/2024 27.9 (H) 9.8 - 20.1 mg/dL Final     Creatinine   Date Value Ref Range Status   05/09/2024 1.30 (H) 0.55 - 1.02 mg/dL Final   05/08/2024 1.52 (H) 0.55 - 1.02 mg/dL Final   04/29/2024 2.44 (H) 0.57 - 1.00 mg/dL Final   01/29/2024 1.76 (H) 0.57 - 1.00 mg/dL Final     WBC   Date Value Ref Range Status   05/09/2024 7.90 4.50 - 11.50 x10(3)/mcL Final   05/08/2024 8.38 4.50 - 11.50 x10(3)/mcL Final      Urine Culture   Date Value Ref Range Status   04/26/2024 No Significant Growth  Final     Urine Culture, Comprehensive   Date Value Ref Range Status   08/24/2023 Final report  Final     No results for input(s): "PH", "PCO2", "PO2", "HCO3", "POCSATURATED", "BE" in the last 72 hours.       "

## 2024-05-09 NOTE — PT/OT/SLP PROGRESS
Occupational Therapy      Patient Name:  Rosalba Whitlock   MRN:  [id number]    Attempted OT treatment session x2, this morning pt just finished ambulating to bathroom with RN and requesting OT follow up in pm. This afternoon, pt just completing PT session and fatigued. Pt stating she will participate tomorrow. Will follow-up 5/9.    5/9/2024

## 2024-05-10 LAB
ALBUMIN SERPL-MCNC: 2.8 G/DL (ref 3.4–4.8)
ALBUMIN/GLOB SERPL: 1 RATIO (ref 1.1–2)
ALP SERPL-CCNC: 54 UNIT/L (ref 40–150)
ALT SERPL-CCNC: 6 UNIT/L (ref 0–55)
AST SERPL-CCNC: 20 UNIT/L (ref 5–34)
BASOPHILS # BLD AUTO: 0.06 X10(3)/MCL
BASOPHILS NFR BLD AUTO: 0.7 %
BILIRUB SERPL-MCNC: 0.4 MG/DL
BUN SERPL-MCNC: 26.8 MG/DL (ref 9.8–20.1)
CALCIUM SERPL-MCNC: 8.3 MG/DL (ref 8.4–10.2)
CHLORIDE SERPL-SCNC: 107 MMOL/L (ref 98–107)
CO2 SERPL-SCNC: 24 MMOL/L (ref 23–31)
CREAT SERPL-MCNC: 1.33 MG/DL (ref 0.55–1.02)
EOSINOPHIL # BLD AUTO: 0.32 X10(3)/MCL (ref 0–0.9)
EOSINOPHIL NFR BLD AUTO: 3.7 %
ERYTHROCYTE [DISTWIDTH] IN BLOOD BY AUTOMATED COUNT: 14.1 % (ref 11.5–17)
GFR SERPLBLD CREATININE-BSD FMLA CKD-EPI: 40 ML/MIN/1.73/M2
GLOBULIN SER-MCNC: 2.8 GM/DL (ref 2.4–3.5)
GLUCOSE SERPL-MCNC: 161 MG/DL (ref 82–115)
HCT VFR BLD AUTO: 36.6 % (ref 37–47)
HGB BLD-MCNC: 12.1 G/DL (ref 12–16)
IMM GRANULOCYTES # BLD AUTO: 0.06 X10(3)/MCL (ref 0–0.04)
IMM GRANULOCYTES NFR BLD AUTO: 0.7 %
LYMPHOCYTES # BLD AUTO: 2.59 X10(3)/MCL (ref 0.6–4.6)
LYMPHOCYTES NFR BLD AUTO: 29.6 %
MAGNESIUM SERPL-MCNC: 2.1 MG/DL (ref 1.6–2.6)
MCH RBC QN AUTO: 29.2 PG (ref 27–31)
MCHC RBC AUTO-ENTMCNC: 33.1 G/DL (ref 33–36)
MCV RBC AUTO: 88.2 FL (ref 80–94)
MONOCYTES # BLD AUTO: 0.89 X10(3)/MCL (ref 0.1–1.3)
MONOCYTES NFR BLD AUTO: 10.2 %
NEUTROPHILS # BLD AUTO: 4.82 X10(3)/MCL (ref 2.1–9.2)
NEUTROPHILS NFR BLD AUTO: 55.1 %
NRBC BLD AUTO-RTO: 0 %
PLATELET # BLD AUTO: 228 X10(3)/MCL (ref 130–400)
PMV BLD AUTO: 9.7 FL (ref 7.4–10.4)
POCT GLUCOSE: 158 MG/DL (ref 70–110)
POCT GLUCOSE: 220 MG/DL (ref 70–110)
POCT GLUCOSE: 234 MG/DL (ref 70–110)
POTASSIUM SERPL-SCNC: 4.4 MMOL/L (ref 3.5–5.1)
PROT SERPL-MCNC: 5.6 GM/DL (ref 5.8–7.6)
RBC # BLD AUTO: 4.15 X10(6)/MCL (ref 4.2–5.4)
SODIUM SERPL-SCNC: 139 MMOL/L (ref 136–145)
WBC # SPEC AUTO: 8.74 X10(3)/MCL (ref 4.5–11.5)

## 2024-05-10 PROCEDURE — 83735 ASSAY OF MAGNESIUM: CPT | Performed by: NURSE PRACTITIONER

## 2024-05-10 PROCEDURE — 36415 COLL VENOUS BLD VENIPUNCTURE: CPT | Performed by: NURSE PRACTITIONER

## 2024-05-10 PROCEDURE — 63600175 PHARM REV CODE 636 W HCPCS: Performed by: INTERNAL MEDICINE

## 2024-05-10 PROCEDURE — 85025 COMPLETE CBC W/AUTO DIFF WBC: CPT | Performed by: NURSE PRACTITIONER

## 2024-05-10 PROCEDURE — 25000003 PHARM REV CODE 250: Performed by: THORACIC SURGERY (CARDIOTHORACIC VASCULAR SURGERY)

## 2024-05-10 PROCEDURE — 97535 SELF CARE MNGMENT TRAINING: CPT

## 2024-05-10 PROCEDURE — 25000003 PHARM REV CODE 250: Performed by: NURSE PRACTITIONER

## 2024-05-10 PROCEDURE — 25000003 PHARM REV CODE 250: Performed by: INTERNAL MEDICINE

## 2024-05-10 PROCEDURE — 25000003 PHARM REV CODE 250: Performed by: PHYSICIAN ASSISTANT

## 2024-05-10 PROCEDURE — 25000003 PHARM REV CODE 250

## 2024-05-10 PROCEDURE — 97164 PT RE-EVAL EST PLAN CARE: CPT

## 2024-05-10 PROCEDURE — 94799 UNLISTED PULMONARY SVC/PX: CPT

## 2024-05-10 PROCEDURE — 80053 COMPREHEN METABOLIC PANEL: CPT | Performed by: NURSE PRACTITIONER

## 2024-05-10 PROCEDURE — 21400001 HC TELEMETRY ROOM

## 2024-05-10 PROCEDURE — 97530 THERAPEUTIC ACTIVITIES: CPT

## 2024-05-10 PROCEDURE — 99024 POSTOP FOLLOW-UP VISIT: CPT | Mod: ,,,

## 2024-05-10 PROCEDURE — 63600175 PHARM REV CODE 636 W HCPCS: Performed by: PHYSICIAN ASSISTANT

## 2024-05-10 RX ADMIN — INSULIN ASPART 2 UNITS: 100 INJECTION, SOLUTION INTRAVENOUS; SUBCUTANEOUS at 05:05

## 2024-05-10 RX ADMIN — ASPIRIN 81 MG: 81 TABLET, COATED ORAL at 08:05

## 2024-05-10 RX ADMIN — METOPROLOL TARTRATE 25 MG: 25 TABLET, FILM COATED ORAL at 08:05

## 2024-05-10 RX ADMIN — FAMOTIDINE 20 MG: 20 TABLET, FILM COATED ORAL at 08:05

## 2024-05-10 RX ADMIN — SODIUM BICARBONATE 650 MG TABLET 650 MG: at 08:05

## 2024-05-10 RX ADMIN — DOCUSATE SODIUM 100 MG: 100 CAPSULE, LIQUID FILLED ORAL at 08:05

## 2024-05-10 RX ADMIN — GABAPENTIN 100 MG: 100 CAPSULE ORAL at 08:05

## 2024-05-10 RX ADMIN — METOPROLOL TARTRATE 25 MG: 25 TABLET, FILM COATED ORAL at 03:05

## 2024-05-10 RX ADMIN — GABAPENTIN 100 MG: 100 CAPSULE ORAL at 03:05

## 2024-05-10 RX ADMIN — ATORVASTATIN CALCIUM 40 MG: 40 TABLET, FILM COATED ORAL at 08:05

## 2024-05-10 RX ADMIN — HYDROCODONE BITARTRATE AND ACETAMINOPHEN 1 TABLET: 5; 325 TABLET ORAL at 08:05

## 2024-05-10 RX ADMIN — HYDROCODONE BITARTRATE AND ACETAMINOPHEN 1 TABLET: 5; 325 TABLET ORAL at 03:05

## 2024-05-10 RX ADMIN — FOLIC ACID 1 MG: 1 TABLET ORAL at 08:05

## 2024-05-10 RX ADMIN — ENOXAPARIN SODIUM 30 MG: 30 INJECTION SUBCUTANEOUS at 03:05

## 2024-05-10 NOTE — PROGRESS NOTES
[address]  Wound Care    Patient Name:  Rosalba Whitlock   MRN:  [id number]  Date: 5/10/2024  Diagnosis: <principal problem not specified>    History:     Past Medical History:   Diagnosis Date    Amnesia 06/13/2022    Benign paroxysmal positional vertigo, bilateral 06/13/2022    Bronchitis 06/08/2022    Chronic gouty arthritis 06/13/2022    Coronary artery disease involving native coronary artery of native heart without angina pectoris 12/04/2023    Diabetes mellitus, type 2     Essential hypertension 04/21/2016    Gastroesophageal reflux disease without esophagitis 04/21/2016    GERD (gastroesophageal reflux disease)     Gout, unspecified     Heart attack     Low back pain 06/13/2022    lower back pain bilateral with movement; radiates down right leg to thigh    Low vitamin D level 06/13/2022    Metabolic acidosis 01/12/2023    Microalbuminuria 01/12/2023    Migraine headache 04/21/2016    Osteoporosis 06/13/2022    Peripheral edema 06/13/2022    Physical deconditioning 06/13/2022    Pure hypercholesterolemia 06/13/2022    SOBOE (shortness of breath on exertion)     Spondylosis of lumbar spine 06/13/2022    Stage 3 chronic kidney disease 06/13/2022    Type 2 diabetes mellitus 06/13/2022    Weakness        Social History     Socioeconomic History    Marital status:    Tobacco Use    Smoking status: Former     Types: Cigarettes    Smokeless tobacco: Never    Tobacco comments:     Quit 20 years ago   Substance and Sexual Activity    Alcohol use: Never    Drug use: Never     Social Determinants of Health     Food Insecurity: No Food Insecurity (5/6/2024)    Hunger Vital Sign     Worried About Running Out of Food in the Last Year: Never true     Ran Out of Food in the Last Year: Never true   Transportation Needs: No Transportation Needs (5/6/2024)    PRAPARE - Transportation     Lack of Transportation (Medical): No     Lack of Transportation (Non-Medical): No        Precautions:     Allergies as of 04/25/2024 - Reviewed 04/25/2024   Allergen Reaction Noted    Amlodipine  06/02/2015       WO Assessment Details/Treatment      05/10/24 1157   WO Assessment   Visit Date 05/10/24   Visit Time 1157   Consult Type Follow Up   [facility]   Intervention chart review;assessed;applied   Teaching on-going        Wound 05/06/23 0900 Pressure Injury Buttocks   Date First Assessed/Time First Assessed: 05/06/23 0900   Primary Wound Type: Pressure Injury  Location: Buttocks   Wound Image    Pressure Injury Stage DTPI   Dressing Appearance Open to air   Drainage Amount None   Drainage Characteristics/Odor No odor   Appearance Intact;Maroon;Purple;Dry   Tissue loss description Not applicable  (DTI)   Black (%), Wound Tissue Color 0 %   Red (%), Wound Tissue Color 100 %   Yellow (%), Wound Tissue Color 0 %   Periwound Area Intact;Dry;Pink   Wound Edges Irregular   Care Cleansed with:;Soap and water   Dressing Applied;Foam     WO follow up for buttocks. Discussed plan of care with nurse Walker prior to visiting the patient. Family at bedside treatment recommendations in place. Assigned nurse assisted with repositioning during this visit. New photograph taken for EMR. Educated the patient on the importance of offloading buttocks area whether in chair or bed. Nursing to continue with treatment recommendations and other preventative measures. Patient on ADDIE mattress. No further questions at this time from patient, family or staff. Will follow up.   05/10/2024

## 2024-05-10 NOTE — PT/OT/SLP PROGRESS
Occupational Therapy   Treatment    Name: Rosalba Whitlock  MRN: [id number]  Admitting Diagnosis:  <principal problem not specified>  7 Days Post-Op    Recommendations:     Recommended therapy intensity at discharge: Moderate Intensity Therapy   Discharge Equipment Recommendations:  to be determined by next level of care  Barriers to discharge:  Decreased caregiver support (ongoing medcial needs)    Assessment:     Rosalba Whitlock is a 83 y.o. female with a medical diagnosis of Severe CAD s/p median sternotomy c attempted CABG, hypotension.  She presents with poor fictional endurance. Performance deficits affecting function are weakness, impaired endurance, impaired self care skills, impaired functional mobility, pain, edema, impaired cardiopulmonary response to activity, decreased upper extremity function, other (comment) (sternal precautions). Patient would benefit from moderate intensity therapy upon discharge.    Patient was able to ambulate to bathroom with CGA using RW on 3L of O2 with stats between 99-82%. Patient with c/o dizziness when ambulating. She required cues for sternal precautions and diaphragmatic breathing to increase O2. She was able to stand at sink to brush her teeth but needed to sit immediately after due to SOB. Patient was able to recover quickly by sitting down each time O2 dropped with activity.    Rehab Prognosis:  Fair; patient would benefit from acute skilled OT services to address these deficits and reach maximum level of function.       Plan:     Patient to be seen 5 x/week to address the above listed problems via self-care/home management, therapeutic activities, therapeutic exercises  Plan of Care Expires: 06/02/24  Plan of Care Reviewed with: patient, sibling    Subjective     Pain/Comfort:  Pain Rating 1: 0/10    Objective:     Communicated with: nurse prior to session.  Patient found up in chair with oxygen, pulse ox (continuous), telemetry, peripheral IV upon OT entry to  room.    General Precautions: Standard, fall, sternal    Orthopedic Precautions:N/A  Braces: N/A  Respiratory Status: Nasal cannula, flow 3 L/min  Vital Signs: Sp02: 99-82% while ambulating     Occupational Performance:   Functional Mobility/Transfers:  Patient completed Sit <> Stand Transfer with moderate assistance  with  no assistive device   Functional Mobility: ambulated to bathroom sink for grooming tasks with no LOB    Activities of Daily Living:  Grooming: contact guard assistance standing at sink to brush teeth  Toileting: total assistance purewick    Patient Education:  Patient provided with verbal education education regarding OT role/goals/POC, post op precautions, fall prevention, safety awareness, Discharge/DME recommendations, and pressure ulcer prevention.  Understanding was verbalized, however additional teaching warranted.      Patient left up in chair with all lines intact, call button in reach, geomat cushion, nurse notified, and family present.    GOALS:   Multidisciplinary Problems       Occupational Therapy Goals          Problem: Occupational Therapy    Goal Priority Disciplines Outcome Interventions   Occupational Therapy Goal     OT, PT/OT Progressing    Description: LTG: Pt will perform basic ADLs and ADL transfers with Modified independence and good compliance to sternal precautions using LRAD by discharge.    STG: to be met by 6/2/24:    Pt will complete grooming standing at sink with LRAD with SBA.  Pt will complete UB dressing with SBA.  Pt will complete LB dressing with SBA using LRAD and AE prn.  Pt will complete toileting with SBA using LRAD.  Pt will complete functional mobility to/from toilet and toilet transfer with SBA using LRAD.   Pt will independently verbalize sternal precautions with >90% accuracy.                        Time Tracking:     OT Date of Treatment: 05/10/24  OT Start Time: 1028  OT Stop Time: 1103  OT Total Time (min): 35 min    Billable Minutes:Self Care/Home  Management 35    OT/SUSAN: OT     Number of SUSAN visits since last OT visit: 3    5/10/2024

## 2024-05-10 NOTE — PT/OT/SLP RE-EVAL
Physical Therapy Re-Evaluation    Patient Name:  Rosalba Whitlock   MRN:  [id number]    Recommendations:     Discharge therapy intensity: Moderate Intensity Therapy   Discharge Equipment Recommendations: to be determined by next level of care   Barriers to discharge: Impaired mobility and Ongoing medical needs    Assessment:     Rosalba Whitlock is a 83 y.o. female admitted with a medical diagnosis of Severe CAD s/p median sternotomy c attempted CABG, hypotension .  She presents with the following impairments/functional limitations: weakness, impaired endurance, impaired functional mobility, impaired self care skills, gait instability, pain, edema, impaired cardiopulmonary response to activity . Pt progressing well with PT - currently mod assist for transfers and CGA for ambulation with RW; spO2 drops to low 80s with ambulation on 3L but resolves quickly with rest. Remains deconditioned below reported baseline and would benefit from moderate intensity therapy at DC.    Rehab Prognosis: Good; patient would benefit from acute skilled PT services to address these deficits and reach maximum level of function.    Recent Surgery: Procedure(s) (LRB):  CORONARY ARTERY BYPASS GRAFT (CABG) (N/A)  SURGICAL PROCUREMENT, VEIN, ENDOSCOPIC (Left)  PLATING, STERNAL (N/A) 7 Days Post-Op    Plan:     During this hospitalization, patient would benefit from acute PT services 5 x/week to address the identified rehab impairments via gait training, therapeutic activities, therapeutic exercises, neuromuscular re-education and progress toward the following goals:    Plan of Care Expires:  06/06/24    PT/PTA conference to discuss PT POC and patient's progression towards goals held with Harriett Hussein PTA.     Subjective     Chief Complaint: incisional pain  Patient/Family Comments/goals: get stronger  Pain/Comfort:  Pain Rating 1:  (c/o incisional pain with exertion)  Pain Addressed 1: Reposition, Cessation of Activity, Nurse  notified    Patients cultural, spiritual, Rastafarian conflicts given the current situation: no    Objective:     Communicated with RN prior to session.  Patient found up in chair with pulse ox (continuous), telemetry, oxygen  upon PT entry to room.    General Precautions: Standard, fall, sternal  Orthopedic Precautions:N/A   Braces: N/A  Respiratory Status: Nasal cannula, flow 3 L/min SpO2 81-97% with ax  Blood Pressure: NT; HR  bpm with ax      Exams:  RLE Strength: WFL  LLE Strength: WFL  Skin integrity:  sternal incision bandaged      Functional Mobility:  Bed Mobility:     Sit to Supine: maximal assistance and of 2 persons  Transfers:     Sit to Stand:  moderate assistance and of 1 persons with no AD  Toilet Transfer: minimum assistance with  rolling walker  using  Stand Pivot  Gait: 2x20ft with RW with CGA, slow pace, kyphotic. Occasional standing rest   Balance: Sitting = SBA      AM-PAC 6 CLICK MOBILITY  Total Score:12       Treatment & Education:  Ambulated to the restroom; (+) urine void on toilet, totalA for pericare  Multiple STS performed with cues for anterior lean and sternal precautions    Patient provided with verbal education education regarding PT role/goals/POC, post-op precautions, fall prevention, safety awareness, and discharge/DME recommendations.  Understanding was verbalized.     Patient left HOB elevated with all lines intact, call button in reach, and RN notified.    GOALS:   Multidisciplinary Problems       Physical Therapy Goals          Problem: Physical Therapy    Goal Priority Disciplines Outcome Goal Variances Interventions   Physical Therapy Goal     PT, PT/OT Progressing     Description: Goals to be met by: 6/6/24     Patient will increase functional independence with mobility by performing:    Supine to sit with Modified [place]  Sit to stand transfer with Modified [place]  Gait  x 200 feet with Modified [place] using Rolling Walker.                           History:     Past Medical History:   Diagnosis Date    Amnesia 06/13/2022    Benign paroxysmal positional vertigo, bilateral 06/13/2022    Bronchitis 06/08/2022    Chronic gouty arthritis 06/13/2022    Coronary artery disease involving native coronary artery of native heart without angina pectoris 12/04/2023    Diabetes mellitus, type 2     Essential hypertension 04/21/2016    Gastroesophageal reflux disease without esophagitis 04/21/2016    GERD (gastroesophageal reflux disease)     Gout, unspecified     Heart attack     Low back pain 06/13/2022    lower back pain bilateral with movement; radiates down right leg to thigh    Low vitamin D level 06/13/2022    Metabolic acidosis 01/12/2023    Microalbuminuria 01/12/2023    Migraine headache 04/21/2016    Osteoporosis 06/13/2022    Peripheral edema 06/13/2022    Physical deconditioning 06/13/2022    Pure hypercholesterolemia 06/13/2022    SOBOE (shortness of breath on exertion)     Spondylosis of lumbar spine 06/13/2022    Stage 3 chronic kidney disease 06/13/2022    Type 2 diabetes mellitus 06/13/2022    Weakness        Past Surgical History:   Procedure Laterality Date    COLONOSCOPY      CORONARY ARTERY BYPASS GRAFT (CABG) N/A 5/3/2024    Procedure: CORONARY ARTERY BYPASS GRAFT (CABG);  Surgeon: Loki Estrada MD;  Location: [facility] OR;  Service: Cardiovascular;  Laterality: N/A;    ENDOSCOPIC HARVEST OF VEIN Left 5/3/2024    Procedure: SURGICAL PROCUREMENT, VEIN, ENDOSCOPIC;  Surgeon: Loki Estrada MD;  Location: [facility] OR;  Service: Cardiovascular;  Laterality: Left;    EPIDURAL STEROID INJECTION INTO CERVICAL SPINE      EYE SURGERY Bilateral     cataract extraction    FOOT SURGERY Bilateral     HYSTERECTOMY      LEFT HEART CATHETERIZATION Left 12/04/2023    Procedure: Left heart cath;  Surgeon: Rubia Seymour MD;  Location: [facility] CATH LAB;  Service: Cardiology;  Laterality: Left;  LHC +/- PCI    PLATING OF STERNUM N/A 5/3/2024    Procedure: PLATING, STERNAL;   Surgeon: Loki Estrada MD;  Location: [facility] OR;  Service: Cardiovascular;  Laterality: N/A;    REPAIR, CHRONIC TOTAL OCCLUSION, CORONARY N/A 02/21/2024    Procedure: Repair, Chronic Total Occlusion, Coronary;  Surgeon: Emmanuel Riley MD;  Location: [facility] CATH LAB;  Service: Cardiology;  Laterality: N/A;   PCI RCA *START WITH PICTURES OF THE LAD AND CONSIDER IFR IF LAD LOOKS WORSE*  6F EBU 3.5 GUIDE 90 CM LM VIA RRA, 7F AL-1 GUIDE SH RCA VIA RT GROIN    STENT, DRUG ELUTING, SINGLE VESSEL, CORONARY  12/04/2023    Procedure: Stent, Drug Eluting, Single Vessel, Coronary;  Surgeon: Rubia Seymour MD;  Location: [facility] CATH LAB;  Service: Cardiology;;    TONSILLECTOMY      UPPER GASTROINTESTINAL ENDOSCOPY         Time Tracking:     PT Received On: 05/10/24  PT Start Time: 1419     PT Stop Time: 1449  PT Total Time (min): 30 min     Billable Minutes: Re-eval 20 and Therapeutic Activity 10      05/10/2024

## 2024-05-10 NOTE — PROGRESS NOTES
" [facility]    Cardiology  Progress Note    Patient Name: Rosalba Whitlock  MRN: [id number]  Admission Date: 5/3/2024  Hospital Length of Stay: 7 days  Code Status: Full Code   Attending Provider: Loki Estrada MD   Consulting Provider: Puja Nolan NP  Primary Care Physician: Eleazar Ramos MD  Principal Problem:<principal problem not specified>    Patient information was obtained from past medical records and ER records.     Subjective:     Reason for Consult: s/p Sternotomy with Ungraftable Anatomy x 2 Vessels    HPI: Ms. Whitlock is an 82 y/o female who is known to [facility], Dr. Fry. The patient was recently worked up by Dr. 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 She was referred to [facility] Surgery for a CABG Evaluation. She was deemed a candidate for CABG and on 5.3.24 she underwent a Sternotomy, unfortunately, she had ungraftable anatomy to both the RCA and LAD. She was transferred to ICU for further management. [facility] has been consulted for Recommendations.     Hospital Course:   5.5.24: NAD. Sitting in Bedside Chair. Denies SOB and Palps. + CP/Incisional. "I am ok." Off Pressors.   5.6.24: NAD. Sitting in Bedside Chair. Denies SOB and Palps. + CP/Incisional. "I am not feeling the best."   5.7.24: NAD. Sitting in Bedside Chair. Denies SOB and Palps. + CP/Incisional. "I am feeling much better."   5.8.24: NAD. Sitting in Bedside Chair. Denies SOB and Palps. + CP/Incisional. BUN/Crea 27.9/1.52, AST/ALT 23/7, K 4.6   5.9.24: NAD. Sitting in Bedside Chair. Denies SOB and Palps. + CP/Incisional. Bun/Crea 23.5/1.30, LFTs Normalized, K 4.5, Mg 2.1  5.10.24: NAD, VSS. She was seen working with OT. She denies SOB & Palpitations She reports mild incisional pain.     PMH: Vertigo, Amnesia, OA, MVCAD/Ungraftable post Sternotomy, DM II, HTN, GERD, MI, Chronic Lower Back pain, Migraines, HLD, CKD Stage III, Osteoporosis, Obesity, VI  PSH: Sternotomy/No Graftable Vessels (5.3.24), Angiogram, " Colonoscopy, Eye Surgery, Foot Surgery, KIA, Tonsillectomy, Epidural Steroid Injections, EGD  Family History: Father, D, ESRD; Mother, D, DVT      Previous Cardiac Diagnostics:   [facility] 2.21.24:  Findings:  1. Left main-normal  2. Lad-patent small mid stent, 60-70% stenosis distal to stent edge, IFR 0.86  3. LCX- codominant, Mid 50%, IFR 0.92  4. RCA-codominant Mid occlusion in fractured stent, left to right collaterals  Plan:  1. Maximize medical management  2. Discharge home today  3. Consult CTS as outpatient to evaluate for two-vessel bypass to distal LAD and poor and small.  Patient does not have good stenting options for apical LAD and RCA as well.  Will continue medical MNGT for now.    ECHO 11.21.23:  The study quality is average.   The left ventricle is normal in size. Global left ventricular systolic function is normal. The left ventricular ejection fraction is 60%. Left ventricular diastolic function is normal. Normal wall thickness.  No significant valvular dysfunction noted.     Carotid US 11.21.23:  The study quality is average.   1-39% stenosis in the mid right internal carotid artery based on Bluth Criteria.   1-39% stenosis in the proximal left internal carotid artery based on Bluth Criteria.   Antegrade right vertebral artery flow.   Antegrade left vertebral artery flow.    Review of patient's allergies indicates:   Allergen Reactions    Amlodipine      Skin crawling      Current Facility-Administered Medications on File Prior to Encounter   Medication    0.9%  NaCl infusion    diazePAM tablet 10 mg    diphenhydrAMINE capsule 50 mg    diphenhydrAMINE capsule 50 mg    sodium chloride 0.9% flush 10 mL     Current Outpatient Medications on File Prior to Encounter   Medication Sig    aspirin (ECOTRIN) 81 MG EC tablet Take 81 mg by mouth once daily.    gabapentin (NEURONTIN) 100 MG capsule TAKE 2 CAPSULES BY MOUTH TWICE DAILY, EVERY MORNING AND AT NOON    gabapentin (NEURONTIN) 300 MG capsule TAKE 1  "CAPSULE(300 MG) BY MOUTH EVERY EVENING    icosapent ethyL (VASCEPA) 1 gram Cap BID    insulin glargine (LANTUS U-100 INSULIN) 100 unit/mL injection Inject 46 units in the am, and 20 units at night    isosorbide mononitrate (IMDUR) 30 MG 24 hr tablet Take 30 mg by mouth once daily.    loratadine (CLARITIN) 10 mg tablet TAKE 1 TABLET BY MOUTH EVERY DAY    nortriptyline (PAMELOR) 25 MG capsule TAKE 1 CAPSULE BY MOUTH EVERY DAY AT BEDTIME FOR SLEEP    omeprazole (PRILOSEC) 40 MG capsule TAKE 1 CAPSULE BY MOUTH DAILY AS NEEDED FOR REFLUX (Patient taking differently: Take 40 mg by mouth every morning.)    rosuvastatin (CRESTOR) 40 MG Tab Take 40 mg by mouth every evening.    ticagrelor (BRILINTA) 90 mg tablet Take 90 mg by mouth once daily.    TRADJENTA 5 mg Tab tablet TAKE 1 TABLET(5 MG) BY MOUTH EVERY DAY    ALCOHOL PREP PADS PadM     clopidogreL (PLAVIX) 75 mg tablet Take 1 tablet (75 mg total) by mouth once daily.    COMFORT EZ INSULIN SYRINGE 0.5 mL 31 gauge x 5/16" Syrg     denosumab (PROLIA) 60 mg/mL Syrg Inject 1 mL (60 mg total) into the skin every 6 (six) months.    fluticasone propionate (FLONASE) 50 mcg/actuation nasal spray 2 sprays (100 mcg total) by Each Nostril route once daily.    nebulizer accessories Kit Please dispense one nebulizer accessories kit for appropriate nebulizer machine.    nebulizer and compressor Veronica Please dispense one nebulizer machine with appropriate supplies.    pen needle, diabetic 32 gauge x 5/32" Ndle 1 each by Misc.(Non-Drug; Combo Route) route 2 (two) times a day.     Review of Systems   Constitutional:  Positive for fatigue. Negative for fever.   Respiratory:  Negative for cough and shortness of breath.    Cardiovascular:  Positive for chest pain (Incisional). Negative for palpitations and leg swelling.   Gastrointestinal:  Negative for nausea.   All other systems reviewed and are negative.    Objective:     Vital Signs (Most Recent):  Temp: 97.6 °F (36.4 °C) (05/10/24 " 1108)  Pulse: 87 (05/10/24 1108)  Resp: 18 (05/10/24 0848)  BP: 106/63 (05/10/24 1108)  SpO2: (!) 94 % (05/10/24 1108) Vital Signs (24h Range):  Temp:  [97.3 °F (36.3 °C)-98 °F (36.7 °C)] 97.6 °F (36.4 °C)  Pulse:  [78-90] 87  Resp:  [18-19] 18  SpO2:  [94 %-98 %] 94 %  BP: (106-121)/(63-75) 106/63   Weight: 76.2 kg (167 lb 15.9 oz)  Body mass index is 27.96 kg/m².  SpO2: (!) 94 %       Intake/Output Summary (Last 24 hours) at 5/10/2024 1356  Last data filed at 5/10/2024 1155  Gross per 24 hour   Intake 840 ml   Output 600 ml   Net 240 ml     Lines/Drains/Airways       Drain  Duration             Female External Urinary Catheter w/ Suction 05/05/24 0600 5 days              Peripheral Intravenous Line  Duration                  Peripheral IV - Single Lumen 05/03/24 0545 18 G Distal;Posterior;Right Forearm 7 days                  Significant Labs:  Recent Results (from the past 72 hour(s))   POCT glucose    Collection Time: 05/07/24  4:11 PM   Result Value Ref Range    POCT Glucose 246 (H) 70 - 110 mg/dL   Comprehensive Metabolic Panel    Collection Time: 05/08/24  3:46 AM   Result Value Ref Range    Sodium 140 136 - 145 mmol/L    Potassium 4.6 3.5 - 5.1 mmol/L    Chloride 108 (H) 98 - 107 mmol/L    CO2 23 23 - 31 mmol/L    Glucose 155 (H) 82 - 115 mg/dL    Blood Urea Nitrogen 27.9 (H) 9.8 - 20.1 mg/dL    Creatinine 1.52 (H) 0.55 - 1.02 mg/dL    Calcium 8.3 (L) 8.4 - 10.2 mg/dL    Protein Total 5.9 5.8 - 7.6 gm/dL    Albumin 2.9 (L) 3.4 - 4.8 g/dL    Globulin 3.0 2.4 - 3.5 gm/dL    Albumin/Globulin Ratio 1.0 (L) 1.1 - 2.0 ratio    Bilirubin Total 0.5 <=1.5 mg/dL    ALP 63 40 - 150 unit/L    ALT 7 0 - 55 unit/L    AST 23 5 - 34 unit/L    eGFR 34 mL/min/1.73/m2   Magnesium    Collection Time: 05/08/24  3:46 AM   Result Value Ref Range    Magnesium Level 2.40 1.60 - 2.60 mg/dL   Phosphorus    Collection Time: 05/08/24  3:46 AM   Result Value Ref Range    Phosphorus Level 2.3 2.3 - 4.7 mg/dL   CBC with Differential     Collection Time: 05/08/24  3:46 AM   Result Value Ref Range    WBC 8.38 4.50 - 11.50 x10(3)/mcL    RBC 4.43 4.20 - 5.40 x10(6)/mcL    Hgb 12.8 12.0 - 16.0 g/dL    Hct 38.8 37.0 - 47.0 %    MCV 87.6 80.0 - 94.0 fL    MCH 28.9 27.0 - 31.0 pg    MCHC 33.0 33.0 - 36.0 g/dL    RDW 14.0 11.5 - 17.0 %    Platelet 194 130 - 400 x10(3)/mcL    MPV 9.8 7.4 - 10.4 fL    Neut % 65.5 %    Lymph % 21.2 %    Mono % 10.3 %    Eos % 2.0 %    Basophil % 0.5 %    Lymph # 1.78 0.6 - 4.6 x10(3)/mcL    Neut # 5.49 2.1 - 9.2 x10(3)/mcL    Mono # 0.86 0.1 - 1.3 x10(3)/mcL    Eos # 0.17 0 - 0.9 x10(3)/mcL    Baso # 0.04 <=0.2 x10(3)/mcL    IG# 0.04 0 - 0.04 x10(3)/mcL    IG% 0.5 %    NRBC% 0.0 %   POCT glucose    Collection Time: 05/08/24 11:04 AM   Result Value Ref Range    POCT Glucose 236 (H) 70 - 110 mg/dL   POCT glucose    Collection Time: 05/08/24  3:44 PM   Result Value Ref Range    POCT Glucose 217 (H) 70 - 110 mg/dL   POCT glucose    Collection Time: 05/08/24  8:31 PM   Result Value Ref Range    POCT Glucose 185 (H) 70 - 110 mg/dL   Comprehensive Metabolic Panel    Collection Time: 05/09/24  4:14 AM   Result Value Ref Range    Sodium 139 136 - 145 mmol/L    Potassium 4.5 3.5 - 5.1 mmol/L    Chloride 107 98 - 107 mmol/L    CO2 23 23 - 31 mmol/L    Glucose 183 (H) 82 - 115 mg/dL    Blood Urea Nitrogen 23.5 (H) 9.8 - 20.1 mg/dL    Creatinine 1.30 (H) 0.55 - 1.02 mg/dL    Calcium 8.2 (L) 8.4 - 10.2 mg/dL    Protein Total 5.5 (L) 5.8 - 7.6 gm/dL    Albumin 2.8 (L) 3.4 - 4.8 g/dL    Globulin 2.7 2.4 - 3.5 gm/dL    Albumin/Globulin Ratio 1.0 (L) 1.1 - 2.0 ratio    Bilirubin Total 0.4 <=1.5 mg/dL    ALP 53 40 - 150 unit/L    ALT 5 0 - 55 unit/L    AST 20 5 - 34 unit/L    eGFR 41 mL/min/1.73/m2   Magnesium    Collection Time: 05/09/24  4:14 AM   Result Value Ref Range    Magnesium Level 2.10 1.60 - 2.60 mg/dL   CBC with Differential    Collection Time: 05/09/24  4:14 AM   Result Value Ref Range    WBC 7.90 4.50 - 11.50 x10(3)/mcL    RBC 4.04  (L) 4.20 - 5.40 x10(6)/mcL    Hgb 11.7 (L) 12.0 - 16.0 g/dL    Hct 34.8 (L) 37.0 - 47.0 %    MCV 86.1 80.0 - 94.0 fL    MCH 29.0 27.0 - 31.0 pg    MCHC 33.6 33.0 - 36.0 g/dL    RDW 14.0 11.5 - 17.0 %    Platelet 230 130 - 400 x10(3)/mcL    MPV 9.9 7.4 - 10.4 fL    Neut % 58.5 %    Lymph % 27.1 %    Mono % 10.0 %    Eos % 3.4 %    Basophil % 0.5 %    Lymph # 2.14 0.6 - 4.6 x10(3)/mcL    Neut # 4.62 2.1 - 9.2 x10(3)/mcL    Mono # 0.79 0.1 - 1.3 x10(3)/mcL    Eos # 0.27 0 - 0.9 x10(3)/mcL    Baso # 0.04 <=0.2 x10(3)/mcL    IG# 0.04 0 - 0.04 x10(3)/mcL    IG% 0.5 %    NRBC% 0.0 %   POCT glucose    Collection Time: 05/09/24  4:47 AM   Result Value Ref Range    POCT Glucose 174 (H) 70 - 110 mg/dL   POCT glucose    Collection Time: 05/09/24 11:24 AM   Result Value Ref Range    POCT Glucose 190 (H) 70 - 110 mg/dL   POCT glucose    Collection Time: 05/09/24  4:43 PM   Result Value Ref Range    POCT Glucose 176 (H) 70 - 110 mg/dL   POCT glucose    Collection Time: 05/09/24  8:19 PM   Result Value Ref Range    POCT Glucose 220 (H) 70 - 110 mg/dL   Comprehensive Metabolic Panel    Collection Time: 05/10/24  4:10 AM   Result Value Ref Range    Sodium 139 136 - 145 mmol/L    Potassium 4.4 3.5 - 5.1 mmol/L    Chloride 107 98 - 107 mmol/L    CO2 24 23 - 31 mmol/L    Glucose 161 (H) 82 - 115 mg/dL    Blood Urea Nitrogen 26.8 (H) 9.8 - 20.1 mg/dL    Creatinine 1.33 (H) 0.55 - 1.02 mg/dL    Calcium 8.3 (L) 8.4 - 10.2 mg/dL    Protein Total 5.6 (L) 5.8 - 7.6 gm/dL    Albumin 2.8 (L) 3.4 - 4.8 g/dL    Globulin 2.8 2.4 - 3.5 gm/dL    Albumin/Globulin Ratio 1.0 (L) 1.1 - 2.0 ratio    Bilirubin Total 0.4 <=1.5 mg/dL    ALP 54 40 - 150 unit/L    ALT 6 0 - 55 unit/L    AST 20 5 - 34 unit/L    eGFR 40 mL/min/1.73/m2   Magnesium    Collection Time: 05/10/24  4:10 AM   Result Value Ref Range    Magnesium Level 2.10 1.60 - 2.60 mg/dL   CBC with Differential    Collection Time: 05/10/24  4:10 AM   Result Value Ref Range    WBC 8.74 4.50 - 11.50  x10(3)/mcL    RBC 4.15 (L) 4.20 - 5.40 x10(6)/mcL    Hgb 12.1 12.0 - 16.0 g/dL    Hct 36.6 (L) 37.0 - 47.0 %    MCV 88.2 80.0 - 94.0 fL    MCH 29.2 27.0 - 31.0 pg    MCHC 33.1 33.0 - 36.0 g/dL    RDW 14.1 11.5 - 17.0 %    Platelet 228 130 - 400 x10(3)/mcL    MPV 9.7 7.4 - 10.4 fL    Neut % 55.1 %    Lymph % 29.6 %    Mono % 10.2 %    Eos % 3.7 %    Basophil % 0.7 %    Lymph # 2.59 0.6 - 4.6 x10(3)/mcL    Neut # 4.82 2.1 - 9.2 x10(3)/mcL    Mono # 0.89 0.1 - 1.3 x10(3)/mcL    Eos # 0.32 0 - 0.9 x10(3)/mcL    Baso # 0.06 <=0.2 x10(3)/mcL    IG# 0.06 (H) 0 - 0.04 x10(3)/mcL    IG% 0.7 %    NRBC% 0.0 %   POCT glucose    Collection Time: 05/10/24  4:57 AM   Result Value Ref Range    POCT Glucose 158 (H) 70 - 110 mg/dL   POCT glucose    Collection Time: 05/10/24 12:48 PM   Result Value Ref Range    POCT Glucose 234 (H) 70 - 110 mg/dL     Telemetry: SR     Physical Exam  Constitutional:       General: She is not in acute distress.     Appearance: Normal appearance. She is obese.   HENT:      Head: Normocephalic.      Mouth/Throat:      Mouth: Mucous membranes are moist.   Cardiovascular:      Rate and Rhythm: Normal rate and regular rhythm.      Pulses: Normal pulses.      Heart sounds: Murmur heard.   Pulmonary:      Effort: Pulmonary effort is normal. No respiratory distress.      Breath sounds: Decreased breath sounds present.      Comments: NC O2  Abdominal:      Palpations: Abdomen is soft.   Musculoskeletal:         General: Normal range of motion.   Skin:     General: Skin is warm.      Comments: Midline Sternotomy Dressing C/D/I   Neurological:      General: No focal deficit present.      Mental Status: She is alert and oriented to person, place, and time.   Psychiatric:         Mood and Affect: Mood normal.         Behavior: Behavior normal.       Home Medications:   Current Facility-Administered Medications on File Prior to Encounter   Medication Dose Route Frequency Provider Last Rate Last Admin    0.9%  NaCl  "infusion   Intravenous Continuous Rubia Seymour MD   Stopped at 12/04/23 0713    diazePAM tablet 10 mg  10 mg Oral On Call Procedure Emmanuel Riley MD   10 mg at 02/21/24 1031    diphenhydrAMINE capsule 50 mg  50 mg Oral On Call Procedure Rubia Seymour MD   50 mg at 12/04/23 0700    diphenhydrAMINE capsule 50 mg  50 mg Oral On Call Procedure Emmanuel Riley MD   50 mg at 02/21/24 1031    sodium chloride 0.9% flush 10 mL  10 mL Intravenous PRN Rubia Seymour MD         Current Outpatient Medications on File Prior to Encounter   Medication Sig Dispense Refill    aspirin (ECOTRIN) 81 MG EC tablet Take 81 mg by mouth once daily.      gabapentin (NEURONTIN) 100 MG capsule TAKE 2 CAPSULES BY MOUTH TWICE DAILY, EVERY MORNING AND AT NOON 60 capsule 6    gabapentin (NEURONTIN) 300 MG capsule TAKE 1 CAPSULE(300 MG) BY MOUTH EVERY EVENING 30 capsule 2    icosapent ethyL (VASCEPA) 1 gram Cap BID      insulin glargine (LANTUS U-100 INSULIN) 100 unit/mL injection Inject 46 units in the am, and 20 units at night 20 mL 6    isosorbide mononitrate (IMDUR) 30 MG 24 hr tablet Take 30 mg by mouth once daily.      loratadine (CLARITIN) 10 mg tablet TAKE 1 TABLET BY MOUTH EVERY DAY 90 tablet 3    nortriptyline (PAMELOR) 25 MG capsule TAKE 1 CAPSULE BY MOUTH EVERY DAY AT BEDTIME FOR SLEEP 30 capsule 3    omeprazole (PRILOSEC) 40 MG capsule TAKE 1 CAPSULE BY MOUTH DAILY AS NEEDED FOR REFLUX (Patient taking differently: Take 40 mg by mouth every morning.) 90 capsule 3    rosuvastatin (CRESTOR) 40 MG Tab Take 40 mg by mouth every evening.      ticagrelor (BRILINTA) 90 mg tablet Take 90 mg by mouth once daily.      TRADJENTA 5 mg Tab tablet TAKE 1 TABLET(5 MG) BY MOUTH EVERY DAY 90 tablet 3    ALCOHOL PREP PADS PadM       clopidogreL (PLAVIX) 75 mg tablet Take 1 tablet (75 mg total) by mouth once daily. 30 tablet 11    COMFORT EZ INSULIN SYRINGE 0.5 mL 31 gauge x 5/16" Syrg       denosumab (PROLIA) 60 mg/mL Syrg Inject 1 mL " "(60 mg total) into the skin every 6 (six) months. 1 mL 1    fluticasone propionate (FLONASE) 50 mcg/actuation nasal spray 2 sprays (100 mcg total) by Each Nostril route once daily. 16 g 3    nebulizer accessories Kit Please dispense one nebulizer accessories kit for appropriate nebulizer machine. 1 kit 0    nebulizer and compressor Veronica Please dispense one nebulizer machine with appropriate supplies. 1 each 0    pen needle, diabetic 32 gauge x 5/32" Ndle 1 each by Misc.(Non-Drug; Combo Route) route 2 (two) times a day. 100 each 12     Current Inpatient Medications:    Current Facility-Administered Medications:     albumin human 5% bottle 12.5 g, 12.5 g, Intravenous, PRN, Aaron Ontiveros PA-C, Stopped at 05/03/24 1906    aspirin EC tablet 81 mg, 81 mg, Oral, Daily, Aaron Ontiveros PA-C, 81 mg at 05/10/24 0840    atorvastatin tablet 40 mg, 40 mg, Oral, Daily, Prakash Peres ANP, 40 mg at 05/10/24 0840    calcium gluconate 1 g in NS IVPB (premixed), 1 g, Intravenous, PRN, Aaron Ontiveros, PA-C    calcium gluconate 1 g in NS IVPB (premixed), 2 g, Intravenous, PRN, Aaron Ontiveros, PA-C    calcium gluconate 1 g in NS IVPB (premixed), 3 g, Intravenous, PRN, Aaron Ontiveros, PA-C    dextrose 10% bolus 125 mL 125 mL, 12.5 g, Intravenous, PRN, Aaron Ontiveros, PA-C    dextrose 10% bolus 125 mL 125 mL, 12.5 g, Intravenous, PRN, Langlinais, Frederick P, PA    dextrose 10% bolus 125 mL 125 mL, 12.5 g, Intravenous, PRN, Langlinais, Frederick P, PA    dextrose 10% bolus 250 mL 250 mL, 25 g, Intravenous, PRN, Aaron Ontiveros C, PA-C    dextrose 10% bolus 250 mL 250 mL, 25 g, Intravenous, PRN, Langlinais, Frederick P, PA    dextrose 10% bolus 250 mL 250 mL, 25 g, Intravenous, PRN, Langlinais, Frederick P, PA    docusate sodium capsule 100 mg, 100 mg, Oral, BID, Aaron Ontiveros PA-C, 100 mg at 05/10/24 0840    enoxaparin injection 30 mg, 30 mg, Subcutaneous, Daily, Jeovanny Wan MD, 30 mg at 05/09/24 1644    famotidine tablet 20 mg, 20 " mg, Oral, Daily, Loki Estrada MD, 20 mg at 05/10/24 0840    folic acid tablet 1 mg, 1 mg, Oral, Daily, Aaron Ontiveros PA-C, 1 mg at 05/10/24 0840    gabapentin capsule 100 mg, 100 mg, Oral, TID, Kimberly Catherine MD, 100 mg at 05/10/24 0840    glucagon (human recombinant) injection 1 mg, 1 mg, Intramuscular, PRN, Kurt Frederick P, PA    glucagon (human recombinant) injection 1 mg, 1 mg, Intramuscular, PRN, Cameronais, Frederick P, PA    glucose chewable tablet 16 g, 16 g, Oral, PRN, Seelinais, Frederick P, PA    glucose chewable tablet 16 g, 16 g, Oral, PRN, Seelinais, Frederick P, PA    glucose chewable tablet 24 g, 24 g, Oral, PRN, Seelinais, Frederick P, PA    glucose chewable tablet 24 g, 24 g, Oral, PRN, Kurt Frederick P, PA    HYDROcodone-acetaminophen 5-325 mg per tablet 1 tablet, 1 tablet, Oral, Q4H PRN, Jeovanny Wan MD, 1 tablet at 05/10/24 0848    insulin aspart U-100 injection 0-10 Units, 0-10 Units, Subcutaneous, QID (AC + HS) PRN, Frederick Hicks, PA, 2 Units at 05/10/24 0505    magnesium sulfate 2g in water 50mL IVPB (premix), 2 g, Intravenous, PRN, Aaron Ontiveros PA-C    magnesium sulfate 2g in water 50mL IVPB (premix), 4 g, Intravenous, PRN, Aaron Ontiveros PA-C    metoprolol tartrate (LOPRESSOR) tablet 25 mg, 25 mg, Oral, TID, Prakash Peres ANP, 25 mg at 05/10/24 0840    morphine injection 4 mg, 4 mg, Intravenous, Q4H PRN, Aaron nOtiveros PA-C, 4 mg at 05/03/24 2212    ondansetron injection 4 mg, 4 mg, Intravenous, Q4H PRN, Aaron Ontiveros PA-C, 4 mg at 05/04/24 1319    oxyCODONE immediate release tablet 10 mg, 10 mg, Oral, Q4H PRN, Aaron Ontiveros, PA-C    oxyCODONE immediate release tablet 5 mg, 5 mg, Oral, Q4H PRN, Jeovanny Wan MD, 5 mg at 05/05/24 2039    polyethylene glycol packet 17 g, 17 g, Oral, Daily, Miller Zimmerman MD, 17 g at 05/08/24 1502    potassium chloride 20 mEq in 100 mL IVPB (FOR CENTRAL LINE ADMINISTRATION ONLY), 20 mEq, Intravenous, PRN, Aaron Ontiveros  RANI HUGHES, Stopped at 05/03/24 1316    potassium chloride 20 mEq in 100 mL IVPB (FOR CENTRAL LINE ADMINISTRATION ONLY), 40 mEq, Intravenous, PRN, Aaron Ontiveros PA-C    potassium chloride 20 mEq in 100 mL IVPB (FOR CENTRAL LINE ADMINISTRATION ONLY), 60 mEq, Intravenous, PRN, Aaron Ontiveros PA-C    sodium bicarbonate tablet 650 mg, 650 mg, Oral, BID, Miller Zimmerman MD, 650 mg at 05/10/24 0839    sodium phosphate 15 mmol in dextrose 5 % (D5W) 250 mL IVPB, 15 mmol, Intravenous, PRN, Aaron Ontiveros PA-ELLEN    sodium phosphate 20.01 mmol in dextrose 5 % (D5W) 250 mL IVPB, 20.01 mmol, Intravenous, PRN, Aaron Ontiveros PA-C    sodium phosphate 30 mmol in dextrose 5 % (D5W) 250 mL IVPB, 30 mmol, Intravenous, PRN, Aaron Ontiveros PA-C    Facility-Administered Medications Ordered in Other Encounters:     0.9%  NaCl infusion, , Intravenous, Continuous, Rubia Seymour MD, Stopped at 12/04/23 0713    diazePAM tablet 10 mg, 10 mg, Oral, On Call Procedure, Emmanuel Riley MD, 10 mg at 02/21/24 1031    diphenhydrAMINE capsule 50 mg, 50 mg, Oral, On Call Procedure, Rubia Seymour MD, 50 mg at 12/04/23 0700    diphenhydrAMINE capsule 50 mg, 50 mg, Oral, On Call Procedure, Emmanuel Riley MD, 50 mg at 02/21/24 1031    sodium chloride 0.9% flush 10 mL, 10 mL, Intravenous, PRN, Rubia Seymour MD  VTE Risk Mitigation (From admission, onward)           Ordered     enoxaparin injection 30 mg  Daily         05/03/24 1336     Place sequential compression device  Until discontinued         05/03/24 0940     IP VTE HIGH RISK PATIENT  Once         05/03/24 0937                  Assessment:   MVCAD    - s/p Sternotomy (5.3.24) - Ungraftable LAD and RCA    - s/p LHC (2.21.24) - 1. Left main-normal. 2. Lad-patent small mid stent, 60-70% stenosis distal to stent edge, IFR 0.86. 3. LCX- codominant, Mid 50%, IFR 0.92. 4. RCA-codominant Mid occlusion in fractured stent, left to right collaterals    - ECHO (11.21.23) -  LVEF 60%  Hypotension requiring Pressors - Resolved     - Hx of HTN  Acute Hypoxemic Respiratory Failure requiring Intubation/Ventilation - Now on NC  BARTOLO/CKD III - Improving   Leukocytosis - Resolved   DM II  Diabetic Neuropathy  HLD  Hx of MI  Obesity  VI  Chronic Lower Back Pain  GERD  Migraines  OA  Electrolytes - Hyperkalemia - Resolved   No Hx of GIB     Plan:   Cont. ASA, Statin and BB  Aggressive Mobilization of PT and Q1HR IS  Follow up as OP for Solidification of Plan of Care with Dr. Riley   Cardiology to sign off, please reconsult if needed will f/u if needed     Puja Nolan NP  Cardiology  [facility]  05/10/2024

## 2024-05-10 NOTE — PROGRESS NOTES
CT SURGERY PROGRESS NOTE  Rosalba Whitlock  83 y.o.  1940    Patients Procedure: Procedure(s) (LRB):  CORONARY ARTERY BYPASS GRAFT (CABG) (N/A)  SURGICAL PROCUREMENT, VEIN, ENDOSCOPIC (Left)  PLATING, STERNAL (N/A)    Subjective  Interval History: Patient is postoperative day 7.  No acute events overnight. Resting comfortably in chair this morning, no significant complaints.  Denies any chest pain, shortness of breath, or palpitations.      Review of Systems   Constitutional:  Positive for malaise/fatigue. Negative for chills and fever.   Respiratory:  Negative for cough, shortness of breath and wheezing.    Cardiovascular:  Positive for chest pain (incisional). Negative for palpitations.   Gastrointestinal:  Negative for nausea and vomiting.       Medication List  Infusions    Scheduled   aspirin  81 mg Oral Daily    atorvastatin  40 mg Oral Daily    docusate sodium  100 mg Oral BID    enoxparin  30 mg Subcutaneous Daily    famotidine  20 mg Oral Daily    folic acid  1 mg Oral Daily    gabapentin  100 mg Oral TID    metoprolol tartrate  25 mg Oral TID    polyethylene glycol  17 g Oral Daily    sodium bicarbonate  650 mg Oral BID       Objective:  Recent Vitals:  Temp:  [97.3 °F (36.3 °C)-98 °F (36.7 °C)] 97.6 °F (36.4 °C)  Pulse:  [78-90] 87  Resp:  [18-19] 18  SpO2:  [94 %-98 %] 94 %  BP: (106-121)/(63-75) 106/63    Physical Exam  Constitutional:       Appearance: She is obese.      Interventions: Nasal cannula in place.   HENT:      Head: Normocephalic.      Mouth/Throat:      Mouth: Mucous membranes are moist.      Pharynx: Oropharynx is clear.   Cardiovascular:      Rate and Rhythm: Normal rate and regular rhythm.      Pulses: Normal pulses.      Heart sounds: Normal heart sounds. No murmur heard.     No friction rub. No gallop.   Pulmonary:      Effort: Pulmonary effort is normal. No respiratory distress.      Breath sounds: Decreased breath sounds present. No wheezing, rhonchi or rales.   Abdominal:       General: There is no distension.      Palpations: Abdomen is soft.   Musculoskeletal:      Cervical back: Normal range of motion and neck supple.      Right lower leg: No edema.      Left lower leg: No edema.   Skin:     General: Skin is warm and dry.      Comments: Median sternotomy incision c/d/i   Neurological:      Mental Status: She is alert and oriented to person, place, and time.   Psychiatric:         Mood and Affect: Mood normal.         Behavior: Behavior normal.          I/O last 24 hrs:  Intake/Output - Last 3 Shifts         05/08 0700 05/09 0659 05/09 0700  05/10 0659 05/10 0700  05/11 0659    P.O. 520 840     Total Intake(mL/kg) 520 (6.9) 840 (11)     Urine (mL/kg/hr) 950 (0.5)  600 (1)    Stool 0      Total Output 950  600    Net -430 +840 -600           Urine Occurrence  7 x     Stool Occurrence 2 x 1 x             Labs  CBC:   Recent Labs   Lab 05/10/24  0410   WBC 8.74   RBC 4.15*   HGB 12.1   HCT 36.6*      MCV 88.2   MCH 29.2   MCHC 33.1     CMP:   Recent Labs   Lab 05/10/24  0410   CALCIUM 8.3*   ALBUMIN 2.8*      K 4.4   CO2 24   BUN 26.8*   CREATININE 1.33*   ALKPHOS 54   ALT 6   AST 20   BILITOT 0.4     LFTs:   Recent Labs   Lab 05/10/24  0410   ALT 6   AST 20   ALKPHOS 54   BILITOT 0.4   ALBUMIN 2.8*     All pertinent labs from the last 24 hours have been reviewed.      ASSESSMENT/PLAN:    Severe coronary artery disease with instent restenosis   Type II DM  HTN  GERD  HLD  Stage 3 CKD    -s/p median sternotomy with attempted CABG and sternal plating  -Labs reviewed - all stavle  -Cardiology also following.  Defer definitive treatment plan to Dr. iRley.   -Continue to work with PT  / OT  -Aggressive IS use  -Rehab placement pending - per case management     Defer wound care assessment and management to wound care nurses. I did not personally rview this patients buttocks. All wound care issues related to nursing care and management are deferred to nursing management and  hospitalists service.    LIAM Joya

## 2024-05-10 NOTE — PLAN OF CARE
Problem: Physical Therapy  Goal: Physical Therapy Goal  Description: Goals to be met by: 6/6/24     Patient will increase functional independence with mobility by performing:    Supine to sit with Modified [place]  Sit to stand transfer with Modified [place]  Gait  x 200 feet with Modified [place] using Rolling Walker.     Outcome: Progressing

## 2024-05-11 LAB
POCT GLUCOSE: 208 MG/DL (ref 70–110)
POCT GLUCOSE: 222 MG/DL (ref 70–110)
POCT GLUCOSE: 226 MG/DL (ref 70–110)

## 2024-05-11 PROCEDURE — 25000003 PHARM REV CODE 250

## 2024-05-11 PROCEDURE — 25000003 PHARM REV CODE 250: Performed by: PHYSICIAN ASSISTANT

## 2024-05-11 PROCEDURE — 25000003 PHARM REV CODE 250: Performed by: THORACIC SURGERY (CARDIOTHORACIC VASCULAR SURGERY)

## 2024-05-11 PROCEDURE — 63600175 PHARM REV CODE 636 W HCPCS: Performed by: PHYSICIAN ASSISTANT

## 2024-05-11 PROCEDURE — 25000003 PHARM REV CODE 250: Performed by: INTERNAL MEDICINE

## 2024-05-11 PROCEDURE — 99024 POSTOP FOLLOW-UP VISIT: CPT | Mod: ,,, | Performed by: PHYSICIAN ASSISTANT

## 2024-05-11 PROCEDURE — 25000003 PHARM REV CODE 250: Performed by: NURSE PRACTITIONER

## 2024-05-11 PROCEDURE — 21400001 HC TELEMETRY ROOM

## 2024-05-11 PROCEDURE — 63600175 PHARM REV CODE 636 W HCPCS: Performed by: INTERNAL MEDICINE

## 2024-05-11 RX ADMIN — HYDROCODONE BITARTRATE AND ACETAMINOPHEN 1 TABLET: 5; 325 TABLET ORAL at 05:05

## 2024-05-11 RX ADMIN — FOLIC ACID 1 MG: 1 TABLET ORAL at 08:05

## 2024-05-11 RX ADMIN — GABAPENTIN 100 MG: 100 CAPSULE ORAL at 08:05

## 2024-05-11 RX ADMIN — INSULIN ASPART 4 UNITS: 100 INJECTION, SOLUTION INTRAVENOUS; SUBCUTANEOUS at 05:05

## 2024-05-11 RX ADMIN — ENOXAPARIN SODIUM 30 MG: 30 INJECTION SUBCUTANEOUS at 05:05

## 2024-05-11 RX ADMIN — SODIUM BICARBONATE 650 MG TABLET 650 MG: at 08:05

## 2024-05-11 RX ADMIN — DOCUSATE SODIUM 100 MG: 100 CAPSULE, LIQUID FILLED ORAL at 08:05

## 2024-05-11 RX ADMIN — INSULIN ASPART 2 UNITS: 100 INJECTION, SOLUTION INTRAVENOUS; SUBCUTANEOUS at 09:05

## 2024-05-11 RX ADMIN — ATORVASTATIN CALCIUM 40 MG: 40 TABLET, FILM COATED ORAL at 08:05

## 2024-05-11 RX ADMIN — FAMOTIDINE 20 MG: 20 TABLET, FILM COATED ORAL at 08:05

## 2024-05-11 RX ADMIN — HYDROCODONE BITARTRATE AND ACETAMINOPHEN 1 TABLET: 5; 325 TABLET ORAL at 08:05

## 2024-05-11 RX ADMIN — METOPROLOL TARTRATE 25 MG: 25 TABLET, FILM COATED ORAL at 08:05

## 2024-05-11 RX ADMIN — ASPIRIN 81 MG: 81 TABLET, COATED ORAL at 09:05

## 2024-05-11 NOTE — PROGRESS NOTES
CT SURGERY PROGRESS NOTE  Rosalba Whitlock  83 y.o.  1940    Patients Procedure: Procedure(s) (LRB):  CORONARY ARTERY BYPASS GRAFT (CABG) (N/A)  SURGICAL PROCUREMENT, VEIN, ENDOSCOPIC (Left)  PLATING, STERNAL (N/A)    Subjective  Interval History: Patient is postoperative day 8.  No acute events overnight.     Resting comfortably in chair this morning, no significant complaints.  Denies any chest pain, shortness of breath, or palpitations.      Review of Systems   Constitutional:  Positive for malaise/fatigue. Negative for chills and fever.   Respiratory:  Negative for cough, shortness of breath and wheezing.    Cardiovascular:  Positive for chest pain (incisional). Negative for palpitations.   Gastrointestinal:  Negative for nausea and vomiting.       Medication List  Infusions    Scheduled   aspirin  81 mg Oral Daily    atorvastatin  40 mg Oral Daily    docusate sodium  100 mg Oral BID    enoxparin  30 mg Subcutaneous Daily    famotidine  20 mg Oral Daily    folic acid  1 mg Oral Daily    gabapentin  100 mg Oral TID    metoprolol tartrate  25 mg Oral TID    polyethylene glycol  17 g Oral Daily    sodium bicarbonate  650 mg Oral BID       Objective:  Recent Vitals:  Temp:  [97.6 °F (36.4 °C)-98.3 °F (36.8 °C)] 98.3 °F (36.8 °C)  Pulse:  [84-90] 84  Resp:  [16-21] 16  SpO2:  [94 %-98 %] 97 %  BP: (101-121)/(61-77) 121/77    Physical Exam  Constitutional:       Appearance: She is obese.      Interventions: Nasal cannula in place.   HENT:      Head: Normocephalic.      Mouth/Throat:      Mouth: Mucous membranes are moist.      Pharynx: Oropharynx is clear.   Cardiovascular:      Rate and Rhythm: Normal rate and regular rhythm.      Pulses: Normal pulses.      Heart sounds: Normal heart sounds. No murmur heard.     No friction rub. No gallop.   Pulmonary:      Effort: Pulmonary effort is normal. No respiratory distress.      Breath sounds: Decreased breath sounds present. No wheezing, rhonchi or rales.    Abdominal:      General: There is no distension.      Palpations: Abdomen is soft.   Musculoskeletal:      Cervical back: Normal range of motion and neck supple.      Right lower leg: No edema.      Left lower leg: No edema.   Skin:     General: Skin is warm and dry.      Comments: Median sternotomy incision c/d/i   Neurological:      Mental Status: She is alert and oriented to person, place, and time.   Psychiatric:         Mood and Affect: Mood normal.         Behavior: Behavior normal.          I/O last 24 hrs:  Intake/Output - Last 3 Shifts         05/09 0700  05/10 0659 05/10 0700 05/11 0659 05/11 0700 05/12 0659    P.O. 840      Total Intake(mL/kg) 840 (11)      Urine (mL/kg/hr)  900 (0.5)     Stool       Total Output  900     Net +840 -900            Urine Occurrence 7 x      Stool Occurrence 1 x              Labs  CBC:   Recent Labs   Lab 05/10/24  0410   WBC 8.74   RBC 4.15*   HGB 12.1   HCT 36.6*      MCV 88.2   MCH 29.2   MCHC 33.1     CMP:   Recent Labs   Lab 05/10/24  0410   CALCIUM 8.3*   ALBUMIN 2.8*      K 4.4   CO2 24   BUN 26.8*   CREATININE 1.33*   ALKPHOS 54   ALT 6   AST 20   BILITOT 0.4     LFTs:   Recent Labs   Lab 05/10/24  0410   ALT 6   AST 20   ALKPHOS 54   BILITOT 0.4   ALBUMIN 2.8*     All pertinent labs from the last 24 hours have been reviewed.      ASSESSMENT/PLAN:    Severe coronary artery disease with instent restenosis   Type II DM  HTN  GERD  HLD  Stage 3 CKD    -s/p median sternotomy with attempted CABG and sternal plating- doing well from sx standpoint  -Rehab placement pending - per case management     Case and plan of care discussed with Dr. Sean Ontiveros PAC

## 2024-05-12 LAB
POCT GLUCOSE: 167 MG/DL (ref 70–110)
POCT GLUCOSE: 171 MG/DL (ref 70–110)
POCT GLUCOSE: 244 MG/DL (ref 70–110)
POCT GLUCOSE: 248 MG/DL (ref 70–110)
POCT GLUCOSE: 260 MG/DL (ref 70–110)

## 2024-05-12 PROCEDURE — 25000003 PHARM REV CODE 250: Performed by: INTERNAL MEDICINE

## 2024-05-12 PROCEDURE — 21400001 HC TELEMETRY ROOM

## 2024-05-12 PROCEDURE — 27000221 HC OXYGEN, UP TO 24 HOURS

## 2024-05-12 PROCEDURE — 25000003 PHARM REV CODE 250: Performed by: THORACIC SURGERY (CARDIOTHORACIC VASCULAR SURGERY)

## 2024-05-12 PROCEDURE — 99900035 HC TECH TIME PER 15 MIN (STAT)

## 2024-05-12 PROCEDURE — 63600175 PHARM REV CODE 636 W HCPCS: Performed by: INTERNAL MEDICINE

## 2024-05-12 PROCEDURE — 25000003 PHARM REV CODE 250: Performed by: PHYSICIAN ASSISTANT

## 2024-05-12 PROCEDURE — 25000003 PHARM REV CODE 250

## 2024-05-12 PROCEDURE — 97116 GAIT TRAINING THERAPY: CPT | Mod: CQ

## 2024-05-12 PROCEDURE — 99024 POSTOP FOLLOW-UP VISIT: CPT | Mod: ,,, | Performed by: PHYSICIAN ASSISTANT

## 2024-05-12 PROCEDURE — 97530 THERAPEUTIC ACTIVITIES: CPT | Mod: CQ

## 2024-05-12 PROCEDURE — 63600175 PHARM REV CODE 636 W HCPCS: Performed by: PHYSICIAN ASSISTANT

## 2024-05-12 PROCEDURE — 25000003 PHARM REV CODE 250: Performed by: NURSE PRACTITIONER

## 2024-05-12 RX ADMIN — INSULIN ASPART 4 UNITS: 100 INJECTION, SOLUTION INTRAVENOUS; SUBCUTANEOUS at 12:05

## 2024-05-12 RX ADMIN — GABAPENTIN 100 MG: 100 CAPSULE ORAL at 04:05

## 2024-05-12 RX ADMIN — SODIUM BICARBONATE 650 MG TABLET 650 MG: at 10:05

## 2024-05-12 RX ADMIN — METOPROLOL TARTRATE 25 MG: 25 TABLET, FILM COATED ORAL at 04:05

## 2024-05-12 RX ADMIN — DOCUSATE SODIUM 100 MG: 100 CAPSULE, LIQUID FILLED ORAL at 10:05

## 2024-05-12 RX ADMIN — HYDROCODONE BITARTRATE AND ACETAMINOPHEN 1 TABLET: 5; 325 TABLET ORAL at 04:05

## 2024-05-12 RX ADMIN — FOLIC ACID 1 MG: 1 TABLET ORAL at 10:05

## 2024-05-12 RX ADMIN — DOCUSATE SODIUM 100 MG: 100 CAPSULE, LIQUID FILLED ORAL at 08:05

## 2024-05-12 RX ADMIN — METOPROLOL TARTRATE 25 MG: 25 TABLET, FILM COATED ORAL at 10:05

## 2024-05-12 RX ADMIN — FAMOTIDINE 20 MG: 20 TABLET, FILM COATED ORAL at 10:05

## 2024-05-12 RX ADMIN — POLYETHYLENE GLYCOL 3350 17 G: 17 POWDER, FOR SOLUTION ORAL at 10:05

## 2024-05-12 RX ADMIN — ASPIRIN 81 MG: 81 TABLET, COATED ORAL at 10:05

## 2024-05-12 RX ADMIN — SODIUM BICARBONATE 650 MG TABLET 650 MG: at 08:05

## 2024-05-12 RX ADMIN — INSULIN ASPART 2 UNITS: 100 INJECTION, SOLUTION INTRAVENOUS; SUBCUTANEOUS at 11:05

## 2024-05-12 RX ADMIN — HYDROCODONE BITARTRATE AND ACETAMINOPHEN 1 TABLET: 5; 325 TABLET ORAL at 10:05

## 2024-05-12 RX ADMIN — ATORVASTATIN CALCIUM 40 MG: 40 TABLET, FILM COATED ORAL at 10:05

## 2024-05-12 RX ADMIN — ENOXAPARIN SODIUM 30 MG: 30 INJECTION SUBCUTANEOUS at 04:05

## 2024-05-12 RX ADMIN — GABAPENTIN 100 MG: 100 CAPSULE ORAL at 08:05

## 2024-05-12 RX ADMIN — GABAPENTIN 100 MG: 100 CAPSULE ORAL at 10:05

## 2024-05-12 RX ADMIN — METOPROLOL TARTRATE 25 MG: 25 TABLET, FILM COATED ORAL at 08:05

## 2024-05-12 NOTE — PROGRESS NOTES
CT SURGERY PROGRESS NOTE  Rosalba Whitlock  83 y.o.  1940    Patients Procedure: Procedure(s) (LRB):  CORONARY ARTERY BYPASS GRAFT (CABG) (N/A)  SURGICAL PROCUREMENT, VEIN, ENDOSCOPIC (Left)  PLATING, STERNAL (N/A)    Subjective  Interval History: Patient is postoperative day 9.      Review of Systems   Constitutional:  Positive for malaise/fatigue. Negative for chills and fever.   Respiratory:  Negative for cough, shortness of breath and wheezing.    Cardiovascular:  Positive for chest pain (incisional). Negative for palpitations.   Gastrointestinal:  Negative for nausea and vomiting.       Medication List  Infusions    Scheduled   aspirin  81 mg Oral Daily    atorvastatin  40 mg Oral Daily    docusate sodium  100 mg Oral BID    enoxparin  30 mg Subcutaneous Daily    famotidine  20 mg Oral Daily    folic acid  1 mg Oral Daily    gabapentin  100 mg Oral TID    metoprolol tartrate  25 mg Oral TID    polyethylene glycol  17 g Oral Daily    sodium bicarbonate  650 mg Oral BID       Objective:  Recent Vitals:  Temp:  [97.6 °F (36.4 °C)-98.4 °F (36.9 °C)] 97.7 °F (36.5 °C)  Pulse:  [63-85] 85  Resp:  [18-20] 18  SpO2:  [94 %-99 %] 94 %  BP: ()/(48-74) 104/48    Physical Exam  Constitutional:       Appearance: She is obese.      Interventions: Nasal cannula in place.   HENT:      Head: Normocephalic.      Mouth/Throat:      Mouth: Mucous membranes are moist.      Pharynx: Oropharynx is clear.   Cardiovascular:      Rate and Rhythm: Normal rate and regular rhythm.      Pulses: Normal pulses.      Heart sounds: Normal heart sounds. No murmur heard.     No friction rub. No gallop.   Pulmonary:      Effort: Pulmonary effort is normal. No respiratory distress.      Breath sounds: Decreased breath sounds present. No wheezing, rhonchi or rales.   Abdominal:      General: There is no distension.      Palpations: Abdomen is soft.   Musculoskeletal:      Cervical back: Normal range of motion and neck supple.      Right  "lower leg: No edema.      Left lower leg: No edema.   Skin:     General: Skin is warm and dry.      Comments: Median sternotomy incision c/d/i   Neurological:      Mental Status: She is alert and oriented to person, place, and time.   Psychiatric:         Mood and Affect: Mood normal.         Behavior: Behavior normal.          I/O last 24 hrs:  Intake/Output - Last 3 Shifts         05/10 0700  05/11 0659 05/11 0700  05/12 0659 05/12 0700 05/13 0659    P.O.  480     Total Intake(mL/kg)  480 (6.6)     Urine (mL/kg/hr) 900 (0.5) 900 (0.5)     Total Output 900 900     Net -900 -420            Urine Occurrence  3 x             Labs  CBC:   No results for input(s): "WBC", "RBC", "HGB", "HCT", "PLT", "MCV", "MCH", "MCHC" in the last 48 hours.    CMP:   No results for input(s): "GLU", "CALCIUM", "ALBUMIN", "PROT", "NA", "K", "CO2", "CL", "BUN", "CREATININE", "ALKPHOS", "ALT", "AST", "BILITOT" in the last 48 hours.    LFTs:   No results for input(s): "ALT", "AST", "ALKPHOS", "BILITOT", "PROT", "ALBUMIN" in the last 48 hours.    All pertinent labs from the last 24 hours have been reviewed.      ASSESSMENT/PLAN:    Severe coronary artery disease with instent restenosis   Type II DM  HTN  GERD  HLD  Stage 3 CKD    -POD 9 median sternotomy with attempted CABG and sternal plating- doing well from sx standpoint  -Rehab placement pending - per case management     Case and plan of care discussed with Dr. Sean Ontiveros PAC  "

## 2024-05-12 NOTE — PT/OT/SLP PROGRESS
Physical Therapy Treatment    Patient Name:  Rosalba Whitlock   MRN:  [id number]    Recommendations:     Discharge therapy intensity: Moderate Intensity Therapy   Discharge Equipment Recommendations: to be determined by next level of care  Barriers to discharge: Impaired mobility and Ongoing medical needs    Assessment:     Rosalba Whitlock is a 83 y.o. female admitted with a medical diagnosis of Severe CAD s/p median sternotomy c attempted CABG, hypotension .  She presents with the following impairments/functional limitations: weakness, impaired endurance, impaired self care skills, impaired functional mobility, impaired balance, gait instability, decreased upper extremity function .    Rehab Prognosis: Good; patient would benefit from acute skilled PT services to address these deficits and reach maximum level of function.    Recent Surgery: Procedure(s) (LRB):  CORONARY ARTERY BYPASS GRAFT (CABG) (N/A)  SURGICAL PROCUREMENT, VEIN, ENDOSCOPIC (Left)  PLATING, STERNAL (N/A) 9 Days Post-Op    Plan:     During this hospitalization, patient would benefit from acute PT services 5 x/week to address the identified rehab impairments via gait training, therapeutic activities, therapeutic exercises, neuromuscular re-education and progress toward the following goals:    Plan of Care Expires:  06/06/24    Subjective     Chief Complaint: shoulder pain  Patient/Family Comments/goals: to get stronger  Pain/Comfort:  Pain Rating 1: 7/10  Location - Side 1: Left  Location 1: shoulder  Pain Addressed 1: Cessation of Activity, Reposition, Nurse notified  Pain Rating Post-Intervention 1: 8/10      Objective:     Communicated with pts nurse prior to and during session.  Pain med given to pt.  Patient found HOB elevated with pulse ox (continuous), telemetry, oxygen upon PT entry to room.     General Precautions: Standard, fall, sternal  Orthopedic Precautions: N/A  Braces: N/A  Respiratory Status: Nasal cannula, flow 3 L/min  Blood  Pressure: 112/53   Skin Integrity:  sternal incision intact      Functional Mobility:  Bed Mobility:     Scooting: moderate assistance, maximal assistance, and of 2 persons  Supine to Sit: moderate assistance and of 2 persons  Transfers:     Sit to Stand:  minimum assistance, moderate assistance, and of 2 persons with rolling walker and amount of assist decrease w/ reps  Bed to Chair: minimum assistance with  rolling walker  using  Step Transfer  Gait: Pt ambulated 20 ft, L shoulder pain limited activity.    Therapeutic Activities/Exercises:  Assisted pt w/ brushing teeth and washing face in bathroom.  Pt education on strategies to maintain sternal precautions.     Education:  Patient provided with verbal education and demonstrations education regarding safety awareness and strategies to maintain sternal precautions.  .  Understanding was verbalized, however additional teaching warranted.     Patient left up in chair with all lines intact, call button in reach, and nurse and family present    GOALS:   Multidisciplinary Problems       Physical Therapy Goals          Problem: Physical Therapy    Goal Priority Disciplines Outcome Goal Variances Interventions   Physical Therapy Goal     PT, PT/OT Progressing     Description: Goals to be met by: 6/6/24     Patient will increase functional independence with mobility by performing:    Supine to sit with Modified [place]  Sit to stand transfer with Modified [place]  Gait  x 200 feet with Modified [place] using Rolling Walker.                          Time Tracking:     PT Received On:    PT Start Time: 1016     PT Stop Time: 1041  PT Total Time (min): 25 min     Billable Minutes: Gait Training 10 and Therapeutic Activity 15    Treatment Type: Treatment  PT/PTA: PTA     Number of PTA visits since last PT visit: 1 05/12/2024

## 2024-05-13 VITALS
WEIGHT: 160.69 LBS | DIASTOLIC BLOOD PRESSURE: 61 MMHG | OXYGEN SATURATION: 95 % | SYSTOLIC BLOOD PRESSURE: 94 MMHG | HEIGHT: 65 IN | HEART RATE: 78 BPM | TEMPERATURE: 98 F | RESPIRATION RATE: 18 BRPM | BODY MASS INDEX: 26.77 KG/M2

## 2024-05-13 PROBLEM — Z95.1 S/P CABG (CORONARY ARTERY BYPASS GRAFT): Status: ACTIVE | Noted: 2024-05-13

## 2024-05-13 LAB
POCT GLUCOSE: 182 MG/DL (ref 70–110)
POCT GLUCOSE: 230 MG/DL (ref 70–110)

## 2024-05-13 PROCEDURE — 99024 POSTOP FOLLOW-UP VISIT: CPT | Mod: ,,,

## 2024-05-13 PROCEDURE — 25000003 PHARM REV CODE 250: Performed by: INTERNAL MEDICINE

## 2024-05-13 PROCEDURE — 97530 THERAPEUTIC ACTIVITIES: CPT | Mod: CQ

## 2024-05-13 PROCEDURE — 25000003 PHARM REV CODE 250: Performed by: THORACIC SURGERY (CARDIOTHORACIC VASCULAR SURGERY)

## 2024-05-13 PROCEDURE — 25000003 PHARM REV CODE 250: Performed by: NURSE PRACTITIONER

## 2024-05-13 PROCEDURE — 25000003 PHARM REV CODE 250

## 2024-05-13 PROCEDURE — 63600175 PHARM REV CODE 636 W HCPCS: Performed by: PHYSICIAN ASSISTANT

## 2024-05-13 PROCEDURE — 25000003 PHARM REV CODE 250: Performed by: PHYSICIAN ASSISTANT

## 2024-05-13 PROCEDURE — 97116 GAIT TRAINING THERAPY: CPT | Mod: CQ

## 2024-05-13 RX ORDER — HYDROCODONE BITARTRATE AND ACETAMINOPHEN 5; 325 MG/1; MG/1
1 TABLET ORAL EVERY 6 HOURS PRN
Qty: 28 TABLET | Refills: 0 | Status: ON HOLD | OUTPATIENT
Start: 2024-05-13 | End: 2024-05-28 | Stop reason: HOSPADM

## 2024-05-13 RX ORDER — METOPROLOL TARTRATE 25 MG/1
25 TABLET, FILM COATED ORAL 3 TIMES DAILY
Qty: 270 TABLET | Refills: 3 | Status: ON HOLD | OUTPATIENT
Start: 2024-05-13 | End: 2024-05-28 | Stop reason: HOSPADM

## 2024-05-13 RX ORDER — BENZONATATE 100 MG/1
200 CAPSULE ORAL 3 TIMES DAILY PRN
Qty: 30 CAPSULE | Refills: 0 | Status: ON HOLD | OUTPATIENT
Start: 2024-05-13 | End: 2024-05-28

## 2024-05-13 RX ADMIN — FOLIC ACID 1 MG: 1 TABLET ORAL at 09:05

## 2024-05-13 RX ADMIN — ATORVASTATIN CALCIUM 40 MG: 40 TABLET, FILM COATED ORAL at 09:05

## 2024-05-13 RX ADMIN — METOPROLOL TARTRATE 25 MG: 25 TABLET, FILM COATED ORAL at 09:05

## 2024-05-13 RX ADMIN — INSULIN ASPART 4 UNITS: 100 INJECTION, SOLUTION INTRAVENOUS; SUBCUTANEOUS at 12:05

## 2024-05-13 RX ADMIN — DOCUSATE SODIUM 100 MG: 100 CAPSULE, LIQUID FILLED ORAL at 09:05

## 2024-05-13 RX ADMIN — HYDROCODONE BITARTRATE AND ACETAMINOPHEN 1 TABLET: 5; 325 TABLET ORAL at 09:05

## 2024-05-13 RX ADMIN — HYDROCODONE BITARTRATE AND ACETAMINOPHEN 1 TABLET: 5; 325 TABLET ORAL at 12:05

## 2024-05-13 RX ADMIN — POLYETHYLENE GLYCOL 3350 17 G: 17 POWDER, FOR SOLUTION ORAL at 09:05

## 2024-05-13 RX ADMIN — SODIUM BICARBONATE 650 MG TABLET 650 MG: at 09:05

## 2024-05-13 RX ADMIN — ASPIRIN 81 MG: 81 TABLET, COATED ORAL at 09:05

## 2024-05-13 RX ADMIN — FAMOTIDINE 20 MG: 20 TABLET, FILM COATED ORAL at 09:05

## 2024-05-13 RX ADMIN — HYDROCODONE BITARTRATE AND ACETAMINOPHEN 1 TABLET: 5; 325 TABLET ORAL at 04:05

## 2024-05-13 RX ADMIN — GABAPENTIN 100 MG: 100 CAPSULE ORAL at 09:05

## 2024-05-13 NOTE — PROGRESS NOTES
[facility]  [id number]   Re: Rehab/SNF Placement  Payer source: [employer]  Date: 5/13/2024  P2P Physician: Dr. Canales  P2P Call Time: 1018  Outcome: Denial overturned     HPI: 83 y.o. female admitted with a medical diagnosis of Severe CAD s/p median sternotomy + sternal plating with failed CABG attempt due to anatomy with associated hypotension.  Significant postoperative impairments requiring aggressive therapies to achieve prior level of function.      Dx: Cardiac debility  - CAD s/p sternotomy with sternal plating without CABG POD #10  - acute hypoxic respiratory failure requiring supplemental oxygen - 3L NC  - urinary retention with Chavez catheter  - CKD IIIb  - type 2 diabetes mellitus with hyperglycemia  - orthostatic hypotension  - hyperlipidemia  - sacral DTI     Criteria for inpatient  1. Medical management: IV diuresis.  Wean O2 as tolerated (currently requiring 3L NC).  Closely monitor for bleeds and transfuse if.  Closely monitor renal indices and make appropriate treatment changes as needed.  Carefully monitor blood pressure and provide IV fluid resuscitation if needed.  Wound care  2. Aggressive multidisciplinary therapies: PT/OT to address impairments/functional limitations: weakness, impaired endurance, impaired self care skills, impaired functional mobility, impaired balance, gait instability, decreased upper extremity function   Scooting: maximal assistance, and of 2 persons  Supine to Sit: moderate assistance and of 2 persons  Sit to Stand:  minimum assistance, moderate assistance, and of 2 persons with rolling walker and amount of assist decrease w/ reps  Bed to Chair: minimum assistance with  rolling walker  using  Step Transfer  Gait: Pt ambulated 20 ft, L shoulder pain limited activity.  3. Strong support system: Plans to return home.  Modified independent with cane prior to hospitalization     ENCOUNTER LOG   10:00-10:17: chart review  and document prep  10:18-10:29: P2P call  10:30- 10:35: documentation     Eleazar Ramos MD, CMD  Internal Medicine     DO NOT BILL

## 2024-05-13 NOTE — PLAN OF CARE
P2P requested- I messaged THU Carrillo to see if she is available. P2P needed  before 1230 p. I will set up if she is available    Dr Ramos will do the P2P.

## 2024-05-13 NOTE — PLAN OF CARE
05/13/24 1313   Final Note   Assessment Type Final Discharge Note   Anticipated Discharge Disposition SNF   Post-Acute Status   Post-Acute Authorization Placement   Post-Acute Placement Status Set-up Complete/Auth obtained   Discharge Delays None known at this time     Pt will dc to TCU with Gregoria ga 2p     [id number]

## 2024-05-13 NOTE — DISCHARGE SUMMARY
[facility] - [facility] Tele[facility]  Cardiothoracic Surgery  Discharge Summary      Patient Name: Rosalba Whitlock  MRN: [id number]  Admission Date: 5/3/2024  Hospital Length of Stay: 10 days  Discharge Date and Time:  05/13/2024 11:53 AM  Attending Physician: Loki Estrada MD   Discharging Provider: LIAM Joya  Primary Care Provider: Eleazar Ramos MD    HPI:   No notes on file    Procedure(s) (LRB):  CORONARY ARTERY BYPASS GRAFT (CABG) (N/A)  SURGICAL PROCUREMENT, VEIN, ENDOSCOPIC (Left)  PLATING, STERNAL (N/A)      Indwelling Lines/Drains at time of discharge:   Lines/Drains/Airways       Drain  Duration             Female External Urinary Catheter w/ Suction 05/05/24 0600 8 days                  Hospital Course: No notes on file    Goals of Care Treatment Preferences:  Code Status: Full Code      Consults (From admission, onward)          Status Ordering Provider     Inpatient consult to Nephrology  Once        Provider:  Jeovanny Mercado MD    Acknowledged RAAD PALOMO     Inpatient consult to Cardiology  Once        Provider:  Kevin Barton MD    Completed RAAD PALOMO            Significant Diagnostic Studies: N/A    Pending Diagnostic Studies:       Procedure Component Value Units Date/Time    EKG 12-LEAD [[id number]]     Order Status: Sent Lab Status: No result     EKG 12-LEAD [[id number]]     Order Status: Sent Lab Status: No result             No new Assessment & Plan notes have been filed under this hospital service since the last note was generated.  Service: Cardiovascular Surgery    There are no hospital problems to display for this patient.     Discharged Condition: good    Disposition: Rehab Facility    Follow Up:   Follow-up Information       Emmanuel Riley MD Follow up on 5/24/2024.    Specialty: Cardiology  Why: @ 3:10PM  Contact information:  [address] [place] [address]  [facility] [address]  [phone]               Eleazar Ramos  "MD [facility] Follow up in 2 week(s).    Specialty: Internal Medicine  Contact information:  [address]  [place] [id number]  [phone]               Ankit Fry MD Follow up in 2 week(s).    Specialty: Cardiology  Contact information:  [address] [facility] [address]  [phone]               Loki Estrada MD Follow up in 3 week(s).    Specialty: Cardiothoracic Surgery  Contact information:  [address]  [address]  [place] [address]  [phone]                           Patient Instructions:   No discharge procedures on file.  Medications:  Reconciled Home Medications:      Medication List        START taking these medications      HYDROcodone-acetaminophen 5-325 mg per tablet  Commonly known as: NORCO  Take 1 tablet by mouth every 6 (six) hours as needed for Pain.     metoprolol tartrate 25 MG tablet  Commonly known as: LOPRESSOR  Take 1 tablet (25 mg total) by mouth 3 (three) times daily.            CHANGE how you take these medications      omeprazole 40 MG capsule  Commonly known as: PRILOSEC  TAKE 1 CAPSULE BY MOUTH DAILY AS NEEDED FOR REFLUX  What changed:   how much to take  how to take this  when to take this  additional instructions            CONTINUE taking these medications      ALCOHOL PREP PADS Padm  Generic drug: alcohol swabs     aspirin 81 MG EC tablet  Commonly known as: ECOTRIN  Take 81 mg by mouth once daily.     benzonatate 100 MG capsule  Commonly known as: TESSALON PERLES  Take 2 capsules (200 mg total) by mouth 3 (three) times daily as needed for Cough.     BRILINTA 90 mg tablet  Generic drug: ticagrelor  Take 90 mg by mouth once daily.     COMFORT EZ INSULIN SYRINGE 0.5 mL 31 gauge x 5/16" Syrg  Generic drug: insulin syringe-needle U-100     * gabapentin 300 MG capsule  Commonly known as: NEURONTIN  TAKE 1 CAPSULE(300 MG) BY MOUTH EVERY EVENING     * gabapentin 100 MG capsule  Commonly known as: NEURONTIN  TAKE 2 CAPSULES BY MOUTH TWICE DAILY, EVERY " "MORNING AND AT NOON     icosapent ethyL 1 gram Cap  Commonly known as: VASCEPA  BID     insulin lispro 100 unit/mL pen  Commonly known as: HumaLOG KwikPen Insulin  Humalog 8 units w/ largest meal of the day  (hold if glucose < 150)     LANTUS U-100 INSULIN 100 unit/mL injection  Generic drug: insulin glargine U-100 (Lantus)  Inject 46 units in the am, and 20 units at night     loratadine 10 mg tablet  Commonly known as: CLARITIN  TAKE 1 TABLET BY MOUTH EVERY DAY     montelukast 10 mg tablet  Commonly known as: SINGULAIR  TAKE 1 TABLET(10 MG) BY MOUTH EVERY EVENING     nebulizer accessories Kit  Please dispense one nebulizer accessories kit for appropriate nebulizer machine.     nebulizer and compressor Veronica  Please dispense one nebulizer machine with appropriate supplies.     pen needle, diabetic 32 gauge x 5/32" Ndle  1 each by Misc.(Non-Drug; Combo Route) route 2 (two) times a day.     rosuvastatin 40 MG Tab  Commonly known as: CRESTOR  Take 40 mg by mouth every evening.     TRADJENTA 5 mg Tab tablet  Generic drug: linaGLIPtin  TAKE 1 TABLET(5 MG) BY MOUTH EVERY DAY           * This list has 2 medication(s) that are the same as other medications prescribed for you. Read the directions carefully, and ask your doctor or other care provider to review them with you.                STOP taking these medications      clopidogreL 75 mg tablet  Commonly known as: PLAVIX     denosumab 60 mg/mL Syrg  Commonly known as: PROLIA     fluticasone propionate 50 mcg/actuation nasal spray  Commonly known as: FLONASE     isosorbide mononitrate 30 MG 24 hr tablet  Commonly known as: IMDUR     metoprolol succinate 25 mg Cspx     mupirocin 2 % ointment  Commonly known as: BACTROBAN     nortriptyline 25 MG capsule  Commonly known as: PAMELOR            Time spent on the discharge of patient: 30 minutes    LIAM Joya  Cardiothoracic Surgery  [facility] - [facility] Tele[facility]  "

## 2024-05-13 NOTE — ANESTHESIA POSTPROCEDURE EVALUATION
Anesthesia Post Evaluation    Patient: Rosalba Whitlock    Procedure(s) Performed: Procedure(s) (LRB):  CORONARY ARTERY BYPASS GRAFT (CABG) (N/A)  SURGICAL PROCUREMENT, VEIN, ENDOSCOPIC (Left)  PLATING, STERNAL (N/A)    Final Anesthesia Type: general      Patient location during evaluation: PACU  Patient participation: Yes- Able to Participate  Level of consciousness: awake and alert  Post-procedure vital signs: reviewed and stable  Pain management: adequate  Airway patency: patent      Anesthetic complications: no      Cardiovascular status: hemodynamically stable  Respiratory status: unassisted  Hydration status: euvolemic  Follow-up not needed.              Vitals Value Taken Time   /68 05/13/24 0725   Temp 36.7 °C (98.1 °F) 05/13/24 0724   Pulse 84 05/13/24 0725   Resp 18 05/13/24 0936   SpO2 95 % 05/13/24 0724         No case tracking events are documented in the log.      Pain/Mari Score: Pain Rating Prior to Med Admin: 6 (5/13/2024  9:36 AM)

## 2024-05-13 NOTE — PROGRESS NOTES
CT SURGERY PROGRESS NOTE  Rosalba Whitlock  83 y.o.  1940    Patients Procedure: Procedure(s) (LRB):  CORONARY ARTERY BYPASS GRAFT (CABG) (N/A)  SURGICAL PROCUREMENT, VEIN, ENDOSCOPIC (Left)  PLATING, STERNAL (N/A)    Subjective  Interval History: Patient is postoperative day 10.  Sitting up in chair, no distress, VSS.  She continues to complain of some median sternotomy incisional pain along with some fatigue / lowe energy.  Otherwise, clinically stable for transfer to rehab.     Review of Systems   Constitutional:  Positive for malaise/fatigue. Negative for chills and fever.   Respiratory:  Negative for cough, shortness of breath and wheezing.    Cardiovascular:  Positive for chest pain (incisional). Negative for palpitations.   Gastrointestinal:  Negative for nausea and vomiting.     Medication List  Infusions    Scheduled   aspirin  81 mg Oral Daily    atorvastatin  40 mg Oral Daily    docusate sodium  100 mg Oral BID    enoxparin  30 mg Subcutaneous Daily    famotidine  20 mg Oral Daily    folic acid  1 mg Oral Daily    gabapentin  100 mg Oral TID    metoprolol tartrate  25 mg Oral TID    polyethylene glycol  17 g Oral Daily    sodium bicarbonate  650 mg Oral BID       Objective:  Recent Vitals:  Temp:  [97.5 °F (36.4 °C)-99.1 °F (37.3 °C)] 98.2 °F (36.8 °C)  Pulse:  [72-84] 78  Resp:  [18-20] 18  SpO2:  [95 %-97 %] 95 %  BP: ()/(56-69) 94/61    Physical Exam  Constitutional:       Appearance: She is obese.      Interventions: Nasal cannula in place.   HENT:      Head: Normocephalic.      Mouth/Throat:      Mouth: Mucous membranes are moist.      Pharynx: Oropharynx is clear.   Cardiovascular:      Rate and Rhythm: Normal rate and regular rhythm.      Pulses: Normal pulses.      Heart sounds: Normal heart sounds. No murmur heard.     No friction rub. No gallop.   Pulmonary:      Effort: Pulmonary effort is normal. No respiratory distress.      Breath sounds: No wheezing, rhonchi or rales.    Abdominal:      General: There is no distension.      Palpations: Abdomen is soft.   Musculoskeletal:      Cervical back: Normal range of motion and neck supple.      Right lower leg: No edema.      Left lower leg: No edema.   Skin:     General: Skin is warm and dry.      Comments: Median sternotomy incision dressed-  c/d/i   Neurological:      Mental Status: She is alert and oriented to person, place, and time.   Psychiatric:         Mood and Affect: Mood normal.         Behavior: Behavior normal.     I/O last 24 hrs:  Intake/Output - Last 3 Shifts         05/11 0700  05/12 0659 05/12 0700 05/13 0659 05/13 0700  05/14 0659    P.O. 480 720     Total Intake(mL/kg) 480 (6.6) 720 (9.9)     Urine (mL/kg/hr) 900 (0.5) 1000 (0.6)     Stool  0     Total Output 900 1000     Net -420 -280            Urine Occurrence 3 x      Stool Occurrence  0 x             ASSESSMENT/PLAN:    Severe coronary artery disease with instent restenosis   Type II DM  HTN  GERD  HLD  Stage 3 CKD    -POD 10 median sternotomy with attempted CABG and sternal plating- doing well from sx standpoint  -Plan to transfer to rehab today    Case and plan of care discussed with LIAM Restrepo

## 2024-05-13 NOTE — PT/OT/SLP PROGRESS
Physical Therapy Treatment    Patient Name:  Rosalba Whitlock   MRN:  [id number]    Recommendations:     Discharge therapy intensity: Moderate Intensity Therapy   Discharge Equipment Recommendations: to be determined by next level of care  Barriers to discharge: Impaired mobility    Assessment:     Rosalba Whitlock is a 83 y.o. female admitted with a medical diagnosis of Severe CAD s/p median sternotomy c attempted CABG, hypotension .  She presents with the following impairments/functional limitations: weakness, impaired endurance, impaired self care skills, impaired functional mobility, gait instability, impaired balance, decreased upper extremity function, pain .    Rehab Prognosis: Good; patient would benefit from acute skilled PT services to address these deficits and reach maximum level of function.    Recent Surgery: Procedure(s) (LRB):  CORONARY ARTERY BYPASS GRAFT (CABG) (N/A)  SURGICAL PROCUREMENT, VEIN, ENDOSCOPIC (Left)  PLATING, STERNAL (N/A) 10 Days Post-Op    Plan:     During this hospitalization, patient would benefit from acute PT services 5 x/week to address the identified rehab impairments via gait training, therapeutic activities, therapeutic exercises, neuromuscular re-education and progress toward the following goals:    Plan of Care Expires:  06/06/24    Subjective     Chief Complaint: pain in L posterior shoulder, increasing w/ activity  Patient/Family Comments/goals: to get stronger  Pain/Comfort:  Pain Rating 1: 7/10 (w/ activity)  Location - Side 1: Left  Location 1: shoulder  Pain Addressed 1: Cessation of Activity, Nurse notified  Pain Rating Post-Intervention 1: 4/10      Objective:     Communicated with pts nurse prior to session.  Patient found up in chair with pulse ox (continuous), telemetry, oxygen upon PT entry to room.     General Precautions: Standard, fall, sternal  Orthopedic Precautions: N/A  Braces: N/A  Respiratory Status: Nasal cannula, flow 2 L/min  Blood Pressure:   Skin  Integrity:  sternal incision intact      Functional Mobility:  Transfers:     Sit to Stand:  minimum assistance with rolling walker  Toilet Transfer: contact guard assistance with  rolling walker  using  Step Transfer  Gait: Pt ambulated 20 ft w/ RW in room and bathroom, CGA and verbal cues.  Balance: good standing balance at sink and no LOB during gait and t/f's    Therapeutic Activities/Exercises:  pt stood at bathroom sink to brush teeth and clean face SB/CGA. Pt stood ~ 4 min.   L shoulder pain limited pt ability to do more today    Education:  Patient provided with verbal education and demonstrations education regarding fall prevention and safety awareness.  Understanding was verbalized.     Patient left up in chair with all lines intact, call button in reach, and nurse notified of pts pain complaints    GOALS:   Multidisciplinary Problems       Physical Therapy Goals          Problem: Physical Therapy    Goal Priority Disciplines Outcome Goal Variances Interventions   Physical Therapy Goal     PT, PT/OT Progressing     Description: Goals to be met by: 6/6/24     Patient will increase functional independence with mobility by performing:    Supine to sit with Modified [place]  Sit to stand transfer with Modified [place]  Gait  x 200 feet with Modified [place] using Rolling Walker.                          Time Tracking:     PT Received On: 05/13/24  PT Start Time: 0949     PT Stop Time: 1018  PT Total Time (min): 29 min     Billable Minutes: Gait Training 8 and Therapeutic Activity 21    Treatment Type: Treatment  PT/PTA: PTA     Number of PTA visits since last PT visit: 1 05/13/2024

## 2024-05-22 NOTE — PHYSICIAN QUERY
"Select your response then make sure to press "Enter" to save your answer. The other options will disappear and then you can click Sign.      Given the conflicting documentation, please clarify the renal diagnosis.                   I am not comfortable doing quieries  send to doctors  "

## 2024-05-22 NOTE — PHYSICIAN QUERY
"Select your response then make sure to press "Enter" to save your answer. The other options will disappear and then you can click Sign.    Please clarify the term "postoperative" as it relates to the Hypotension condition.                  Please send to doctors  "

## 2024-05-29 ENCOUNTER — HOSPITAL ENCOUNTER (EMERGENCY)
Facility: HOSPITAL | Age: 84
Discharge: HOME | End: 2024-05-29
Attending: EMERGENCY MEDICINE
Payer: MEDICARE

## 2024-05-29 VITALS
OXYGEN SATURATION: 97 % | BODY MASS INDEX: 26.81 KG/M2 | RESPIRATION RATE: 20 BRPM | HEART RATE: 83 BPM | WEIGHT: 160.94 LBS | TEMPERATURE: 97 F | HEIGHT: 65 IN | DIASTOLIC BLOOD PRESSURE: 54 MMHG | SYSTOLIC BLOOD PRESSURE: 157 MMHG

## 2024-05-29 DIAGNOSIS — R06.02 SOB (SHORTNESS OF BREATH): Primary | ICD-10-CM

## 2024-05-29 DIAGNOSIS — J90 PLEURAL EFFUSION ON LEFT: ICD-10-CM

## 2024-05-29 DIAGNOSIS — F41.9 ANXIETY: ICD-10-CM

## 2024-05-29 LAB
ALBUMIN SERPL-MCNC: 3.9 G/DL (ref 3.4–4.8)
ALBUMIN/GLOB SERPL: 1.1 RATIO (ref 1.1–2)
ALP SERPL-CCNC: 76 UNIT/L (ref 40–150)
ALT SERPL-CCNC: 8 UNIT/L (ref 0–55)
ANION GAP SERPL CALC-SCNC: 11 MEQ/L
AST SERPL-CCNC: 20 UNIT/L (ref 5–34)
BASOPHILS # BLD AUTO: 0.06 X10(3)/MCL
BASOPHILS NFR BLD AUTO: 0.8 %
BILIRUB SERPL-MCNC: 0.3 MG/DL
BNP BLD-MCNC: 230.8 PG/ML
BUN SERPL-MCNC: 35.1 MG/DL (ref 9.8–20.1)
CALCIUM SERPL-MCNC: 10.2 MG/DL (ref 8.4–10.2)
CHLORIDE SERPL-SCNC: 107 MMOL/L (ref 98–107)
CO2 SERPL-SCNC: 23 MMOL/L (ref 23–31)
CREAT SERPL-MCNC: 2.08 MG/DL (ref 0.55–1.02)
CREAT/UREA NIT SERPL: 17
EOSINOPHIL # BLD AUTO: 0.35 X10(3)/MCL (ref 0–0.9)
EOSINOPHIL NFR BLD AUTO: 4.5 %
ERYTHROCYTE [DISTWIDTH] IN BLOOD BY AUTOMATED COUNT: 14.4 % (ref 11.5–17)
GFR SERPLBLD CREATININE-BSD FMLA CKD-EPI: 24 ML/MIN/1.73/M2
GLOBULIN SER-MCNC: 3.7 GM/DL (ref 2.4–3.5)
GLUCOSE SERPL-MCNC: 188 MG/DL (ref 82–115)
HCT VFR BLD AUTO: 38.6 % (ref 37–47)
HGB BLD-MCNC: 12.5 G/DL (ref 12–16)
IMM GRANULOCYTES # BLD AUTO: 0.02 X10(3)/MCL (ref 0–0.04)
IMM GRANULOCYTES NFR BLD AUTO: 0.3 %
LYMPHOCYTES # BLD AUTO: 2.37 X10(3)/MCL (ref 0.6–4.6)
LYMPHOCYTES NFR BLD AUTO: 30.5 %
MCH RBC QN AUTO: 28.9 PG (ref 27–31)
MCHC RBC AUTO-ENTMCNC: 32.4 G/DL (ref 33–36)
MCV RBC AUTO: 89.4 FL (ref 80–94)
MONOCYTES # BLD AUTO: 0.6 X10(3)/MCL (ref 0.1–1.3)
MONOCYTES NFR BLD AUTO: 7.7 %
NEUTROPHILS # BLD AUTO: 4.37 X10(3)/MCL (ref 2.1–9.2)
NEUTROPHILS NFR BLD AUTO: 56.2 %
NRBC BLD AUTO-RTO: 0 %
PLATELET # BLD AUTO: 268 X10(3)/MCL (ref 130–400)
PMV BLD AUTO: 10.1 FL (ref 7.4–10.4)
POTASSIUM SERPL-SCNC: 4.1 MMOL/L (ref 3.5–5.1)
PROT SERPL-MCNC: 7.6 GM/DL (ref 5.8–7.6)
RBC # BLD AUTO: 4.32 X10(6)/MCL (ref 4.2–5.4)
SODIUM SERPL-SCNC: 141 MMOL/L (ref 136–145)
TROPONIN I SERPL-MCNC: <0.01 NG/ML (ref 0–0.04)
WBC # SPEC AUTO: 7.77 X10(3)/MCL (ref 4.5–11.5)

## 2024-05-29 PROCEDURE — 99285 EMERGENCY DEPT VISIT HI MDM: CPT | Mod: 25

## 2024-05-29 PROCEDURE — 84484 ASSAY OF TROPONIN QUANT: CPT | Performed by: EMERGENCY MEDICINE

## 2024-05-29 PROCEDURE — 93005 ELECTROCARDIOGRAM TRACING: CPT

## 2024-05-29 PROCEDURE — 96374 THER/PROPH/DIAG INJ IV PUSH: CPT

## 2024-05-29 PROCEDURE — 80053 COMPREHEN METABOLIC PANEL: CPT | Performed by: EMERGENCY MEDICINE

## 2024-05-29 PROCEDURE — 63600175 PHARM REV CODE 636 W HCPCS: Performed by: EMERGENCY MEDICINE

## 2024-05-29 PROCEDURE — 83880 ASSAY OF NATRIURETIC PEPTIDE: CPT | Performed by: EMERGENCY MEDICINE

## 2024-05-29 PROCEDURE — 85025 COMPLETE CBC W/AUTO DIFF WBC: CPT | Performed by: EMERGENCY MEDICINE

## 2024-05-29 RX ORDER — FUROSEMIDE 10 MG/ML
40 INJECTION INTRAMUSCULAR; INTRAVENOUS
Status: COMPLETED | OUTPATIENT
Start: 2024-05-29 | End: 2024-05-29

## 2024-05-29 RX ADMIN — FUROSEMIDE 40 MG: 10 INJECTION, SOLUTION INTRAMUSCULAR; INTRAVENOUS at 06:05

## 2024-05-29 NOTE — ED PROVIDER NOTES
Encounter Date: 5/29/2024       History     Chief Complaint   Patient presents with    Shortness of Breath     SOB since last hospital admission; CABG on 5-3; DC from [facility] on 5-27; sees dr diaz     The history is provided by the patient.   Shortness of Breath  This is a recurrent problem. The average episode lasts 3 hours. The problem occurs intermittently.The current episode started 3 to 5 hours ago. The problem has been rapidly improving. Pertinent negatives include no fever, no sore throat, no cough, no chest pain and no rash.   Patient discharged from TCU yesterday after undergoing attempted CABG on 5/3 (unable to perform bypass grafting due to poor quality of native vessels).    Review of patient's allergies indicates:   Allergen Reactions    Amlodipine      Skin crawling      Past Medical History:   Diagnosis Date    Amnesia 06/13/2022    Benign paroxysmal positional vertigo, bilateral 06/13/2022    Bronchitis 06/08/2022    Chronic gouty arthritis 06/13/2022    Coronary artery disease involving native coronary artery of native heart without angina pectoris 12/04/2023    Diabetes mellitus, type 2     Essential hypertension 04/21/2016    Gastroesophageal reflux disease without esophagitis 04/21/2016    GERD (gastroesophageal reflux disease)     Gout, unspecified     Heart attack     Low back pain 06/13/2022    lower back pain bilateral with movement; radiates down right leg to thigh    Low vitamin D level 06/13/2022    Metabolic acidosis 01/12/2023    Microalbuminuria 01/12/2023    Migraine headache 04/21/2016    Osteoporosis 06/13/2022    Peripheral edema 06/13/2022    Physical deconditioning 06/13/2022    Pure hypercholesterolemia 06/13/2022    SOBOE (shortness of breath on exertion)     Spondylosis of lumbar spine 06/13/2022    Stage 3 chronic kidney disease 06/13/2022    Type 2 diabetes mellitus 06/13/2022    Weakness      Past Surgical History:   Procedure Laterality Date    COLONOSCOPY       CORONARY ARTERY BYPASS GRAFT (CABG) N/A 5/3/2024    Procedure: CORONARY ARTERY BYPASS GRAFT (CABG);  Surgeon: Loki Estrada MD;  Location: [facility] OR;  Service: Cardiovascular;  Laterality: N/A;    ENDOSCOPIC HARVEST OF VEIN Left 5/3/2024    Procedure: SURGICAL PROCUREMENT, VEIN, ENDOSCOPIC;  Surgeon: Loki Estrada MD;  Location: [facility] OR;  Service: Cardiovascular;  Laterality: Left;    EPIDURAL STEROID INJECTION INTO CERVICAL SPINE      EYE SURGERY Bilateral     cataract extraction    FOOT SURGERY Bilateral     HYSTERECTOMY      LEFT HEART CATHETERIZATION Left 12/04/2023    Procedure: Left heart cath;  Surgeon: Rubia Seymour MD;  Location: [facility] CATH LAB;  Service: Cardiology;  Laterality: Left;  LHC +/- PCI    PLATING OF STERNUM N/A 5/3/2024    Procedure: PLATING, STERNAL;  Surgeon: Loki Estrada MD;  Location: [facility] OR;  Service: Cardiovascular;  Laterality: N/A;    REPAIR, CHRONIC TOTAL OCCLUSION, CORONARY N/A 02/21/2024    Procedure: Repair, Chronic Total Occlusion, Coronary;  Surgeon: Emmanuel Riley MD;  Location: [facility] CATH LAB;  Service: Cardiology;  Laterality: N/A;   PCI RCA *START WITH PICTURES OF THE LAD AND CONSIDER IFR IF LAD LOOKS WORSE*  6F EBU 3.5 GUIDE 90 CM LM VIA RRA, 7F AL-1 GUIDE SH RCA VIA RT GROIN    STENT, DRUG ELUTING, SINGLE VESSEL, CORONARY  12/04/2023    Procedure: Stent, Drug Eluting, Single Vessel, Coronary;  Surgeon: Rubia Seymour MD;  Location: [facility] CATH LAB;  Service: Cardiology;;    TONSILLECTOMY      UPPER GASTROINTESTINAL ENDOSCOPY       Family History   Problem Relation Name Age of Onset    Lung cancer Mother      Kidney disease Father      Diabetes Brother       Social History     Tobacco Use    Smoking status: Former     Types: Cigarettes    Smokeless tobacco: Never    Tobacco comments:     Quit 20 years ago   Substance Use Topics    Alcohol use: Never    Drug use: Never     Review of Systems   Constitutional:  Negative for fever.   HENT:  Negative for sore throat.     Respiratory:  Positive for shortness of breath. Negative for cough.    Cardiovascular:  Negative for chest pain.   Gastrointestinal:  Negative for nausea.   Genitourinary:  Negative for dysuria.   Musculoskeletal:  Negative for back pain.   Skin:  Negative for rash.   Neurological:  Negative for weakness.   Hematological:  Does not bruise/bleed easily.       Physical Exam     Initial Vitals [05/29/24 1723]   BP Pulse Resp Temp SpO2   (!) 157/54 83 20 97.3 °F (36.3 °C) 97 %      MAP       --         Physical Exam    Nursing note and vitals reviewed.  Constitutional: She appears well-developed and well-nourished.   HENT:   Head: Normocephalic and atraumatic.   Right Ear: External ear normal.   Left Ear: External ear normal.   Eyes: Conjunctivae and EOM are normal. Pupils are equal, round, and reactive to light.   Neck: Neck supple.   Normal range of motion.  Cardiovascular:  Normal rate, regular rhythm, normal heart sounds and intact distal pulses.               Pulmonary/Chest: Breath sounds normal.   Abdominal: Abdomen is soft. Bowel sounds are normal.   Musculoskeletal:         General: Normal range of motion.      Cervical back: Normal range of motion and neck supple.     Neurological: She is alert and oriented to person, place, and time. GCS score is 15. GCS eye subscore is 4. GCS verbal subscore is 5. GCS motor subscore is 6.   Skin: Skin is warm and dry. Capillary refill takes less than 2 seconds.   Psychiatric: She has a normal mood and affect. Her behavior is normal. Judgment and thought content normal.         ED Course   Procedures  Labs Reviewed   B-TYPE NATRIURETIC PEPTIDE - Abnormal; Notable for the following components:       Result Value    Natriuretic Peptide 230.8 (*)     All other components within normal limits   COMPREHENSIVE METABOLIC PANEL - Abnormal; Notable for the following components:    Glucose 188 (*)     Blood Urea Nitrogen 35.1 (*)     Creatinine 2.08 (*)     Globulin 3.7 (*)     All  other components within normal limits   CBC WITH DIFFERENTIAL - Abnormal; Notable for the following components:    MCHC 32.4 (*)     All other components within normal limits   TROPONIN I - Normal   CBC W/ AUTO DIFFERENTIAL    Narrative:     The following orders were created for panel order CBC auto differential.  Procedure                               Abnormality         Status                     ---------                               -----------         ------                     CBC with Differential[[id number]]       Abnormal            Final result                 Please view results for these tests on the individual orders.     EKG Readings: (Independently Interpreted)   Initial Reading: No STEMI. Rhythm: Normal Sinus Rhythm. Heart Rate: 66. Ectopy: No Ectopy. ST Segments: Normal ST Segments. T Waves Flipped: I, AVL and V2. Axis: Normal. Clinical Impression: Normal Sinus Rhythm and Movement Artifact Other Impression: motion artifact limits diagnostic sensitivity of leads V4-V6       Imaging Results              X-Ray Chest AP Portable (Final result)  Result time 05/29/24 17:51:12      Final result by Nuvia Altman MD (05/29/24 17:51:12)                   Impression:      Similar moderate left pleural effusion with basilar airspace opacity.      Electronically signed by: Nuvia Altman  Date:    05/29/2024  Time:    17:51               Narrative:    EXAMINATION:  XR CHEST AP PORTABLE    CLINICAL HISTORY:  SOB;    COMPARISON:  Chest x-ray dated 05/21/2024    FINDINGS:  The heart is normal in size.  There is a similar moderate left pleural effusion with basilar airspace opacity.  There is no definite visible pneumothorax.                                       Medications   furosemide injection 40 mg (has no administration in time range)     Medical Decision Making  Amount and/or Complexity of Data Reviewed  Labs: ordered. Decision-making details documented in ED Course.  Radiology: ordered and  independent interpretation performed. Decision-making details documented in ED Course.  ECG/medicine tests: ordered and independent interpretation performed. Decision-making details documented in ED Course.    Risk  Prescription drug management.               ED Course as of 05/29/24 1825   Wed May 29, 2024   1817 I spoke with Dr. Loya (CV surgery) - agrees no reason for re-admission, supportive care (IS, mobilization, diuretic). [CL]   1823 I spoke with Dr. Ramos (patient's PMD) - states he will have office call her tomorrow and have her come in for re-check.  Agrees with IV Lasix while in ED. [CL]      ED Course User Index  [CL] Antoni Phillips MD          Differential includes:  pleural effusion, anxiety, CHF, post-operative pain, angina, PE, infectious process, anemia.  Will obtain EKG, CXR, CBC, CMP, BNP, troponin.  Will discuss with cardiothoracic surgery, as she is within 30 days of her surgery.                 Clinical Impression:  Final diagnoses:  [R06.02] SOB (shortness of breath) (Primary)  [J90] Pleural effusion on left  [F41.9] Anxiety          ED Disposition Condition    Discharge Stable          ED Prescriptions    None       Follow-up Information       Follow up With Specialties Details Why Contact Info    Eleazar Ramos MD Internal Medicine In 1 day  [address] [place] [address]  [phone]               Antoni Phillips MD  05/29/24 1825

## 2024-05-30 ENCOUNTER — PATIENT OUTREACH (OUTPATIENT)
Dept: ADMINISTRATIVE | Facility: CLINIC | Age: 84
End: 2024-05-30
Payer: MEDICARE

## 2024-05-30 PROBLEM — Z95.1 S/P CABG (CORONARY ARTERY BYPASS GRAFT): Status: RESOLVED | Noted: 2024-05-13 | Resolved: 2024-05-30

## 2024-05-30 LAB
OHS QRS DURATION: 78 MS
OHS QTC CALCULATION: 417 MS

## 2024-05-30 NOTE — PROGRESS NOTES
C3 nurse spoke with Rosalba Whitlock 's daughter, Magda for a TCC post hospital discharge follow up call. The patient had a scheduled HOSFU appointment with Eleazar Ramos MD  on Thursday, 5/30/24 and has post op appt scheduled with Loki Estrada MD (Cardiovascular Surgery) on 6/6/24 @ 8:10 am

## 2024-06-04 ENCOUNTER — LAB REQUISITION (OUTPATIENT)
Dept: LAB | Facility: HOSPITAL | Age: 84
End: 2024-06-04
Payer: MEDICARE

## 2024-06-04 DIAGNOSIS — I25.10 ATHEROSCLEROTIC HEART DISEASE OF NATIVE CORONARY ARTERY WITHOUT ANGINA PECTORIS: ICD-10-CM

## 2024-06-04 DIAGNOSIS — N18.4 CHRONIC KIDNEY DISEASE, STAGE 4 (SEVERE): ICD-10-CM

## 2024-06-04 LAB
ALBUMIN SERPL-MCNC: 3.7 G/DL (ref 3.4–4.8)
ALBUMIN/GLOB SERPL: 1.2 RATIO (ref 1.1–2)
ALP SERPL-CCNC: 60 UNIT/L (ref 40–150)
ALT SERPL-CCNC: 9 UNIT/L (ref 0–55)
ANION GAP SERPL CALC-SCNC: 12 MEQ/L
AST SERPL-CCNC: 23 UNIT/L (ref 5–34)
BILIRUB SERPL-MCNC: 0.5 MG/DL
BUN SERPL-MCNC: 31 MG/DL (ref 9.8–20.1)
CALCIUM SERPL-MCNC: 9.4 MG/DL (ref 8.4–10.2)
CHLORIDE SERPL-SCNC: 106 MMOL/L (ref 98–107)
CO2 SERPL-SCNC: 23 MMOL/L (ref 23–31)
CREAT SERPL-MCNC: 1.77 MG/DL (ref 0.55–1.02)
CREAT/UREA NIT SERPL: 18
GFR SERPLBLD CREATININE-BSD FMLA CKD-EPI: 29 ML/MIN/1.73/M2
GLOBULIN SER-MCNC: 3 GM/DL (ref 2.4–3.5)
GLUCOSE SERPL-MCNC: 130 MG/DL (ref 82–115)
PHOSPHATE SERPL-MCNC: 4.5 MG/DL (ref 2.3–4.7)
POTASSIUM SERPL-SCNC: 4.3 MMOL/L (ref 3.5–5.1)
PROT SERPL-MCNC: 6.7 GM/DL (ref 5.8–7.6)
SODIUM SERPL-SCNC: 141 MMOL/L (ref 136–145)

## 2024-06-04 PROCEDURE — 80053 COMPREHEN METABOLIC PANEL: CPT | Performed by: INTERNAL MEDICINE

## 2024-06-04 PROCEDURE — 84100 ASSAY OF PHOSPHORUS: CPT | Performed by: INTERNAL MEDICINE

## 2024-06-06 ENCOUNTER — OFFICE VISIT (OUTPATIENT)
Dept: CARDIAC SURGERY | Facility: CLINIC | Age: 84
End: 2024-06-06
Payer: MEDICARE

## 2024-06-06 VITALS
RESPIRATION RATE: 18 BRPM | BODY MASS INDEX: 25.52 KG/M2 | DIASTOLIC BLOOD PRESSURE: 68 MMHG | HEIGHT: 65 IN | HEART RATE: 54 BPM | WEIGHT: 153.19 LBS | SYSTOLIC BLOOD PRESSURE: 104 MMHG | OXYGEN SATURATION: 97 %

## 2024-06-06 DIAGNOSIS — Z95.1 HX OF CABG: Primary | ICD-10-CM

## 2024-06-06 PROCEDURE — 99024 POSTOP FOLLOW-UP VISIT: CPT | Mod: ,,, | Performed by: THORACIC SURGERY (CARDIOTHORACIC VASCULAR SURGERY)

## 2024-06-06 PROCEDURE — 3074F SYST BP LT 130 MM HG: CPT | Mod: CPTII,,, | Performed by: THORACIC SURGERY (CARDIOTHORACIC VASCULAR SURGERY)

## 2024-06-06 PROCEDURE — 1101F PT FALLS ASSESS-DOCD LE1/YR: CPT | Mod: CPTII,,, | Performed by: THORACIC SURGERY (CARDIOTHORACIC VASCULAR SURGERY)

## 2024-06-06 PROCEDURE — 1160F RVW MEDS BY RX/DR IN RCRD: CPT | Mod: CPTII,,, | Performed by: THORACIC SURGERY (CARDIOTHORACIC VASCULAR SURGERY)

## 2024-06-06 PROCEDURE — 1111F DSCHRG MED/CURRENT MED MERGE: CPT | Mod: CPTII,,, | Performed by: THORACIC SURGERY (CARDIOTHORACIC VASCULAR SURGERY)

## 2024-06-06 PROCEDURE — 3078F DIAST BP <80 MM HG: CPT | Mod: CPTII,,, | Performed by: THORACIC SURGERY (CARDIOTHORACIC VASCULAR SURGERY)

## 2024-06-06 PROCEDURE — 1159F MED LIST DOCD IN RCRD: CPT | Mod: CPTII,,, | Performed by: THORACIC SURGERY (CARDIOTHORACIC VASCULAR SURGERY)

## 2024-06-06 PROCEDURE — 1126F AMNT PAIN NOTED NONE PRSNT: CPT | Mod: CPTII,,, | Performed by: THORACIC SURGERY (CARDIOTHORACIC VASCULAR SURGERY)

## 2024-06-06 PROCEDURE — 3288F FALL RISK ASSESSMENT DOCD: CPT | Mod: CPTII,,, | Performed by: THORACIC SURGERY (CARDIOTHORACIC VASCULAR SURGERY)

## 2024-06-06 NOTE — PROGRESS NOTES
Patient is status post attempted coronary artery bypass grafting doing well without complaints.    Operative Findings (including complications, if any):  Severe InStent restenosis of RCA/PDA stent with distal PDA long segment occlusion to the apex.  Lad was deeply intra myocardial with with mid to distal LAD stent terminating in an extremely small ongoing LAD less than 1 mm to the apex.  LAD was intra myocardial necessitating cardioplegic arrest to palpate previously placed LAD stent and identified distal LAD segment and this vessel was not graftable beyond the stent   Vital signs stable/afebrile   Regular rate and rhythm without murmurs rubs or gallops  Coarse breath sounds bilaterally   Wounds CDI     A/P:  Doing well without complaints.  Plan per Cardiology

## 2024-06-12 PROBLEM — Z95.1 HX OF CABG: Status: RESOLVED | Noted: 2024-05-13 | Resolved: 2024-06-12

## 2024-06-13 ENCOUNTER — HOSPITAL ENCOUNTER (OUTPATIENT)
Dept: RADIOLOGY | Facility: HOSPITAL | Age: 84
Discharge: HOME | End: 2024-06-13
Attending: INTERNAL MEDICINE
Payer: MEDICARE

## 2024-06-13 DIAGNOSIS — R91.8 PULMONARY NODULES: ICD-10-CM

## 2024-06-13 PROCEDURE — 71250 CT THORAX DX C-: CPT | Mod: TC

## 2024-06-14 ENCOUNTER — EXTERNAL HOME HEALTH (OUTPATIENT)
Dept: HOME HEALTH SERVICES | Facility: HOSPITAL | Age: 84
End: 2024-06-14
Payer: MEDICARE

## 2024-06-19 ENCOUNTER — LAB REQUISITION (OUTPATIENT)
Dept: LAB | Facility: HOSPITAL | Age: 84
End: 2024-06-19
Payer: MEDICARE

## 2024-06-19 DIAGNOSIS — I25.10 ATHEROSCLEROTIC HEART DISEASE OF NATIVE CORONARY ARTERY WITHOUT ANGINA PECTORIS: ICD-10-CM

## 2024-06-19 DIAGNOSIS — Z48.812 ENCOUNTER FOR SURGICAL AFTERCARE FOLLOWING SURGERY ON THE CIRCULATORY SYSTEM: ICD-10-CM

## 2024-06-19 LAB
ALBUMIN SERPL-MCNC: 3.1 G/DL (ref 3.4–4.8)
ALBUMIN/GLOB SERPL: 1 RATIO (ref 1.1–2)
ALP SERPL-CCNC: 57 UNIT/L (ref 40–150)
ALT SERPL-CCNC: 5 UNIT/L (ref 0–55)
ANION GAP SERPL CALC-SCNC: 11 MEQ/L
AST SERPL-CCNC: 18 UNIT/L (ref 5–34)
BASOPHILS # BLD AUTO: 0.05 X10(3)/MCL
BASOPHILS NFR BLD AUTO: 0.7 %
BILIRUB SERPL-MCNC: 0.3 MG/DL
BUN SERPL-MCNC: 22 MG/DL (ref 9.8–20.1)
CALCIUM SERPL-MCNC: 9.2 MG/DL (ref 8.4–10.2)
CHLORIDE SERPL-SCNC: 112 MMOL/L (ref 98–107)
CHOLEST SERPL-MCNC: 122 MG/DL
CHOLEST/HDLC SERPL: 5 {RATIO} (ref 0–5)
CO2 SERPL-SCNC: 23 MMOL/L (ref 23–31)
CREAT SERPL-MCNC: 1.56 MG/DL (ref 0.55–1.02)
CREAT/UREA NIT SERPL: 14
EOSINOPHIL # BLD AUTO: 0.31 X10(3)/MCL (ref 0–0.9)
EOSINOPHIL NFR BLD AUTO: 4.2 %
ERYTHROCYTE [DISTWIDTH] IN BLOOD BY AUTOMATED COUNT: 13.3 % (ref 11.5–17)
GFR SERPLBLD CREATININE-BSD FMLA CKD-EPI: 33 ML/MIN/1.73/M2
GLOBULIN SER-MCNC: 3.1 GM/DL (ref 2.4–3.5)
GLUCOSE SERPL-MCNC: 91 MG/DL (ref 82–115)
HCT VFR BLD AUTO: 34.4 % (ref 37–47)
HDLC SERPL-MCNC: 24 MG/DL (ref 35–60)
HGB BLD-MCNC: 11.4 G/DL (ref 12–16)
IMM GRANULOCYTES # BLD AUTO: 0.03 X10(3)/MCL (ref 0–0.04)
IMM GRANULOCYTES NFR BLD AUTO: 0.4 %
LDLC SERPL CALC-MCNC: 73 MG/DL (ref 50–140)
LYMPHOCYTES # BLD AUTO: 2.62 X10(3)/MCL (ref 0.6–4.6)
LYMPHOCYTES NFR BLD AUTO: 35.3 %
MCH RBC QN AUTO: 28.9 PG (ref 27–31)
MCHC RBC AUTO-ENTMCNC: 33.1 G/DL (ref 33–36)
MCV RBC AUTO: 87.3 FL (ref 80–94)
MONOCYTES # BLD AUTO: 0.59 X10(3)/MCL (ref 0.1–1.3)
MONOCYTES NFR BLD AUTO: 7.9 %
NEUTROPHILS # BLD AUTO: 3.83 X10(3)/MCL (ref 2.1–9.2)
NEUTROPHILS NFR BLD AUTO: 51.5 %
NRBC BLD AUTO-RTO: 0 %
PLATELET # BLD AUTO: 217 X10(3)/MCL (ref 130–400)
PMV BLD AUTO: 10.5 FL (ref 7.4–10.4)
POTASSIUM SERPL-SCNC: 4.5 MMOL/L (ref 3.5–5.1)
PROT SERPL-MCNC: 6.2 GM/DL (ref 5.8–7.6)
RBC # BLD AUTO: 3.94 X10(6)/MCL (ref 4.2–5.4)
SODIUM SERPL-SCNC: 146 MMOL/L (ref 136–145)
TRIGL SERPL-MCNC: 127 MG/DL (ref 37–140)
TSH SERPL-ACNC: 1.54 UIU/ML (ref 0.35–4.94)
VLDLC SERPL CALC-MCNC: 25 MG/DL
WBC # BLD AUTO: 7.43 X10(3)/MCL (ref 4.5–11.5)

## 2024-06-19 PROCEDURE — 85025 COMPLETE CBC W/AUTO DIFF WBC: CPT | Performed by: INTERNAL MEDICINE

## 2024-06-19 PROCEDURE — 84443 ASSAY THYROID STIM HORMONE: CPT | Performed by: INTERNAL MEDICINE

## 2024-06-19 PROCEDURE — 80061 LIPID PANEL: CPT | Performed by: INTERNAL MEDICINE

## 2024-06-19 PROCEDURE — 80053 COMPREHEN METABOLIC PANEL: CPT | Performed by: INTERNAL MEDICINE

## 2024-06-27 ENCOUNTER — LAB REQUISITION (OUTPATIENT)
Dept: LAB | Facility: HOSPITAL | Age: 84
End: 2024-06-27
Payer: MEDICARE

## 2024-06-27 DIAGNOSIS — D63.1 ANEMIA IN CHRONIC KIDNEY DISEASE (CODE): ICD-10-CM

## 2024-06-27 DIAGNOSIS — E21.3 HYPERPARATHYROIDISM, UNSPECIFIED: ICD-10-CM

## 2024-06-27 DIAGNOSIS — N18.4 CHRONIC KIDNEY DISEASE, STAGE 4 (SEVERE): ICD-10-CM

## 2024-06-27 DIAGNOSIS — E87.8 OTHER DISORDERS OF ELECTROLYTE AND FLUID BALANCE, NOT ELSEWHERE CLASSIFIED: ICD-10-CM

## 2024-06-27 DIAGNOSIS — E55.9 VITAMIN D DEFICIENCY, UNSPECIFIED: ICD-10-CM

## 2024-06-27 DIAGNOSIS — R80.9 PROTEINURIA, UNSPECIFIED: ICD-10-CM

## 2024-06-27 LAB
25(OH)D3+25(OH)D2 SERPL-MCNC: 62 NG/ML (ref 30–80)
ALBUMIN SERPL-MCNC: 3.6 G/DL (ref 3.4–4.8)
ALBUMIN/GLOB SERPL: 1.2 RATIO (ref 1.1–2)
ALP SERPL-CCNC: 68 UNIT/L (ref 40–150)
ALT SERPL-CCNC: 8 UNIT/L (ref 0–55)
ANION GAP SERPL CALC-SCNC: 9 MEQ/L
AST SERPL-CCNC: 21 UNIT/L (ref 5–34)
BASOPHILS # BLD AUTO: 0.04 X10(3)/MCL
BASOPHILS NFR BLD AUTO: 0.5 %
BILIRUB SERPL-MCNC: 0.3 MG/DL
BUN SERPL-MCNC: 18.7 MG/DL (ref 9.8–20.1)
CALCIUM SERPL-MCNC: 9.2 MG/DL (ref 8.4–10.2)
CHLORIDE SERPL-SCNC: 111 MMOL/L (ref 98–107)
CO2 SERPL-SCNC: 22 MMOL/L (ref 23–31)
CREAT SERPL-MCNC: 1.49 MG/DL (ref 0.55–1.02)
CREAT/UREA NIT SERPL: 13
EOSINOPHIL # BLD AUTO: 0.23 X10(3)/MCL (ref 0–0.9)
EOSINOPHIL NFR BLD AUTO: 3 %
ERYTHROCYTE [DISTWIDTH] IN BLOOD BY AUTOMATED COUNT: 13.8 % (ref 11.5–17)
GFR SERPLBLD CREATININE-BSD FMLA CKD-EPI: 35 ML/MIN/1.73/M2
GLOBULIN SER-MCNC: 2.9 GM/DL (ref 2.4–3.5)
GLUCOSE SERPL-MCNC: 166 MG/DL (ref 82–115)
HCT VFR BLD AUTO: 36.7 % (ref 37–47)
HGB BLD-MCNC: 12.1 G/DL (ref 12–16)
IMM GRANULOCYTES # BLD AUTO: 0.03 X10(3)/MCL (ref 0–0.04)
IMM GRANULOCYTES NFR BLD AUTO: 0.4 %
LYMPHOCYTES # BLD AUTO: 2.15 X10(3)/MCL (ref 0.6–4.6)
LYMPHOCYTES NFR BLD AUTO: 27.7 %
MAGNESIUM SERPL-MCNC: 2 MG/DL (ref 1.6–2.6)
MCH RBC QN AUTO: 29 PG (ref 27–31)
MCHC RBC AUTO-ENTMCNC: 33 G/DL (ref 33–36)
MCV RBC AUTO: 88 FL (ref 80–94)
MONOCYTES # BLD AUTO: 0.47 X10(3)/MCL (ref 0.1–1.3)
MONOCYTES NFR BLD AUTO: 6.1 %
NEUTROPHILS # BLD AUTO: 4.84 X10(3)/MCL (ref 2.1–9.2)
NEUTROPHILS NFR BLD AUTO: 62.3 %
NRBC BLD AUTO-RTO: 0 %
PHOSPHATE SERPL-MCNC: 3.5 MG/DL (ref 2.3–4.7)
PLATELET # BLD AUTO: 259 X10(3)/MCL (ref 130–400)
PMV BLD AUTO: 9.8 FL (ref 7.4–10.4)
POTASSIUM SERPL-SCNC: 4.4 MMOL/L (ref 3.5–5.1)
PROT SERPL-MCNC: 6.5 GM/DL (ref 5.8–7.6)
PTH-INTACT SERPL-MCNC: 88.2 PG/ML (ref 8.7–77)
RBC # BLD AUTO: 4.17 X10(6)/MCL (ref 4.2–5.4)
SODIUM SERPL-SCNC: 142 MMOL/L (ref 136–145)
WBC # BLD AUTO: 7.76 X10(3)/MCL (ref 4.5–11.5)

## 2024-06-27 PROCEDURE — 82306 VITAMIN D 25 HYDROXY: CPT | Performed by: INTERNAL MEDICINE

## 2024-06-27 PROCEDURE — 83735 ASSAY OF MAGNESIUM: CPT | Performed by: INTERNAL MEDICINE

## 2024-06-27 PROCEDURE — 85025 COMPLETE CBC W/AUTO DIFF WBC: CPT | Performed by: INTERNAL MEDICINE

## 2024-06-27 PROCEDURE — 84100 ASSAY OF PHOSPHORUS: CPT | Performed by: INTERNAL MEDICINE

## 2024-06-27 PROCEDURE — 83970 ASSAY OF PARATHORMONE: CPT | Performed by: INTERNAL MEDICINE

## 2024-06-27 PROCEDURE — 80053 COMPREHEN METABOLIC PANEL: CPT | Performed by: INTERNAL MEDICINE

## 2024-07-05 ENCOUNTER — HOSPITAL ENCOUNTER (INPATIENT)
Facility: HOSPITAL | Age: 84
LOS: 2 days | Discharge: HOME | DRG: 205 | End: 2024-07-07
Attending: INTERNAL MEDICINE | Admitting: INTERNAL MEDICINE
Payer: MEDICARE

## 2024-07-05 DIAGNOSIS — M54.9 MUSCULOSKELETAL BACK PAIN: Primary | ICD-10-CM

## 2024-07-05 DIAGNOSIS — R07.9 CHEST PAIN: ICD-10-CM

## 2024-07-05 DIAGNOSIS — J18.9 PNEUMONIA: ICD-10-CM

## 2024-07-05 DIAGNOSIS — R06.02 SHORTNESS OF BREATH: ICD-10-CM

## 2024-07-05 LAB
ALBUMIN SERPL-MCNC: 3.8 G/DL (ref 3.4–4.8)
ALBUMIN/GLOB SERPL: 1 RATIO (ref 1.1–2)
ALP SERPL-CCNC: 68 UNIT/L (ref 40–150)
ALT SERPL-CCNC: 6 UNIT/L (ref 0–55)
ANION GAP SERPL CALC-SCNC: 13 MEQ/L
APTT PPP: 28.3 SECONDS (ref 23.4–33.9)
AST SERPL-CCNC: 16 UNIT/L (ref 5–34)
BACTERIA #/AREA URNS AUTO: NORMAL /HPF
BASOPHILS # BLD AUTO: 0.04 X10(3)/MCL
BASOPHILS NFR BLD AUTO: 0.5 %
BILIRUB SERPL-MCNC: 0.4 MG/DL
BILIRUB UR QL STRIP.AUTO: NEGATIVE
BUN SERPL-MCNC: 16.1 MG/DL (ref 9.8–20.1)
CALCIUM SERPL-MCNC: 9.6 MG/DL (ref 8.4–10.2)
CHLORIDE SERPL-SCNC: 109 MMOL/L (ref 98–107)
CLARITY UR: CLEAR
CO2 SERPL-SCNC: 21 MMOL/L (ref 23–31)
COLOR UR AUTO: ABNORMAL
CREAT SERPL-MCNC: 1.53 MG/DL (ref 0.55–1.02)
CREAT/UREA NIT SERPL: 11
EOSINOPHIL # BLD AUTO: 0.2 X10(3)/MCL (ref 0–0.9)
EOSINOPHIL NFR BLD AUTO: 2.3 %
ERYTHROCYTE [DISTWIDTH] IN BLOOD BY AUTOMATED COUNT: 13.9 % (ref 11.5–17)
FLUAV AG UPPER RESP QL IA.RAPID: NOT DETECTED
FLUBV AG UPPER RESP QL IA.RAPID: NOT DETECTED
GFR SERPLBLD CREATININE-BSD FMLA CKD-EPI: 34 ML/MIN/1.73/M2
GLOBULIN SER-MCNC: 3.8 GM/DL (ref 2.4–3.5)
GLUCOSE SERPL-MCNC: 209 MG/DL (ref 82–115)
GLUCOSE UR QL STRIP: 100
HCT VFR BLD AUTO: 40.4 % (ref 37–47)
HGB BLD-MCNC: 13.1 G/DL (ref 12–16)
HGB UR QL STRIP: ABNORMAL
IMM GRANULOCYTES # BLD AUTO: 0.03 X10(3)/MCL (ref 0–0.04)
IMM GRANULOCYTES NFR BLD AUTO: 0.4 %
INR PPP: 1 (ref 2–3)
KETONES UR QL STRIP: NEGATIVE
LACTATE SERPL-SCNC: 1.5 MMOL/L (ref 0.5–2.2)
LEUKOCYTE ESTERASE UR QL STRIP: NEGATIVE
LYMPHOCYTES # BLD AUTO: 2.03 X10(3)/MCL (ref 0.6–4.6)
LYMPHOCYTES NFR BLD AUTO: 23.8 %
MAGNESIUM SERPL-MCNC: 1.9 MG/DL (ref 1.6–2.6)
MCH RBC QN AUTO: 28.8 PG (ref 27–31)
MCHC RBC AUTO-ENTMCNC: 32.4 G/DL (ref 33–36)
MCV RBC AUTO: 88.8 FL (ref 80–94)
MONOCYTES # BLD AUTO: 0.68 X10(3)/MCL (ref 0.1–1.3)
MONOCYTES NFR BLD AUTO: 8 %
NEUTROPHILS # BLD AUTO: 5.54 X10(3)/MCL (ref 2.1–9.2)
NEUTROPHILS NFR BLD AUTO: 65 %
NITRITE UR QL STRIP: NEGATIVE
NRBC BLD AUTO-RTO: 0 %
PH UR STRIP: 6 [PH]
PLATELET # BLD AUTO: 203 X10(3)/MCL (ref 130–400)
PMV BLD AUTO: 10 FL (ref 7.4–10.4)
POCT GLUCOSE: 165 MG/DL (ref 70–110)
POTASSIUM SERPL-SCNC: 4 MMOL/L (ref 3.5–5.1)
PROT SERPL-MCNC: 7.6 GM/DL (ref 5.8–7.6)
PROT UR QL STRIP: NEGATIVE
PROTHROMBIN TIME: 13.4 SECONDS (ref 11.7–14.5)
RBC # BLD AUTO: 4.55 X10(6)/MCL (ref 4.2–5.4)
RBC #/AREA URNS AUTO: NORMAL /HPF
SARS-COV-2 RNA RESP QL NAA+PROBE: NOT DETECTED
SODIUM SERPL-SCNC: 143 MMOL/L (ref 136–145)
SP GR UR STRIP.AUTO: 1.01 (ref 1–1.03)
SQUAMOUS #/AREA URNS AUTO: NORMAL /HPF
TROPONIN I SERPL-MCNC: <0.01 NG/ML (ref 0–0.04)
UROBILINOGEN UR STRIP-ACNC: 0.2
WBC # BLD AUTO: 8.52 X10(3)/MCL (ref 4.5–11.5)
WBC #/AREA URNS AUTO: NORMAL /HPF

## 2024-07-05 PROCEDURE — 81001 URINALYSIS AUTO W/SCOPE: CPT | Performed by: INTERNAL MEDICINE

## 2024-07-05 PROCEDURE — 80053 COMPREHEN METABOLIC PANEL: CPT | Performed by: INTERNAL MEDICINE

## 2024-07-05 PROCEDURE — 85025 COMPLETE CBC W/AUTO DIFF WBC: CPT | Performed by: INTERNAL MEDICINE

## 2024-07-05 PROCEDURE — 84484 ASSAY OF TROPONIN QUANT: CPT | Performed by: INTERNAL MEDICINE

## 2024-07-05 PROCEDURE — 25000003 PHARM REV CODE 250: Performed by: INTERNAL MEDICINE

## 2024-07-05 PROCEDURE — 93010 ELECTROCARDIOGRAM REPORT: CPT | Mod: ,,, | Performed by: INTERNAL MEDICINE

## 2024-07-05 PROCEDURE — 83605 ASSAY OF LACTIC ACID: CPT | Performed by: INTERNAL MEDICINE

## 2024-07-05 PROCEDURE — 0240U COVID/FLU A&B PCR: CPT | Performed by: EMERGENCY MEDICINE

## 2024-07-05 PROCEDURE — 96375 TX/PRO/DX INJ NEW DRUG ADDON: CPT

## 2024-07-05 PROCEDURE — 11000001 HC ACUTE MED/SURG PRIVATE ROOM

## 2024-07-05 PROCEDURE — 99285 EMERGENCY DEPT VISIT HI MDM: CPT | Mod: 25

## 2024-07-05 PROCEDURE — 83735 ASSAY OF MAGNESIUM: CPT | Performed by: INTERNAL MEDICINE

## 2024-07-05 PROCEDURE — 85730 THROMBOPLASTIN TIME PARTIAL: CPT | Performed by: INTERNAL MEDICINE

## 2024-07-05 PROCEDURE — 63600175 PHARM REV CODE 636 W HCPCS: Performed by: EMERGENCY MEDICINE

## 2024-07-05 PROCEDURE — 96365 THER/PROPH/DIAG IV INF INIT: CPT

## 2024-07-05 PROCEDURE — 21400001 HC TELEMETRY ROOM

## 2024-07-05 PROCEDURE — 85610 PROTHROMBIN TIME: CPT | Performed by: INTERNAL MEDICINE

## 2024-07-05 PROCEDURE — 81003 URINALYSIS AUTO W/O SCOPE: CPT | Performed by: INTERNAL MEDICINE

## 2024-07-05 PROCEDURE — 93005 ELECTROCARDIOGRAM TRACING: CPT

## 2024-07-05 PROCEDURE — 87040 BLOOD CULTURE FOR BACTERIA: CPT | Performed by: INTERNAL MEDICINE

## 2024-07-05 RX ORDER — LEVOFLOXACIN 5 MG/ML
750 INJECTION, SOLUTION INTRAVENOUS
Status: DISCONTINUED | OUTPATIENT
Start: 2024-07-05 | End: 2024-07-07 | Stop reason: HOSPADM

## 2024-07-05 RX ORDER — MORPHINE SULFATE 4 MG/ML
4 INJECTION, SOLUTION INTRAMUSCULAR; INTRAVENOUS EVERY 6 HOURS PRN
Status: DISCONTINUED | OUTPATIENT
Start: 2024-07-05 | End: 2024-07-07 | Stop reason: HOSPADM

## 2024-07-05 RX ORDER — FENTANYL CITRATE 50 UG/ML
50 INJECTION, SOLUTION INTRAMUSCULAR; INTRAVENOUS
Status: COMPLETED | OUTPATIENT
Start: 2024-07-05 | End: 2024-07-05

## 2024-07-05 RX ORDER — NAPROXEN SODIUM 220 MG/1
324 TABLET, FILM COATED ORAL ONCE
Status: COMPLETED | OUTPATIENT
Start: 2024-07-05 | End: 2024-07-05

## 2024-07-05 RX ADMIN — FENTANYL CITRATE 50 MCG: 50 INJECTION, SOLUTION INTRAMUSCULAR; INTRAVENOUS at 07:07

## 2024-07-05 RX ADMIN — LEVOFLOXACIN 750 MG: 750 INJECTION, SOLUTION INTRAVENOUS at 08:07

## 2024-07-05 RX ADMIN — MORPHINE SULFATE 4 MG: 4 INJECTION INTRAVENOUS at 11:07

## 2024-07-05 RX ADMIN — ASPIRIN 81 MG 324 MG: 81 TABLET ORAL at 06:07

## 2024-07-05 NOTE — ED PROVIDER NOTES
Source of History:  Patient, daughter, no limitations    Chief complaint:  Shoulder Pain (Pt presents to er upset at pain to right side of  shoulder radiating to neck and arm today. Pt relating DR Ramos told her she has pneumnia from an xray taken 2 days ago and prescribed Augmentin and Z-pack.)      HPI:  Rosalba Whitlock is a 83 y.o. female presenting with Shoulder Pain (Pt presents to er upset at pain to right side of  shoulder radiating to neck and arm today. Pt relating DR Ramos told her she has pneumnia from an xray taken 2 days ago and prescribed Augmentin and Z-pack.)       82yo F hx of CAD s/p attempted CABG 5/03/2024 where she developed infiltrate p surgery, HTN, HLD, DM2, GERD, esophageal stricture s/p dilatation, neuropathy s/p BL LE nerve clipping procedure on 01/2018, chronic back pain s/p epidural injection in 09/2018, gout, right shoulder tendonitis and rotator cuff tear, osteoporosis, and CKD stage IIIb, presents to ED for right shoulder pain, onset today, worse with deep breaths and cough. Dx with PNA 6/13/24 and completed 5 days of Levaquin. 2nd round of abx for PNA starting 7/03/24 Z-Pac and Augmentin.    The patient complains of chest pain and chest pressure/discomfort. The discomfort is described as pleuritic, sharp with radiation to the right shoulder and neck area. Onset of symptoms was today, unchanged course since that time. The patient also complains of severe pain. The patient denies fever, cough, and vomiting. Patient's cardiac risk factors are advanced age (older than 55 for men, 65 for women), diabetes mellitus, dyslipidemia, hypertension, obesity (BMI >= 30 kg/m2), and known CAD .  Care prior to arrival consisted of 2 rounds of abx, with no relief.        Review of Systems   Constitutional symptoms:  Negative except as documented in HPI.   Skin symptoms:  Negative except as documented in HPI.   HEENT symptoms:  Negative except as documented in HPI.   Respiratory symptoms:   Negative except as documented in HPI.   Cardiovascular symptoms:  Negative except as documented in HPI.   Gastrointestinal symptoms:  Negative except as documented in HPI.    Genitourinary symptoms:  Negative except as documented in HPI.   Musculoskeletal symptoms:  Negative except as documented in HPI.   Neurologic symptoms:  Negative except as documented in HPI.   Psychiatric symptoms:  Negative except as documented in HPI.   Allergy/immunologic symptoms:  Negative except as documented in HPI.             Additional review of systems information: All other systems reviewed and otherwise negative.      Review of patient's allergies indicates:   Allergen Reactions    Amlodipine      Skin crawling        PMH:  As per HPI and below:    Past Medical History:   Diagnosis Date    Amnesia 06/13/2022    Benign paroxysmal positional vertigo, bilateral 06/13/2022    Bronchitis 06/08/2022    Chronic gouty arthritis 06/13/2022    Coronary artery disease involving native coronary artery of native heart without angina pectoris 12/04/2023    Diabetes mellitus, type 2     Essential hypertension 04/21/2016    Gastroesophageal reflux disease without esophagitis 04/21/2016    GERD (gastroesophageal reflux disease)     Gout, unspecified     Heart attack     Low back pain 06/13/2022    lower back pain bilateral with movement; radiates down right leg to thigh    Low vitamin D level 06/13/2022    Metabolic acidosis 01/12/2023    Microalbuminuria 01/12/2023    Migraine headache 04/21/2016    Osteoporosis 06/13/2022    Peripheral edema 06/13/2022    Physical deconditioning 06/13/2022    Pure hypercholesterolemia 06/13/2022    SOBOE (shortness of breath on exertion)     Spondylosis of lumbar spine 06/13/2022    Stage 3 chronic kidney disease 06/13/2022    Type 2 diabetes mellitus 06/13/2022    Weakness        Family History   Problem Relation Name Age of Onset    Lung cancer Mother      Kidney disease Father      Diabetes Brother          Past Surgical History:   Procedure Laterality Date    COLONOSCOPY      CORONARY ARTERY BYPASS GRAFT (CABG) N/A 5/3/2024    Procedure: CORONARY ARTERY BYPASS GRAFT (CABG);  Surgeon: Lkoi Estrada MD;  Location: [facility] OR;  Service: Cardiovascular;  Laterality: N/A;    ENDOSCOPIC HARVEST OF VEIN Left 5/3/2024    Procedure: SURGICAL PROCUREMENT, VEIN, ENDOSCOPIC;  Surgeon: Loki Estrada MD;  Location: [facility];  Service: Cardiovascular;  Laterality: Left;    EPIDURAL STEROID INJECTION INTO CERVICAL SPINE      EYE SURGERY Bilateral     cataract extraction    FOOT SURGERY Bilateral     HYSTERECTOMY      LEFT HEART CATHETERIZATION Left 12/04/2023    Procedure: Left heart cath;  Surgeon: Rubia Seymour MD;  Location: [facility]ATH [facility];  Service: Cardiology;  Laterality: Left;  LHC +/- PCI    PLATING OF STERNUM N/A 5/3/2024    Procedure: PLATING, STERNAL;  Surgeon: Loki Estrada MD;  Location: [facility];  Service: Cardiovascular;  Laterality: N/A;    REPAIR, CHRONIC TOTAL OCCLUSION, CORONARY N/A 02/21/2024    Procedure: Repair, Chronic Total Occlusion, Coronary;  Surgeon: Emmanuel Riley MD;  Location: [facility];  Service: Cardiology;  Laterality: N/A;   PCI RCA *START WITH PICTURES OF THE LAD AND CONSIDER IFR IF LAD LOOKS WORSE*  6F EBU 3.5 GUIDE 90 CM LM VIA RRA, 7F AL-1 GUIDE SH RCA VIA RT GROIN    STENT, DRUG ELUTING, SINGLE VESSEL, CORONARY  12/04/2023    Procedure: Stent, Drug Eluting, Single Vessel, Coronary;  Surgeon: Rubia Seymour MD;  Location: [facility];  Service: Cardiology;;    TONSILLECTOMY      UPPER GASTROINTESTINAL ENDOSCOPY         Social History     Tobacco Use    Smoking status: Former     Types: Cigarettes    Smokeless tobacco: Never    Tobacco comments:     Quit 20 years ago   Substance Use Topics    Alcohol use: Never    Drug use: Never       Patient Active Problem List   Diagnosis    Gastroesophageal reflux disease without esophagitis    Essential hypertension    Chronic gouty  "arthritis    Benign paroxysmal positional vertigo, bilateral    Low back pain    Low vitamin D level    Migraine headache    Osteoporosis    Peripheral edema    Physical deconditioning    Spondylosis of lumbar spine    Pure hypercholesterolemia    Stage 3 chronic kidney disease    Type 2 diabetes mellitus    Microalbuminuria    Metabolic acidosis    MGUS (monoclonal gammopathy of unknown significance)    Coronary artery disease        Physical Exam:    /76   Pulse 89   Temp 97.9 °F (36.6 °C) (Oral)   Resp 20   Ht 5' 5" (1.651 m)   Wt 68 kg (150 lb)   SpO2 97%   BMI 24.96 kg/m²     Nursing note and vital signs reviewed.    General:  Alert, moaning and groaning of pain, appears uncomfortable  Skin: Normal for Ethnic Origin, No cyanosis  HEENT: Normocephalic and atraumatic, Vision unchanged  Cardiovascular:  Regular rate and rhythm  Chest Wall: No deformity, equal chest rise  Respiratory:  shallow and rapid respirations 2/2 pain  Musculoskeletal:  No deformity, Normal perfusion to all extremities  Gastrointestinal:  Soft, Non distended  Neurological:  Alert and oriented, normal motor observed, normal speech observed.    Psychiatric:  Cooperative, anxious mood & affect.        Labs that have been ordered have been independently reviewed and interpreted by myself.     Old Chart Reviewed.      Initial Impression/ Differential Dx:  Myocardial ischemia, pericarditis, pulmonary embolus, chest wall pain, pleural inflammation and pulmonary infectious causes.  Bronchitis, URI, allergies, irritants, pulmonary edema, GERD, laryngitis, tracheitis, asthma, sinusitis, pneumonia, viral, COPD, medication side effect      MDM:      Reviewed Nurses Note.    Reviewed Pertinent old records.    Orders Placed This Encounter    Pulse Oximeter    Blood Culture #1 **CANNOT BE ORDERED STAT**    Blood Culture #2 **CANNOT BE ORDERED STAT**    X-Ray Chest 1 View    CBC Auto Differential    Comprehensive Metabolic Panel    Troponin I "    Magnesium    Urinalysis, Reflex to Urine Culture    Lactic Acid, Plasma    Protime-INR    APTT    CBC with Differential    Urinalysis, Microscopic    COVID/FLU A&B PCR    Cardiac Monitoring - Adult    EKG 12-lead    Insert peripheral IV    Admit to Inpatient    POCT glucose    aspirin chewable tablet 324 mg    fentaNYL injection 50 mcg    levoFLOXacin 750 mg/150 mL IVPB 750 mg    morphine injection 4 mg                    Labs Reviewed   COMPREHENSIVE METABOLIC PANEL - Abnormal; Notable for the following components:       Result Value    Chloride 109 (*)     CO2 21 (*)     Glucose 209 (*)     Creatinine 1.53 (*)     Globulin 3.8 (*)     Albumin/Globulin Ratio 1.0 (*)     All other components within normal limits   URINALYSIS, REFLEX TO URINE CULTURE - Abnormal; Notable for the following components:    Glucose,  (*)     Blood, UA Trace (*)     All other components within normal limits   PROTIME-INR - Abnormal; Notable for the following components:    INR 1.0 (*)     All other components within normal limits   CBC WITH DIFFERENTIAL - Abnormal; Notable for the following components:    MCHC 32.4 (*)     All other components within normal limits   POCT GLUCOSE - Abnormal; Notable for the following components:    POCT Glucose 165 (*)     All other components within normal limits   TROPONIN I - Normal   MAGNESIUM - Normal   LACTIC ACID, PLASMA - Normal   APTT - Normal   URINALYSIS, MICROSCOPIC - Normal   COVID/FLU A&B PCR - Normal    Narrative:     The Xpert Xpress SARS-CoV-2/FLU/RSV plus is a rapid, multiplexed real-time PCR test intended for the simultaneous qualitative detection and differentiation of SARS-CoV-2, Influenza A, Influenza B, and respiratory syncytial virus (RSV) viral RNA in either nasopharyngeal swab or nasal swab specimens.         BLOOD CULTURE [facility]   BLOOD CULTURE [facility]   CBC W/ AUTO DIFFERENTIAL    Narrative:     The following orders were created for panel order CBC Auto Differential.  Procedure                                Abnormality         Status                     ---------                               -----------         ------                     CBC with Differential[[id number]]       Abnormal            Final result                 Please view results for these tests on the individual orders.          X-Ray Chest 1 View   Final Result           Admission on 07/05/2024   Component Date Value Ref Range Status    Sodium 07/05/2024 143  136 - 145 mmol/L Final    Potassium 07/05/2024 4.0  3.5 - 5.1 mmol/L Final    Chloride 07/05/2024 109 (H)  98 - 107 mmol/L Final    CO2 07/05/2024 21 (L)  23 - 31 mmol/L Final    Glucose 07/05/2024 209 (H)  82 - 115 mg/dL Final    Blood Urea Nitrogen 07/05/2024 16.1  9.8 - 20.1 mg/dL Final    Creatinine 07/05/2024 1.53 (H)  0.55 - 1.02 mg/dL Final    Calcium 07/05/2024 9.6  8.4 - 10.2 mg/dL Final    Protein Total 07/05/2024 7.6  5.8 - 7.6 gm/dL Final    Albumin 07/05/2024 3.8  3.4 - 4.8 g/dL Final    Globulin 07/05/2024 3.8 (H)  2.4 - 3.5 gm/dL Final    Albumin/Globulin Ratio 07/05/2024 1.0 (L)  1.1 - 2.0 ratio Final    Bilirubin Total 07/05/2024 0.4  <=1.5 mg/dL Final    ALP 07/05/2024 68  40 - 150 unit/L Final    ALT 07/05/2024 6  0 - 55 unit/L Final    AST 07/05/2024 16  5 - 34 unit/L Final    eGFR 07/05/2024 34  mL/min/1.73/m2 Final    Anion Gap 07/05/2024 13.0  mEq/L Final    BUN/Creatinine Ratio 07/05/2024 11   Final    Troponin-I 07/05/2024 <0.010  0.000 - 0.045 ng/mL Final    Magnesium Level 07/05/2024 1.90  1.60 - 2.60 mg/dL Final    Color, UA 07/05/2024 Straw  Yellow, Light-Yellow, Dark Yellow, Agustina, Straw Final    Appearance, UA 07/05/2024 Clear  Clear Final    Specific Gravity, UA 07/05/2024 1.015  1.005 - 1.030 Final    pH, UA 07/05/2024 6.0  5.0 - 8.5 Final    Protein, UA 07/05/2024 Negative  Negative Final    Glucose, UA 07/05/2024 100 (A)  Negative, Normal Final    Ketones, UA 07/05/2024 Negative  Negative Final    Blood, UA 07/05/2024 Trace (A)   Negative Final    Bilirubin, UA 07/05/2024 Negative  Negative Final    Urobilinogen, UA 07/05/2024 0.2  0.2, 1.0, Normal Final    Nitrites, UA 07/05/2024 Negative  Negative Final    Leukocyte Esterase, UA 07/05/2024 Negative  Negative Final    Lactic Acid Level 07/05/2024 1.5  0.5 - 2.2 mmol/L Final    PT 07/05/2024 13.4  11.7 - 14.5 seconds Final    INR 07/05/2024 1.0 (L)  2.0 - 3.0 Final    PTT 07/05/2024 28.3  23.4 - 33.9 seconds Final    WBC 07/05/2024 8.52  4.50 - 11.50 x10(3)/mcL Final    RBC 07/05/2024 4.55  4.20 - 5.40 x10(6)/mcL Final    Hgb 07/05/2024 13.1  12.0 - 16.0 g/dL Final    Hct 07/05/2024 40.4  37.0 - 47.0 % Final    MCV 07/05/2024 88.8  80.0 - 94.0 fL Final    MCH 07/05/2024 28.8  27.0 - 31.0 pg Final    MCHC 07/05/2024 32.4 (L)  33.0 - 36.0 g/dL Final    RDW 07/05/2024 13.9  11.5 - 17.0 % Final    Platelet 07/05/2024 203  130 - 400 x10(3)/mcL Final    MPV 07/05/2024 10.0  7.4 - 10.4 fL Final    Neut % 07/05/2024 65.0  % Final    Lymph % 07/05/2024 23.8  % Final    Mono % 07/05/2024 8.0  % Final    Eos % 07/05/2024 2.3  % Final    Basophil % 07/05/2024 0.5  % Final    Lymph # 07/05/2024 2.03  0.6 - 4.6 x10(3)/mcL Final    Neut # 07/05/2024 5.54  2.1 - 9.2 x10(3)/mcL Final    Mono # 07/05/2024 0.68  0.1 - 1.3 x10(3)/mcL Final    Eos # 07/05/2024 0.20  0 - 0.9 x10(3)/mcL Final    Baso # 07/05/2024 0.04  <=0.2 x10(3)/mcL Final    IG# 07/05/2024 0.03  0 - 0.04 x10(3)/mcL Final    IG% 07/05/2024 0.4  % Final    NRBC% 07/05/2024 0.0  % Final    Bacteria, UA 07/05/2024 None Seen  None Seen, Rare, Occasional /HPF Final    RBC, UA 07/05/2024 0-5  None Seen, 0-2, 3-5, 0-5 /HPF Final    WBC, UA 07/05/2024 0-2  None Seen, 0-2, 3-5, 0-5 /HPF Final    Squamous Epithelial Cells, UA 07/05/2024 Occasional  None Seen, Rare, Occasional, Occ /HPF Final    Influenza A PCR 07/05/2024 Not Detected  Not Detected Final    Influenza B PCR 07/05/2024 Not Detected  Not Detected Final    SARS-CoV-2 PCR 07/05/2024 Not  Detected  Not Detected, Negative Final    POCT Glucose 07/05/2024 165 (H)  70 - 110 mg/dL Final       Imaging Results              X-Ray Chest 1 View (Final result)  Result time 07/05/24 19:05:50      Final result by Geoff Wesley MD (07/05/24 19:05:50)                   Narrative:    EXAMINATION  XR CHEST 1 VIEW    CLINICAL HISTORY  cough;    TECHNIQUE  A total of 1 frontal image(s) submitted of the chest.    COMPARISON  29 May 2024    FINDINGS  Lines/tubes/devices: ECG leads overlie the imaged region.    The cardiac silhouette, mediastinal postoperative changes, and central vascular structures are unchanged.  The trachea is midline. Left basilar opacity is noted.  Remaining bilateral lung fields are clear.  Small left pleural effusion appears mildly improved.  No significant right pleural fluid.  There is no convincing pneumothorax.    Regional osseous structures and extrathoracic soft tissues are similar.    IMPRESSION  1. Persistent but mildly improved left pleural effusion.  2. Left basilar opacity may represent atelectasis, with developing pneumonia not excluded.  3. Remaining findings are similar.      Electronically signed by: Geoff Wesley  Date:    07/05/2024  Time:    19:05                                      ECG Results              EKG 12-lead (Preliminary result)  Result time 07/05/24 18:25:25      Wet Read by Aubrey Finley DO (07/05/24 18:25:25, [facility] - Emergency Dept, Emergency Medicine)    Independent ECG Interpretation:    Normal sinus rhythm at rate of 83. Normal intervals. Normal QRS. No acute ST or T wave abnormalities. Overall impression: No evidence of acute ischemia or arrhythmia.                                               ED Course as of 07/06/24 0019   Fri Jul 05, 2024   1852 Resp(!): 36 [MP]   1909 The patient was turned over at shift change to  to follow the workup, re-assess the patient, give additional treatments if necessary, and make the  final disposition.   [MP]   1920 Patient on ASA, Brilinta, and clopidogrel. CXR shows persistent infiltrate. Will admit for failed outpatient treatment of antibiotics x 2.  [GM]   1932 eGFR: 34 [GM]   1932 Creatinine(!): 1.53 [GM]   1932 Troponin pending [GM]   1939 Troponin I: <0.010 [GM]   1941 Patient has previously been on Levaquin and more recently Augmentin without improvemnt. Persistent pneumonia on CXR. Plan to admit. [GM]      ED Course User Index  [GM] Renetta Unger MD  [MP] Aubrey Finley, DO                        Diagnostic Impression:    1. Shortness of breath    2. Pneumonia         ED Disposition Condition    Admit                        Renetta Unger MD  07/06/24 0020

## 2024-07-05 NOTE — ED TRIAGE NOTES
Pt presents to er upset at pain to right side of shoulder radiating to neck and arm today. Pt relating DR Ramos told her she has pneumnia from an xray taken 2 days ago and prescribed Augmentin and Z-pack.

## 2024-07-06 LAB
ALBUMIN SERPL-MCNC: 3.3 G/DL (ref 3.4–4.8)
ALBUMIN/GLOB SERPL: 1.2 RATIO (ref 1.1–2)
ALP SERPL-CCNC: 62 UNIT/L (ref 40–150)
ALT SERPL-CCNC: 5 UNIT/L (ref 0–55)
ANION GAP SERPL CALC-SCNC: 11 MEQ/L
AST SERPL-CCNC: 14 UNIT/L (ref 5–34)
B PERT.PT PRMT NPH QL NAA+NON-PROBE: NOT DETECTED
BASOPHILS # BLD AUTO: 0.02 X10(3)/MCL
BASOPHILS NFR BLD AUTO: 0.2 %
BILIRUB SERPL-MCNC: 0.4 MG/DL
BUN SERPL-MCNC: 14.3 MG/DL (ref 9.8–20.1)
C PNEUM DNA NPH QL NAA+NON-PROBE: NOT DETECTED
CALCIUM SERPL-MCNC: 8.8 MG/DL (ref 8.4–10.2)
CHLORIDE SERPL-SCNC: 110 MMOL/L (ref 98–107)
CO2 SERPL-SCNC: 19 MMOL/L (ref 23–31)
CREAT SERPL-MCNC: 1.38 MG/DL (ref 0.55–1.02)
CREAT/UREA NIT SERPL: 10
EOSINOPHIL # BLD AUTO: 0.09 X10(3)/MCL (ref 0–0.9)
EOSINOPHIL NFR BLD AUTO: 1.1 %
ERYTHROCYTE [DISTWIDTH] IN BLOOD BY AUTOMATED COUNT: 13.8 % (ref 11.5–17)
GFR SERPLBLD CREATININE-BSD FMLA CKD-EPI: 39 ML/MIN/1.73/M2
GLOBULIN SER-MCNC: 2.8 GM/DL (ref 2.4–3.5)
GLUCOSE SERPL-MCNC: 200 MG/DL (ref 82–115)
HADV DNA NPH QL NAA+NON-PROBE: NOT DETECTED
HCOV 229E RNA NPH QL NAA+NON-PROBE: NOT DETECTED
HCOV HKU1 RNA NPH QL NAA+NON-PROBE: NOT DETECTED
HCOV NL63 RNA NPH QL NAA+NON-PROBE: NOT DETECTED
HCOV OC43 RNA NPH QL NAA+NON-PROBE: NOT DETECTED
HCT VFR BLD AUTO: 34.6 % (ref 37–47)
HGB BLD-MCNC: 11.4 G/DL (ref 12–16)
HMPV RNA NPH QL NAA+NON-PROBE: NOT DETECTED
HPIV1 RNA NPH QL NAA+NON-PROBE: NOT DETECTED
HPIV2 RNA NPH QL NAA+NON-PROBE: NOT DETECTED
HPIV3 RNA NPH QL NAA+NON-PROBE: NOT DETECTED
HPIV4 RNA NPH QL NAA+NON-PROBE: NOT DETECTED
IMM GRANULOCYTES # BLD AUTO: 0.02 X10(3)/MCL (ref 0–0.04)
IMM GRANULOCYTES NFR BLD AUTO: 0.2 %
LYMPHOCYTES # BLD AUTO: 1.61 X10(3)/MCL (ref 0.6–4.6)
LYMPHOCYTES NFR BLD AUTO: 19.3 %
M PNEUMO DNA NPH QL NAA+NON-PROBE: NOT DETECTED
MAGNESIUM SERPL-MCNC: 1.6 MG/DL (ref 1.6–2.6)
MCH RBC QN AUTO: 28.9 PG (ref 27–31)
MCHC RBC AUTO-ENTMCNC: 32.9 G/DL (ref 33–36)
MCV RBC AUTO: 87.6 FL (ref 80–94)
MONOCYTES # BLD AUTO: 0.75 X10(3)/MCL (ref 0.1–1.3)
MONOCYTES NFR BLD AUTO: 9 %
NEUTROPHILS # BLD AUTO: 5.85 X10(3)/MCL (ref 2.1–9.2)
NEUTROPHILS NFR BLD AUTO: 70.2 %
NRBC BLD AUTO-RTO: 0 %
PHOSPHATE SERPL-MCNC: 3.4 MG/DL (ref 2.3–4.7)
PLATELET # BLD AUTO: 158 X10(3)/MCL (ref 130–400)
PMV BLD AUTO: 10 FL (ref 7.4–10.4)
POCT GLUCOSE: 143 MG/DL (ref 70–110)
POCT GLUCOSE: 160 MG/DL (ref 70–110)
POCT GLUCOSE: 179 MG/DL (ref 70–110)
POCT GLUCOSE: 182 MG/DL (ref 70–110)
POTASSIUM SERPL-SCNC: 4 MMOL/L (ref 3.5–5.1)
PROT SERPL-MCNC: 6.1 GM/DL (ref 5.8–7.6)
RBC # BLD AUTO: 3.95 X10(6)/MCL (ref 4.2–5.4)
RSV RNA NPH QL NAA+NON-PROBE: NOT DETECTED
RV+EV RNA NPH QL NAA+NON-PROBE: NOT DETECTED
SODIUM SERPL-SCNC: 140 MMOL/L (ref 136–145)
WBC # BLD AUTO: 8.34 X10(3)/MCL (ref 4.5–11.5)

## 2024-07-06 PROCEDURE — 87486 CHLMYD PNEUM DNA AMP PROBE: CPT | Performed by: NURSE PRACTITIONER

## 2024-07-06 PROCEDURE — 36415 COLL VENOUS BLD VENIPUNCTURE: CPT | Performed by: NURSE PRACTITIONER

## 2024-07-06 PROCEDURE — 21400001 HC TELEMETRY ROOM

## 2024-07-06 PROCEDURE — 84100 ASSAY OF PHOSPHORUS: CPT | Performed by: NURSE PRACTITIONER

## 2024-07-06 PROCEDURE — 87798 DETECT AGENT NOS DNA AMP: CPT | Performed by: NURSE PRACTITIONER

## 2024-07-06 PROCEDURE — 83735 ASSAY OF MAGNESIUM: CPT | Performed by: NURSE PRACTITIONER

## 2024-07-06 PROCEDURE — 63600175 PHARM REV CODE 636 W HCPCS: Performed by: NURSE PRACTITIONER

## 2024-07-06 PROCEDURE — 25000003 PHARM REV CODE 250: Performed by: NURSE PRACTITIONER

## 2024-07-06 PROCEDURE — 25000003 PHARM REV CODE 250: Performed by: INTERNAL MEDICINE

## 2024-07-06 PROCEDURE — 87632 RESP VIRUS 6-11 TARGETS: CPT | Performed by: NURSE PRACTITIONER

## 2024-07-06 PROCEDURE — 80053 COMPREHEN METABOLIC PANEL: CPT | Performed by: NURSE PRACTITIONER

## 2024-07-06 PROCEDURE — 85025 COMPLETE CBC W/AUTO DIFF WBC: CPT | Performed by: NURSE PRACTITIONER

## 2024-07-06 RX ORDER — ONDANSETRON HYDROCHLORIDE 2 MG/ML
4 INJECTION, SOLUTION INTRAVENOUS EVERY 4 HOURS PRN
Status: DISCONTINUED | OUTPATIENT
Start: 2024-07-06 | End: 2024-07-07 | Stop reason: HOSPADM

## 2024-07-06 RX ORDER — PROCHLORPERAZINE EDISYLATE 5 MG/ML
5 INJECTION INTRAMUSCULAR; INTRAVENOUS EVERY 6 HOURS PRN
Status: DISCONTINUED | OUTPATIENT
Start: 2024-07-06 | End: 2024-07-07 | Stop reason: HOSPADM

## 2024-07-06 RX ORDER — POLYETHYLENE GLYCOL 3350 17 G/17G
17 POWDER, FOR SOLUTION ORAL 2 TIMES DAILY PRN
Status: DISCONTINUED | OUTPATIENT
Start: 2024-07-06 | End: 2024-07-07 | Stop reason: HOSPADM

## 2024-07-06 RX ORDER — ACETAMINOPHEN 325 MG/1
650 TABLET ORAL EVERY 6 HOURS PRN
Status: DISCONTINUED | OUTPATIENT
Start: 2024-07-06 | End: 2024-07-07 | Stop reason: HOSPADM

## 2024-07-06 RX ORDER — INSULIN ASPART 100 [IU]/ML
0-10 INJECTION, SOLUTION INTRAVENOUS; SUBCUTANEOUS
Status: DISCONTINUED | OUTPATIENT
Start: 2024-07-06 | End: 2024-07-07 | Stop reason: HOSPADM

## 2024-07-06 RX ORDER — LIDOCAINE 50 MG/G
1 PATCH TOPICAL
Status: DISCONTINUED | OUTPATIENT
Start: 2024-07-06 | End: 2024-07-07 | Stop reason: HOSPADM

## 2024-07-06 RX ORDER — IBUPROFEN 200 MG
24 TABLET ORAL
Status: DISCONTINUED | OUTPATIENT
Start: 2024-07-06 | End: 2024-07-07 | Stop reason: HOSPADM

## 2024-07-06 RX ORDER — SIMETHICONE 80 MG
1 TABLET,CHEWABLE ORAL 4 TIMES DAILY PRN
Status: DISCONTINUED | OUTPATIENT
Start: 2024-07-06 | End: 2024-07-07 | Stop reason: HOSPADM

## 2024-07-06 RX ORDER — CYCLOBENZAPRINE HCL 5 MG
5 TABLET ORAL 3 TIMES DAILY
Status: DISCONTINUED | OUTPATIENT
Start: 2024-07-06 | End: 2024-07-07 | Stop reason: HOSPADM

## 2024-07-06 RX ORDER — GLUCAGON 1 MG
1 KIT INJECTION
Status: DISCONTINUED | OUTPATIENT
Start: 2024-07-06 | End: 2024-07-07 | Stop reason: HOSPADM

## 2024-07-06 RX ORDER — NALOXONE HCL 0.4 MG/ML
0.02 VIAL (ML) INJECTION
Status: DISCONTINUED | OUTPATIENT
Start: 2024-07-06 | End: 2024-07-07 | Stop reason: HOSPADM

## 2024-07-06 RX ORDER — ALUMINUM HYDROXIDE, MAGNESIUM HYDROXIDE, AND SIMETHICONE 1200; 120; 1200 MG/30ML; MG/30ML; MG/30ML
30 SUSPENSION ORAL 4 TIMES DAILY PRN
Status: DISCONTINUED | OUTPATIENT
Start: 2024-07-06 | End: 2024-07-07 | Stop reason: HOSPADM

## 2024-07-06 RX ORDER — ENOXAPARIN SODIUM 100 MG/ML
30 INJECTION SUBCUTANEOUS EVERY 24 HOURS
Status: DISCONTINUED | OUTPATIENT
Start: 2024-07-06 | End: 2024-07-07 | Stop reason: HOSPADM

## 2024-07-06 RX ORDER — IBUPROFEN 200 MG
16 TABLET ORAL
Status: DISCONTINUED | OUTPATIENT
Start: 2024-07-06 | End: 2024-07-07 | Stop reason: HOSPADM

## 2024-07-06 RX ORDER — TALC
6 POWDER (GRAM) TOPICAL NIGHTLY PRN
Status: DISCONTINUED | OUTPATIENT
Start: 2024-07-06 | End: 2024-07-07 | Stop reason: HOSPADM

## 2024-07-06 RX ADMIN — LIDOCAINE 1 PATCH: 50 PATCH CUTANEOUS at 01:07

## 2024-07-06 RX ADMIN — INSULIN ASPART 2 UNITS: 100 INJECTION, SOLUTION INTRAVENOUS; SUBCUTANEOUS at 06:07

## 2024-07-06 RX ADMIN — ACETAMINOPHEN 325MG 650 MG: 325 TABLET ORAL at 03:07

## 2024-07-06 RX ADMIN — INSULIN ASPART 1 UNITS: 100 INJECTION, SOLUTION INTRAVENOUS; SUBCUTANEOUS at 08:07

## 2024-07-06 RX ADMIN — CYCLOBENZAPRINE HYDROCHLORIDE 5 MG: 5 TABLET, FILM COATED ORAL at 01:07

## 2024-07-06 RX ADMIN — ENOXAPARIN SODIUM 30 MG: 30 INJECTION SUBCUTANEOUS at 06:07

## 2024-07-06 RX ADMIN — CYCLOBENZAPRINE HYDROCHLORIDE 5 MG: 5 TABLET, FILM COATED ORAL at 08:07

## 2024-07-06 NOTE — NURSING
Nurses Note -- 4 Eyes      7/6/2024   4:30 AM      Skin assessed during: Admit      [x] No Altered Skin Integrity Present    []Prevention Measures Documented      [] Yes- Altered Skin Integrity Present or Discovered   [] LDA Added if Not in Epic (Describe Wound)   [] New Altered Skin Integrity was Present on Admit and Documented in LDA   [] Wound Image Taken    Wound Care Consulted? No    Attending Nurse:  Dianna Dennis RN/Staff Member:  LEONARDO Torres

## 2024-07-06 NOTE — PROGRESS NOTES
Pharmacist Renal Dose Adjustment Note    Rosalba Whitlock is a 83 y.o. female being treated with the medication levofloxacin for and upper respiratory infection.    Patient Data:    Vital Signs (Most Recent):  Temp: 97.9 °F (36.6 °C) (07/05/24 1811)  Pulse: 82 (07/05/24 2001)  Resp: 17 (07/05/24 2001)  BP: (!) 147/77 (07/05/24 2001)  SpO2: 96 % (07/05/24 2001) Vital Signs (72h Range):  Temp:  [97.9 °F (36.6 °C)]   Pulse:  [82-93]   Resp:  [17-36]   BP: (122-147)/(77-88)   SpO2:  [96 %-98 %]      Recent Labs   Lab 07/05/24  1830   CREATININE 1.53*     Serum creatinine: 1.53 mg/dL (H) 07/05/24 1830  Estimated creatinine clearance: 25.9 mL/min (A)    Medication: Order was for Levoquin 500 mg q24 hours. Adjusted to levoquin 750 mg q48h based on pharmacy renal protocol for [facility].    Pharmacist's Name: Elroy Randall  Pharmacist's Extension: [phone]

## 2024-07-06 NOTE — H&P
[facility] Hospital Medicine History & Physical Examination       Patient Name: Rosalba Whitlock  MRN: [id number]  Patient Class: IP- Inpatient   Admission Date: 7/5/2024   Admitting Physician: Antoni Peres MD   Length of Stay: 1  Attending Physician: Chong Dill MD  Primary Care Provider: Eleazar Ramos MD  Face-to-Face encounter date: 07/06/2024  Code Status: Full Code   Chief Complaint: Shoulder Pain (Pt presents to er upset at pain to right side of  shoulder radiating to neck and arm today. Pt relating DR Ramos told her she has pneumnia from an xray taken 2 days ago and prescribed Augmentin and Z-pack.)        Patient information was obtained from patient, patient's family, past medical records and ER records.     HISTORY OF PRESENT ILLNESS:   Rosalba Whitlock is a 83 y.o. Black or  female with a past medical history of hypertension, hyperlipidemia, coronary artery disease status post stents and CABG on Plavix, diabetes mellitus type 2, anxiety/depression, BPPV, GERD, migraine headaches, CKD stage 3, MGUS, gout, right shoulder tendinitis with rotator cuff tear and chronic pain syndrome. The patient presented to [facility] on 7/5/2024 with a primary complaint of right shoulder pain , intermittent shortness a breath and intermittent left upper quadrant abdominal pain.  On exam patient denies cough, nausea, vomiting and diarrhea.  Patient and granddaughter at bedside are poor historians.  Patient was diagnosed with pneumonia on 6/13/2024.  She completed a 5 day course of Levaquin and she was started on azithromycin and Augmentin on 07/03/2024.    Upon presentation to the ED, temperature 97.9° F, heart rate 88, blood pressure 122/88, respiratory rate 22 and SpO2 97% on room air.  Labs with CO2 21, BUN/creatinine 16.1/1.53 (18.7/1.49 on 06/27/2024), glucose 209, troponin less than 0.01.  Influenza A/B and SARS-COV-2 PCR negative.  UA negative for infection.  EKG sinus  rhythm with premature atrial complexes.  Chest x-ray with persistent but mild improved left pleural effusion and left basilar opacity which may represent atelectasis with developed pneumonia unable to be excluded.  In patient received full-dose aspirin, fentanyl and Lovenox.  She is admitted to hospital medicine services for further medical management.    PAST MEDICAL HISTORY:     Past Medical History:   Diagnosis Date    Amnesia 06/13/2022    Benign paroxysmal positional vertigo, bilateral 06/13/2022    Bronchitis 06/08/2022    Chronic gouty arthritis 06/13/2022    Coronary artery disease involving native coronary artery of native heart without angina pectoris 12/04/2023    Diabetes mellitus, type 2     Essential hypertension 04/21/2016    Gastroesophageal reflux disease without esophagitis 04/21/2016    GERD (gastroesophageal reflux disease)     Gout, unspecified     Heart attack     Low back pain 06/13/2022    lower back pain bilateral with movement; radiates down right leg to thigh    Low vitamin D level 06/13/2022    Metabolic acidosis 01/12/2023    Microalbuminuria 01/12/2023    Migraine headache 04/21/2016    Osteoporosis 06/13/2022    Peripheral edema 06/13/2022    Physical deconditioning 06/13/2022    Pure hypercholesterolemia 06/13/2022    SOBOE (shortness of breath on exertion)     Spondylosis of lumbar spine 06/13/2022    Stage 3 chronic kidney disease 06/13/2022    Type 2 diabetes mellitus 06/13/2022    Weakness        PAST SURGICAL HISTORY:     Past Surgical History:   Procedure Laterality Date    COLONOSCOPY      CORONARY ARTERY BYPASS GRAFT (CABG) N/A 5/3/2024    Procedure: CORONARY ARTERY BYPASS GRAFT (CABG);  Surgeon: Loki Estrada MD;  Location: [facility] OR;  Service: Cardiovascular;  Laterality: N/A;    ENDOSCOPIC HARVEST OF VEIN Left 5/3/2024    Procedure: SURGICAL PROCUREMENT, VEIN, ENDOSCOPIC;  Surgeon: Loki Estrada MD;  Location: [facility] OR;  Service: Cardiovascular;  Laterality: Left;    EPIDURAL  STEROID INJECTION INTO CERVICAL SPINE      EYE SURGERY Bilateral     cataract extraction    FOOT SURGERY Bilateral     HYSTERECTOMY      LEFT HEART CATHETERIZATION Left 12/04/2023    Procedure: Left heart cath;  Surgeon: Rubia Seymour MD;  Location: [facility] CATH LAB;  Service: Cardiology;  Laterality: Left;  LHC +/- PCI    PLATING OF STERNUM N/A 5/3/2024    Procedure: PLATING, STERNAL;  Surgeon: Loki Estrada MD;  Location: [facility] OR;  Service: Cardiovascular;  Laterality: N/A;    REPAIR, CHRONIC TOTAL OCCLUSION, CORONARY N/A 02/21/2024    Procedure: Repair, Chronic Total Occlusion, Coronary;  Surgeon: Emmanuel Riley MD;  Location: [facility] CATH LAB;  Service: Cardiology;  Laterality: N/A;   PCI RCA *START WITH PICTURES OF THE LAD AND CONSIDER IFR IF LAD LOOKS WORSE*  6F EBU 3.5 GUIDE 90 CM LM VIA RRA, 7F AL-1 GUIDE SH RCA VIA RT GROIN    STENT, DRUG ELUTING, SINGLE VESSEL, CORONARY  12/04/2023    Procedure: Stent, Drug Eluting, Single Vessel, Coronary;  Surgeon: Rubia Seymour MD;  Location: [facility] CATH LAB;  Service: Cardiology;;    TONSILLECTOMY      UPPER GASTROINTESTINAL ENDOSCOPY         ALLERGIES:   Amlodipine    FAMILY HISTORY:   Mother:  Lung cancer   Father:  CAD    SOCIAL HISTORY:   Denies tobacco, alcohol or illicit drug use    Screening for Social Drivers for health:  Patient screened for food insecurity, housing instability, transportation needs, utility difficulties, and interpersonal safety (select all that apply as identified as concern)  []Housing or Food  []Transportation Needs  []Utility Difficulties  []Interpersonal safety  [x]None    HOME MEDICATIONS:     Prior to Admission medications    Medication Sig Start Date End Date Taking? Authorizing Provider   amoxicillin-clavulanate 500-125mg (AUGMENTIN) 500-125 mg Tab Take 1 tablet (500 mg total) by mouth 3 (three) times daily. for 10 days 7/3/24 7/13/24 Yes Eleazar Ramos MD   aspirin (ECOTRIN) 81 MG EC tablet Take 1 tablet (81 mg total) by mouth  "once daily. 5/28/24  Yes Melissa Arzola FNP   atorvastatin (LIPITOR) 40 MG tablet Take 1 tablet (40 mg total) by mouth once daily. 5/28/24 5/28/25 Yes Melissa Arzola FNP   azithromycin (Z-RON) 250 MG tablet Take 2 tablets by mouth on day 1; Take 1 tablet by mouth on days 2-5 7/3/24 7/8/24 Yes Eleazar Ramos MD   busPIRone (BUSPAR) 5 MG Tab Take 1 tablet (5 mg total) by mouth 3 (three) times daily. 5/28/24 5/28/25 Yes Melissa Arzola FNP   famotidine (PEPCID) 20 MG tablet Take 1 tablet (20 mg total) by mouth once daily. 5/28/24 5/28/25 Yes Melissa Arzola FNP   gabapentin (NEURONTIN) 100 MG capsule Take 1 capsule (100 mg total) by mouth 2 (two) times a day. 5/28/24 5/28/25 Yes Melissa Arzola FNP   insulin glargine U-100, Lantus, (LANTUS U-100 INSULIN) 100 unit/mL injection Inject 20 Units into the skin 2 (two) times a day.  Patient taking differently: Inject 18 Units into the skin 2 (two) times a day. 6/19/24 6/19/25 Yes Eleazar Ramos MD   linaGLIPtin (TRADJENTA) 5 mg Tab tablet Take 1 tablet (5 mg total) by mouth once daily. 5/30/24 5/30/25 Yes Chantel Guan NP   metoprolol tartrate (LOPRESSOR) 25 MG tablet Take 0.5 tablets (12.5 mg total) by mouth 2 (two) times daily. 5/28/24 5/28/25 Yes Melissa Arzola FNP   PLAVIX 75 mg tablet Take 75 mg by mouth once daily. 6/12/24  Yes Provider, Historical   folic acid (FOLVITE) 1 MG tablet Take 1 tablet (1 mg total) by mouth once daily. 6/12/24 6/12/25  Eleazar Ramos MD   gabapentin (NEURONTIN) 300 MG capsule Take 1 capsule (300 mg total) by mouth every evening. 5/28/24 5/28/25  Melissa Arzola, LIAM   HYDROcodone-acetaminophen (NORCO) 5-325 mg per tablet Take 1 tablet by mouth every 6 (six) hours as needed for Pain. 5/28/24   Melissa Arzola FNP   insulin syringe-needle U-100 (BD INSULIN SYRINGE ULTRA-FINE) 0.5 mL 31 gauge x 5/16" Syrg 1 each by Misc.(Non-Drug; Combo Route) route 2 (two) times a day. 5/29/24   Eleazar Ramos MD   omeprazole " (PRILOSEC) 40 MG capsule TAKE 1 CAPSULE BY MOUTH DAILY AS NEEDED FOR REFLUX 6/24/24   Eleazar Ramos MD       REVIEW OF SYSTEMS:   Except as documented, all other systems reviewed and negative     PHYSICAL EXAM:     VITAL SIGNS: 24 HRS MIN & MAX LAST   Temp  Min: 97.9 °F (36.6 °C)  Max: 98.5 °F (36.9 °C) 98.5 °F (36.9 °C)   BP  Min: 110/58  Max: 147/77 (!) 110/58   Pulse  Min: 78  Max: 95  78   Resp  Min: 17  Max: 36 18   SpO2  Min: 93 %  Max: 98 % 95 %       General appearance: Well-developed, well-nourished female in no apparent distress.  Granddaughter at bedside.  HEENT: Atraumatic head. Moist mucous membranes of oral cavity.  Lungs: Clear to auscultation bilaterally.   Heart: Regular rate and rhythm.   Abdomen: Soft, non-distended.   Extremities: No cyanosis, clubbing. No deformities.  Skin: No Rash. Warm and dry.  Neuro: Awake, alert and oriented. Motor and sensory exams grossly intact.  Psych/mental status: Appropriate mood and affect. Cooperative. Responds appropriately to questions.       LABS AND IMAGING:     Recent Labs   Lab 07/05/24 1830 07/06/24  0437   WBC 8.52 8.34   RBC 4.55 3.95*   HGB 13.1 11.4*   HCT 40.4 34.6*   MCV 88.8 87.6   MCH 28.8 28.9   MCHC 32.4* 32.9*   RDW 13.9 13.8    158   MPV 10.0 10.0       Recent Labs   Lab 07/05/24 1830 07/06/24  0440    140   K 4.0 4.0   * 110*   CO2 21* 19*   BUN 16.1 14.3   CREATININE 1.53* 1.38*   CALCIUM 9.6 8.8   MG 1.90 1.60   ALBUMIN 3.8 3.3*   ALKPHOS 68 62   ALT 6 5   AST 16 14   BILITOT 0.4 0.4       Microbiology Results (last 7 days)       Procedure Component Value Units Date/Time    Respiratory Culture [[id number]]     Order Status: Sent Specimen: Sputum, Expectorated     Blood Culture #1 **CANNOT BE ORDERED STAT** [[id number]] Collected: 07/05/24 1855    Order Status: Resulted Specimen: Blood from Forearm, Left Updated: 07/05/24 1903    Blood Culture #2 **CANNOT BE ORDERED STAT** [[id number]] Collected: 07/05/24 1850    Order  Status: Resulted Specimen: Blood from Antecubital, Right Updated: 07/05/24 1901             X-Ray Chest 1 View  EXAMINATION  XR CHEST 1 VIEW    CLINICAL HISTORY  cough;    TECHNIQUE  A total of 1 frontal image(s) submitted of the chest.    COMPARISON  29 May 2024    FINDINGS  Lines/tubes/devices: ECG leads overlie the imaged region.    The cardiac silhouette, mediastinal postoperative changes, and central vascular structures are unchanged.  The trachea is midline. Left basilar opacity is noted.  Remaining bilateral lung fields are clear.  Small left pleural effusion appears mildly improved.  No significant right pleural fluid.  There is no convincing pneumothorax.    Regional osseous structures and extrathoracic soft tissues are similar.    IMPRESSION  1. Persistent but mildly improved left pleural effusion.  2. Left basilar opacity may represent atelectasis, with developing pneumonia not excluded.  3. Remaining findings are similar.    Electronically signed by: Geoff Wesley  Date:    07/05/2024  Time:    19:05        ASSESSMENT & PLAN:   Assessment:  Left basilar opacity, atelectasis versus community-acquired pneumonia  Left pleural effusion, improving  CKD stage 3 at baseline  Diabetes mellitus type 2 with hyperglycemia   History of hypertension, hyperlipidemia, coronary artery disease status post stents and CABG on Plavix, anxiety/depression, BPPV, GERD, migraine headaches, CKD stage 3, MGUS, gout, right shoulder tendinitis with rotator cuff tear and chronic pain syndrome    Plan:  - Continue with Levaquin  - Respiratory panel negative  - Follow results of blood cultures  - CT thorax spine ordered.  Follow results  - Accu-Cheks and sliding scale  - Resume appropriate home medications when deemed necessary   - Labs in AM      VTE Prophylaxis: will be placed on Lovenox for DVT prophylaxis and will be advised to be as mobile as possible and sit in a chair as  tolerated      __________________________________________________________________________  INPATIENT LIST OF MEDICATIONS     Current Facility-Administered Medications:     acetaminophen tablet 650 mg, 650 mg, Oral, Q6H PRN, Keke Pendleton, AGACNP-BC, 650 mg at 07/06/24 0307    aluminum-magnesium hydroxide-simethicone 200-200-20 mg/5 mL suspension 30 mL, 30 mL, Oral, QID PRN, Keke Pendleton AGACNP-BC    dextrose 10% bolus 125 mL 125 mL, 12.5 g, Intravenous, PRN, Keke Pendleton, AGACNP-BC    dextrose 10% bolus 250 mL 250 mL, 25 g, Intravenous, PRN, Keke Pendleton AGACNP-BC    enoxaparin injection 30 mg, 30 mg, Subcutaneous, Daily, Keke Pendleton AGACNP-BC    glucagon (human recombinant) injection 1 mg, 1 mg, Intramuscular, PRN, Keke Pendleton AGACNP-BC    glucose chewable tablet 16 g, 16 g, Oral, PRN, Keke Pendleton, AGACNP-BC    glucose chewable tablet 24 g, 24 g, Oral, PRN, Keke Pendleton, AGACNP-BC    insulin aspart U-100 injection 0-10 Units, 0-10 Units, Subcutaneous, QID (AC + HS) PRN, Keke Pendleton, AGACNP-BC, 2 Units at 07/06/24 0611    levoFLOXacin 750 mg/150 mL IVPB 750 mg, 750 mg, Intravenous, Q48H, Renetta Unger MD, Stopped at 07/05/24 2201    melatonin tablet 6 mg, 6 mg, Oral, Nightly PRN, Keke Pendleton, AGACNP-BC    morphine injection 4 mg, 4 mg, Intravenous, Q6H PRN, Renetta Unger MD, 4 mg at 07/05/24 2327    naloxone 0.4 mg/mL injection 0.02 mg, 0.02 mg, Intravenous, PRN, Keke Pendleton AGACNP-BC    ondansetron injection 4 mg, 4 mg, Intravenous, Q4H PRN, Keke Pendleton AGACNP-BC    polyethylene glycol packet 17 g, 17 g, Oral, BID PRN, Keke Pendleton AGACNP-BC    prochlorperazine injection Soln 5 mg, 5 mg, Intravenous, Q6H PRN, Keke Pendleton AGACNP-BC    simethicone chewable tablet 80 mg, 1 tablet, Oral, QID PRN, Keke Pendleton AGACNP-BC      Scheduled Meds:   enoxparin  30 mg Subcutaneous Daily    levoFLOXacin  750 mg Intravenous Q48H     Continuous  Infusions:  PRN Meds:.  Current Facility-Administered Medications:     acetaminophen, 650 mg, Oral, Q6H PRN    aluminum-magnesium hydroxide-simethicone, 30 mL, Oral, QID PRN    dextrose 10%, 12.5 g, Intravenous, PRN    dextrose 10%, 25 g, Intravenous, PRN    glucagon (human recombinant), 1 mg, Intramuscular, PRN    glucose, 16 g, Oral, PRN    glucose, 24 g, Oral, PRN    insulin aspart U-100, 0-10 Units, Subcutaneous, QID (AC + HS) PRN    melatonin, 6 mg, Oral, Nightly PRN    morphine, 4 mg, Intravenous, Q6H PRN    naloxone, 0.02 mg, Intravenous, PRN    ondansetron, 4 mg, Intravenous, Q4H PRN    polyethylene glycol, 17 g, Oral, BID PRN    prochlorperazine, 5 mg, Intravenous, Q6H PRN    simethicone, 1 tablet, Oral, QID PRN      Discharge Planning and Disposition: Anticipated discharge to be determined.    IJannet PA, have reviewed and discussed the case with Dr. Antoni Peres MD    Please see the following addendum for further assessment and plan from there attending MD.      Portion of this note is dictated using EMR integrated voice recognition software and may be subjected to voice recognition errors not corrected with proofreading. Please  for clarification if needed.       Jannet Peres PA-C  07/06/2024

## 2024-07-07 VITALS
RESPIRATION RATE: 18 BRPM | HEIGHT: 65 IN | WEIGHT: 155.38 LBS | TEMPERATURE: 98 F | DIASTOLIC BLOOD PRESSURE: 69 MMHG | OXYGEN SATURATION: 96 % | BODY MASS INDEX: 25.89 KG/M2 | SYSTOLIC BLOOD PRESSURE: 114 MMHG | HEART RATE: 92 BPM

## 2024-07-07 PROBLEM — M54.9 MUSCULOSKELETAL BACK PAIN: Status: ACTIVE | Noted: 2022-06-13

## 2024-07-07 LAB — POCT GLUCOSE: 203 MG/DL (ref 70–110)

## 2024-07-07 PROCEDURE — 25000003 PHARM REV CODE 250: Performed by: NURSE PRACTITIONER

## 2024-07-07 PROCEDURE — 87070 CULTURE OTHR SPECIMN AEROBIC: CPT | Performed by: NURSE PRACTITIONER

## 2024-07-07 PROCEDURE — 63600175 PHARM REV CODE 636 W HCPCS: Performed by: NURSE PRACTITIONER

## 2024-07-07 RX ORDER — CYCLOBENZAPRINE HCL 5 MG
5 TABLET ORAL 3 TIMES DAILY PRN
Qty: 30 TABLET | Refills: 0 | Status: SHIPPED | OUTPATIENT
Start: 2024-07-07 | End: 2024-07-17

## 2024-07-07 RX ADMIN — ACETAMINOPHEN 325MG 650 MG: 325 TABLET ORAL at 05:07

## 2024-07-07 RX ADMIN — INSULIN ASPART 4 UNITS: 100 INJECTION, SOLUTION INTRAVENOUS; SUBCUTANEOUS at 05:07

## 2024-07-07 NOTE — DISCHARGE SUMMARY
[facility] - 6th Floor [facility] MEDICINE - DISCHARGE SUMMARY    Patient Name: Rosalba Whitlock  MRN: [id number]  Admission Date: 7/5/2024  Discharge Date: 07/07/2024  Hospital Length of Stay: 2 days  Discharge Provider: Chong Dill MD  Primary Care Provider: Eleazar Ramos MD      HOSPITAL COURSE   Rosalba Whitlock is a 83 y.o. Black or  female with a past medical history of hypertension, hyperlipidemia, coronary artery disease status post stents and CABG on Plavix, diabetes mellitus type 2, anxiety/depression, BPPV, GERD, migraine headaches, CKD stage 3, MGUS, gout, right shoulder tendinitis with rotator cuff tear and chronic pain syndrome. The patient presented to [facility] on 7/5/2024 with a primary complaint of right shoulder pain , intermittent shortness a breath and intermittent left upper quadrant abdominal pain.  On exam patient denies cough, nausea, vomiting and diarrhea.  Patient and granddaughter at bedside are poor historians.  Patient was diagnosed with pneumonia on 6/13/2024.  She completed a 5 day course of Levaquin and she was started on azithromycin and Augmentin on 07/03/2024.     Upon presentation to the ED, temperature 97.9° F, heart rate 88, blood pressure 122/88, respiratory rate 22 and SpO2 97% on room air.  Labs with CO2 21, BUN/creatinine 16.1/1.53 (18.7/1.49 on 06/27/2024), glucose 209, troponin less than 0.01.  Influenza A/B and SARS-COV-2 PCR negative.  UA negative for infection.  EKG sinus rhythm with premature atrial complexes.  Chest x-ray with persistent but mild improved left pleural effusion and left basilar opacity which may represent atelectasis with developed pneumonia unable to be excluded.  In patient received full-dose aspirin, fentanyl and Lovenox.  She is admitted to hospital medicine services for further medical management.        Essentially she really had been admitted for complaints of right upper back pain.  After further  discussion and examination she has some tenderness over the spinal and paraspinal area in the upper thoracic spine.  CT thoracic spine was ordered which was showing no acute abnormalities, no significant degenerative changes, bilateral lower lobe atelectasis.  At this time I believe her pain is more so musculoskeletal and have prescribed her Flexeril PRN.  As far as the pneumonia I believe that is more so atelectasis as she had already been previously treated with antibiotics appropriately, no fevers and no leukocytosis.  Also her complaints are on 2 separate sides as the possible pneumonia was read on the left side and her complaints are more so right upper back.  At this time we will plan for discharge home with muscle relaxers and follow-up with PCP.        PHYSICAL EXAM     Most Recent Vital Signs:  Temp: 98.2 °F (36.8 °C) (07/07/24 0746)  Pulse: 92 (07/07/24 0800)  Resp: 18 (07/07/24 0400)  BP: 114/69 (07/07/24 0746)  SpO2: 96 % (07/07/24 0800)   GENERAL: In no acute distress, afebrile  HEENT:  CHEST: Clear to auscultation bilaterally  HEART: S1, S2, no appreciable murmur  ABDOMEN: Soft, nontender, BS +  MSK: Warm, no lower extremity edema, no clubbing or cyanosis, tenderness to the upper paraspinal area.  NEUROLOGIC: Alert and oriented x4, moving all extremities with good strength   INTEGUMENTARY:  PSYCHIATRY:          DISCHARGE DIAGNOSIS   Musculoskeletal pain   Atelectasis        _____________________________________________________________________________      DISCHARGE MED REC     Current Discharge Medication List        START taking these medications    Details   cyclobenzaprine (FLEXERIL) 5 MG tablet Take 1 tablet (5 mg total) by mouth 3 (three) times daily as needed for Muscle spasms.  Qty: 30 tablet, Refills: 0           CONTINUE these medications which have NOT CHANGED    Details   amoxicillin-clavulanate 500-125mg (AUGMENTIN) 500-125 mg Tab Take 1 tablet (500 mg total) by mouth 3 (three) times daily.  for 10 days  Qty: 30 tablet, Refills: 0    Associated Diagnoses: Pneumonia of left lower lobe due to infectious organism      aspirin (ECOTRIN) 81 MG EC tablet Take 1 tablet (81 mg total) by mouth once daily.  Qty: 30 tablet, Refills: 0      atorvastatin (LIPITOR) 40 MG tablet Take 1 tablet (40 mg total) by mouth once daily.  Qty: 90 tablet, Refills: 3      azithromycin (Z-RON) 250 MG tablet Take 2 tablets by mouth on day 1; Take 1 tablet by mouth on days 2-5  Qty: 6 tablet, Refills: 0    Associated Diagnoses: Pneumonia of left lower lobe due to infectious organism      busPIRone (BUSPAR) 5 MG Tab Take 1 tablet (5 mg total) by mouth 3 (three) times daily.  Qty: 90 tablet, Refills: 11      famotidine (PEPCID) 20 MG tablet Take 1 tablet (20 mg total) by mouth once daily.  Qty: 30 tablet, Refills: 11      !! gabapentin (NEURONTIN) 100 MG capsule Take 1 capsule (100 mg total) by mouth 2 (two) times a day.  Qty: 60 capsule, Refills: 11      insulin glargine U-100, Lantus, (LANTUS U-100 INSULIN) 100 unit/mL injection Inject 20 Units into the skin 2 (two) times a day.    Associated Diagnoses: Type 2 diabetes mellitus with other specified complication, with long-term current use of insulin      linaGLIPtin (TRADJENTA) 5 mg Tab tablet Take 1 tablet (5 mg total) by mouth once daily.  Qty: 90 tablet, Refills: 3    Associated Diagnoses: Type 2 diabetes mellitus with other specified complication, with long-term current use of insulin      metoprolol tartrate (LOPRESSOR) 25 MG tablet Take 0.5 tablets (12.5 mg total) by mouth 2 (two) times daily.  Qty: 90 tablet, Refills: 3    Comments: .      PLAVIX 75 mg tablet Take 75 mg by mouth once daily.      folic acid (FOLVITE) 1 MG tablet Take 1 tablet (1 mg total) by mouth once daily.  Qty: 30 tablet, Refills: 6    Associated Diagnoses: Folate deficiency      !! gabapentin (NEURONTIN) 300 MG capsule Take 1 capsule (300 mg total) by mouth every evening.  Qty: 30 capsule, Refills: 11     "  HYDROcodone-acetaminophen (NORCO) 5-325 mg per tablet Take 1 tablet by mouth every 6 (six) hours as needed for Pain.  Qty: 12 tablet, Refills: 0    Comments: Quantity prescribed more than 7 day supply? no      insulin syringe-needle U-100 (BD INSULIN SYRINGE ULTRA-FINE) 0.5 mL 31 gauge x 5/16" Syrg 1 each by Misc.(Non-Drug; Combo Route) route 2 (two) times a day.  Qty: 90 each, Refills: 12    Associated Diagnoses: Type 2 diabetes mellitus with other specified complication, with long-term current use of insulin      omeprazole (PRILOSEC) 40 MG capsule TAKE 1 CAPSULE BY MOUTH DAILY AS NEEDED FOR REFLUX  Qty: 90 capsule, Refills: 3    Associated Diagnoses: Gastroesophageal reflux disease without esophagitis       !! - Potential duplicate medications found. Please discuss with provider.             CONSULTS         FOLLOW UP      Follow-up Information       Eleazar Ramos MD Follow up in 1 week(s).    Specialty: Internal Medicine  Contact information:  Masha Castaneda [facility] [id number] [phone]                                 DISCHARGE INSTRUCTIONS     Explained in detail to the patient about the discharge plan, medications, and follow-up visits. Pt understands and agrees with the treatment plan.  Discharged Condition: stable  Diet as tolerated  Activities as tolerated  Discharge to: Home or Self Care    TIME SPENT ON DISCHARGE   35 minutes        Chong Dill MD  Internal Medicine  Department of Hospital Medicine  [facility] - 6th Floor Medical Telemetry      This document was created using electronic dictation services.  Please excuse any errors that may have been made.  Contact me if any questions regarding documentation to clarify verbiage.   "

## 2024-07-08 ENCOUNTER — PATIENT OUTREACH (OUTPATIENT)
Dept: ADMINISTRATIVE | Facility: CLINIC | Age: 84
End: 2024-07-08
Payer: MEDICARE

## 2024-07-08 LAB
BACTERIA SPEC CULT: NORMAL
GRAM STN SPEC: NORMAL
OHS QRS DURATION: 74 MS
OHS QTC CALCULATION: 415 MS

## 2024-07-08 NOTE — PROGRESS NOTES
C3 nurse attempted to contact Rosalba Whitlock  for a TCC post hospital discharge follow up call. No answer. Left voicemail with callback information. The patient has a scheduled HOSFU appointment with Eleazar Ramos MD 7/11/24 @3:30pm.

## 2024-07-09 NOTE — PROGRESS NOTES
2nd attempt made to reach patient for TCC call. Left voicemail please call [phone] leave first name, last, and date of birth for Temo. I will return your call.

## 2024-07-09 NOTE — PROGRESS NOTES
C3 nurse 3rd attempt to contact Rosalba Whitlock daughter for a TCC post hospital discharge follow up call. No answer. No voicemail available. The patient has a scheduled HOSFU appointment with Eleazar Ramos MD 7/11/24 3:30pm

## 2024-07-11 LAB
BACTERIA BLD CULT: NORMAL
BACTERIA BLD CULT: NORMAL

## (undated) DEVICE — KIT SURGICAL TURNOVER

## (undated) DEVICE — Device

## (undated) DEVICE — DRAIN JACKSON PRATT TRCR 19FR

## (undated) DEVICE — SUT SILK 2-0 BLK BR KS 30 I

## (undated) DEVICE — GLOVE BIOGEL 7.5

## (undated) DEVICE — DRAIN CHEST DRY SUCTION

## (undated) DEVICE — SUT PROLENE 4-0 RB-1 BL MO

## (undated) DEVICE — DEVICE INDEFLATOR BASIX

## (undated) DEVICE — GLOVE PROTEXIS BLUE LATEX 7

## (undated) DEVICE — SYS VIRTUOSAPH PLUS EVM

## (undated) DEVICE — PACK OR CLEAN UP COMBO SIZE 2

## (undated) DEVICE — WIRE INTRAMYOCARDIAL TEMP

## (undated) DEVICE — APPLICATOR SURG 2 SPRY 1 PLUNG

## (undated) DEVICE — POWDER ARISTA AH 3G

## (undated) DEVICE — CLIP JAW SPRING DBL SFT 6MM

## (undated) DEVICE — SOL NACL IRR 3000ML

## (undated) DEVICE — INSERT INTRACK CLAMP ULT 66MM

## (undated) DEVICE — SUT PROLENE 7-0 BV175-6

## (undated) DEVICE — KNIFE OPTIMUM STR GRN 15DEG

## (undated) DEVICE — PATCH SEALANT EVARREST 2X4

## (undated) DEVICE — SUT 2/0 36IN ETHIBOND EXCE

## (undated) DEVICE — GLOVE PROTEXIS PI SYN SURG 7.5

## (undated) DEVICE — UNIT AUTOTRANSFUSION PL 450ML

## (undated) DEVICE — ELECTRODE PATIENT RETURN DISP

## (undated) DEVICE — SOL ELECTROLYTE PH 7.4 500ML

## (undated) DEVICE — SHEATH INTRODUCER 6FR 11CM

## (undated) DEVICE — PAD DEFIB CADENCE ADULT R2

## (undated) DEVICE — SYR 10CC LUER LOCK

## (undated) DEVICE — STOPCOCK 4-WAY

## (undated) DEVICE — SOL NORMAL USPCA 0.9%

## (undated) DEVICE — ADHESIVE DERMABOND ADVANCED

## (undated) DEVICE — SUT VICRYL 1 OB 36 CTX

## (undated) DEVICE — KIT HAND CONTROL HIGH PRESSUR

## (undated) DEVICE — SUT VICRYL 2-0 36 CT-1

## (undated) DEVICE — CARTRIDGE HEPARIN DOSE

## (undated) DEVICE — KIT VAVD

## (undated) DEVICE — CARTRIDGE SILV 4CHAN 2.0-3.5MG

## (undated) DEVICE — CANNULA IMA 1MM

## (undated) DEVICE — SOL NACL IRR 1000ML BTL

## (undated) DEVICE — STOPCOCK 3-WAY

## (undated) DEVICE — SET MICROPUNCTUREECHO STIFF

## (undated) DEVICE — BOWL STERILE LARGE 32OZ

## (undated) DEVICE — YANKAUER OPEN TIP W/O VENT

## (undated) DEVICE — DRESSING TELFA + BARR 4X6IN

## (undated) DEVICE — DEVICE BASIXCOMPAK INFL 20ML

## (undated) DEVICE — SUT PROLENE 5-0 36IN C-1

## (undated) DEVICE — DRAPE INCISE IOBAN 2 23X23IN

## (undated) DEVICE — APPLICATOR ENDOSCP 32CM5MM2TIP

## (undated) DEVICE — SEALANT VISTASEAL FIBRIN 10ML

## (undated) DEVICE — BLADE SURGICAL SAFELOCK #10 ST

## (undated) DEVICE — PAD DEFIB RADIOTRANSPARENT

## (undated) DEVICE — DRAPE STERI INCISE 23X23IN

## (undated) DEVICE — GUIDE LAUNCHER 6FR EBU 3.5

## (undated) DEVICE — CANNULA SOFT FLOW ANG 14IN 21F

## (undated) DEVICE — HEMOCONCENTRATOR W TBNG ADPT

## (undated) DEVICE — SHEATH INTRODUCER 5FR 10CM

## (undated) DEVICE — TRAY CATH FOL SIL TEMP 10 16FR

## (undated) DEVICE — HOLDER STRIP-T SELF ADH 2X10IN

## (undated) DEVICE — SUT BONE WAX 2.5 GRMS 12/BX

## (undated) DEVICE — VALVE GUARDIAN II HEMOSTASIS

## (undated) DEVICE — CANNULA ADULT NASAL 7FT

## (undated) DEVICE — TOWEL OR DISP STRL BLUE 4/PK

## (undated) DEVICE — SOL LAC RINGERS 1000ML INJ

## (undated) DEVICE — BLOWER MISTER

## (undated) DEVICE — BLANKET HYPER ADULT 24X60IN

## (undated) DEVICE — HEMOCONCENTRATOR PED .09M2

## (undated) DEVICE — BOWL STERILE LG GRAD 32OZ

## (undated) DEVICE — SPONGE LAP 18X18 PREWASHED

## (undated) DEVICE — SUT PROLENE 4-0 SH BLU 36IN

## (undated) DEVICE — HEMOSTAT SURGICEL NU-KNIT 6X9

## (undated) DEVICE — SUT PROLENE 7-0 BV-1 30 BLU

## (undated) DEVICE — DRAPE SLUSH WARMER WITH DISC

## (undated) DEVICE — KIT C.A.T.S. FAST START

## (undated) DEVICE — PENCIL MEGADYNE E-Z CLEAN BTTN

## (undated) DEVICE — DRESSING TEGADERM 4.4X5IN

## (undated) DEVICE — KIT GLIDESHEATH SLEND 6FR 10CM

## (undated) DEVICE — SYS WASTE FLD DISPOSAL 1400ML

## (undated) DEVICE — CANNULA DUAL CO2/O2 NASAL 7FT

## (undated) DEVICE — DRAIN CHEST WATER SEAL

## (undated) DEVICE — ELECTRODE BLADE INSULATED 1 IN

## (undated) DEVICE — PLEDGET TFLN FELT 9.5X4.8 ST

## (undated) DEVICE — SYR ONLY LUER LOCK 20CC

## (undated) DEVICE — DRAPE STERI INSTRUMENT 1018

## (undated) DEVICE — GLOVE PROTEXIS HYDROGEL SZ6.5

## (undated) DEVICE — CANNULA MC2 NVENT .5IN 28/36FR

## (undated) DEVICE — SUT PROLENE 6-0 C-1 30IN BL

## (undated) DEVICE — BAND TR COMP DEVICE REG 24CM

## (undated) DEVICE — KIT MANIFOLD LOW PRESS TUBING

## (undated) DEVICE — GUIDEWIRE RUNTHROUGH NS 180CM

## (undated) DEVICE — GLOVE SURG BIOGEL LATEX SZ 7.5

## (undated) DEVICE — KIT CATHGARD DBL LUMN 9FRX11.5

## (undated) DEVICE — GUIDEWIRE SAFE T .035IN 180CM

## (undated) DEVICE — CONTAINER SPECIMEN SCREW 4OZ

## (undated) DEVICE — DRESSING TELFA + RECT 6X10IN

## (undated) DEVICE — PACK VARISPREED BATTERY

## (undated) DEVICE — CATH IMPULSE MP 5FR 145CM

## (undated) DEVICE — SPONGE GAUZE 16PLY 4X4

## (undated) DEVICE — PACK SURG PERF CARDPULM BYPS

## (undated) DEVICE — TUBING INSUFFLATOR W/ROT CONCT

## (undated) DEVICE — DRAPE ANGIO BRACH 38X44IN

## (undated) DEVICE — GUIDEWIRE INQWIRE SE 3MM JTIP

## (undated) DEVICE — COVER PROBE US 5.5X58L NON LTX

## (undated) DEVICE — BAG MEDI-PLAST DECANTER C-FLOW

## (undated) DEVICE — VAC WOUND DISPOSABLE PREVENA

## (undated) DEVICE — CATH OPTITORQUE RADIAL 5FR

## (undated) DEVICE — SOL PLASMALYTE PH 7.4 1000ML

## (undated) DEVICE — GOWN SMARTSLEEVE AAMI LVL4 XXL

## (undated) DEVICE — SENSOR LOW LEVEL OXYGEN

## (undated) DEVICE — ELECTRODE PROPAD MULTI W/10FT

## (undated) DEVICE — GUIDEWIRE OMNI STR TIP 185CM

## (undated) DEVICE — PUNCH AORTIC ROT TIP 4.0MM

## (undated) DEVICE — SUT PROLENE 3-0 36 MHMH

## (undated) DEVICE — SUT MONOCRYL PLUS UD 3-0 27

## (undated) DEVICE — SUT SILK 2-0 SH 18IN BLACK

## (undated) DEVICE — SOL .9NACL PF 100 ML

## (undated) DEVICE — SUT ETHBND XTRA 1 OS-8 30IN

## (undated) DEVICE — CANNULA AORT ROOT VNT LN 14GA

## (undated) DEVICE — CATH EMERGE MR 12 X 2.50

## (undated) DEVICE — SET PERFUSION

## (undated) DEVICE — CONTRAST ISOVUE 370 500ML MULT

## (undated) DEVICE — INSERT INTRACK CLAMP ULTRA 88M

## (undated) DEVICE — CARTRIDGE HEPARIN 2 CHNNL ACT

## (undated) DEVICE — GLOVE 7.5 PROTEXIS PI MICRO

## (undated) DEVICE — GLOVE PROTEXIS NEOPRN SZ8

## (undated) DEVICE — DRESSING TEGADERM CHG 3.5X4.5